# Patient Record
Sex: MALE | Race: WHITE | NOT HISPANIC OR LATINO | Employment: OTHER | URBAN - NONMETROPOLITAN AREA
[De-identification: names, ages, dates, MRNs, and addresses within clinical notes are randomized per-mention and may not be internally consistent; named-entity substitution may affect disease eponyms.]

---

## 2020-07-31 ENCOUNTER — APPOINTMENT (OUTPATIENT)
Dept: CT IMAGING | Facility: HOSPITAL | Age: 65
End: 2020-07-31
Attending: EMERGENCY MEDICINE
Payer: MEDICARE

## 2020-07-31 ENCOUNTER — APPOINTMENT (OUTPATIENT)
Dept: GENERAL RADIOLOGY | Facility: HOSPITAL | Age: 65
End: 2020-07-31
Attending: EMERGENCY MEDICINE
Payer: MEDICARE

## 2020-07-31 ENCOUNTER — HOSPITAL ENCOUNTER (EMERGENCY)
Facility: HOSPITAL | Age: 65
Discharge: HOME | End: 2020-07-31
Attending: EMERGENCY MEDICINE | Admitting: EMERGENCY MEDICINE
Payer: MEDICARE

## 2020-07-31 VITALS
SYSTOLIC BLOOD PRESSURE: 119 MMHG | RESPIRATION RATE: 16 BRPM | OXYGEN SATURATION: 93 % | TEMPERATURE: 97.9 F | WEIGHT: 225.31 LBS | HEART RATE: 97 BPM | DIASTOLIC BLOOD PRESSURE: 87 MMHG

## 2020-07-31 DIAGNOSIS — I50.9 CONGESTIVE HEART FAILURE, UNSPECIFIED HF CHRONICITY, UNSPECIFIED HEART FAILURE TYPE (H): ICD-10-CM

## 2020-07-31 DIAGNOSIS — K57.32 DIVERTICULITIS OF COLON: ICD-10-CM

## 2020-07-31 LAB
ANION GAP SERPL CALCULATED.3IONS-SCNC: 8 MMOL/L (ref 3–14)
BASOPHILS # BLD AUTO: 0.1 10E9/L (ref 0–0.2)
BASOPHILS NFR BLD AUTO: 0.4 %
BUN SERPL-MCNC: 11 MG/DL (ref 7–30)
CALCIUM SERPL-MCNC: 8.6 MG/DL (ref 8.5–10.1)
CHLORIDE SERPL-SCNC: 106 MMOL/L (ref 94–109)
CO2 SERPL-SCNC: 22 MMOL/L (ref 20–32)
CREAT SERPL-MCNC: 0.79 MG/DL (ref 0.66–1.25)
DIFFERENTIAL METHOD BLD: ABNORMAL
EOSINOPHIL # BLD AUTO: 0.2 10E9/L (ref 0–0.7)
EOSINOPHIL NFR BLD AUTO: 1.4 %
ERYTHROCYTE [DISTWIDTH] IN BLOOD BY AUTOMATED COUNT: 12.9 % (ref 10–15)
GFR SERPL CREATININE-BSD FRML MDRD: >90 ML/MIN/{1.73_M2}
GLUCOSE SERPL-MCNC: 118 MG/DL (ref 70–99)
HCT VFR BLD AUTO: 49 % (ref 40–53)
HGB BLD-MCNC: 17.2 G/DL (ref 13.3–17.7)
IMM GRANULOCYTES # BLD: 0.1 10E9/L (ref 0–0.4)
IMM GRANULOCYTES NFR BLD: 0.4 %
LYMPHOCYTES # BLD AUTO: 2.9 10E9/L (ref 0.8–5.3)
LYMPHOCYTES NFR BLD AUTO: 25.9 %
MCH RBC QN AUTO: 31.3 PG (ref 26.5–33)
MCHC RBC AUTO-ENTMCNC: 35.1 G/DL (ref 31.5–36.5)
MCV RBC AUTO: 89 FL (ref 78–100)
MONOCYTES # BLD AUTO: 1 10E9/L (ref 0–1.3)
MONOCYTES NFR BLD AUTO: 8.9 %
NEUTROPHILS # BLD AUTO: 7 10E9/L (ref 1.6–8.3)
NEUTROPHILS NFR BLD AUTO: 63 %
NRBC # BLD AUTO: 0 10*3/UL
NRBC BLD AUTO-RTO: 0 /100
NT-PROBNP SERPL-MCNC: 3183 PG/ML (ref 0–900)
PLATELET # BLD AUTO: 291 10E9/L (ref 150–450)
POTASSIUM SERPL-SCNC: 4.2 MMOL/L (ref 3.4–5.3)
PROCALCITONIN SERPL-MCNC: <0.05 NG/ML
RBC # BLD AUTO: 5.5 10E12/L (ref 4.4–5.9)
SODIUM SERPL-SCNC: 136 MMOL/L (ref 133–144)
WBC # BLD AUTO: 11.1 10E9/L (ref 4–11)

## 2020-07-31 PROCEDURE — 25500064 ZZH RX 255 OP 636: Performed by: EMERGENCY MEDICINE

## 2020-07-31 PROCEDURE — 36415 COLL VENOUS BLD VENIPUNCTURE: CPT | Performed by: EMERGENCY MEDICINE

## 2020-07-31 PROCEDURE — 71045 X-RAY EXAM CHEST 1 VIEW: CPT | Mod: TC

## 2020-07-31 PROCEDURE — 99285 EMERGENCY DEPT VISIT HI MDM: CPT | Mod: 25

## 2020-07-31 PROCEDURE — 83880 ASSAY OF NATRIURETIC PEPTIDE: CPT | Performed by: EMERGENCY MEDICINE

## 2020-07-31 PROCEDURE — C9803 HOPD COVID-19 SPEC COLLECT: HCPCS

## 2020-07-31 PROCEDURE — 84145 PROCALCITONIN (PCT): CPT | Performed by: EMERGENCY MEDICINE

## 2020-07-31 PROCEDURE — 80048 BASIC METABOLIC PNL TOTAL CA: CPT | Performed by: EMERGENCY MEDICINE

## 2020-07-31 PROCEDURE — U0003 INFECTIOUS AGENT DETECTION BY NUCLEIC ACID (DNA OR RNA); SEVERE ACUTE RESPIRATORY SYNDROME CORONAVIRUS 2 (SARS-COV-2) (CORONAVIRUS DISEASE [COVID-19]), AMPLIFIED PROBE TECHNIQUE, MAKING USE OF HIGH THROUGHPUT TECHNOLOGIES AS DESCRIBED BY CMS-2020-01-R: HCPCS | Performed by: EMERGENCY MEDICINE

## 2020-07-31 PROCEDURE — 74177 CT ABD & PELVIS W/CONTRAST: CPT | Mod: TC

## 2020-07-31 PROCEDURE — 85025 COMPLETE CBC W/AUTO DIFF WBC: CPT | Performed by: EMERGENCY MEDICINE

## 2020-07-31 PROCEDURE — 99285 EMERGENCY DEPT VISIT HI MDM: CPT | Mod: Z6 | Performed by: EMERGENCY MEDICINE

## 2020-07-31 RX ORDER — ACETAMINOPHEN 500 MG
1000 TABLET ORAL DAILY PRN
COMMUNITY

## 2020-07-31 RX ORDER — CYCLOBENZAPRINE HCL 10 MG
10 TABLET ORAL
COMMUNITY
Start: 2020-05-21 | End: 2021-02-26

## 2020-07-31 RX ORDER — TRAZODONE HYDROCHLORIDE 50 MG/1
100 TABLET, FILM COATED ORAL
COMMUNITY
Start: 2020-05-21 | End: 2021-08-25

## 2020-07-31 RX ORDER — GABAPENTIN 300 MG/1
300 CAPSULE ORAL
COMMUNITY
Start: 2020-04-13 | End: 2020-08-07

## 2020-07-31 RX ORDER — DULOXETIN HYDROCHLORIDE 60 MG/1
90 CAPSULE, DELAYED RELEASE ORAL DAILY
COMMUNITY
Start: 2020-05-21 | End: 2021-03-02

## 2020-07-31 RX ORDER — GUAIFENESIN 600 MG/1
1200 TABLET, EXTENDED RELEASE ORAL
Status: ON HOLD | COMMUNITY
Start: 2019-10-11 | End: 2022-02-08

## 2020-07-31 RX ORDER — ARIPIPRAZOLE 2 MG/1
2 TABLET ORAL
COMMUNITY
Start: 2019-08-11 | End: 2020-08-14

## 2020-07-31 RX ORDER — MONTELUKAST SODIUM 10 MG/1
TABLET ORAL
COMMUNITY
Start: 2019-09-08 | End: 2020-12-04

## 2020-07-31 RX ORDER — NAPROXEN 500 MG/1
TABLET ORAL
COMMUNITY
Start: 2020-03-31 | End: 2022-11-11

## 2020-07-31 RX ORDER — ASCORBIC ACID 500 MG
500 TABLET ORAL DAILY
COMMUNITY

## 2020-07-31 RX ORDER — CETIRIZINE HYDROCHLORIDE 10 MG/1
10 TABLET ORAL
COMMUNITY
Start: 2020-04-13 | End: 2020-08-14

## 2020-07-31 RX ORDER — HYDROXYZINE HYDROCHLORIDE 25 MG/1
12.5-25 TABLET, FILM COATED ORAL
COMMUNITY
Start: 2020-04-13 | End: 2020-10-13

## 2020-07-31 RX ORDER — FLUTICASONE PROPIONATE 50 MCG
2 SPRAY, SUSPENSION (ML) NASAL
COMMUNITY
Start: 2019-03-06 | End: 2022-11-11

## 2020-07-31 RX ORDER — IOPAMIDOL 612 MG/ML
100 INJECTION, SOLUTION INTRAVASCULAR ONCE
Status: COMPLETED | OUTPATIENT
Start: 2020-07-31 | End: 2020-07-31

## 2020-07-31 RX ORDER — ROSUVASTATIN CALCIUM 10 MG/1
10 TABLET, COATED ORAL
COMMUNITY
Start: 2019-06-04 | End: 2020-10-13

## 2020-07-31 RX ORDER — ZOLPIDEM TARTRATE 5 MG/1
10 TABLET ORAL
COMMUNITY
Start: 2020-06-08 | End: 2020-10-13

## 2020-07-31 RX ORDER — METRONIDAZOLE 500 MG/1
500 TABLET ORAL 2 TIMES DAILY
Qty: 20 TABLET | Refills: 0 | Status: SHIPPED | OUTPATIENT
Start: 2020-07-31 | End: 2020-08-10

## 2020-07-31 RX ORDER — AMPICILLIN TRIHYDRATE 500 MG
1 CAPSULE ORAL DAILY
COMMUNITY

## 2020-07-31 RX ORDER — HYDROCODONE BITARTRATE AND ACETAMINOPHEN 5; 325 MG/1; MG/1
1 TABLET ORAL EVERY 6 HOURS PRN
Qty: 18 TABLET | Refills: 0 | Status: SHIPPED | OUTPATIENT
Start: 2020-07-31 | End: 2020-08-03

## 2020-07-31 RX ORDER — ALBUTEROL SULFATE 90 UG/1
1-2 AEROSOL, METERED RESPIRATORY (INHALATION)
COMMUNITY
Start: 2019-03-13 | End: 2021-04-20

## 2020-07-31 RX ORDER — CEFPROZIL 500 MG/1
500 TABLET, FILM COATED ORAL 2 TIMES DAILY
Qty: 20 TABLET | Refills: 0 | Status: SHIPPED | OUTPATIENT
Start: 2020-07-31 | End: 2020-08-07

## 2020-07-31 RX ADMIN — IOPAMIDOL 100 ML: 612 INJECTION, SOLUTION INTRAVENOUS at 17:44

## 2020-07-31 ASSESSMENT — ENCOUNTER SYMPTOMS
NAUSEA: 0
SHORTNESS OF BREATH: 1
CONSTITUTIONAL NEGATIVE: 1
ABDOMINAL DISTENTION: 0
DIARRHEA: 0
CONSTIPATION: 0
ABDOMINAL PAIN: 1
COUGH: 1
BLOOD IN STOOL: 0
FATIGUE: 0
BACK PAIN: 0
VOMITING: 0
FEVER: 0

## 2020-07-31 NOTE — ED PROVIDER NOTES
History     Chief Complaint   Patient presents with     Abdominal Pain     HPI  Joey Irene is a 64 year old male who presents to the emergency department with a chief complaint of abdominal pain and a nonproductive cough.  Patient states that both of these symptoms started approximately 4 days ago.  Regards to the cough he states that he has been appropriately quarantining himself from COVID and is been wearing a mask at all times.  He has no known exposures to said disease denies experiencing exertional dyspnea denies chest pain no pleuritic complaints.  Patient's left lower quadrant abdominal pain is constant it is not associated with diarrhea or constipation nor is it associated with nausea or vomiting.    Allergies:  Allergies   Allergen Reactions     Tramadol Nausea and Swelling     [place] Nite Time Dizziness and Nausea and Vomiting     Nyquil Cold &  [Night-Time Cold-Flu Relief] Dizziness and Nausea and Vomiting       Problem List:    There are no active problems to display for this patient.       Past Medical History:    No past medical history on file.    Past Surgical History:    No past surgical history on file.    Family History:    No family history on file.    Social History:  Marital Status:   [4]  Social History     Tobacco Use     Smoking status: Not on file   Substance Use Topics     Alcohol use: Not on file     Drug use: Not on file        Medications:    albuterol (PROAIR HFA/PROVENTIL HFA/VENTOLIN HFA) 108 (90 Base) MCG/ACT inhaler  ARIPiprazole (ABILIFY) 2 MG tablet  aspirin (ASA) 81 MG EC tablet  cefPROZIL (CEFZIL) 500 MG tablet  cetirizine (ZYRTEC) 10 MG tablet  cyclobenzaprine (FLEXERIL) 10 MG tablet  DULoxetine (CYMBALTA) 60 MG capsule  fluticasone (FLONASE) 50 MCG/ACT nasal spray  Fluticasone-Umeclidin-Vilanterol (TRELEGY ELLIPTA) 100-62.5-25 MCG/INH oral inhaler  gabapentin (NEURONTIN) 300 MG capsule  guaiFENesin (MUCINEX) 600 MG 12 hr tablet  HYDROcodone-acetaminophen (NORCO)  5-325 MG tablet  hydrOXYzine (ATARAX) 25 MG tablet  metroNIDAZOLE (FLAGYL) 500 MG tablet  montelukast (SINGULAIR) 10 MG tablet  naproxen (NAPROSYN) 500 MG tablet  omeprazole (PRILOSEC) 20 MG DR capsule  rosuvastatin (CRESTOR) 10 MG tablet  traZODone (DESYREL) 50 MG tablet  zolpidem (AMBIEN) 5 MG tablet  acetaminophen (TYLENOL) 500 MG tablet  Cholecalciferol (D 1000) 25 MCG (1000 UT) CAPS  vitamin C (ASCORBIC ACID) 500 MG tablet  VITAMIN E-100 OR          Review of Systems   Constitutional: Negative.  Negative for fatigue and fever.   Respiratory: Positive for cough and shortness of breath.    Cardiovascular: Negative for chest pain.   Gastrointestinal: Positive for abdominal pain. Negative for abdominal distention, blood in stool, constipation, diarrhea, nausea and vomiting.   Musculoskeletal: Negative for back pain.   All other systems reviewed and are negative.      Physical Exam   BP: 119/87  Pulse: 97  Temp: 97.9  F (36.6  C)  Resp: 16  Weight: 102.2 kg (225 lb 5 oz)  SpO2: 93 %      Physical Exam  Vitals signs and nursing note reviewed.   Constitutional:       Appearance: He is well-developed.   HENT:      Head: Normocephalic.   Neck:      Musculoskeletal: Normal range of motion.   Cardiovascular:      Rate and Rhythm: Normal rate.   Pulmonary:      Effort: Pulmonary effort is normal.      Breath sounds: Rales present.   Abdominal:      General: Bowel sounds are normal. There is no distension.      Palpations: Abdomen is soft.      Tenderness: There is abdominal tenderness in the left lower quadrant.      Comments: Tenderness in the left lower quadrant   Musculoskeletal: Normal range of motion.   Skin:     General: Skin is warm and dry.   Neurological:      General: No focal deficit present.      Mental Status: He is alert and oriented to person, place, and time.         ED Course        Procedures                 Results for orders placed or performed during the hospital encounter of 07/31/20 (from the past 24  hour(s))   CBC with platelets differential   Result Value Ref Range    WBC 11.1 (H) 4.0 - 11.0 10e9/L    RBC Count 5.50 4.4 - 5.9 10e12/L    Hemoglobin 17.2 13.3 - 17.7 g/dL    Hematocrit 49.0 40.0 - 53.0 %    MCV 89 78 - 100 fl    MCH 31.3 26.5 - 33.0 pg    MCHC 35.1 31.5 - 36.5 g/dL    RDW 12.9 10.0 - 15.0 %    Platelet Count 291 150 - 450 10e9/L    Diff Method Automated Method     % Neutrophils 63.0 %    % Lymphocytes 25.9 %    % Monocytes 8.9 %    % Eosinophils 1.4 %    % Basophils 0.4 %    % Immature Granulocytes 0.4 %    Nucleated RBCs 0 0 /100    Absolute Neutrophil 7.0 1.6 - 8.3 10e9/L    Absolute Lymphocytes 2.9 0.8 - 5.3 10e9/L    Absolute Monocytes 1.0 0.0 - 1.3 10e9/L    Absolute Eosinophils 0.2 0.0 - 0.7 10e9/L    Absolute Basophils 0.1 0.0 - 0.2 10e9/L    Abs Immature Granulocytes 0.1 0 - 0.4 10e9/L    Absolute Nucleated RBC 0.0    Basic metabolic panel   Result Value Ref Range    Sodium 136 133 - 144 mmol/L    Potassium 4.2 3.4 - 5.3 mmol/L    Chloride 106 94 - 109 mmol/L    Carbon Dioxide 22 20 - 32 mmol/L    Anion Gap 8 3 - 14 mmol/L    Glucose 118 (H) 70 - 99 mg/dL    Urea Nitrogen 11 7 - 30 mg/dL    Creatinine 0.79 0.66 - 1.25 mg/dL    GFR Estimate >90 >60 mL/min/[1.73_m2]    GFR Estimate If Black >90 >60 mL/min/[1.73_m2]    Calcium 8.6 8.5 - 10.1 mg/dL   Procalcitonin   Result Value Ref Range    Procalcitonin <0.05 ng/ml   NT pro BNP   Result Value Ref Range    N-Terminal Pro BNP Inpatient 3,183 (H) 0 - 900 pg/mL   XR Chest Port 1 View    Narrative    PROCEDURE:  XR CHEST PORT 1 VW    HISTORY: eval for sob. .    COMPARISON:  None.    FINDINGS:    The cardiomediastinal contours are notable for probable enlargement of  the pulmonary artery. Interstitial markings are coarsened. A few  Kerley B lines are questioned. Some hazy opacity is present at the  bases.       Impression    IMPRESSION:  Possible interstitial pulmonary edema. Superimposed  infection is not excluded in the appropriate clinical  context.  Consider short interval follow-up.     EMMY EARL MD   Abd/pelvis CT - IV contrast only TRAUMA / AAA    Narrative    PROCEDURE:  CT ABDOMEN PELVIS W CONTRAST    HISTORY: Abd pain, diverticulitis suspected    TECHNIQUE:  Helical CT of the abdomen and pelvis was performed  following injection of intravenous contrast.    COMPARISON:  None.    MEDS/CONTRAST: Isovue 300 100ml Given    FINDINGS:      Limited images through the lung bases demonstrate cardiomegaly. No  edema is seen at the bases. Atelectasis and emphysematous changes are  noted.    The small bowel is normal in caliber. There is focal inflammation  adjacent to multiple diverticula in the proximal sigmoid colon. No  free air or abscess formation is seen.    The liver demonstrates a small cyst.  The gallbladder, spleen,  pancreas and adrenal glands are unremarkable.  Symmetric nephrograms  are present without hydronephrosis. The aorta is normal in size with  scattered atherosclerotic calcifications. The prostate is enlarged.    No suspicious osseous lesions are identified.      Impression    IMPRESSION:      Acute diverticulitis of the proximal sigmoid colon without evidence of  abscess or free air.    EMMY EARL MD       Medications   sodium chloride (PF) 0.9% PF flush 60 mL (60 mLs Intravenous Given 7/31/20 1744)   iopamidol (ISOVUE-300) IV solution 61% 100 mL (100 mLs Intravenous Given 7/31/20 1744)       Assessments & Plan (with Medical Decision Making)  1. Diverticulitis-patient presents to the ER with a chief complaint of left-sided abdominal pain x4 days.  He does not complain of diarrhea constipation or fevers nor change in his appetite.  CT scan reveals evidence of diverticulitis without abscess formation.  This will be treated in the outpatient setting with cefprozil as well as Flagyl for a course of 10 days.  2. Cough-is likely a cardiac cough he has evidence of pulmonary edema and an elevated BNP.  Patient has no known  history of cardiac disease he just moved to [place] he needs to establish care with the PCP for further diagnostic work-up.  I placed an order for a resting cardiac echo in addition I did advise him to follow-up with the PCP to discuss utility of starting diuretics.  Lastly he does have a COVID test that is pending.     I have reviewed the nursing notes.    I have reviewed the findings, diagnosis, plan and need for follow up with the patient.      New Prescriptions    CEFPROZIL (CEFZIL) 500 MG TABLET    Take 1 tablet (500 mg) by mouth 2 times daily for 10 days    HYDROCODONE-ACETAMINOPHEN (NORCO) 5-325 MG TABLET    Take 1 tablet by mouth every 6 hours as needed for pain    METRONIDAZOLE (FLAGYL) 500 MG TABLET    Take 1 tablet (500 mg) by mouth 2 times daily for 10 days       Final diagnoses:   Diverticulitis of colon   Congestive heart failure, unspecified HF chronicity, unspecified heart failure type (H)       7/31/2020   [facility]     Sung Flood MD  07/31/20 9122

## 2020-07-31 NOTE — ED AVS SNAPSHOT
[facility]  [address]  [place] [address]  Phone:  [phone]                                    Joey Irene   MRN: [id number]    Department:  [facility]   Date of Visit:  7/31/2020           After Visit Summary Signature Page    I have received my discharge instructions, and my questions have been answered. I have discussed any challenges I see with this plan with the nurse or doctor.    ..........................................................................................................................................  Patient/Patient Representative Signature      ..........................................................................................................................................  Patient Representative Print Name and Relationship to Patient    ..................................................               ................................................  Date                                   Time    ..........................................................................................................................................  Reviewed by Signature/Title    ...................................................              ..............................................  Date                                               Time          22EPIC Rev 08/18

## 2020-07-31 NOTE — DISCHARGE INSTRUCTIONS
Please follow up with your doctor to arrange a cardiac echo because your blood tests show a component of early heart failure.  Take antibiotics as prescribed for your intestinal infection called diverticulitis.

## 2020-07-31 NOTE — ED NOTES
Pt states he has had a cough for 4 days. Denies and any fevers or body aches. States LLQ pain for 4 days that is worse with coughing and when he presses on area.

## 2020-08-03 ENCOUNTER — HOSPITAL ENCOUNTER (EMERGENCY)
Facility: HOSPITAL | Age: 65
Discharge: HOME | End: 2020-08-04
Attending: EMERGENCY MEDICINE | Admitting: EMERGENCY MEDICINE
Payer: MEDICARE

## 2020-08-03 ENCOUNTER — APPOINTMENT (OUTPATIENT)
Dept: GENERAL RADIOLOGY | Facility: HOSPITAL | Age: 65
End: 2020-08-03
Attending: EMERGENCY MEDICINE
Payer: MEDICARE

## 2020-08-03 DIAGNOSIS — S61.210A LACERATION OF RIGHT INDEX FINGER WITHOUT FOREIGN BODY WITHOUT DAMAGE TO NAIL, INITIAL ENCOUNTER: ICD-10-CM
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
## 2020-08-03 DIAGNOSIS — S62.659A CLOSED NONDISPLACED FRACTURE OF MIDDLE PHALANX OF FINGER, UNSPECIFIED FINGER, INITIAL ENCOUNTER: ICD-10-CM

## 2020-08-03 LAB
SARS-COV-2 RNA SPEC QL NAA+PROBE: NOT DETECTED
SPECIMEN SOURCE: NORMAL

## 2020-08-03 PROCEDURE — 12001 RPR S/N/AX/GEN/TRNK 2.5CM/<: CPT | Performed by: EMERGENCY MEDICINE

## 2020-08-03 PROCEDURE — 90471 IMMUNIZATION ADMIN: CPT

## 2020-08-03 PROCEDURE — 25000128 H RX IP 250 OP 636: Performed by: EMERGENCY MEDICINE

## 2020-08-03 PROCEDURE — 90715 TDAP VACCINE 7 YRS/> IM: CPT | Performed by: EMERGENCY MEDICINE

## 2020-08-03 PROCEDURE — 12001 RPR S/N/AX/GEN/TRNK 2.5CM/<: CPT

## 2020-08-03 PROCEDURE — 99283 EMERGENCY DEPT VISIT LOW MDM: CPT | Mod: 25 | Performed by: EMERGENCY MEDICINE

## 2020-08-03 PROCEDURE — 99283 EMERGENCY DEPT VISIT LOW MDM: CPT | Mod: 25

## 2020-08-03 PROCEDURE — 73140 X-RAY EXAM OF FINGER(S): CPT | Mod: TC,RT

## 2020-08-03 RX ADMIN — CLOSTRIDIUM TETANI TOXOID ANTIGEN (FORMALDEHYDE INACTIVATED), CORYNEBACTERIUM DIPHTHERIAE TOXOID ANTIGEN (FORMALDEHYDE INACTIVATED), BORDETELLA PERTUSSIS TOXOID ANTIGEN (GLUTARALDEHYDE INACTIVATED), BORDETELLA PERTUSSIS FILAMENTOUS HEMAGGLUTININ ANTIGEN (FORMALDEHYDE INACTIVATED), BORDETELLA PERTUSSIS PERTACTIN ANTIGEN, AND BORDETELLA PERTUSSIS FIMBRIAE 2/3 ANTIGEN 0.5 ML: 5; 2; 2.5; 5; 3; 5 INJECTION, SUSPENSION INTRAMUSCULAR at 23:21

## 2020-08-03 SDOH — HEALTH STABILITY: MENTAL HEALTH: HOW OFTEN DO YOU HAVE A DRINK CONTAINING ALCOHOL?: NEVER

## 2020-08-03 NOTE — ED AVS SNAPSHOT
[facility]  [address]  [place] [address]  Phone:  [phone]                                    Joey Irene   MRN: [id number]    Department:  [facility]   Date of Visit:  8/3/2020           After Visit Summary Signature Page    I have received my discharge instructions, and my questions have been answered. I have discussed any challenges I see with this plan with the nurse or doctor.    ..........................................................................................................................................  Patient/Patient Representative Signature      ..........................................................................................................................................  Patient Representative Print Name and Relationship to Patient    ..................................................               ................................................  Date                                   Time    ..........................................................................................................................................  Reviewed by Signature/Title    ...................................................              ..............................................  Date                                               Time          22EPIC Rev 08/18

## 2020-08-04 VITALS
DIASTOLIC BLOOD PRESSURE: 84 MMHG | OXYGEN SATURATION: 94 % | SYSTOLIC BLOOD PRESSURE: 120 MMHG | HEART RATE: 82 BPM | TEMPERATURE: 98.4 F | RESPIRATION RATE: 16 BRPM

## 2020-08-04 PROCEDURE — 25000132 ZZH RX MED GY IP 250 OP 250 PS 637: Mod: GY | Performed by: EMERGENCY MEDICINE

## 2020-08-04 RX ADMIN — AMOXICILLIN AND CLAVULANATE POTASSIUM 1 TABLET: 875; 125 TABLET, FILM COATED ORAL at 00:38

## 2020-08-04 NOTE — DISCHARGE INSTRUCTIONS
1) Follow the aftercare instructions provided  2) Your sutures will be removed in 7 to 10 days  3) You have been given a prescription for a medication that can cause an allergic reactions. Return to the ER if you develop any itching, tongue swelling, wheezing or shortness of breath.  4) Stop the Cefzil antibiotic and start the new Augmentin antibiotic. Continue with the Flagyl medication.   5) You must be seen by orthopedics this week for recheck.  You may call orthopedic associates if you have not been contacted by our orthopedic Department.  6) Return to the ER for a referral if you cannot schedule an orthopedic appointment this week.   6) Do not wear your splint for more than a week unless instructed to do so by a doctor

## 2020-08-04 NOTE — ED PROVIDER NOTES
History     Chief Complaint   Patient presents with     Dog Bite     right index finger laceration from dog bite about 10 mn ago, pts dog, shots up to date     HPI  Joey Irene is a 64 year old male who presents today with complaints of a dog bite.  Patient states that approximately 20 minutes prior to arrival, patient noted his dog had a toy in his hand.  He attempted to move it when the dog bit down and in the process bit into his finger.  Patient had to forcibly remove his finger.  Dog is up-to-date with all immunizations.  Last tetanus was over 10 years ago for patient.  Patient is right-handed.  No additional complaints.    Allergies:  Allergies   Allergen Reactions     Tramadol Nausea and Swelling     [place] Nite Time Dizziness and Nausea and Vomiting     Nyquil Cold &  [Night-Time Cold-Flu Relief] Dizziness and Nausea and Vomiting       Problem List:    There are no active problems to display for this patient.       Past Medical History:    History reviewed. No pertinent past medical history.    Past Surgical History:    History reviewed. No pertinent surgical history.    Family History:    History reviewed. No pertinent family history.    Social History:  Marital Status:   [4]  Social History     Tobacco Use     Smoking status: Current Some Day Smoker     Smokeless tobacco: Never Used     Tobacco comment: uses nicorette, one cigar per day   Substance Use Topics     Alcohol use: Never     Frequency: Never     Drug use: Never        Medications:    acetaminophen (TYLENOL) 500 MG tablet  albuterol (PROAIR HFA/PROVENTIL HFA/VENTOLIN HFA) 108 (90 Base) MCG/ACT inhaler  ARIPiprazole (ABILIFY) 2 MG tablet  aspirin (ASA) 81 MG EC tablet  cefPROZIL (CEFZIL) 500 MG tablet  cetirizine (ZYRTEC) 10 MG tablet  Cholecalciferol (D 1000) 25 MCG (1000 UT) CAPS  cyclobenzaprine (FLEXERIL) 10 MG tablet  DULoxetine (CYMBALTA) 60 MG capsule  fluticasone (FLONASE) 50 MCG/ACT nasal spray  Fluticasone-Umeclidin-Vilanterol  (TRELEGY ELLIPTA) 100-62.5-25 MCG/INH oral inhaler  gabapentin (NEURONTIN) 300 MG capsule  guaiFENesin (MUCINEX) 600 MG 12 hr tablet  HYDROcodone-acetaminophen (NORCO) 5-325 MG tablet  hydrOXYzine (ATARAX) 25 MG tablet  metroNIDAZOLE (FLAGYL) 500 MG tablet  montelukast (SINGULAIR) 10 MG tablet  naproxen (NAPROSYN) 500 MG tablet  omeprazole (PRILOSEC) 20 MG DR capsule  rosuvastatin (CRESTOR) 10 MG tablet  traZODone (DESYREL) 50 MG tablet  vitamin C (ASCORBIC ACID) 500 MG tablet  VITAMIN E-100 OR  zolpidem (AMBIEN) 5 MG tablet          Review of Systems   All other systems reviewed and are negative.      Physical Exam   BP: 125/90  Pulse: 65  Temp: 97.1  F (36.2  C)  Resp: 18  SpO2: 92 %      Physical Exam  Constitutional:       General: He is not in acute distress.     Appearance: Normal appearance. He is normal weight. He is not toxic-appearing.   Neck:      Musculoskeletal: Normal range of motion and neck supple.   Pulmonary:      Effort: Pulmonary effort is normal.   Musculoskeletal:      Comments: Right hand -1.5 cm laceration over volar aspect of DIP of first finger.  Full range of motion of DIP and PIP of involved finger (intact flexion and extension of DIP and PIP).  Good radial pulses.  Sensation intact in all extremities.  Good capillary refill.  Full range of motion of wrist.  Uninjured above the wrist.    Slight bruising noted over volar aspect of middle phalanx and second area over distal phalanx.   Skin:     General: Skin is warm.      Capillary Refill: Capillary refill takes less than 2 seconds.   Neurological:      General: No focal deficit present.      Mental Status: He is alert.   Psychiatric:         Mood and Affect: Mood normal.         Behavior: Behavior normal.         Thought Content: Thought content normal.         Judgment: Judgment normal.         ED Course        Procedures    Wound was anesthestized with 8 ml of 1% lidocaine (no epi) by digital block. Wound was irrigated copiously with 500  ml of NS by jet propulsion. Under direct light and in a bloodless field, bottom of the wound seen and no foreign body or tendon injury noted. Under sterile conditions, wound closed with three loose 4-0 non-absorbable interrupted stitches. Wound edges well approximated. No complications.      X-rays - ? Lucency through volar base of middle finger suspicious for nondisplaced avulsion fx.   No results found for this or any previous visit (from the past 24 hour(s)).    Medications   Tdap (tetanus-diphtheria-acell pertussis) (ADACEL) injection 0.5 mL (0.5 mLs Intramuscular Given 8/3/20 8659)       Assessments & Plan (with Medical Decision Making)     64-year-old male who presents today with complaints of finger bite from his own dog.  Dog's immunization records are up-to-date.  Patient recently seen in our emergency department for diverticulitis and placed on Cefzil and Flagyl.  X-rays performed showing a possible nondisplaced avulsion fracture of the base of the middle phalanx.  Wound closed primarily after discussion with the plastic surgeon on-call at [facility], Dr. Lizarraga, who confirmed that it is safe to do so loosely.  Patient placed in finger splint afterwards.      Patient is going to be discharged home with follow-up with orthopedics this week.  Cefzil was discontinued and patient started on Augmentin for dog bite prophylaxis as well as continued treatment for diverticulitis.  Wound care instructions reviewed with patient.  O2 sats noted but patient has hx of COPD with previous visits with similar O2 levels.  Patient understands to return if he develops any new or worsening symptoms.    Due to the nature of this electronic medical record, laboratory results, imaging results, diagnosis, other information and medications reported above may not represent information available to me at the the time of my care and disposition. Medications reported above may have not been ordered by me.     Portions of the record may  have been created with voice recognition software. Occasional wrong-word or 'sound-a- like' substitution may have occurred due to the inherent limitations of voice recognition software. Though the chart has been reviewed, there may be inadvertent transcription errors. Read the chart carefully and recognize, using context, where substitutions have occurred.       New Prescriptions    No medications on file       Final diagnoses:   Laceration of right index finger without foreign body without damage to nail, initial encounter   Closed nondisplaced fracture of middle phalanx of finger, unspecified finger, initial encounter   Dog bite, initial encounter       8/3/2020   [facility]     Michelle Juan MD  08/04/20 0125       Michelle Juan MD  08/04/20 0128

## 2020-08-04 NOTE — ED NOTES
"Pt was playing with his dog with a toy when the dog accidentally grabbed onto pts distal right index finger, catching dogs tooth on pts finger, lacerating it approx 3 cm in a \"L\" shape. Bleeding controlled. Call light in reach.   "

## 2020-08-05 PROBLEM — I51.7 CARDIOMEGALY: Status: ACTIVE | Noted: 2020-08-05

## 2020-08-05 PROBLEM — E78.5 HYPERLIPIDEMIA, UNSPECIFIED HYPERLIPIDEMIA TYPE: Status: ACTIVE | Noted: 2020-08-05

## 2020-08-05 PROBLEM — N40.0 ENLARGED PROSTATE: Status: ACTIVE | Noted: 2020-08-05

## 2020-08-05 PROBLEM — J43.9 PULMONARY EMPHYSEMA, UNSPECIFIED EMPHYSEMA TYPE (H): Status: ACTIVE | Noted: 2020-08-05
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
## 2020-08-06 PROBLEM — F11.10 OPIOID ABUSE (H): Status: ACTIVE | Noted: 2020-08-06

## 2020-08-06 PROBLEM — E66.811 CLASS 1 OBESITY WITH BODY MASS INDEX (BMI) OF 33.0 TO 33.9 IN ADULT: Status: ACTIVE | Noted: 2020-02-03

## 2020-08-06 PROBLEM — Z96.652 STATUS POST TOTAL LEFT KNEE REPLACEMENT: Status: ACTIVE | Noted: 2017-02-25

## 2020-08-06 PROBLEM — J34.2 NASAL SEPTAL DEVIATION: Status: ACTIVE | Noted: 2017-12-18

## 2020-08-06 PROBLEM — Z96.652: Status: ACTIVE | Noted: 2020-01-27

## 2020-08-06 PROBLEM — F17.200 TOBACCO USE DISORDER: Status: ACTIVE | Noted: 2020-08-06

## 2020-08-06 PROBLEM — F41.1 GENERALIZED ANXIETY DISORDER: Status: ACTIVE | Noted: 2020-08-06

## 2020-08-06 PROBLEM — K57.32 DIVERTICULITIS LARGE INTESTINE: Status: ACTIVE | Noted: 2020-08-06

## 2020-08-06 PROBLEM — M48.02 CERVICAL STENOSIS OF SPINAL CANAL: Status: ACTIVE | Noted: 2020-01-13

## 2020-08-06 PROBLEM — F17.210 SMOKING GREATER THAN 40 PACK YEARS: Status: ACTIVE | Noted: 2020-02-03

## 2020-08-06 PROBLEM — J44.89 CHRONIC BRONCHITIS WITH EMPHYSEMA (H): Status: ACTIVE | Noted: 2020-02-03

## 2020-08-06 PROBLEM — H91.92: Status: ACTIVE | Noted: 2019-03-04

## 2020-08-06 PROBLEM — T84.84XS: Status: ACTIVE | Noted: 2020-01-27

## 2020-08-06 PROBLEM — K57.90 DIVERTICULOSIS: Status: ACTIVE | Noted: 2020-03-25

## 2020-08-06 PROBLEM — Z98.1 HISTORY OF FUSION OF CERVICAL SPINE: Status: ACTIVE | Noted: 2020-08-06

## 2020-08-06 PROBLEM — H71.92 CHOLESTEATOMA OF LEFT EAR: Status: ACTIVE | Noted: 2019-03-04

## 2020-08-06 PROBLEM — M17.11 PRIMARY OSTEOARTHRITIS OF RIGHT KNEE: Status: ACTIVE | Noted: 2017-02-25

## 2020-08-07 ENCOUNTER — TELEPHONE (OUTPATIENT)
Dept: UROLOGY | Facility: OTHER | Age: 65
End: 2020-08-07

## 2020-08-07 ENCOUNTER — OFFICE VISIT (OUTPATIENT)
Dept: FAMILY MEDICINE | Facility: OTHER | Age: 65
End: 2020-08-07
Attending: NURSE PRACTITIONER
Payer: MEDICARE

## 2020-08-07 VITALS
WEIGHT: 223 LBS | TEMPERATURE: 97.1 F | DIASTOLIC BLOOD PRESSURE: 80 MMHG | SYSTOLIC BLOOD PRESSURE: 110 MMHG | HEART RATE: 87 BPM | BODY MASS INDEX: 31.22 KG/M2 | HEIGHT: 71 IN

## 2020-08-07 DIAGNOSIS — Z13.29 SCREENING FOR THYROID DISORDER: ICD-10-CM

## 2020-08-07 DIAGNOSIS — N40.0 ENLARGED PROSTATE: ICD-10-CM

## 2020-08-07 DIAGNOSIS — Z11.59 ENCOUNTER FOR HEPATITIS C SCREENING TEST FOR LOW RISK PATIENT: ICD-10-CM

## 2020-08-07 DIAGNOSIS — Z76.89 ENCOUNTER TO ESTABLISH CARE: Primary | ICD-10-CM

## 2020-08-07 DIAGNOSIS — R73.9 HYPERGLYCEMIA: ICD-10-CM

## 2020-08-07 DIAGNOSIS — R79.89 ELEVATED TSH: ICD-10-CM

## 2020-08-07 DIAGNOSIS — J81.0 ACUTE PULMONARY EDEMA (H): ICD-10-CM

## 2020-08-07 DIAGNOSIS — I51.7 CARDIOMEGALY: ICD-10-CM

## 2020-08-07 DIAGNOSIS — Z91.030 BEE STING ALLERGY: ICD-10-CM

## 2020-08-07 DIAGNOSIS — Z11.4 SCREENING FOR HIV (HUMAN IMMUNODEFICIENCY VIRUS): ICD-10-CM

## 2020-08-07 DIAGNOSIS — Z23 NEED FOR PNEUMOCOCCAL VACCINATION: ICD-10-CM

## 2020-08-07 DIAGNOSIS — Z12.5 SCREENING FOR PROSTATE CANCER: ICD-10-CM

## 2020-08-07 DIAGNOSIS — Z12.11 SCREEN FOR COLON CANCER: ICD-10-CM

## 2020-08-07 DIAGNOSIS — K57.92 ACUTE DIVERTICULITIS: ICD-10-CM

## 2020-08-07 DIAGNOSIS — E78.5 HYPERLIPIDEMIA, UNSPECIFIED HYPERLIPIDEMIA TYPE: ICD-10-CM

## 2020-08-07 DIAGNOSIS — S62.629D CLOSED AVULSION FRACTURE OF MIDDLE PHALANX OF FINGER WITH ROUTINE HEALING, SUBSEQUENT ENCOUNTER: ICD-10-CM

## 2020-08-07 DIAGNOSIS — J43.9 PULMONARY EMPHYSEMA, UNSPECIFIED EMPHYSEMA TYPE (H): ICD-10-CM

## 2020-08-07 DIAGNOSIS — R97.20 ELEVATED PROSTATE SPECIFIC ANTIGEN (PSA): ICD-10-CM

## 2020-08-07 LAB
ALBUMIN SERPL-MCNC: 3.6 G/DL (ref 3.4–5)
ALP SERPL-CCNC: 77 U/L (ref 40–150)
ALT SERPL W P-5'-P-CCNC: 18 U/L (ref 0–70)
ANION GAP SERPL CALCULATED.3IONS-SCNC: 5 MMOL/L (ref 3–14)
AST SERPL W P-5'-P-CCNC: 13 U/L (ref 0–45)
BASOPHILS # BLD AUTO: 0.1 10E9/L (ref 0–0.2)
BASOPHILS NFR BLD AUTO: 0.8 %
BILIRUB SERPL-MCNC: 0.3 MG/DL (ref 0.2–1.3)
BUN SERPL-MCNC: 17 MG/DL (ref 7–30)
CALCIUM SERPL-MCNC: 9.2 MG/DL (ref 8.5–10.1)
CHLORIDE SERPL-SCNC: 106 MMOL/L (ref 94–109)
CHOLEST SERPL-MCNC: 98 MG/DL
CO2 SERPL-SCNC: 25 MMOL/L (ref 20–32)
CREAT SERPL-MCNC: 0.76 MG/DL (ref 0.66–1.25)
DIFFERENTIAL METHOD BLD: ABNORMAL
EOSINOPHIL # BLD AUTO: 0.2 10E9/L (ref 0–0.7)
EOSINOPHIL NFR BLD AUTO: 1.8 %
ERYTHROCYTE [DISTWIDTH] IN BLOOD BY AUTOMATED COUNT: 12.9 % (ref 10–15)
EST. AVERAGE GLUCOSE BLD GHB EST-MCNC: 128 MG/DL
GFR SERPL CREATININE-BSD FRML MDRD: >90 ML/MIN/{1.73_M2}
GLUCOSE SERPL-MCNC: 114 MG/DL (ref 70–99)
HBA1C MFR BLD: 6.1 % (ref 0–5.6)
HCT VFR BLD AUTO: 50.7 % (ref 40–53)
HDLC SERPL-MCNC: 29 MG/DL
HGB BLD-MCNC: 17.5 G/DL (ref 13.3–17.7)
IMM GRANULOCYTES # BLD: 0.1 10E9/L (ref 0–0.4)
IMM GRANULOCYTES NFR BLD: 0.6 %
LDLC SERPL CALC-MCNC: 34 MG/DL
LYMPHOCYTES # BLD AUTO: 2.9 10E9/L (ref 0.8–5.3)
LYMPHOCYTES NFR BLD AUTO: 24.6 %
MCH RBC QN AUTO: 30.9 PG (ref 26.5–33)
MCHC RBC AUTO-ENTMCNC: 34.5 G/DL (ref 31.5–36.5)
MCV RBC AUTO: 89 FL (ref 78–100)
MONOCYTES # BLD AUTO: 1 10E9/L (ref 0–1.3)
MONOCYTES NFR BLD AUTO: 8.1 %
NEUTROPHILS # BLD AUTO: 7.5 10E9/L (ref 1.6–8.3)
NEUTROPHILS NFR BLD AUTO: 64.1 %
NONHDLC SERPL-MCNC: 69 MG/DL
NRBC # BLD AUTO: 0 10*3/UL
NRBC BLD AUTO-RTO: 0 /100
NT-PROBNP SERPL-MCNC: 3201 PG/ML (ref 0–125)
PLATELET # BLD AUTO: 329 10E9/L (ref 150–450)
POTASSIUM SERPL-SCNC: 4.4 MMOL/L (ref 3.4–5.3)
PROT SERPL-MCNC: 7.5 G/DL (ref 6.8–8.8)
PSA SERPL-ACNC: 10.9 UG/L (ref 0–4)
RBC # BLD AUTO: 5.67 10E12/L (ref 4.4–5.9)
SODIUM SERPL-SCNC: 136 MMOL/L (ref 133–144)
T4 FREE SERPL-MCNC: 0.97 NG/DL (ref 0.76–1.46)
TRIGL SERPL-MCNC: 173 MG/DL
TSH SERPL DL<=0.005 MIU/L-ACNC: 5.92 MU/L (ref 0.4–4)
WBC # BLD AUTO: 11.7 10E9/L (ref 4–11)

## 2020-08-07 PROCEDURE — 80053 COMPREHEN METABOLIC PANEL: CPT | Mod: ZL | Performed by: NURSE PRACTITIONER

## 2020-08-07 PROCEDURE — 83036 HEMOGLOBIN GLYCOSYLATED A1C: CPT | Mod: ZL | Performed by: NURSE PRACTITIONER

## 2020-08-07 PROCEDURE — G0009 ADMIN PNEUMOCOCCAL VACCINE: HCPCS

## 2020-08-07 PROCEDURE — 86803 HEPATITIS C AB TEST: CPT | Mod: ZL | Performed by: NURSE PRACTITIONER

## 2020-08-07 PROCEDURE — 40000788 ZZHCL STATISTIC ESTIMATED AVERAGE GLUCOSE: Mod: ZL | Performed by: NURSE PRACTITIONER

## 2020-08-07 PROCEDURE — 84153 ASSAY OF PSA TOTAL: CPT | Mod: ZL | Performed by: NURSE PRACTITIONER

## 2020-08-07 PROCEDURE — G0463 HOSPITAL OUTPT CLINIC VISIT: HCPCS | Mod: 25

## 2020-08-07 PROCEDURE — 90732 PPSV23 VACC 2 YRS+ SUBQ/IM: CPT

## 2020-08-07 PROCEDURE — 87389 HIV-1 AG W/HIV-1&-2 AB AG IA: CPT | Mod: ZL | Performed by: NURSE PRACTITIONER

## 2020-08-07 PROCEDURE — 85025 COMPLETE CBC W/AUTO DIFF WBC: CPT | Mod: ZL | Performed by: NURSE PRACTITIONER

## 2020-08-07 PROCEDURE — 84443 ASSAY THYROID STIM HORMONE: CPT | Mod: ZL | Performed by: NURSE PRACTITIONER

## 2020-08-07 PROCEDURE — 80061 LIPID PANEL: CPT | Mod: ZL | Performed by: NURSE PRACTITIONER

## 2020-08-07 PROCEDURE — 86376 MICROSOMAL ANTIBODY EACH: CPT | Mod: ZL | Performed by: NURSE PRACTITIONER

## 2020-08-07 PROCEDURE — 90471 IMMUNIZATION ADMIN: CPT

## 2020-08-07 PROCEDURE — 99204 OFFICE O/P NEW MOD 45 MIN: CPT | Performed by: NURSE PRACTITIONER

## 2020-08-07 PROCEDURE — 83880 ASSAY OF NATRIURETIC PEPTIDE: CPT | Mod: ZL,59 | Performed by: NURSE PRACTITIONER

## 2020-08-07 PROCEDURE — 36415 COLL VENOUS BLD VENIPUNCTURE: CPT | Mod: ZL | Performed by: NURSE PRACTITIONER

## 2020-08-07 PROCEDURE — G0463 HOSPITAL OUTPT CLINIC VISIT: HCPCS

## 2020-08-07 PROCEDURE — G0103 PSA SCREENING: HCPCS | Mod: ZL | Performed by: NURSE PRACTITIONER

## 2020-08-07 PROCEDURE — 84439 ASSAY OF FREE THYROXINE: CPT | Mod: ZL | Performed by: NURSE PRACTITIONER

## 2020-08-07 RX ORDER — FUROSEMIDE 20 MG
20 TABLET ORAL DAILY
Qty: 30 TABLET | Refills: 0 | Status: CANCELLED | OUTPATIENT
Start: 2020-08-07

## 2020-08-07 RX ORDER — EPINEPHRINE 0.3 MG/.3ML
0.3 INJECTION SUBCUTANEOUS PRN
Qty: 2 EACH | Refills: 0 | Status: SHIPPED | OUTPATIENT
Start: 2020-08-07 | End: 2022-06-13

## 2020-08-07 ASSESSMENT — ANXIETY QUESTIONNAIRES
5. BEING SO RESTLESS THAT IT IS HARD TO SIT STILL: SEVERAL DAYS
3. WORRYING TOO MUCH ABOUT DIFFERENT THINGS: MORE THAN HALF THE DAYS
GAD7 TOTAL SCORE: 10
1. FEELING NERVOUS, ANXIOUS, OR ON EDGE: MORE THAN HALF THE DAYS
7. FEELING AFRAID AS IF SOMETHING AWFUL MIGHT HAPPEN: NOT AT ALL
4. TROUBLE RELAXING: NEARLY EVERY DAY
IF YOU CHECKED OFF ANY PROBLEMS ON THIS QUESTIONNAIRE, HOW DIFFICULT HAVE THESE PROBLEMS MADE IT FOR YOU TO DO YOUR WORK, TAKE CARE OF THINGS AT HOME, OR GET ALONG WITH OTHER PEOPLE: NOT DIFFICULT AT ALL
2. NOT BEING ABLE TO STOP OR CONTROL WORRYING: SEVERAL DAYS
6. BECOMING EASILY ANNOYED OR IRRITABLE: SEVERAL DAYS

## 2020-08-07 ASSESSMENT — PATIENT HEALTH QUESTIONNAIRE - PHQ9: SUM OF ALL RESPONSES TO PHQ QUESTIONS 1-9: 4

## 2020-08-07 ASSESSMENT — PAIN SCALES - GENERAL: PAINLEVEL: MODERATE PAIN (4)

## 2020-08-07 ASSESSMENT — MIFFLIN-ST. JEOR: SCORE: 1823.65

## 2020-08-07 NOTE — TELEPHONE ENCOUNTER
Left message on phone to call back to schedule with Monique can use a cysto spot and does have a referral

## 2020-08-07 NOTE — NURSING NOTE
"Chief Complaint   Patient presents with     Establish Care       Initial /80 (BP Location: Left arm, Patient Position: Chair, Cuff Size: Adult Large)   Pulse 87   Temp 97.1  F (36.2  C) (Tympanic)   Ht 1.803 m (5' 11\")   Wt 101.2 kg (223 lb)   BMI 31.10 kg/m   Estimated body mass index is 31.1 kg/m  as calculated from the following:    Height as of this encounter: 1.803 m (5' 11\").    Weight as of this encounter: 101.2 kg (223 lb).  Medication Reconciliation: complete  Rose Her LPN  "

## 2020-08-08 ASSESSMENT — ANXIETY QUESTIONNAIRES: GAD7 TOTAL SCORE: 10

## 2020-08-09 LAB
HCV AB SERPL QL IA: NONREACTIVE
HIV 1+2 AB+HIV1 P24 AG SERPL QL IA: NONREACTIVE

## 2020-08-11 LAB — THYROPEROXIDASE AB SERPL-ACNC: <10 IU/ML

## 2020-08-14 DIAGNOSIS — I51.7 CARDIOMEGALY: ICD-10-CM

## 2020-08-14 DIAGNOSIS — J30.2 SEASONAL ALLERGIES: ICD-10-CM

## 2020-08-14 DIAGNOSIS — J43.9 PULMONARY EMPHYSEMA, UNSPECIFIED EMPHYSEMA TYPE (H): Primary | ICD-10-CM

## 2020-08-14 DIAGNOSIS — K21.9 GASTROESOPHAGEAL REFLUX DISEASE WITHOUT ESOPHAGITIS: ICD-10-CM

## 2020-08-14 DIAGNOSIS — F33.41 RECURRENT MAJOR DEPRESSIVE DISORDER, IN PARTIAL REMISSION (H): ICD-10-CM

## 2020-08-14 RX ORDER — ARIPIPRAZOLE 2 MG/1
2 TABLET ORAL DAILY
Qty: 90 TABLET | Refills: 0 | Status: SHIPPED | OUTPATIENT
Start: 2020-08-14 | End: 2020-10-13

## 2020-08-14 RX ORDER — CETIRIZINE HYDROCHLORIDE 10 MG/1
10 TABLET ORAL DAILY
Qty: 90 TABLET | Refills: 3 | Status: SHIPPED | OUTPATIENT
Start: 2020-08-14 | End: 2021-06-15

## 2020-08-20 DIAGNOSIS — R97.20 ELEVATED PROSTATE SPECIFIC ANTIGEN (PSA): Primary | ICD-10-CM

## 2020-08-26 ENCOUNTER — OFFICE VISIT (OUTPATIENT)
Dept: UROLOGY | Facility: OTHER | Age: 65
End: 2020-08-26
Attending: UROLOGY
Payer: MEDICARE

## 2020-08-26 VITALS
HEIGHT: 71 IN | BODY MASS INDEX: 31.22 KG/M2 | WEIGHT: 223 LBS | OXYGEN SATURATION: 88 % | TEMPERATURE: 96.4 F | HEART RATE: 90 BPM | SYSTOLIC BLOOD PRESSURE: 130 MMHG | DIASTOLIC BLOOD PRESSURE: 70 MMHG

## 2020-08-26 DIAGNOSIS — R97.20 ELEVATED PROSTATE SPECIFIC ANTIGEN (PSA): ICD-10-CM

## 2020-08-26 DIAGNOSIS — N40.0 ENLARGED PROSTATE: ICD-10-CM

## 2020-08-26 LAB
ALBUMIN UR-MCNC: NEGATIVE MG/DL
APPEARANCE UR: CLEAR
BILIRUB UR QL STRIP: NEGATIVE
COLOR UR AUTO: NORMAL
GLUCOSE UR STRIP-MCNC: NEGATIVE MG/DL
HGB UR QL STRIP: NEGATIVE
KETONES UR STRIP-MCNC: NEGATIVE MG/DL
LEUKOCYTE ESTERASE UR QL STRIP: NEGATIVE
NITRATE UR QL: NEGATIVE
PH UR STRIP: 6 PH (ref 4.7–8)
SOURCE: NORMAL
SP GR UR STRIP: 1.02 (ref 1–1.03)
UROBILINOGEN UR STRIP-MCNC: NORMAL MG/DL (ref 0–2)

## 2020-08-26 PROCEDURE — G0463 HOSPITAL OUTPT CLINIC VISIT: HCPCS

## 2020-08-26 PROCEDURE — 99203 OFFICE O/P NEW LOW 30 MIN: CPT | Performed by: UROLOGY

## 2020-08-26 PROCEDURE — 81003 URINALYSIS AUTO W/O SCOPE: CPT | Mod: ZL | Performed by: UROLOGY

## 2020-08-26 ASSESSMENT — PAIN SCALES - GENERAL: PAINLEVEL: NO PAIN (0)

## 2020-08-26 ASSESSMENT — ENCOUNTER SYMPTOMS
UNEXPECTED WEIGHT CHANGE: 1
SHORTNESS OF BREATH: 1
BACK PAIN: 1
WHEEZING: 1

## 2020-08-26 ASSESSMENT — MIFFLIN-ST. JEOR: SCORE: 1823.65

## 2020-08-26 NOTE — NURSING NOTE
"Chief Complaint   Patient presents with     Elevated PSA       Initial /70 (BP Location: Left arm, Patient Position: Chair, Cuff Size: Adult Regular)   Pulse 90   Temp 96.4  F (35.8  C) (Tympanic)   Ht 1.803 m (5' 11\")   Wt 101.2 kg (223 lb)   SpO2 (!) 88%   BMI 31.10 kg/m   Estimated body mass index is 31.1 kg/m  as calculated from the following:    Height as of this encounter: 1.803 m (5' 11\").    Weight as of this encounter: 101.2 kg (223 lb).  Medication Reconciliation: complete  LARRY MCKEON LPN      Review of Systems:    Weight loss:    yes     Recent fever/chills:  No   Night sweats:   yes  Current skin rash:  No   Recent hair loss:  No  Heat intolerance:  No   Cold intolerance:  No  Chest pain:   No   Palpitations:   No  Shortness of breath:  yes   Wheezing:   yes  Constipation:    No   Diarrhea:   No   Nausea:   No   Vomiting:   No   Kidney/side pain:  No   Back pain:   yes  Frequent headaches:  No   Dizziness:     No  Leg swelling:   yes   Calf pain:    No    Parents, brothers or sisters with history of kidney cancer:   No  Parents, brothers or sisters with history of bladder cancer: No  LARRY MCKEON LPN    "

## 2020-08-26 NOTE — LETTER
8/26/2020       RE: Joey Irene  [address]  [place] [address]     Dear Colleague,    Thank you for referring your patient, Joey Irene, to the [facility] [place] at [facility]. Please see a copy of my visit note below.      History     Chief Complaint:    Elevated PSA      HPI   Joey Irene is a 64 year old male who presents with a recent history of an elevated PSA of 10.9.  Fazal is unaware that he is ever had a PSA before.  He was being seen for diverticulitis that the the PSA was drawn but he was not having any difficulty urinating.  There was not a urine test done that day because he just could not urinate for them for specimen but he was not having any difficulty going.  He has no history of urinary tract infection he denies any problems urinating he has never seen blood in his urine no history of genitourinary surgery no history of stones and no family history of prostate cancer.    Allergies:    Allergies   Allergen Reactions     Seasonal Allergies Difficulty breathing and Cough     Bupropion Other (See Comments)     Tramadol Nausea and Swelling     [place] Nite Time Dizziness and Nausea and Vomiting     Nyquil Cold &  [Night-Time Cold-Flu Relief] Dizziness and Nausea and Vomiting     Trichophyton Other (See Comments)        Medications:      acetaminophen (TYLENOL) 500 MG tablet  albuterol (PROAIR HFA/PROVENTIL HFA/VENTOLIN HFA) 108 (90 Base) MCG/ACT inhaler  ARIPiprazole (ABILIFY) 2 MG tablet  aspirin (ASA) 81 MG EC tablet  cetirizine (ZYRTEC) 10 MG tablet  Cholecalciferol (D 1000) 25 MCG (1000 UT) CAPS  cyclobenzaprine (FLEXERIL) 10 MG tablet  DULoxetine (CYMBALTA) 60 MG capsule  EPINEPHrine (ANY BX GENERIC EQUIV) 0.3 MG/0.3ML injection 2-pack  fluticasone (FLONASE) 50 MCG/ACT nasal spray  Fluticasone-Umeclidin-Vilanterol (TRELEGY ELLIPTA) 100-62.5-25 MCG/INH oral inhaler  guaiFENesin (MUCINEX) 600 MG 12 hr tablet  hydrOXYzine (ATARAX) 25 MG  tablet  montelukast (SINGULAIR) 10 MG tablet  naproxen (NAPROSYN) 500 MG tablet  omeprazole (PRILOSEC) 20 MG DR capsule  rosuvastatin (CRESTOR) 10 MG tablet  traZODone (DESYREL) 50 MG tablet  vitamin C (ASCORBIC ACID) 500 MG tablet  VITAMIN E-100 OR  zolpidem (AMBIEN) 5 MG tablet        Problem List:      Patient Active Problem List    Diagnosis Date Noted     Bee sting allergy 08/07/2020     Priority: Medium     Hyperglycemia 08/07/2020     Priority: Medium     Elevated TSH 08/07/2020     Priority: Medium     Elevated prostate specific antigen (PSA) 08/07/2020     Priority: Medium     Diverticulitis large intestine 08/06/2020     Priority: Medium     History of fusion of cervical spine 08/06/2020     Priority: Medium     Opioid abuse (H) 08/06/2020     Priority: Medium     Generalized anxiety disorder 08/06/2020     Priority: Medium     Tobacco use disorder 08/06/2020     Priority: Medium     Pulmonary emphysema, unspecified emphysema type (H) 08/05/2020     Priority: Medium     Hyperlipidemia, unspecified hyperlipidemia type 08/05/2020     Priority: Medium     Cardiomegaly 08/05/2020     Priority: Medium     Enlarged prostate 08/05/2020     Priority: Medium     Diverticulosis 03/25/2020     Priority: Medium     Chronic bronchitis with emphysema (H) 02/03/2020     Priority: Medium     Class 1 obesity with body mass index (BMI) of 33.0 to 33.9 in adult 02/03/2020     Priority: Medium     Smoking greater than 40 pack years 02/03/2020     Priority: Medium     Pain due to total left knee replacement, sequela 01/27/2020     Priority: Medium     Cervical stenosis of spinal canal 01/13/2020     Priority: Medium     Cholesteatoma of left ear 03/04/2019     Priority: Medium     Hearing problem, left 03/04/2019     Priority: Medium     Nasal septal deviation 12/18/2017     Priority: Medium     Primary osteoarthritis of right knee 02/25/2017     Priority: Medium     Status post total left knee replacement 02/25/2017      "Priority: Medium     Recurrent major depressive disorder, in partial remission (H) 11/21/2015     Priority: Medium     PTSD (post-traumatic stress disorder) 08/19/2015     Priority: Medium     Seasonal allergies 08/19/2015     Priority: Medium     Anxiety state 07/07/2011     Priority: Medium     Back pain, chronic 07/07/2011     Priority: Medium     Hearing loss 07/07/2011     Priority: Medium     Insomnia, unspecified 07/07/2011     Priority: Medium     Sleep apnea 07/07/2011     Priority: Medium        Past Medical History:      History reviewed. No pertinent past medical history.    Past Surgical History:      Past Surgical History:   Procedure Laterality Date     ENT SURGERY      patient has had three left ear surgeries, unsure what they did     FUSION CERVICAL POSTERIOR THREE+ LEVELS       HERNIA REPAIR, INGUINAL RT/LT Right     done times 3     JOINT REPLACEMENT Left      REPAIR HAMMER TOE Right        Family History:      Family History   Problem Relation Age of Onset     Lung Cancer Mother      Ovarian Cancer Sister        Social History:    Marital Status:   [4]  Social History     Tobacco Use     Smoking status: Current Some Day Smoker     Smokeless tobacco: Never Used     Tobacco comment: uses nicorette, one cigar per day   Substance Use Topics     Alcohol use: Never     Frequency: Never     Drug use: Never        Review of Systems   Constitutional: Positive for unexpected weight change.   Respiratory: Positive for shortness of breath and wheezing.    Cardiovascular: Positive for leg swelling.   Musculoskeletal: Positive for back pain.   All other systems reviewed and are negative.        Physical Exam   Vitals:  /70 (BP Location: Left arm, Patient Position: Chair, Cuff Size: Adult Regular)   Pulse 90   Temp 96.4  F (35.8  C) (Tympanic)   Ht 1.803 m (5' 11\")   Wt 101.2 kg (223 lb)   SpO2 (!) 88%   BMI 31.10 kg/m        Physical Exam  Constitutional:       Appearance: Normal " appearance. He is obese.   Pulmonary:      Effort: Pulmonary effort is normal.   Abdominal:      General: There is no distension.      Palpations: Abdomen is soft. There is no mass.      Tenderness: There is abdominal tenderness.      Hernia: No hernia is present.          Comments: Some mild lower abdominal tenderness likely residual from his diverticulitis.   Genitourinary:     Comments: Penis is circumcised meatus glans shaft of the penis are normal.  Both testicles are descended without any masses supra testicular masses or inguinal hernias.  External rectal area is normal normal rectal tone and he has a small prostate that is about 25 g and smooth without any obvious nodularity.  No rectal masses.  Neurological:      Mental Status: He is alert.       PSA   Date Value Ref Range Status   08/07/2020 10.90 (H) 0 - 4 ug/L Final     Comment:     Assay Method:  Chemiluminescence using Siemens Vista analyzer       UA pending    Impression: Elevated PSA with abnormal digital rectal exam  Plan   Plan: I told Joey the first thing is we will just recheck the PSA end of September and then I will see him following that.  I did briefly discuss the prostate biopsy procedure with him .  I told him to avoid certain activities prior to the PSA being checked and we will see him in about 5-6 weeks.      No follow-ups on file.    Kesha Amaya MD  [facility] [place]            Again, thank you for allowing me to participate in the care of your patient.      Sincerely,    Kesha Amaya MD

## 2020-08-26 NOTE — PROGRESS NOTES
History     Chief Complaint:    Elevated PSA      HPI   Joey Irene is a 64 year old male who presents with a recent history of an elevated PSA of 10.9.  Fazal is unaware that he is ever had a PSA before.  He was being seen for diverticulitis that the the PSA was drawn but he was not having any difficulty urinating.  There was not a urine test done that day because he just could not urinate for them for specimen but he was not having any difficulty going.  He has no history of urinary tract infection he denies any problems urinating he has never seen blood in his urine no history of genitourinary surgery no history of stones and no family history of prostate cancer.    Allergies:    Allergies   Allergen Reactions     Seasonal Allergies Difficulty breathing and Cough     Bupropion Other (See Comments)     Tramadol Nausea and Swelling     [place] Nite Time Dizziness and Nausea and Vomiting     Nyquil Cold &  [Night-Time Cold-Flu Relief] Dizziness and Nausea and Vomiting     Trichophyton Other (See Comments)        Medications:      acetaminophen (TYLENOL) 500 MG tablet  albuterol (PROAIR HFA/PROVENTIL HFA/VENTOLIN HFA) 108 (90 Base) MCG/ACT inhaler  ARIPiprazole (ABILIFY) 2 MG tablet  aspirin (ASA) 81 MG EC tablet  cetirizine (ZYRTEC) 10 MG tablet  Cholecalciferol (D 1000) 25 MCG (1000 UT) CAPS  cyclobenzaprine (FLEXERIL) 10 MG tablet  DULoxetine (CYMBALTA) 60 MG capsule  EPINEPHrine (ANY BX GENERIC EQUIV) 0.3 MG/0.3ML injection 2-pack  fluticasone (FLONASE) 50 MCG/ACT nasal spray  Fluticasone-Umeclidin-Vilanterol (TRELEGY ELLIPTA) 100-62.5-25 MCG/INH oral inhaler  guaiFENesin (MUCINEX) 600 MG 12 hr tablet  hydrOXYzine (ATARAX) 25 MG tablet  montelukast (SINGULAIR) 10 MG tablet  naproxen (NAPROSYN) 500 MG tablet  omeprazole (PRILOSEC) 20 MG DR capsule  rosuvastatin (CRESTOR) 10 MG tablet  traZODone (DESYREL) 50 MG tablet  vitamin C (ASCORBIC ACID) 500 MG tablet  VITAMIN E-100 OR  zolpidem (AMBIEN) 5 MG  tablet        Problem List:      Patient Active Problem List    Diagnosis Date Noted     Bee sting allergy 08/07/2020     Priority: Medium     Hyperglycemia 08/07/2020     Priority: Medium     Elevated TSH 08/07/2020     Priority: Medium     Elevated prostate specific antigen (PSA) 08/07/2020     Priority: Medium     Diverticulitis large intestine 08/06/2020     Priority: Medium     History of fusion of cervical spine 08/06/2020     Priority: Medium     Opioid abuse (H) 08/06/2020     Priority: Medium     Generalized anxiety disorder 08/06/2020     Priority: Medium     Tobacco use disorder 08/06/2020     Priority: Medium     Pulmonary emphysema, unspecified emphysema type (H) 08/05/2020     Priority: Medium     Hyperlipidemia, unspecified hyperlipidemia type 08/05/2020     Priority: Medium     Cardiomegaly 08/05/2020     Priority: Medium     Enlarged prostate 08/05/2020     Priority: Medium     Diverticulosis 03/25/2020     Priority: Medium     Chronic bronchitis with emphysema (H) 02/03/2020     Priority: Medium     Class 1 obesity with body mass index (BMI) of 33.0 to 33.9 in adult 02/03/2020     Priority: Medium     Smoking greater than 40 pack years 02/03/2020     Priority: Medium     Pain due to total left knee replacement, sequela 01/27/2020     Priority: Medium     Cervical stenosis of spinal canal 01/13/2020     Priority: Medium     Cholesteatoma of left ear 03/04/2019     Priority: Medium     Hearing problem, left 03/04/2019     Priority: Medium     Nasal septal deviation 12/18/2017     Priority: Medium     Primary osteoarthritis of right knee 02/25/2017     Priority: Medium     Status post total left knee replacement 02/25/2017     Priority: Medium     Recurrent major depressive disorder, in partial remission (H) 11/21/2015     Priority: Medium     PTSD (post-traumatic stress disorder) 08/19/2015     Priority: Medium     Seasonal allergies 08/19/2015     Priority: Medium     Anxiety state 07/07/2011      "Priority: Medium     Back pain, chronic 07/07/2011     Priority: Medium     Hearing loss 07/07/2011     Priority: Medium     Insomnia, unspecified 07/07/2011     Priority: Medium     Sleep apnea 07/07/2011     Priority: Medium        Past Medical History:      History reviewed. No pertinent past medical history.    Past Surgical History:      Past Surgical History:   Procedure Laterality Date     ENT SURGERY      patient has had three left ear surgeries, unsure what they did     FUSION CERVICAL POSTERIOR THREE+ LEVELS       HERNIA REPAIR, INGUINAL RT/LT Right     done times 3     JOINT REPLACEMENT Left      REPAIR HAMMER TOE Right        Family History:      Family History   Problem Relation Age of Onset     Lung Cancer Mother      Ovarian Cancer Sister        Social History:    Marital Status:   [4]  Social History     Tobacco Use     Smoking status: Current Some Day Smoker     Smokeless tobacco: Never Used     Tobacco comment: uses nicorette, one cigar per day   Substance Use Topics     Alcohol use: Never     Frequency: Never     Drug use: Never        Review of Systems   Constitutional: Positive for unexpected weight change.   Respiratory: Positive for shortness of breath and wheezing.    Cardiovascular: Positive for leg swelling.   Musculoskeletal: Positive for back pain.   All other systems reviewed and are negative.        Physical Exam   Vitals:  /70 (BP Location: Left arm, Patient Position: Chair, Cuff Size: Adult Regular)   Pulse 90   Temp 96.4  F (35.8  C) (Tympanic)   Ht 1.803 m (5' 11\")   Wt 101.2 kg (223 lb)   SpO2 (!) 88%   BMI 31.10 kg/m        Physical Exam  Constitutional:       Appearance: Normal appearance. He is obese.   Pulmonary:      Effort: Pulmonary effort is normal.   Abdominal:      General: There is no distension.      Palpations: Abdomen is soft. There is no mass.      Tenderness: There is abdominal tenderness.      Hernia: No hernia is present.          Comments: " Some mild lower abdominal tenderness likely residual from his diverticulitis.   Genitourinary:     Comments: Penis is circumcised meatus glans shaft of the penis are normal.  Both testicles are descended without any masses supra testicular masses or inguinal hernias.  External rectal area is normal normal rectal tone and he has a small prostate that is about 25 g and smooth without any obvious nodularity.  No rectal masses.  Neurological:      Mental Status: He is alert.       PSA   Date Value Ref Range Status   08/07/2020 10.90 (H) 0 - 4 ug/L Final     Comment:     Assay Method:  Chemiluminescence using Siemens Vista analyzer     AUASS 5  UA pending    Impression: Elevated PSA with abnormal digital rectal exam  Plan   Plan: I told Joey the first thing is we will just recheck the PSA end of September and then I will see him following that.  I did briefly discuss the prostate biopsy procedure with him .  I told him to avoid certain activities prior to the PSA being checked and we will see him in about 5-6 weeks.      No follow-ups on file.    Kesha Amaya MD  [facility]

## 2020-08-27 ENCOUNTER — HOSPITAL ENCOUNTER (OUTPATIENT)
Dept: CARDIOLOGY | Facility: HOSPITAL | Age: 65
Discharge: HOME | End: 2020-08-27
Attending: EMERGENCY MEDICINE | Admitting: INTERNAL MEDICINE
Payer: MEDICARE

## 2020-08-27 DIAGNOSIS — I50.9 CONGESTIVE HEART FAILURE, UNSPECIFIED HF CHRONICITY, UNSPECIFIED HEART FAILURE TYPE (H): ICD-10-CM

## 2020-08-27 PROCEDURE — 93306 TTE W/DOPPLER COMPLETE: CPT | Mod: 26 | Performed by: INTERNAL MEDICINE

## 2020-08-27 PROCEDURE — 93306 TTE W/DOPPLER COMPLETE: CPT | Mod: TC

## 2020-09-10 ENCOUNTER — TELEPHONE (OUTPATIENT)
Dept: CARDIOLOGY | Facility: OTHER | Age: 65
End: 2020-09-10

## 2020-09-10 NOTE — TELEPHONE ENCOUNTER
Patient is looking for Echocardiogram results is there any way Amparo you can see these results? We have never seen patient and I have no results to given him. We will see him in October for new patient visit.   Thank you , Joaquina Jonas LPN

## 2020-09-30 ENCOUNTER — NURSE TRIAGE (OUTPATIENT)
Dept: FAMILY MEDICINE | Facility: OTHER | Age: 65
End: 2020-09-30

## 2020-09-30 NOTE — TELEPHONE ENCOUNTER
Protocol advises home care. Care advice given per protocol. Advised patient to call back or go to [facility] if symptoms worsen. Patient verbalized understanding.    Additional Information    Negative: Severe difficulty breathing (e.g., struggling for each breath, speaks in single words)    Negative: Bluish (or gray) lips or face now    Negative: [1] Rapid onset of cough AND [2] has hives    Negative: Coughing started suddenly after medicine, an allergic food or bee sting    Negative: [1] Difficulty breathing AND [2] exposure to flames, smoke, or fumes    Negative: [1] Stridor AND [2] difficulty breathing    Negative: Sounds like a life-threatening emergency to the triager    Negative: Choked on object of food that could be caught in the throat    Negative: Chest pain is main symptom    Negative: [1] Previous asthma attacks AND [2] this feels like asthma attack    Negative: Wet (productive) cough (i.e., white-yellow, yellow, green, or ismael colored sputum)    Negative: Cough lasts > 3 weeks    Negative: Chest pain  (Exception: MILD central chest pain, present only when coughing)    Negative: Difficulty breathing    Negative: Patient sounds very sick or weak to the triager    Negative: [1] Coughed up blood AND [2] > 1 tablespoon (15 ml) (Exception: blood-tinged sputum)    Negative: Fever > 103 F (39.4 C)    Negative: [1] Fever > 101 F (38.3 C) AND [2] age > 60    Negative: [1] Fever > 100.0 F (37.8 C) AND [2] bedridden (e.g., nursing home patient, CVA, chronic illness, recovering from surgery)    Negative: [1] Fever > 100.0 F (37.8 C) AND [2] diabetes mellitus or weak immune system (e.g., HIV positive, cancer chemo, splenectomy, organ transplant, chronic steroids)    Negative: Wheezing is present    Negative: [1] Ankle swelling AND [2] swelling is increasing    Negative: SEVERE coughing spells (e.g., whooping sound after coughing, vomiting after coughing)    Negative: [1] Continuous (nonstop) coughing interferes with work  "or school AND [2] no improvement using cough treatment per protocol    Negative: Fever present > 3 days (72 hours)    Negative: [1] Fever returns after gone for over 24 hours AND [2] symptoms worse or not improved    Negative: [1] Using nasal washes and pain medicine > 24 hours AND [2] sinus pain (around cheekbone or eye) persists    Negative: Earache is present    Negative: Cough has been present for > 3 weeks    Negative: [1] Nasal discharge AND [2] present > 10 days    Negative: [1] Coughed up blood-tinged sputum AND [2] more than once    Negative: [1] Patient also has allergy symptoms (e.g., itchy eyes, clear nasal discharge, postnasal drip) AND [2] they are acting up    Negative: Taking an ACE Inhibitor medication  (e.g., benazepril/LOTENSIN, captopril/CAPOTEN, enalapril/VASOTEC, lisinopril/ZESTRIL)    Negative: Exposure to TB (Tuberculosis)    Negative: Cough    Cough with cold symptoms (e.g., runny nose, postnasal drip, throat clearing)    Answer Assessment - Initial Assessment Questions  1. ONSET: \"When did the cough begin?\"       3 days ago  2. SEVERITY: \"How bad is the cough today?\"       Mild  3. RESPIRATORY DISTRESS: \"Describe your breathing.\"       Ok   4. FEVER: \"Do you have a fever?\" If so, ask: \"What is your temperature, how was it measured, and when did it start?\"      No  5. HEMOPTYSIS: \"Are you coughing up any blood?\" If so ask: \"How much?\" (flecks, streaks, tablespoons, etc.)      No  6. TREATMENT: \"What have you done so far to treat the cough?\" (e.g., meds, fluids, humidifier)      Cough drops  7. CARDIAC HISTORY: \"Do you have any history of heart disease?\" (e.g., heart attack, congestive heart failure)       Heart murmur  8. LUNG HISTORY: \"Do you have any history of lung disease?\"  (e.g., pulmonary embolus, asthma, emphysema)      COPD  9. PE RISK FACTORS: \"Do you have a history of blood clots?\" (or: recent major surgery, recent prolonged travel, bedridden)      No  10. OTHER SYMPTOMS: \"Do you " "have any other symptoms? (e.g., runny nose, wheezing, chest pain)        Runny nose, itchy watery eyes, sore throat, sinuses  11. PREGNANCY: \"Is there any chance you are pregnant?\" \"When was your last menstrual period?\"        NA  12. TRAVEL: \"Have you traveled out of the country in the last month?\" (e.g., travel history, exposures)        No    Protocols used: COUGH - ACUTE NON-PRODUCTIVE-A-AH      "

## 2020-10-12 DIAGNOSIS — J81.0 ACUTE PULMONARY EDEMA (H): ICD-10-CM

## 2020-10-12 DIAGNOSIS — I51.7 CARDIOMEGALY: Primary | ICD-10-CM

## 2020-10-13 ENCOUNTER — OFFICE VISIT (OUTPATIENT)
Dept: CARDIOLOGY | Facility: OTHER | Age: 65
End: 2020-10-13
Attending: NURSE PRACTITIONER
Payer: MEDICARE

## 2020-10-13 VITALS
BODY MASS INDEX: 31.81 KG/M2 | HEART RATE: 94 BPM | TEMPERATURE: 97.7 F | OXYGEN SATURATION: 91 % | WEIGHT: 228.1 LBS | SYSTOLIC BLOOD PRESSURE: 119 MMHG | DIASTOLIC BLOOD PRESSURE: 78 MMHG

## 2020-10-13 DIAGNOSIS — I51.7 CARDIOMEGALY: ICD-10-CM

## 2020-10-13 DIAGNOSIS — R42 EPISODIC LIGHTHEADEDNESS: ICD-10-CM

## 2020-10-13 DIAGNOSIS — R73.03 PREDIABETES: ICD-10-CM

## 2020-10-13 DIAGNOSIS — R79.89 ELEVATED D-DIMER: ICD-10-CM

## 2020-10-13 DIAGNOSIS — R60.0 BILATERAL LOWER EXTREMITY EDEMA: ICD-10-CM

## 2020-10-13 DIAGNOSIS — I51.7 RIGHT VENTRICULAR DILATION: Primary | ICD-10-CM

## 2020-10-13 DIAGNOSIS — R06.09 DOE (DYSPNEA ON EXERTION): ICD-10-CM

## 2020-10-13 DIAGNOSIS — F17.200 TOBACCO USE DISORDER: ICD-10-CM

## 2020-10-13 DIAGNOSIS — R07.9 EXERTIONAL CHEST PAIN: ICD-10-CM

## 2020-10-13 DIAGNOSIS — J43.9 PULMONARY EMPHYSEMA, UNSPECIFIED EMPHYSEMA TYPE (H): ICD-10-CM

## 2020-10-13 DIAGNOSIS — E78.2 MIXED HYPERLIPIDEMIA: ICD-10-CM

## 2020-10-13 DIAGNOSIS — E03.8 SUBCLINICAL HYPOTHYROIDISM: ICD-10-CM

## 2020-10-13 LAB
ALBUMIN SERPL-MCNC: 3.7 G/DL (ref 3.4–5)
ALP SERPL-CCNC: 66 U/L (ref 40–150)
ALT SERPL W P-5'-P-CCNC: 15 U/L (ref 0–70)
ANION GAP SERPL CALCULATED.3IONS-SCNC: 5 MMOL/L (ref 3–14)
AST SERPL W P-5'-P-CCNC: 8 U/L (ref 0–45)
BILIRUB SERPL-MCNC: 0.7 MG/DL (ref 0.2–1.3)
BUN SERPL-MCNC: 15 MG/DL (ref 7–30)
CALCIUM SERPL-MCNC: 8.9 MG/DL (ref 8.5–10.1)
CHLORIDE SERPL-SCNC: 106 MMOL/L (ref 94–109)
CO2 SERPL-SCNC: 26 MMOL/L (ref 20–32)
CREAT SERPL-MCNC: 0.85 MG/DL (ref 0.66–1.25)
D DIMER PPP FEU-MCNC: 0.7 UG/ML FEU (ref 0–0.5)
ERYTHROCYTE [DISTWIDTH] IN BLOOD BY AUTOMATED COUNT: 13 % (ref 10–15)
GFR SERPL CREATININE-BSD FRML MDRD: >90 ML/MIN/{1.73_M2}
GLUCOSE SERPL-MCNC: 94 MG/DL (ref 70–99)
HCT VFR BLD AUTO: 52.9 % (ref 40–53)
HGB BLD-MCNC: 18.2 G/DL (ref 13.3–17.7)
MCH RBC QN AUTO: 31 PG (ref 26.5–33)
MCHC RBC AUTO-ENTMCNC: 34.4 G/DL (ref 31.5–36.5)
MCV RBC AUTO: 90 FL (ref 78–100)
NT-PROBNP SERPL-MCNC: 2102 PG/ML (ref 0–125)
PLATELET # BLD AUTO: 279 10E9/L (ref 150–450)
POTASSIUM SERPL-SCNC: 4.4 MMOL/L (ref 3.4–5.3)
PROT SERPL-MCNC: 7.4 G/DL (ref 6.8–8.8)
RBC # BLD AUTO: 5.87 10E12/L (ref 4.4–5.9)
SODIUM SERPL-SCNC: 137 MMOL/L (ref 133–144)
WBC # BLD AUTO: 9.9 10E9/L (ref 4–11)

## 2020-10-13 PROCEDURE — 93010 ELECTROCARDIOGRAM REPORT: CPT | Performed by: INTERNAL MEDICINE

## 2020-10-13 PROCEDURE — 85379 FIBRIN DEGRADATION QUANT: CPT | Mod: ZL | Performed by: NURSE PRACTITIONER

## 2020-10-13 PROCEDURE — 99215 OFFICE O/P EST HI 40 MIN: CPT | Performed by: NURSE PRACTITIONER

## 2020-10-13 PROCEDURE — 36415 COLL VENOUS BLD VENIPUNCTURE: CPT | Mod: ZL | Performed by: NURSE PRACTITIONER

## 2020-10-13 PROCEDURE — 80053 COMPREHEN METABOLIC PANEL: CPT | Mod: ZL | Performed by: NURSE PRACTITIONER

## 2020-10-13 PROCEDURE — 85027 COMPLETE CBC AUTOMATED: CPT | Mod: ZL | Performed by: NURSE PRACTITIONER

## 2020-10-13 PROCEDURE — 83880 ASSAY OF NATRIURETIC PEPTIDE: CPT | Mod: ZL | Performed by: NURSE PRACTITIONER

## 2020-10-13 PROCEDURE — 93005 ELECTROCARDIOGRAM TRACING: CPT

## 2020-10-13 PROCEDURE — G0463 HOSPITAL OUTPT CLINIC VISIT: HCPCS | Mod: 25

## 2020-10-13 RX ORDER — ARIPIPRAZOLE 5 MG/1
TABLET ORAL
COMMUNITY
Start: 2020-08-25 | End: 2021-03-02

## 2020-10-13 RX ORDER — GABAPENTIN 300 MG/1
CAPSULE ORAL DAILY
COMMUNITY
Start: 2020-05-11 | End: 2022-11-11

## 2020-10-13 RX ORDER — HYDROXYZINE PAMOATE 50 MG/1
CAPSULE ORAL
COMMUNITY
Start: 2020-09-25 | End: 2022-08-02

## 2020-10-13 RX ORDER — TORSEMIDE 20 MG/1
20 TABLET ORAL EVERY MORNING
Qty: 90 TABLET | Refills: 1 | Status: SHIPPED | OUTPATIENT
Start: 2020-10-13 | End: 2020-10-29

## 2020-10-13 RX ORDER — POTASSIUM CHLORIDE 750 MG/1
10 CAPSULE, EXTENDED RELEASE ORAL 2 TIMES DAILY
Qty: 60 CAPSULE | Refills: 1 | Status: SHIPPED | OUTPATIENT
Start: 2020-10-13 | End: 2020-10-29

## 2020-10-13 RX ORDER — DILTIAZEM HYDROCHLORIDE 60 MG/1
TABLET, FILM COATED ORAL
COMMUNITY
Start: 2019-11-20 | End: 2022-05-30

## 2020-10-13 RX ORDER — ZOLPIDEM TARTRATE 10 MG/1
10 TABLET ORAL AT BEDTIME
COMMUNITY
Start: 2020-09-21

## 2020-10-13 ASSESSMENT — PAIN SCALES - GENERAL: PAINLEVEL: NO PAIN (0)
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
## 2020-10-13 NOTE — NURSING NOTE
"Chief Complaint   Patient presents with     New Patient       Initial /78 (BP Location: Right arm, Patient Position: Sitting, Cuff Size: Adult Large)   Pulse 94   Temp 97.7  F (36.5  C) (Tympanic)   Wt 103.5 kg (228 lb 1.6 oz)   SpO2 91%   BMI 31.81 kg/m   Estimated body mass index is 31.81 kg/m  as calculated from the following:    Height as of 8/26/20: 1.803 m (5' 11\").    Weight as of this encounter: 103.5 kg (228 lb 1.6 oz).  Medication Reconciliation: complete  Joaquina Jonas LPN    "

## 2020-10-13 NOTE — PATIENT INSTRUCTIONS
Thank you for allowing Marleny Bauer and our team to participate in your care. Please call our office at [phone] with scheduling questions or if you need to cancel or change your appointment. With any other questions or concerns you may call Joaquina cardiology nurse at [phone].       If you experience chest pain, chest pressure, chest tightness, shortness of breath, fainting, lightheadedness, nausea, vomiting, or other concerning symptoms, please report to the Emergency Department or call 911. These symptoms may be emergent, and best treated in the Emergency Department.    Follow up in 2-3 weeks     You will be scheduled for a stress test to evaluate the vessels which supply blood to the heart to rule out the possibility of blockages. You will be called by the hospital scheduling department to schedule this appointment.    You will have a Ultrasound. You will be called by the hospital scheduling department to schedule this appointment.     The hospital will call you to schedule a pulmonary function test to evaluate your lung function.    Labs today, we will call you for results.     Start Demadex 20 mg this could change based on lab results.     Start Potassium 10 MEQ.    Follow up in 1 wk for Nurse only and Labs.     Possible repeat of Echo. You will have an echocardiogram performed.  This is an ultrasound of the heart, that evaluates heart function.  The hospital scheduling department will call to schedule you for this test.

## 2020-10-14 ENCOUNTER — TELEPHONE (OUTPATIENT)
Dept: CARDIOLOGY | Facility: OTHER | Age: 65
End: 2020-10-14

## 2020-10-14 NOTE — TELEPHONE ENCOUNTER
See note that Tete from [phone] message in regards to VQ scan. BERNARDINO Bautista CNP needs to call Tete as soon as available.  Ania Escobar RN on 10/14/2020 at 11:53 AM

## 2020-10-14 NOTE — TELEPHONE ENCOUNTER
Good Morning,      I am writing in regards to a STAT VQ order that was placed for Mr. Irene.    [employer] gave us an algorithm to follow for VQ scans due to COVID and it being an aerosol generating exam.    A)  If Acute Pulmonary Emobolism  1) First choice: CTA Pulmonary Angiogram  2) Second choice:  Only if CTA is contraindicated, such as due to allergy or kidney disease.   Do full VQ scan (ventilation followed by perfusion) with PPE.    B) Chronic Thromboembolic Disease  1) First Choice:   Perfusion scan only.  2) Second Choice: Perfusion and ventilation scan if needed by the the ordering service.   Ventilation scan requires PPE.        I looked through his chart and do not see any contraindications to the CTA (no contrast allergy, good GFR, good creatinine).   This is the first choice and what our radiologists prefer.    I wanted to touch base with you as you are the ordering provider.    I do not have the isotope on hand for a VQ scan and cannot offer this to be done today.  The isotope is ordered from the [facility].  I could do the VQ scan Friday afternoon, if you are against the CTA.     Please let me know how you would like to proceed.   My extension is [phone].     If you are wanting to go the CTA route, a order will need to be placed.    Thank you for your guidance,  ELIE Fields

## 2020-10-15 ENCOUNTER — HOSPITAL ENCOUNTER (OUTPATIENT)
Dept: ULTRASOUND IMAGING | Facility: HOSPITAL | Age: 65
Discharge: HOME | End: 2020-10-15
Attending: NURSE PRACTITIONER | Admitting: NURSE PRACTITIONER
Payer: MEDICARE

## 2020-10-15 DIAGNOSIS — R42 EPISODIC LIGHTHEADEDNESS: ICD-10-CM

## 2020-10-15 DIAGNOSIS — F17.200 TOBACCO USE DISORDER: ICD-10-CM

## 2020-10-15 DIAGNOSIS — E78.2 MIXED HYPERLIPIDEMIA: ICD-10-CM

## 2020-10-15 PROCEDURE — 93880 EXTRACRANIAL BILAT STUDY: CPT

## 2020-10-20 ENCOUNTER — HOSPITAL ENCOUNTER (OUTPATIENT)
Dept: NUCLEAR MEDICINE | Facility: HOSPITAL | Age: 65
Setting detail: NUCLEAR MEDICINE
End: 2020-10-20
Attending: NURSE PRACTITIONER
Payer: MEDICARE

## 2020-10-20 ENCOUNTER — HOSPITAL ENCOUNTER (OUTPATIENT)
Dept: CARDIOLOGY | Facility: HOSPITAL | Age: 65
Setting detail: NUCLEAR MEDICINE
End: 2020-10-20
Attending: NURSE PRACTITIONER
Payer: MEDICARE

## 2020-10-20 ENCOUNTER — ALLIED HEALTH/NURSE VISIT (OUTPATIENT)
Dept: CARDIOLOGY | Facility: OTHER | Age: 65
End: 2020-10-20
Attending: NURSE PRACTITIONER
Payer: MEDICARE

## 2020-10-20 VITALS — SYSTOLIC BLOOD PRESSURE: 131 MMHG | OXYGEN SATURATION: 93 % | HEART RATE: 88 BPM | DIASTOLIC BLOOD PRESSURE: 86 MMHG

## 2020-10-20 DIAGNOSIS — Z01.30 BP CHECK: Primary | ICD-10-CM

## 2020-10-20 DIAGNOSIS — J81.0 ACUTE PULMONARY EDEMA (H): ICD-10-CM

## 2020-10-20 DIAGNOSIS — E78.2 MIXED HYPERLIPIDEMIA: ICD-10-CM

## 2020-10-20 DIAGNOSIS — R07.9 EXERTIONAL CHEST PAIN: ICD-10-CM

## 2020-10-20 DIAGNOSIS — I51.7 RIGHT VENTRICULAR DILATION: ICD-10-CM

## 2020-10-20 DIAGNOSIS — R06.09 DOE (DYSPNEA ON EXERTION): ICD-10-CM

## 2020-10-20 DIAGNOSIS — R42 EPISODIC LIGHTHEADEDNESS: ICD-10-CM

## 2020-10-20 DIAGNOSIS — I51.7 CARDIOMEGALY: ICD-10-CM

## 2020-10-20 DIAGNOSIS — R97.20 ELEVATED PROSTATE SPECIFIC ANTIGEN (PSA): ICD-10-CM

## 2020-10-20 DIAGNOSIS — F17.200 TOBACCO USE DISORDER: ICD-10-CM

## 2020-10-20 DIAGNOSIS — R73.03 PREDIABETES: ICD-10-CM

## 2020-10-20 LAB
ANION GAP SERPL CALCULATED.3IONS-SCNC: 6 MMOL/L (ref 3–14)
BUN SERPL-MCNC: 24 MG/DL (ref 7–30)
CALCIUM SERPL-MCNC: 9 MG/DL (ref 8.5–10.1)
CHLORIDE SERPL-SCNC: 104 MMOL/L (ref 94–109)
CO2 SERPL-SCNC: 27 MMOL/L (ref 20–32)
CREAT SERPL-MCNC: 1.02 MG/DL (ref 0.66–1.25)
CV BLOOD PRESSURE: 74 %
CV STRESS MAX HR HE: 97
GFR SERPL CREATININE-BSD FRML MDRD: 77 ML/MIN/{1.73_M2}
GLUCOSE SERPL-MCNC: 95 MG/DL (ref 70–99)
NUC STRESS EJECTION FRACTION: 64 %
POTASSIUM SERPL-SCNC: 4.1 MMOL/L (ref 3.4–5.3)
PSA SERPL-MCNC: 9.06 UG/L (ref 0–4)
RATE PRESSURE PRODUCT: 8730
SODIUM SERPL-SCNC: 137 MMOL/L (ref 133–144)
STRESS ECHO BASELINE DIASTOLIC HE: 78
STRESS ECHO BASELINE HR: 86
STRESS ECHO BASELINE SYSTOLIC BP: 100
STRESS ECHO CALCULATED PERCENT HR: 62 %
STRESS ECHO LAST STRESS DIASTOLIC BP: 62
STRESS ECHO LAST STRESS SYSTOLIC BP: 90
STRESS ECHO TARGET HR: 156

## 2020-10-20 PROCEDURE — 78452 HT MUSCLE IMAGE SPECT MULT: CPT

## 2020-10-20 PROCEDURE — A9500 TC99M SESTAMIBI: HCPCS | Performed by: RADIOLOGY

## 2020-10-20 PROCEDURE — 93018 CV STRESS TEST I&R ONLY: CPT | Performed by: INTERNAL MEDICINE

## 2020-10-20 PROCEDURE — 36415 COLL VENOUS BLD VENIPUNCTURE: CPT | Mod: ZL | Performed by: NURSE PRACTITIONER

## 2020-10-20 PROCEDURE — 99207 PR NO CHARGE NURSE ONLY: CPT

## 2020-10-20 PROCEDURE — 80048 BASIC METABOLIC PNL TOTAL CA: CPT | Mod: ZL | Performed by: NURSE PRACTITIONER

## 2020-10-20 PROCEDURE — 93017 CV STRESS TEST TRACING ONLY: CPT | Performed by: INTERNAL MEDICINE

## 2020-10-20 PROCEDURE — 343N000001 HC RX 343: Performed by: RADIOLOGY

## 2020-10-20 PROCEDURE — 250N000011 HC RX IP 250 OP 636: Performed by: INTERNAL MEDICINE

## 2020-10-20 PROCEDURE — 93016 CV STRESS TEST SUPVJ ONLY: CPT | Performed by: INTERNAL MEDICINE

## 2020-10-20 PROCEDURE — 84153 ASSAY OF PSA TOTAL: CPT | Mod: ZL | Performed by: NURSE PRACTITIONER

## 2020-10-20 RX ORDER — REGADENOSON 0.08 MG/ML
0.4 INJECTION, SOLUTION INTRAVENOUS ONCE
Status: COMPLETED | OUTPATIENT
Start: 2020-10-20 | End: 2020-10-20

## 2020-10-20 RX ORDER — AMINOPHYLLINE 25 MG/ML
INJECTION, SOLUTION INTRAVENOUS
Status: DISCONTINUED
Start: 2020-10-20 | End: 2020-10-20 | Stop reason: WASHOUT

## 2020-10-20 RX ADMIN — Medication 30.9 MILLICURIE: at 09:19

## 2020-10-20 RX ADMIN — Medication 10.9 MILLICURIE: at 07:23

## 2020-10-20 RX ADMIN — REGADENOSON 0.4 MG: 0.08 INJECTION, SOLUTION INTRAVENOUS at 09:15

## 2020-10-20 NOTE — PROGRESS NOTES
FYI.....Patient in today for Nurse only and Labs per your request, patient did have stress test as well today and will be having upcoming lung scan on day of return with you, B/P today is 131/86 Pulse is 88 , patient states since saw you last feels good. Patient advised will call when lab results come in and stress result.

## 2020-10-27 ENCOUNTER — HOSPITAL ENCOUNTER (OUTPATIENT)
Dept: NUCLEAR MEDICINE | Facility: HOSPITAL | Age: 65
End: 2020-10-27
Attending: NURSE PRACTITIONER
Payer: MEDICARE

## 2020-10-27 ENCOUNTER — HOSPITAL ENCOUNTER (OUTPATIENT)
Dept: GENERAL RADIOLOGY | Facility: HOSPITAL | Age: 65
End: 2020-10-27
Attending: RADIOLOGY
Payer: MEDICARE

## 2020-10-27 DIAGNOSIS — R06.09 DOE (DYSPNEA ON EXERTION): ICD-10-CM

## 2020-10-27 DIAGNOSIS — R79.89 ELEVATED D-DIMER: ICD-10-CM

## 2020-10-27 DIAGNOSIS — I51.7 CARDIOMEGALY: ICD-10-CM

## 2020-10-27 DIAGNOSIS — I51.7 RIGHT VENTRICULAR DILATION: ICD-10-CM

## 2020-10-27 PROCEDURE — 343N000001 HC RX 343: Performed by: RADIOLOGY

## 2020-10-27 PROCEDURE — 78582 LUNG VENTILAT&PERFUS IMAGING: CPT

## 2020-10-27 PROCEDURE — A9540 TC99M MAA: HCPCS | Performed by: RADIOLOGY

## 2020-10-27 PROCEDURE — 71046 X-RAY EXAM CHEST 2 VIEWS: CPT

## 2020-10-27 PROCEDURE — A9567 TECHNETIUM TC-99M AEROSOL: HCPCS | Performed by: RADIOLOGY

## 2020-10-27 RX ADMIN — KIT FOR THE PREPARATION OF TECHNETIUM TC 99M ALBUMIN AGGREGATED 5.4 MILLICURIE: 2.5 INJECTION, POWDER, FOR SOLUTION INTRAVENOUS at 14:35

## 2020-10-27 RX ADMIN — KIT FOR THE PREPARATION OF TECHNETIUM TC 99M PENTETATE 31.4 MILLICURIE: 20 INJECTION, POWDER, LYOPHILIZED, FOR SOLUTION INTRAVENOUS; RESPIRATORY (INHALATION) at 14:00

## 2020-10-29 ENCOUNTER — OFFICE VISIT (OUTPATIENT)
Dept: CARDIOLOGY | Facility: OTHER | Age: 65
End: 2020-10-29
Attending: NURSE PRACTITIONER
Payer: MEDICARE

## 2020-10-29 ENCOUNTER — TELEPHONE (OUTPATIENT)
Dept: CARDIOLOGY | Facility: OTHER | Age: 65
End: 2020-10-29

## 2020-10-29 VITALS
TEMPERATURE: 97.8 F | OXYGEN SATURATION: 95 % | HEART RATE: 92 BPM | DIASTOLIC BLOOD PRESSURE: 82 MMHG | BODY MASS INDEX: 31.86 KG/M2 | SYSTOLIC BLOOD PRESSURE: 117 MMHG | WEIGHT: 228.4 LBS

## 2020-10-29 DIAGNOSIS — J43.9 PULMONARY EMPHYSEMA, UNSPECIFIED EMPHYSEMA TYPE (H): ICD-10-CM

## 2020-10-29 DIAGNOSIS — E78.2 MIXED HYPERLIPIDEMIA: ICD-10-CM

## 2020-10-29 DIAGNOSIS — F17.200 TOBACCO USE DISORDER: ICD-10-CM

## 2020-10-29 DIAGNOSIS — Z23 NEED FOR PROPHYLACTIC VACCINATION AND INOCULATION AGAINST INFLUENZA: ICD-10-CM

## 2020-10-29 DIAGNOSIS — R73.03 PREDIABETES: ICD-10-CM

## 2020-10-29 DIAGNOSIS — I51.7 RIGHT VENTRICULAR DILATION: Primary | ICD-10-CM

## 2020-10-29 DIAGNOSIS — R06.09 DOE (DYSPNEA ON EXERTION): ICD-10-CM

## 2020-10-29 DIAGNOSIS — I51.7 CARDIOMEGALY: ICD-10-CM

## 2020-10-29 DIAGNOSIS — R60.0 BILATERAL LOWER EXTREMITY EDEMA: ICD-10-CM

## 2020-10-29 LAB — NT-PROBNP SERPL-MCNC: 1908 PG/ML (ref 0–125)

## 2020-10-29 PROCEDURE — G0463 HOSPITAL OUTPT CLINIC VISIT: HCPCS

## 2020-10-29 PROCEDURE — 83880 ASSAY OF NATRIURETIC PEPTIDE: CPT | Mod: ZL | Performed by: NURSE PRACTITIONER

## 2020-10-29 PROCEDURE — 99214 OFFICE O/P EST MOD 30 MIN: CPT | Performed by: NURSE PRACTITIONER

## 2020-10-29 PROCEDURE — 36415 COLL VENOUS BLD VENIPUNCTURE: CPT | Mod: ZL | Performed by: NURSE PRACTITIONER

## 2020-10-29 RX ORDER — TORSEMIDE 20 MG/1
40 TABLET ORAL EVERY MORNING
Qty: 90 TABLET | Refills: 1 | Status: SHIPPED | OUTPATIENT
Start: 2020-10-29 | End: 2021-01-13

## 2020-10-29 RX ORDER — POTASSIUM CHLORIDE 750 MG/1
20 CAPSULE, EXTENDED RELEASE ORAL 2 TIMES DAILY
Qty: 120 CAPSULE | Refills: 3 | Status: SHIPPED | OUTPATIENT
Start: 2020-10-29 | End: 2021-03-01

## 2020-10-29 ASSESSMENT — PAIN SCALES - GENERAL: PAINLEVEL: NO PAIN (0)

## 2020-10-29 NOTE — PATIENT INSTRUCTIONS
Thank you for allowing Marleny Bauer and our team to participate in your care. Please call our office at [phone] with scheduling questions or if you need to cancel or change your appointment. With any other questions or concerns you may call Joaquina cardiology nurse at [phone].       If you experience chest pain, chest pressure, chest tightness, shortness of breath, fainting, lightheadedness, nausea, vomiting, or other concerning symptoms, please report to the Emergency Department or call 911. These symptoms may be emergent, and best treated in the Emergency Department.    Lab today and someone will call you for results.     Reschedule your Pulmonary Function Test.     We will schedule you for a Left Heart Catheterization and someone from our office will call with instructions.
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
## 2020-10-29 NOTE — TELEPHONE ENCOUNTER
Outreach to patient and given instructions below. Also left copy of instructions at  for patient to .     Verbalized understanding. Will call with any questions.

## 2020-10-29 NOTE — NURSING NOTE
"Chief Complaint   Patient presents with     RECHECK       Initial /82 (BP Location: Left arm, Patient Position: Sitting, Cuff Size: Adult Large)   Pulse 92   Temp 97.8  F (36.6  C) (Tympanic)   Wt 103.6 kg (228 lb 6.4 oz)   SpO2 95%   BMI 31.86 kg/m   Estimated body mass index is 31.86 kg/m  as calculated from the following:    Height as of 8/26/20: 1.803 m (5' 11\").    Weight as of this encounter: 103.6 kg (228 lb 6.4 oz).  Medication Reconciliation: complete  Joaquina Jonas LPN    "

## 2020-10-29 NOTE — LETTER
October 29, 2020      Joey Irene  [address]  [place] [address]      Dear Joey,   Here are the instructions we discussed on the phone. Please don't hesitate to call with any questions.   Bharath MIRANDA RN  Cardiology Care Coordinator   [phone]    COVID Swab- Thursday 11- at 1045AM  Angiogram- Monday 11- at 1130AM  Follow up with Marleny Bauer- Tuesday 12-8-2020 at 230PM    You will be scheduled for a COVID swab at our [facility] location. You are to arrive to the facility and park in the east parking lot. Please stay IN your vehicle and call [phone] and get further instructions. If you do not have a cell phone to call the number you will enter through door #1 and be screened at the door.       You are scheduled for a Coronary Angiogram on November 23RD at the [facility], [address] [place] [address], [place] [address]. You are to check in at the [facility] Waiting Area at 1130AM.     When you arrive you will be escorted back to the pre procedure area called 2A. Here they will insert an IV, draw labs, and obtain a short medical history. Please bring an updated list of your current medications.     A physician will come and talk with you about the procedure and obtain consent.     A nurse from the Cardiac Catheterization Lab will then escort you to the procedure area. You will be receiving sedation during the procedure so you will need someone to drive you to and from the hospital.     After the procedure you will recover for a short period (2 - 6 hours) on 6B. You will be discharged with instructions for post procedure care. However, depending on what the angiogram shows you may have to have stents placed and this might require an overnight stay. We ask that you bring a small bag of necessities for your comfort if you would need to stay overnight. DO NOT BRING ANY VALUABLES!       Pre procedure instructions:   1. You will NOT need a preop physical (H&P) done by your  PCP within 30 days prior to the procedure. Please bring a hard copy of this with you to your procedure OR have your PCP fax to: [phone].   2. Make arrangements to have a  as you will not be allowed to drive following the procedure. Someone should stay with you the night after your procedure.   3.  Do not eat any solid food or milk products for 8 hours prior to the exam. You may drink clear liquids until 2 hours prior to the exam. Clear liquids include the following: water, Jell-O, clear broth, apple juice or any non-carbonated drink that you can see through (no pop!).   4. Do not drink alcohol or smoke 24 hours prior to test.   5. Hold these meds:     6. You may take your other morning medications as prescribed with a sip of water. If you currently take an aspirin, continue taking your same dose. If not, take a 325 mg aspirin the day before and the day of your procedure.     If you have further questions, please utilize [employer] to contact us.   If your question concerns the above instructions, contact:   Bharath ZEE RN   Cardiology Care Coordinator   [phone]     If your question concerns the schedule/appointment times, contact:   [facility] Procedure    [phone]

## 2020-10-29 NOTE — TELEPHONE ENCOUNTER
COVID Swab- Thursday 11- at 1045AM  Angiogram- Monday 11- at 1130AM  Follow up with Marleny Bauer- Tuesday 12-8-2020 at 230PM    You will be scheduled for a COVID swab at our [facility] location. You are to arrive to the facility and park in the east parking lot. Please stay IN your vehicle and call [phone] and get further instructions. If you do not have a cell phone to call the number you will enter through door #1 and be screened at the door.       You are scheduled for a Coronary Angiogram on November 23RD at the [facility], [address] [place] [address], [place] [address]. You are to check in at the [facility] Waiting Area at 1130AM.     When you arrive you will be escorted back to the pre procedure area called 2A. Here they will insert an IV, draw labs, and obtain a short medical history. Please bring an updated list of your current medications.     A physician will come and talk with you about the procedure and obtain consent.     A nurse from the Cardiac Catheterization Lab will then escort you to the procedure area. You will be receiving sedation during the procedure so you will need someone to drive you to and from the hospital.     After the procedure you will recover for a short period (2 - 6 hours) on 6B. You will be discharged with instructions for post procedure care. However, depending on what the angiogram shows you may have to have stents placed and this might require an overnight stay. We ask that you bring a small bag of necessities for your comfort if you would need to stay overnight. DO NOT BRING ANY VALUABLES!       Pre procedure instructions:   1. You will NOT need a preop physical (H&P) done by your PCP within 30 days prior to the procedure. Please bring a hard copy of this with you to your procedure OR have your PCP fax to: [phone].   2. Make arrangements to have a  as you will not be allowed to drive following the procedure. Someone  should stay with you the night after your procedure.   3.  Do not eat any solid food or milk products for 8 hours prior to the exam. You may drink clear liquids until 2 hours prior to the exam. Clear liquids include the following: water, Jell-O, clear broth, apple juice or any non-carbonated drink that you can see through (no pop!).   4. Do not drink alcohol or smoke 24 hours prior to test.   5. Hold these meds:     6. You may take your other morning medications as prescribed with a sip of water. If you currently take an aspirin, continue taking your same dose. If not, take a 325 mg aspirin the day before and the day of your procedure.     If you have further questions, please utilize [employer] to contact us.   If your question concerns the above instructions, contact:   Bharath ZEE RN   Cardiology Care Coordinator   [phone]     If your question concerns the schedule/appointment times, contact:   [facility]    [phone]

## 2020-11-02 ENCOUNTER — TELEPHONE (OUTPATIENT)
Dept: UROLOGY | Facility: OTHER | Age: 65
End: 2020-11-02

## 2020-11-02 DIAGNOSIS — Z11.59 ENCOUNTER FOR SCREENING FOR OTHER VIRAL DISEASES: Primary | ICD-10-CM

## 2020-11-02 NOTE — TELEPHONE ENCOUNTER
I called the patient to check them in for their phone visit today and he requested to reschedule to a different day in the middle of December. Pt is rescheduled for December 22nd.     Gricelda Landeros MA on 11/2/2020 at 8:18 AM

## 2020-11-07 ENCOUNTER — HEALTH MAINTENANCE LETTER (OUTPATIENT)
Age: 65
End: 2020-11-07

## 2020-11-19 ENCOUNTER — NURSE TRIAGE (OUTPATIENT)
Dept: FAMILY MEDICINE | Facility: OTHER | Age: 65
End: 2020-11-19

## 2020-11-19 ENCOUNTER — OFFICE VISIT (OUTPATIENT)
Dept: FAMILY MEDICINE | Facility: OTHER | Age: 65
End: 2020-11-19
Attending: FAMILY MEDICINE
Payer: MEDICARE

## 2020-11-19 ENCOUNTER — TELEPHONE (OUTPATIENT)
Dept: CARDIOLOGY | Facility: OTHER | Age: 65
End: 2020-11-19

## 2020-11-19 DIAGNOSIS — Z20.822 ENCOUNTER FOR LABORATORY TESTING FOR COVID-19 VIRUS: Primary | ICD-10-CM

## 2020-11-19 DIAGNOSIS — Z20.822 COVID-19 RULED OUT: ICD-10-CM

## 2020-11-19 DIAGNOSIS — I51.7 RIGHT VENTRICULAR DILATION: Primary | ICD-10-CM

## 2020-11-19 DIAGNOSIS — R06.09 DOE (DYSPNEA ON EXERTION): ICD-10-CM

## 2020-11-19 PROCEDURE — U0003 INFECTIOUS AGENT DETECTION BY NUCLEIC ACID (DNA OR RNA); SEVERE ACUTE RESPIRATORY SYNDROME CORONAVIRUS 2 (SARS-COV-2) (CORONAVIRUS DISEASE [COVID-19]), AMPLIFIED PROBE TECHNIQUE, MAKING USE OF HIGH THROUGHPUT TECHNOLOGIES AS DESCRIBED BY CMS-2020-01-R: HCPCS | Mod: ZL | Performed by: NURSE PRACTITIONER

## 2020-11-19 RX ORDER — LIDOCAINE 40 MG/G
CREAM TOPICAL
Status: CANCELLED | OUTPATIENT
Start: 2020-11-19

## 2020-11-19 NOTE — TELEPHONE ENCOUNTER
----- Message from Leatha Avila PA-C sent at 6/12/2020  4:02 PM CDT -----  Normal wet mount.  As discussed with the patient at office visit, this can sometimes give false negatives.  Still take the Diflucan as prescribed and call Monday if there is no symptomatic improvement.   Sinus congestion with watery eyes, sneezing and cough.     covid testing:    Next 5 appointments (look out 90 days)    Nov 19, 2020 11:00 AM  (Arrive by 10:45 AM)  SHORT with  [facility] [place] ([facility] [place] ) [address]  [place] [address]  [phone]   Dec 08, 2020  2:45 PM  (Arrive by 2:30 PM)  Return Visit with BERNARDINO Cordero CNP  [facility] ([facility] [place] ) [address]  [place] [address]  [phone]          Reason for Disposition    COVID-19 Home Isolation, questions about    Additional Information    Negative: SEVERE difficulty breathing (e.g., struggling for each breath, speaks in single words)    Negative: Difficult to awaken or acting confused (e.g., disoriented, slurred speech)    Negative: Bluish (or gray) lips or face now    Negative: Shock suspected (e.g., cold/pale/clammy skin, too weak to stand, low BP, rapid pulse)    Negative: Sounds like a life-threatening emergency to the triager    Negative: [1] COVID-19 exposure AND [2] no symptoms    Negative: COVID-19 and Breastfeeding, questions about    Negative: [1] Adult with possible COVID-19 symptoms AND [2] triager concerned about severity of symptoms or other causes    Negative: SEVERE or constant chest pain or pressure (Exception: mild central chest pain, present only when coughing)    Negative: MODERATE difficulty breathing (e.g., speaks in phrases, SOB even at rest, pulse 100-120)    Negative: Patient sounds very sick or weak to the triager    Negative: MILD difficulty breathing (e.g., minimal/no SOB at rest, SOB with walking, pulse <100)    Negative: Chest pain or pressure    Negative: Fever > 103 F (39.4 C)    Negative: [1] Fever > 101 F (38.3 C) AND [2] age > 60    Negative: [1] Fever > 100.0 F (37.8 C) AND [2] bedridden (e.g., nursing home patient, CVA, chronic illness, recovering from surgery)    Negative: HIGH RISK  "patient (e.g., age > 64 years, diabetes, heart or lung disease, weak immune system) (Exception: Has already been evaluated by healthcare provider and has no new or worsening symptoms)    Negative: Fever present > 3 days (72 hours)    Negative: [1] Fever returns after gone for over 24 hours AND [2] symptoms worse or not improved    Negative: [1] Continuous (nonstop) coughing interferes with work or school AND [2] no improvement using cough treatment per protocol    Negative: [1] COVID-19 infection suspected by caller or triager AND [2] mild symptoms (cough, fever, or others) AND [3] no complications or SOB    Negative: Cough present > 3 weeks    Negative: [1] COVID-19 diagnosed by positive lab test AND [2] mild symptoms (e.g., cough, fever, others) AND [3] no complications or SOB    Negative: [1] COVID-19 diagnosed by HCP (doctor, NP or PA) AND [2] mild symptoms (e.g., cough, fever, others) AND [3] no complications or SOB    Answer Assessment - Initial Assessment Questions  1. COVID-19 DIAGNOSIS: \"Who made your Coronavirus (COVID-19) diagnosis?\" \"Was it confirmed by a positive lab test?\" If not diagnosed by a HCP, ask \"Are there lots of cases (community spread) where you live?\" (See public health department website, if unsure)      Pt has not tested positive for covid 19    2. ONSET: \"When did the COVID-19 symptoms start?\"       X 3 days ago    3. WORST SYMPTOM: \"What is your worst symptom?\" (e.g., cough, fever, shortness of breath, muscle aches)      Cough     4. COUGH: \"Do you have a cough?\" If so, ask: \"How bad is the cough?\"        Yes, productive)      5. FEVER: \"Do you have a fever?\" If so, ask: \"What is your temperature, how was it measured, and when did it start?\"      No     6. RESPIRATORY STATUS: \"Describe your breathing?\" (e.g., shortness of breath, wheezing, unable to speak)       No     7. BETTER-SAME-WORSE: \"Are you getting better, staying the same or getting worse compared to yesterday?\"  If getting " "worse, ask, \"In what way?\"      Wheezing slight     8. HIGH RISK DISEASE: \"Do you have any chronic medical problems?\" (e.g., asthma, heart or lung disease, weak immune system, etc.)      COPD and Heart Disease     9. PREGNANCY: \"Is there any chance you are pregnant?\" \"When was your last menstrual period?\"      NA    10. OTHER SYMPTOMS: \"Do you have any other symptoms?\"  (e.g., chills, fatigue, headache, loss of smell or taste, muscle pain, sore throat)        Cough with sinus congestion    Protocols used: CORONAVIRUS (COVID-19) DIAGNOSED OR [id number] 8.4.20      "

## 2020-11-19 NOTE — TELEPHONE ENCOUNTER
I will put him down to reschedule and reach out to him end of December or early January.   Thank you.

## 2020-11-19 NOTE — TELEPHONE ENCOUNTER
Patient called and is cancelling Angiogram on Monday 11/23/2020, he states with the Lance of Covid - 19 he would like to hold off until January of 2021. Let me know if you need help on my end.   Joaquina Jonas LPN

## 2020-11-20 ENCOUNTER — APPOINTMENT (OUTPATIENT)
Dept: GENERAL RADIOLOGY | Facility: HOSPITAL | Age: 65
End: 2020-11-20
Attending: EMERGENCY MEDICINE
Payer: MEDICARE

## 2020-11-20 ENCOUNTER — TELEPHONE (OUTPATIENT)
Dept: CARDIOLOGY | Facility: CLINIC | Age: 65
End: 2020-11-20

## 2020-11-20 ENCOUNTER — HOSPITAL ENCOUNTER (EMERGENCY)
Facility: HOSPITAL | Age: 65
Discharge: HOME | End: 2020-11-20
Attending: EMERGENCY MEDICINE | Admitting: EMERGENCY MEDICINE
Payer: MEDICARE

## 2020-11-20 ENCOUNTER — TELEPHONE (OUTPATIENT)
Dept: CARDIOLOGY | Facility: OTHER | Age: 65
End: 2020-11-20

## 2020-11-20 ENCOUNTER — APPOINTMENT (OUTPATIENT)
Dept: CT IMAGING | Facility: HOSPITAL | Age: 65
End: 2020-11-20
Attending: EMERGENCY MEDICINE
Payer: MEDICARE

## 2020-11-20 VITALS
SYSTOLIC BLOOD PRESSURE: 117 MMHG | RESPIRATION RATE: 25 BRPM | DIASTOLIC BLOOD PRESSURE: 86 MMHG | OXYGEN SATURATION: 92 % | TEMPERATURE: 98.6 F | HEART RATE: 87 BPM

## 2020-11-20 DIAGNOSIS — J43.9 PULMONARY EMPHYSEMA, UNSPECIFIED EMPHYSEMA TYPE (H): ICD-10-CM

## 2020-11-20 DIAGNOSIS — F17.200 TOBACCO USE DISORDER: ICD-10-CM

## 2020-11-20 DIAGNOSIS — I27.21 SEVERE PULMONARY ARTERIAL SYSTOLIC HYPERTENSION (H): Primary | ICD-10-CM

## 2020-11-20 DIAGNOSIS — I50.33 ACUTE ON CHRONIC DIASTOLIC HEART FAILURE (H): ICD-10-CM

## 2020-11-20 DIAGNOSIS — R06.09 DOE (DYSPNEA ON EXERTION): ICD-10-CM

## 2020-11-20 DIAGNOSIS — I51.7 CARDIOMEGALY: ICD-10-CM

## 2020-11-20 DIAGNOSIS — I51.7 RIGHT VENTRICULAR DILATION: Primary | ICD-10-CM

## 2020-11-20 LAB
ALBUMIN SERPL-MCNC: 3.7 G/DL (ref 3.4–5)
ALP SERPL-CCNC: 80 U/L (ref 40–150)
ALT SERPL W P-5'-P-CCNC: 18 U/L (ref 0–70)
ANION GAP SERPL CALCULATED.3IONS-SCNC: 5 MMOL/L (ref 3–14)
AST SERPL W P-5'-P-CCNC: 13 U/L (ref 0–45)
BASE EXCESS BLDV CALC-SCNC: 4.4 MMOL/L
BASOPHILS # BLD AUTO: 0.1 10E9/L (ref 0–0.2)
BASOPHILS NFR BLD AUTO: 0.7 %
BILIRUB SERPL-MCNC: 0.4 MG/DL (ref 0.2–1.3)
BUN SERPL-MCNC: 15 MG/DL (ref 7–30)
CALCIUM SERPL-MCNC: 8.9 MG/DL (ref 8.5–10.1)
CHLORIDE SERPL-SCNC: 99 MMOL/L (ref 94–109)
CO2 SERPL-SCNC: 31 MMOL/L (ref 20–32)
CREAT SERPL-MCNC: 1.05 MG/DL (ref 0.66–1.25)
CRP SERPL-MCNC: 13.5 MG/L (ref 0–8)
D DIMER PPP FEU-MCNC: 0.9 UG/ML FEU (ref 0–0.5)
DIFFERENTIAL METHOD BLD: ABNORMAL
EOSINOPHIL # BLD AUTO: 0.1 10E9/L (ref 0–0.7)
EOSINOPHIL NFR BLD AUTO: 0.9 %
ERYTHROCYTE [DISTWIDTH] IN BLOOD BY AUTOMATED COUNT: 12.8 % (ref 10–15)
FERRITIN SERPL-MCNC: 77 NG/ML (ref 26–388)
GFR SERPL CREATININE-BSD FRML MDRD: 74 ML/MIN/{1.73_M2}
GLUCOSE SERPL-MCNC: 107 MG/DL (ref 70–99)
HCO3 BLDV-SCNC: 30 MMOL/L (ref 21–28)
HCT VFR BLD AUTO: 54.4 % (ref 40–53)
HGB BLD-MCNC: 18.8 G/DL (ref 13.3–17.7)
IMM GRANULOCYTES # BLD: 0.1 10E9/L (ref 0–0.4)
IMM GRANULOCYTES NFR BLD: 0.5 %
LDH SERPL L TO P-CCNC: 194 U/L (ref 85–227)
LYMPHOCYTES # BLD AUTO: 4.1 10E9/L (ref 0.8–5.3)
LYMPHOCYTES NFR BLD AUTO: 34 %
MAGNESIUM SERPL-MCNC: 2.1 MG/DL (ref 1.6–2.3)
MCH RBC QN AUTO: 31.1 PG (ref 26.5–33)
MCHC RBC AUTO-ENTMCNC: 34.6 G/DL (ref 31.5–36.5)
MCV RBC AUTO: 90 FL (ref 78–100)
MONOCYTES # BLD AUTO: 1 10E9/L (ref 0–1.3)
MONOCYTES NFR BLD AUTO: 8.7 %
NEUTROPHILS # BLD AUTO: 6.6 10E9/L (ref 1.6–8.3)
NEUTROPHILS NFR BLD AUTO: 55.2 %
NRBC # BLD AUTO: 0 10*3/UL
NRBC BLD AUTO-RTO: 0 /100
NT-PROBNP SERPL-MCNC: 2674 PG/ML (ref 0–900)
O2/TOTAL GAS SETTING VFR VENT: ABNORMAL %
OXYHGB MFR BLDV: 86 %
PCO2 BLDV: 45 MM HG (ref 40–50)
PH BLDV: 7.43 PH (ref 7.32–7.43)
PHOSPHATE SERPL-MCNC: 4 MG/DL (ref 2.5–4.5)
PLATELET # BLD AUTO: 301 10E9/L (ref 150–450)
PO2 BLDV: 57 MM HG (ref 25–47)
POTASSIUM SERPL-SCNC: 4.3 MMOL/L (ref 3.4–5.3)
PROCALCITONIN SERPL-MCNC: <0.05 NG/ML
PROT SERPL-MCNC: 7.6 G/DL (ref 6.8–8.8)
RBC # BLD AUTO: 6.04 10E12/L (ref 4.4–5.9)
SARS-COV-2 RNA SPEC QL NAA+PROBE: NOT DETECTED
SODIUM SERPL-SCNC: 135 MMOL/L (ref 133–144)
SPECIMEN SOURCE: NORMAL
TROPONIN I SERPL-MCNC: <0.015 UG/L (ref 0–0.04)
WBC # BLD AUTO: 12 10E9/L (ref 4–11)

## 2020-11-20 PROCEDURE — 80053 COMPREHEN METABOLIC PANEL: CPT | Performed by: EMERGENCY MEDICINE

## 2020-11-20 PROCEDURE — 82728 ASSAY OF FERRITIN: CPT | Performed by: EMERGENCY MEDICINE

## 2020-11-20 PROCEDURE — 86140 C-REACTIVE PROTEIN: CPT | Performed by: EMERGENCY MEDICINE

## 2020-11-20 PROCEDURE — 99285 EMERGENCY DEPT VISIT HI MDM: CPT | Performed by: EMERGENCY MEDICINE

## 2020-11-20 PROCEDURE — 93010 ELECTROCARDIOGRAM REPORT: CPT | Performed by: INTERNAL MEDICINE

## 2020-11-20 PROCEDURE — 83615 LACTATE (LD) (LDH) ENZYME: CPT | Performed by: EMERGENCY MEDICINE

## 2020-11-20 PROCEDURE — 85025 COMPLETE CBC W/AUTO DIFF WBC: CPT | Performed by: EMERGENCY MEDICINE

## 2020-11-20 PROCEDURE — 250N000013 HC RX MED GY IP 250 OP 250 PS 637: Mod: GY | Performed by: EMERGENCY MEDICINE

## 2020-11-20 PROCEDURE — 94640 AIRWAY INHALATION TREATMENT: CPT

## 2020-11-20 PROCEDURE — 83880 ASSAY OF NATRIURETIC PEPTIDE: CPT | Performed by: EMERGENCY MEDICINE

## 2020-11-20 PROCEDURE — 94664 DEMO&/EVAL PT USE INHALER: CPT | Mod: XU

## 2020-11-20 PROCEDURE — 85379 FIBRIN DEGRADATION QUANT: CPT | Performed by: EMERGENCY MEDICINE

## 2020-11-20 PROCEDURE — 84100 ASSAY OF PHOSPHORUS: CPT | Performed by: EMERGENCY MEDICINE

## 2020-11-20 PROCEDURE — 71275 CT ANGIOGRAPHY CHEST: CPT

## 2020-11-20 PROCEDURE — 71045 X-RAY EXAM CHEST 1 VIEW: CPT

## 2020-11-20 PROCEDURE — 84145 PROCALCITONIN (PCT): CPT | Performed by: EMERGENCY MEDICINE

## 2020-11-20 PROCEDURE — 96375 TX/PRO/DX INJ NEW DRUG ADDON: CPT | Mod: XU

## 2020-11-20 PROCEDURE — 93005 ELECTROCARDIOGRAM TRACING: CPT

## 2020-11-20 PROCEDURE — 999N000157 HC STATISTIC RCP TIME EA 10 MIN

## 2020-11-20 PROCEDURE — 82805 BLOOD GASES W/O2 SATURATION: CPT | Performed by: EMERGENCY MEDICINE

## 2020-11-20 PROCEDURE — 99285 EMERGENCY DEPT VISIT HI MDM: CPT | Mod: 25

## 2020-11-20 PROCEDURE — 250N000011 HC RX IP 250 OP 636: Performed by: EMERGENCY MEDICINE

## 2020-11-20 PROCEDURE — 87040 BLOOD CULTURE FOR BACTERIA: CPT | Performed by: EMERGENCY MEDICINE

## 2020-11-20 PROCEDURE — 83735 ASSAY OF MAGNESIUM: CPT | Performed by: EMERGENCY MEDICINE

## 2020-11-20 PROCEDURE — 96374 THER/PROPH/DIAG INJ IV PUSH: CPT | Mod: XU

## 2020-11-20 PROCEDURE — 36415 COLL VENOUS BLD VENIPUNCTURE: CPT | Performed by: EMERGENCY MEDICINE

## 2020-11-20 PROCEDURE — 84484 ASSAY OF TROPONIN QUANT: CPT | Performed by: EMERGENCY MEDICINE

## 2020-11-20 RX ORDER — METOLAZONE 2.5 MG/1
2.5 TABLET ORAL DAILY
Qty: 7 TABLET | Refills: 0 | Status: SHIPPED | OUTPATIENT
Start: 2020-11-20 | End: 2020-12-22

## 2020-11-20 RX ORDER — IOPAMIDOL 755 MG/ML
100 INJECTION, SOLUTION INTRAVASCULAR ONCE
Status: COMPLETED | OUTPATIENT
Start: 2020-11-20 | End: 2020-11-20

## 2020-11-20 RX ORDER — ALBUTEROL SULFATE 90 UG/1
6 AEROSOL, METERED RESPIRATORY (INHALATION)
Status: DISCONTINUED | OUTPATIENT
Start: 2020-11-20 | End: 2020-11-20 | Stop reason: HOSPADM

## 2020-11-20 RX ORDER — ONDANSETRON 2 MG/ML
4 INJECTION INTRAMUSCULAR; INTRAVENOUS EVERY 30 MIN PRN
Status: DISCONTINUED | OUTPATIENT
Start: 2020-11-20 | End: 2020-11-20 | Stop reason: HOSPADM

## 2020-11-20 RX ORDER — FUROSEMIDE 10 MG/ML
60 INJECTION INTRAMUSCULAR; INTRAVENOUS ONCE
Status: COMPLETED | OUTPATIENT
Start: 2020-11-20 | End: 2020-11-20

## 2020-11-20 RX ORDER — MORPHINE SULFATE 4 MG/ML
4 INJECTION, SOLUTION INTRAMUSCULAR; INTRAVENOUS
Status: COMPLETED | OUTPATIENT
Start: 2020-11-20 | End: 2020-11-20

## 2020-11-20 RX ADMIN — MORPHINE SULFATE 4 MG: 4 INJECTION, SOLUTION INTRAMUSCULAR; INTRAVENOUS at 17:23

## 2020-11-20 RX ADMIN — IOPAMIDOL 100 ML: 755 INJECTION, SOLUTION INTRAVENOUS at 19:09

## 2020-11-20 RX ADMIN — ALBUTEROL SULFATE 6 PUFF: 90 AEROSOL, METERED RESPIRATORY (INHALATION) at 17:26

## 2020-11-20 RX ADMIN — FUROSEMIDE 60 MG: 10 INJECTION, SOLUTION INTRAMUSCULAR; INTRAVENOUS at 17:22

## 2020-11-20 RX ADMIN — ONDANSETRON 4 MG: 2 INJECTION INTRAMUSCULAR; INTRAVENOUS at 17:23

## 2020-11-20 ASSESSMENT — ENCOUNTER SYMPTOMS
SHORTNESS OF BREATH: 1
ABDOMINAL PAIN: 0
FEVER: 0

## 2020-11-20 NOTE — ED PROVIDER NOTES
History     Chief Complaint   Patient presents with     Weight Gain     Chest Pain     HPI  Joey Irene is a 64 year old male who presents to the emergency department with complaints of 5 pound weight gain since yesterday per his scale at home.  He is also feeling more short of breath than usual.  He is known to have CHF and COPD.  He tried using his inhalers without improvement.  He is currently on Torsemide 40 mg daily.    Allergies:  Allergies   Allergen Reactions     Seasonal Allergies Difficulty breathing and Cough     Bupropion Other (See Comments)     Tramadol Nausea and Swelling     Belfry Nite Time Dizziness and Nausea and Vomiting     Nyquil Cold &  [Night-Time Cold-Flu Relief] Dizziness and Nausea and Vomiting     Trichophyton Other (See Comments)       Problem List:    Patient Active Problem List    Diagnosis Date Noted     Severe pulmonary arterial systolic hypertension (H) 11/20/2020     Priority: Medium     Acute on chronic diastolic heart failure (H) 11/20/2020     Priority: Medium     Right ventricular dilation 10/29/2020     Priority: Medium     Added automatically from request for surgery [id number]       MURRAY (dyspnea on exertion) 10/29/2020     Priority: Medium     Added automatically from request for surgery [id number]       Bee sting allergy 08/07/2020     Priority: Medium     Hyperglycemia 08/07/2020     Priority: Medium     Elevated TSH 08/07/2020     Priority: Medium     Elevated prostate specific antigen (PSA) 08/07/2020     Priority: Medium     Diverticulitis large intestine 08/06/2020     Priority: Medium     History of fusion of cervical spine 08/06/2020     Priority: Medium     Opioid abuse (H) 08/06/2020     Priority: Medium     Generalized anxiety disorder 08/06/2020     Priority: Medium     Tobacco use disorder 08/06/2020     Priority: Medium     Pulmonary emphysema, unspecified emphysema type (H) 08/05/2020     Priority: Medium     Hyperlipidemia, unspecified hyperlipidemia type  08/05/2020     Priority: Medium     Cardiomegaly 08/05/2020     Priority: Medium     Enlarged prostate 08/05/2020     Priority: Medium     Diverticulosis 03/25/2020     Priority: Medium     Chronic bronchitis with emphysema (H) 02/03/2020     Priority: Medium     Class 1 obesity with body mass index (BMI) of 33.0 to 33.9 in adult 02/03/2020     Priority: Medium     Smoking greater than 40 pack years 02/03/2020     Priority: Medium     Pain due to total left knee replacement, sequela 01/27/2020     Priority: Medium     Cervical stenosis of spinal canal 01/13/2020     Priority: Medium     Cholesteatoma of left ear 03/04/2019     Priority: Medium     Hearing problem, left 03/04/2019     Priority: Medium     Nasal septal deviation 12/18/2017     Priority: Medium     Primary osteoarthritis of right knee 02/25/2017     Priority: Medium     Status post total left knee replacement 02/25/2017     Priority: Medium     Recurrent major depressive disorder, in partial remission (H) 11/21/2015     Priority: Medium     PTSD (post-traumatic stress disorder) 08/19/2015     Priority: Medium     Seasonal allergies 08/19/2015     Priority: Medium     Anxiety state 07/07/2011     Priority: Medium     Back pain, chronic 07/07/2011     Priority: Medium     Hearing loss 07/07/2011     Priority: Medium     Insomnia, unspecified 07/07/2011     Priority: Medium     Sleep apnea 07/07/2011     Priority: Medium        Past Medical History:    History reviewed. No pertinent past medical history.    Past Surgical History:    Past Surgical History:   Procedure Laterality Date     ENT SURGERY      patient has had three left ear surgeries, unsure what they did     FUSION CERVICAL POSTERIOR THREE+ LEVELS       HERNIA REPAIR, INGUINAL RT/LT Right     done times 3     JOINT REPLACEMENT Left      REPAIR HAMMER TOE Right        Family History:    Family History   Problem Relation Age of Onset     Lung Cancer Mother      Ovarian Cancer Sister        Social  History:  Marital Status:   [4]  Social History     Tobacco Use     Smoking status: Current Some Day Smoker     Years: 50.00     Types: Cigars     Smokeless tobacco: Never Used     Tobacco comment: smokes 2 cigars daily, previously smoked 2 packs per day   Substance Use Topics     Alcohol use: Never     Frequency: Never     Drug use: Never        Medications:         acetaminophen (TYLENOL) 500 MG tablet       albuterol (PROAIR HFA/PROVENTIL HFA/VENTOLIN HFA) 108 (90 Base) MCG/ACT inhaler       ARIPiprazole (ABILIFY) 5 MG tablet       aspirin (ASA) 81 MG EC tablet       cetirizine (ZYRTEC) 10 MG tablet       Cholecalciferol (D 1000) 25 MCG (1000 UT) CAPS       cyclobenzaprine (FLEXERIL) 10 MG tablet       DULoxetine (CYMBALTA) 60 MG capsule       fluticasone (FLONASE) 50 MCG/ACT nasal spray       Fluticasone-Umeclidin-Vilanterol (TRELEGY ELLIPTA) 100-62.5-25 MCG/INH oral inhaler       gabapentin (NEURONTIN) 300 MG capsule       guaiFENesin (MUCINEX) 600 MG 12 hr tablet       hydrOXYzine (VISTARIL) 50 MG capsule       metolazone (ZAROXOLYN) 2.5 MG tablet       montelukast (SINGULAIR) 10 MG tablet       naproxen (NAPROSYN) 500 MG tablet       omeprazole (PRILOSEC) 20 MG DR capsule       potassium chloride ER (MICRO-K) 10 MEQ CR capsule       SYMBICORT 80-4.5 MCG/ACT Inhaler       torsemide (DEMADEX) 20 MG tablet       traZODone (DESYREL) 50 MG tablet       vitamin C (ASCORBIC ACID) 500 MG tablet       VITAMIN E-100 OR       zolpidem (AMBIEN) 10 MG tablet       EPINEPHrine (ANY BX GENERIC EQUIV) 0.3 MG/0.3ML injection 2-pack       influenza vac high-dose quad (FLUZONE HD) 0.7 ML ÁLVARO injection          Review of Systems   Constitutional: Negative for fever.   Respiratory: Positive for shortness of breath.    Cardiovascular: Positive for chest pain.   Gastrointestinal: Negative for abdominal pain.   All other systems reviewed and are negative.      Physical Exam   BP: 120/95  Pulse: 97  Temp: 97.6  F (36.4   C)  Resp: 18  SpO2: 93 %      Physical Exam  Vitals signs and nursing note reviewed.   Constitutional:       General: He is not in acute distress.     Appearance: He is well-developed. He is not diaphoretic.   HENT:      Head: Normocephalic and atraumatic.   Eyes:      Pupils: Pupils are equal, round, and reactive to light.   Cardiovascular:      Rate and Rhythm: Normal rate and regular rhythm.      Heart sounds: Normal heart sounds.   Pulmonary:      Effort: Pulmonary effort is normal. No respiratory distress.      Breath sounds: No stridor. Wheezing and rales present.   Abdominal:      General: Bowel sounds are normal. There is no distension.      Tenderness: There is no abdominal tenderness.   Musculoskeletal:         General: No tenderness or deformity.   Neurological:      General: No focal deficit present.      Mental Status: He is alert and oriented to person, place, and time.         ED Course   Patient evaluated and labs ordered.  IV Lasix for 60 mg given.  Morphine 4 mg IV given.  Albuterol nebs given.    Procedures       EKG Interpretation:      Interpreted by Balaji Casper MD  Time reviewed: 5:14 PM  Symptoms at time of EKG: Chest pressure, shortness of breath  Rhythm: normal sinus   Rate: normal  Axis: normal  Ectopy: none  Conduction: normal  ST Segments/ T Waves: No ST-T wave changes  Q Waves: none  Comparison to prior: No old EKG available    Clinical Impression: Sinus rhythm with fusion complexes, left anterior fascicular block.      Results for orders placed or performed during the hospital encounter of 11/20/20 (from the past 24 hour(s))   CBC with platelets differential   Result Value Ref Range    WBC 12.0 (H) 4.0 - 11.0 10e9/L    RBC Count 6.04 (H) 4.4 - 5.9 10e12/L    Hemoglobin 18.8 (H) 13.3 - 17.7 g/dL    Hematocrit 54.4 (H) 40.0 - 53.0 %    MCV 90 78 - 100 fl    MCH 31.1 26.5 - 33.0 pg    MCHC 34.6 31.5 - 36.5 g/dL    RDW 12.8 10.0 - 15.0 %    Platelet Count 301 150 - 450 10e9/L    Diff  Method Automated Method     % Neutrophils 55.2 %    % Lymphocytes 34.0 %    % Monocytes 8.7 %    % Eosinophils 0.9 %    % Basophils 0.7 %    % Immature Granulocytes 0.5 %    Nucleated RBCs 0 0 /100    Absolute Neutrophil 6.6 1.6 - 8.3 10e9/L    Absolute Lymphocytes 4.1 0.8 - 5.3 10e9/L    Absolute Monocytes 1.0 0.0 - 1.3 10e9/L    Absolute Eosinophils 0.1 0.0 - 0.7 10e9/L    Absolute Basophils 0.1 0.0 - 0.2 10e9/L    Abs Immature Granulocytes 0.1 0 - 0.4 10e9/L    Absolute Nucleated RBC 0.0    Comprehensive metabolic panel   Result Value Ref Range    Sodium 135 133 - 144 mmol/L    Potassium 4.3 3.4 - 5.3 mmol/L    Chloride 99 94 - 109 mmol/L    Carbon Dioxide 31 20 - 32 mmol/L    Anion Gap 5 3 - 14 mmol/L    Glucose 107 (H) 70 - 99 mg/dL    Urea Nitrogen 15 7 - 30 mg/dL    Creatinine 1.05 0.66 - 1.25 mg/dL    GFR Estimate 74 >60 mL/min/[1.73_m2]    GFR Estimate If Black 86 >60 mL/min/[1.73_m2]    Calcium 8.9 8.5 - 10.1 mg/dL    Bilirubin Total 0.4 0.2 - 1.3 mg/dL    Albumin 3.7 3.4 - 5.0 g/dL    Protein Total 7.6 6.8 - 8.8 g/dL    Alkaline Phosphatase 80 40 - 150 U/L    ALT 18 0 - 70 U/L    AST 13 0 - 45 U/L   Troponin I   Result Value Ref Range    Troponin I ES <0.015 0.000 - 0.045 ug/L   CRP inflammation   Result Value Ref Range    CRP Inflammation 13.5 (H) 0.0 - 8.0 mg/L   D dimer quantitative   Result Value Ref Range    D Dimer 0.9 (H) 0.0 - 0.50 ug/ml FEU   NT pro BNP   Result Value Ref Range    N-Terminal Pro BNP Inpatient 2,674 (H) 0 - 900 pg/mL   XR Chest Port 1 View    Narrative    PROCEDURE:  XR CHEST PORT 1 VW    HISTORY: SOB and chest pain. .    COMPARISON:  7/31/2020, 10/27/2020    FINDINGS:    The cardiomediastinal contours are stable, including chronic  prominence of the pulmonary arteries.  Hazy opacity over the outer lower lungs may be accentuated by  technique. No effusion or pneumothorax is seen.      Impression    IMPRESSION:  Technique versus hazy alveolar opacity over the outer  lower lungs.  Consider PA and lateral views.      EMMY EARL MD   Magnesium   Result Value Ref Range    Magnesium 2.1 1.6 - 2.3 mg/dL   Phosphorus   Result Value Ref Range    Phosphorus 4.0 2.5 - 4.5 mg/dL   Lactate Dehydrogenase   Result Value Ref Range    Lactate Dehydrogenase 194 85 - 227 U/L   Procalcitonin   Result Value Ref Range    Procalcitonin <0.05 ng/ml   Ferritin   Result Value Ref Range    Ferritin 77 26 - 388 ng/mL   Blood gas venous and oxyhgb   Result Value Ref Range    Ph Venous 7.43 7.32 - 7.43 pH    PCO2 Venous 45 40 - 50 mm Hg    PO2 Venous 57 (H) 25 - 47 mm Hg    Bicarbonate Venous 30 (H) 21 - 28 mmol/L    FIO2 2 liters     Oxyhemoglobin Venous 86 %    Base Excess Venous 4.4 mmol/L   CTA Chest with Contrast    Narrative    PROCEDURE:  CTA CHEST WITH CONTRAST.    HISTORY:  Evaluate for pulmonary embolism.  PE suspected, high pretest  prob    TECHNIQUE:  Initial pre-contrast  and localizer images were  obtained.  Contrast enhanced helical CT pulmonary angiography was then  performed.  Routine transaxial and post-processed (multiplanar and/or  MIP) reformations were obtained.    COMPARISON:  10/27/2020    MEDS/CONTRAST: Isovue 300 100mL    PULMONARY CTA FINDINGS:  This is a diagnostic quality helical CT  pulmonary angiogram.  There is no acute pulmonary embolism to the  segmental pulmonary artery level.    OTHER FINDINGS:      The heart is markedly enlarged. Trace pericardial fluid is seen. The  main pulmonary artery is dilated.     There is severe narrowing of the central airways suggesting  tracheobronchomalacia. Apical predominant emphysematous changes are  present. Atelectasis may be accentuated by the phase of respiration.    Limited views of the upper abdomen reveal no adrenal mass or acute  process. A small sliding hiatal hernia is noted.    No suspicious osseous lesion is identified.      Impression    IMPRESSION:    No acute pulmonary emboli.    Severe cardiomegaly. Severe pulmonary artery  enlargement suggesting  chronic advanced pulmonary hypertension.    Tracheobronchomalacia. Emphysema.    EMMY EARL MD       Medications   albuterol (PROAIR HFA/PROVENTIL HFA/VENTOLIN HFA) 108 (90 Base) MCG/ACT inhaler 6 puff (6 puffs Inhalation Given 11/20/20 1726)   ondansetron (ZOFRAN) injection 4 mg (4 mg Intravenous Given 11/20/20 1723)   furosemide (LASIX) injection 60 mg (60 mg Intravenous Given 11/20/20 1722)   morphine (PF) injection 4 mg (4 mg Intravenous Given 11/20/20 1723)   iopamidol (ISOVUE-370) solution 100 mL (100 mLs Intravenous Given 11/20/20 1909)   sodium chloride (PF) 0.9% PF flush 100 mL (100 mLs Intravenous Given 11/20/20 1910)       Assessments & Plan (with Medical Decision Making)   Severe pulmonary pretension: Discussed tentative diagnosis of severe pulmonary hypertension per CT scan done with right ventricular dilation.  Also discussed the need for right heart catheterization that was initially scheduled for Monday but patient canceled it.  Referral to pulmonologist made.  Discussed with patient possible modalities of treatment including endothelin receptor agonists like ambrisentan and macitentan in combination with phosphodiesterase inhibitors like tadalafil and sildenafil.  Patient will discuss this with a pulmonologist.  Meanwhile, patient was started on Zaroxolyn 2.5 mg daily for the next 1 week and advised follow-up with PCP next week for recheck of potassium levels.  Continue albuterol nebs for COPD at home.    I have reviewed the nursing notes.    I have reviewed the findings, diagnosis, plan and need for follow up with the patient.    New Prescriptions    METOLAZONE (ZAROXOLYN) 2.5 MG TABLET    Take 1 tablet (2.5 mg) by mouth daily for 7 days       Final diagnoses:   Severe pulmonary arterial systolic hypertension (H)   Pulmonary emphysema, unspecified emphysema type (H)   Acute on chronic diastolic heart failure (H)       11/20/2020   [facility]      Balaji Casper MD  11/20/20 1959

## 2020-11-20 NOTE — TELEPHONE ENCOUNTER
called patient to complete Travel Screen for Cardiac Cath Lab appointment on 11/23 and inform patient of updated Visitor Policy.       Pt would like to reschedule for after the new year.

## 2020-11-20 NOTE — ED NOTES
"Presents with reports of L sided chest pressure since 1530 and a 5 lb weight gain in 24 hours. Patient has hx of CHF and COPD. Reports \"slight\" increase in SOB. Rates chest pain 5/10. Weighs himself on the same scale at the same time in the same clothes. Denies high salt meals. States he was scheduled to have an angiogram on Monday, but he cancelled it due to COVID  "

## 2020-11-20 NOTE — ED TRIAGE NOTES
States his NP advised him to be seen due to 5 lb weight gain since yesterday as well as L sided chest pressure that began this morning. Chest pressure increases with ambulation.

## 2020-11-20 NOTE — ED AVS SNAPSHOT
[facility]  [address] [place] [id number]  Phone: [phone]                                    Joey Irene   MRN: [id number]    Department: [facility]   Date of Visit: 11/20/2020           After Visit Summary Signature Page    I have received my discharge instructions, and my questions have been answered. I have discussed any challenges I see with this plan with the nurse or doctor.    ..........................................................................................................................................  Patient/Patient Representative Signature      ..........................................................................................................................................  Patient Representative Print Name and Relationship to Patient    ..................................................               ................................................  Date                                   Time    ..........................................................................................................................................  Reviewed by Signature/Title    ...................................................              ..............................................  Date                                               Time          22EPIC Rev 08/18

## 2020-11-20 NOTE — TELEPHONE ENCOUNTER
Patient called stated he has gained 6 lbs since yesterday, and feels sluggish and has  been peeing all day. Patient advised to be seen in UC. Please let me know what you advise if any and will check on patient on Monday.

## 2020-11-20 NOTE — ED NOTES
SpO2 decreased to 82% after morphine administration. Patient placed on 2L O2 via NC. SpO2 increased to 91%

## 2020-11-21 NOTE — ED NOTES
Patient discharged home at this time. Patient given written and verbal discharge instructions regarding home care, f/u and medications. Patient verbalized understanding of all discharge instructions.   Aware RX at [facility]

## 2020-11-23 NOTE — TELEPHONE ENCOUNTER
Patient was in ER and needs to now get back in as soon as possible to get Angiogram, patient notified RN coordinator  will call him when all is set up.

## 2020-11-24 ENCOUNTER — TELEPHONE (OUTPATIENT)
Dept: CARDIOLOGY | Facility: OTHER | Age: 65
End: 2020-11-24

## 2020-11-24 NOTE — TELEPHONE ENCOUNTER
Please call patient and schedule pre-op for angiogram scheduled on 12-. Must be within 30 days of procedure.   Thank you.   Bharath MIRANDA RN  Cardiology Care Coordinator   [phone]

## 2020-11-24 NOTE — TELEPHONE ENCOUNTER
Outgoing call to patient. Instructions below given. Verbalized understanding. Instructions also printed and left at  for patient to .

## 2020-11-24 NOTE — LETTER
11/24/2020      RE: Joey Irene  [address] [place] [address]     PREOP- You will be called with this appt time  COVID SWAB- 12-7-2020 at 11:15AM  ANGIOGRAM- 12- AT 11:30AM  FOLLOW UP WITH ALEJANDRO GARLAND- 12- AT 2:00PM  (Cancelled appt on 12-8-2020)    You will be scheduled for a COVID swab at our [place] location. You are to arrive to the facility and park in the east parking lot. Please stay IN your vehicle and call [phone] and get further instructions. If you do not have a cell phone to call the number you will enter through door #1 and be screened at the door.       You are scheduled for a Coronary Angiogram on December 11, 2020 at the [facility], [address] [place] [address], [place] [address]. You are to check in at the [facility] Waiting Area at 11:30AM .     When you arrive you will be escorted back to the pre procedure area called 2A. Here they will insert an IV, draw labs, and obtain a short medical history. Please bring an updated list of your current medications.     A physician will come and talk with you about the procedure and obtain consent.     A nurse from the Cardiac Catheterization Lab will then escort you to the procedure area. You will be receiving sedation during the procedure so you will need someone to drive you to and from the hospital.     After the procedure you will recover for a short period (2 - 6 hours) on 6B. You will be discharged with instructions for post procedure care. However, depending on what the angiogram shows you may have to have stents placed and this might require an overnight stay. We ask that you bring a small bag of necessities for your comfort if you would need to stay overnight. DO NOT BRING ANY VALUABLES!       Pre procedure instructions:   1. You will need a preop physical (H&P) done by your PCP within 30 days prior to the procedure. Please bring a hard copy of this with you to your procedure OR have your PCP  fax to: [phone].   2. Make arrangements to have a  as you will not be allowed to drive following the procedure. Someone should stay with you the night after your procedure.   3.  Do not eat any solid food or milk products for 8 hours prior to the exam. You may drink clear liquids until 2 hours prior to the exam. Clear liquids include the following: water, Jell-O, clear broth, apple juice or any non-carbonated drink that you can see through (no pop!).   4. Do not drink alcohol or smoke 24 hours prior to test.   5. Hold these meds:     6. You may take your other morning medications as prescribed with a sip of water. If you currently take an aspirin, continue taking your same dose. If not, take a 325 mg aspirin the day before and the day of your procedure.     If you have further questions, please utilize [employer] to contact us.   If your question concerns the above instructions, contact:   Bharath ZEE RN   Cardiology Care Coordinator   [phone]     If your question concerns the schedule/appointment times, contact:   [facility]    [phone]

## 2020-11-24 NOTE — TELEPHONE ENCOUNTER
Marleny Bauer APRN CNP  You 5 minutes ago (3:39 PM)     Order signed.   Thanks,   Marleny MOHAMUD. BERNARDINO Bauer CNP    Message text

## 2020-11-24 NOTE — TELEPHONE ENCOUNTER
PREOP- You will be called with this appt time  COVID SWAB- 12-7-2020 at 11:15AM  ANGIOGRAM- 12- AT 11:30AM  FOLLOW UP WITH ALEJANDRO GARLAND- 12- AT 2:00PM  (Cancelled appt on 12-8-2020)    You will be scheduled for a COVID swab at our [place] location. You are to arrive to the facility and park in the [facility] parking lot. Please stay IN your vehicle and call [phone] and get further instructions. If you do not have a cell phone to call the number you will enter through door #1 and be screened at the door.       You are scheduled for a Coronary Angiogram on December 11, 2020 at the [facility], [address] [place] [address], [place] [address]. You are to check in at the [facility] Waiting Area at 11:30AM .     When you arrive you will be escorted back to the pre procedure area called 2A. Here they will insert an IV, draw labs, and obtain a short medical history. Please bring an updated list of your current medications.     A physician will come and talk with you about the procedure and obtain consent.     A nurse from the Cardiac Catheterization Lab will then escort you to the procedure area. You will be receiving sedation during the procedure so you will need someone to drive you to and from the hospital.     After the procedure you will recover for a short period (2 - 6 hours) on 6B. You will be discharged with instructions for post procedure care. However, depending on what the angiogram shows you may have to have stents placed and this might require an overnight stay. We ask that you bring a small bag of necessities for your comfort if you would need to stay overnight. DO NOT BRING ANY VALUABLES!       Pre procedure instructions:   1. You will need a preop physical (H&P) done by your PCP within 30 days prior to the procedure. Please bring a hard copy of this with you to your procedure OR have your PCP fax to: [phone].   2. Make arrangements to have a  as you will  not be allowed to drive following the procedure. Someone should stay with you the night after your procedure.   3.  Do not eat any solid food or milk products for 8 hours prior to the exam. You may drink clear liquids until 2 hours prior to the exam. Clear liquids include the following: water, Jell-O, clear broth, apple juice or any non-carbonated drink that you can see through (no pop!).   4. Do not drink alcohol or smoke 24 hours prior to test.   5. Hold these meds:     6. You may take your other morning medications as prescribed with a sip of water. If you currently take an aspirin, continue taking your same dose. If not, take a 325 mg aspirin the day before and the day of your procedure.     If you have further questions, please utilize [employer] to contact us.   If your question concerns the above instructions, contact:   Bharath ZEE RN   Cardiology Care Coordinator   [phone]     If your question concerns the schedule/appointment times, contact:   [facility]    [phone]

## 2020-11-26 DIAGNOSIS — Z11.59 ENCOUNTER FOR SCREENING FOR OTHER VIRAL DISEASES: Primary | ICD-10-CM

## 2020-11-26 LAB
BACTERIA SPEC CULT: NORMAL
Lab: NORMAL
SPECIMEN SOURCE: NORMAL

## 2020-12-03 NOTE — PROGRESS NOTES
[facility] [place]  [address]  [place] [address]  Phone: [phone]  Primary Provider: Amparo Alvares  Pre-op Performing Provider: PRINCESS SANDOVAL    PREOPERATIVE EVALUATION:  Today's date: 12/8/2020    Joey Irene is a 65 year old male who presents for a preoperative evaluation.    Surgical Information:  Surgery/Procedure: angiogram  Surgery Location: [facility]  Surgeon: Khanh  Surgery Date: 12/11/20  Time of Surgery: TBD  Where patient plans to recover: At home with family  Fax number for surgical facility: Note does not need to be faxed, will be available electronically in Epic.    Type of Anesthesia Anticipated: to be determined    Subjective     HPI related to upcoming procedure: DX with CHF and SOB due to COPD with occasional chest pain and cough he did have a stress test completed       Preop Questions 12/8/2020   1. Have you ever had a heart attack or stroke? No   2. Have you ever had surgery on your heart or blood vessels, such as a stent placement, a coronary artery bypass, or surgery on an artery in your head, neck, heart, or legs? No   3. Do you have chest pain with activity? Occasional chest pain and SOB with activity    4. Do you have a history of  heart failure? Yes, CHF   5. Do you currently have a cold, bronchitis or symptoms of other infection? No   6. Do you have a cough, shortness of breath, or wheezing? Smokers cough    7. Do you or anyone in your family have previous history of blood clots? No   8. Do you or does anyone in your family have a serious bleeding problem such as prolonged bleeding following surgeries or cuts? No   9. Have you ever had problems with anemia or been told to take iron pills? No   10. Have you had any abnormal blood loss such as black, tarry or bloody stools? No   11. Have you ever had a blood transfusion? No   12. Are you willing to have a blood transfusion if it is medically needed before, during, or after your surgery? Yes    13. Have you or any of your relatives ever had problems with anesthesia? No   14. Do you have sleep apnea, excessive snoring or daytime drowsiness? YES - snores    14a. Do you have a CPAP machine? No   15. Do you have any artifical heart valves or other implanted medical devices like a pacemaker, defibrillator, or continuous glucose monitor? No   16. Do you have artificial joints? YES - left knee replacement    17. Are you allergic to latex? No     Health Care Directive:  Patient does not have a Health Care Directive or Living Will: Discussed advance care planning with patient; information given to patient to review.    Preoperative Review of :   reviewed - controlled substances reflected in medication list.      Status of Chronic Conditions:  See problem list for active medical problems.  Problems all longstanding and stable, except as noted/documented.  See ROS for pertinent symptoms related to these conditions.    COPD - Patient has a longstanding history of moderate-severe COPD . Patient has been doing well overall noting MURRAY and CHEST PAIN and continues on medication regimen consisting of symbicort without adverse reactions or side effects.    DEPRESSION - Patient has a long history of Depression of moderate severity requiring medication for control with recent symptoms being waxing and waning..Current symptoms of depression include anxiety, irritablility.     SLEEP PROBLEM - Patient has a longstanding history of snoring, irritability and depression and mood changes.. Patient has tried OTC medications with limited success.     PRE-DIABETES  Encouraged to work on diet and exercise   Lab Results   Component Value Date    A1C 6.1 08/07/2020     Elevated  TSH  TSH   Date Value Ref Range Status   08/07/2020 5.92 (H) 0.40 - 4.00 mU/L Final         Review of Systems  CONSTITUTIONAL: NEGATIVE for fever, chills, change in weight  INTEGUMENTARY/SKIN: NEGATIVE for worrisome rashes, moles or lesions  EYES: NEGATIVE  for vision changes or irritation  ENT/MOUTH: NEGATIVE for ear, mouth and throat problems  RESP: Hx COPD emphysema, smokers cough - 1 cigar per day   CV: intermittent chest pain   GI: NEGATIVE for nausea, abdominal pain, heartburn, or change in bowel habits  : NEGATIVE for frequency, dysuria, or hematuria  MUSCULOSKELETAL:chronic pain knees and lower back   NEURO: NEGATIVE for weakness, dizziness or paresthesias  ENDOCRINE: pre-diabetic, history of elevated TSH  HEME/ALLERGY/IMMUNE: NEGATIVE for bleeding problems  PSYCHIATRIC: HX anxiety and HX depression    Patient Active Problem List    Diagnosis Date Noted     Severe pulmonary arterial systolic hypertension (H) 11/20/2020     Priority: Medium     Acute on chronic diastolic heart failure (H) 11/20/2020     Priority: Medium     Right ventricular dilation 10/29/2020     Priority: Medium     Added automatically from request for surgery [id number]       MURRAY (dyspnea on exertion) 10/29/2020     Priority: Medium     Added automatically from request for surgery [id number]       Bee sting allergy 08/07/2020     Priority: Medium     Hyperglycemia 08/07/2020     Priority: Medium     Elevated TSH 08/07/2020     Priority: Medium     Elevated prostate specific antigen (PSA) 08/07/2020     Priority: Medium     Diverticulitis large intestine 08/06/2020     Priority: Medium     History of fusion of cervical spine 08/06/2020     Priority: Medium     Opioid abuse (H) 08/06/2020     Priority: Medium     Generalized anxiety disorder 08/06/2020     Priority: Medium     Tobacco use disorder 08/06/2020     Priority: Medium     Pulmonary emphysema, unspecified emphysema type (H) 08/05/2020     Priority: Medium     Hyperlipidemia, unspecified hyperlipidemia type 08/05/2020     Priority: Medium     Cardiomegaly 08/05/2020     Priority: Medium     Enlarged prostate 08/05/2020     Priority: Medium     Diverticulosis 03/25/2020     Priority: Medium     Chronic bronchitis with emphysema (H)  02/03/2020     Priority: Medium     Class 1 obesity with body mass index (BMI) of 33.0 to 33.9 in adult 02/03/2020     Priority: Medium     Smoking greater than 40 pack years 02/03/2020     Priority: Medium     Pain due to total left knee replacement, sequela 01/27/2020     Priority: Medium     Cervical stenosis of spinal canal 01/13/2020     Priority: Medium     Cholesteatoma of left ear 03/04/2019     Priority: Medium     Hearing problem, left 03/04/2019     Priority: Medium     Nasal septal deviation 12/18/2017     Priority: Medium     Primary osteoarthritis of right knee 02/25/2017     Priority: Medium     Status post total left knee replacement 02/25/2017     Priority: Medium     Recurrent major depressive disorder, in partial remission (H) 11/21/2015     Priority: Medium     PTSD (post-traumatic stress disorder) 08/19/2015     Priority: Medium     Seasonal allergies 08/19/2015     Priority: Medium     Anxiety state 07/07/2011     Priority: Medium     Back pain, chronic 07/07/2011     Priority: Medium     Hearing loss 07/07/2011     Priority: Medium     Insomnia, unspecified 07/07/2011     Priority: Medium     Sleep apnea 07/07/2011     Priority: Medium      History reviewed. No pertinent past medical history.  Past Surgical History:   Procedure Laterality Date     ENT SURGERY      patient has had three left ear surgeries, unsure what they did     FUSION CERVICAL POSTERIOR THREE+ LEVELS       HERNIA REPAIR, INGUINAL RT/LT Right     done times 3     JOINT REPLACEMENT Left      REPAIR HAMMER TOE Right      Current Outpatient Medications   Medication Sig Dispense Refill     acetaminophen (TYLENOL) 500 MG tablet Take 1 tablet by mouth       albuterol (PROAIR HFA/PROVENTIL HFA/VENTOLIN HFA) 108 (90 Base) MCG/ACT inhaler Inhale 1-2 puffs into the lungs       ARIPiprazole (ABILIFY) 5 MG tablet        aspirin (ASA) 81 MG EC tablet Take 1 tablet (81 mg) by mouth daily 90 tablet 3     cetirizine (ZYRTEC) 10 MG tablet  Take 1 tablet (10 mg) by mouth daily 90 tablet 3     Cholecalciferol (D 1000) 25 MCG (1000 UT) CAPS        cyclobenzaprine (FLEXERIL) 10 MG tablet Take 10 mg by mouth       DULoxetine (CYMBALTA) 60 MG capsule 90 mg daily        EPINEPHrine (ANY BX GENERIC EQUIV) 0.3 MG/0.3ML injection 2-pack Inject 0.3 mLs (0.3 mg) into the muscle as needed for anaphylaxis 2 each 0     fluticasone (FLONASE) 50 MCG/ACT nasal spray Spray 2 sprays in nostril       Fluticasone-Umeclidin-Vilanterol (TRELEGY ELLIPTA) 100-62.5-25 MCG/INH oral inhaler Inhale 1 puff into the lungs       gabapentin (NEURONTIN) 300 MG capsule        guaiFENesin (MUCINEX) 600 MG 12 hr tablet Take 1,200 mg by mouth       hydrOXYzine (VISTARIL) 50 MG capsule        influenza vac high-dose quad (FLUZONE HD) 0.7 ML ÁLVARO injection Inject 0.7 mLs into the muscle once for 1 dose 0.7 mL 0     montelukast (SINGULAIR) 10 MG tablet TAKE ONE TABLET BY MOUTH AT BEDTIME 90 tablet 3     naproxen (NAPROSYN) 500 MG tablet Take 1 tablet by mouth twice daily with food       omeprazole (PRILOSEC) 20 MG DR capsule TAKE ONE CAPSULE BY MOUTH ONCE DAILY BEFORE MEALS. DO NOT CRUSH. 90 capsule 3     potassium chloride ER (MICRO-K) 10 MEQ CR capsule Take 2 capsules (20 mEq) by mouth 2 times daily 120 capsule 3     SYMBICORT 80-4.5 MCG/ACT Inhaler INHALE 2 PUFFS INTO THE LUNGS TWO TIMES A DAY       torsemide (DEMADEX) 20 MG tablet Take 2 tablets (40 mg) by mouth every morning 90 tablet 1     traZODone (DESYREL) 50 MG tablet Take 100 mg by mouth       vitamin C (ASCORBIC ACID) 500 MG tablet        VITAMIN E-100 OR Take 1 tablet by mouth       zolpidem (AMBIEN) 10 MG tablet        metolazone (ZAROXOLYN) 2.5 MG tablet Take 1 tablet (2.5 mg) by mouth daily for 7 days 7 tablet 0       Allergies   Allergen Reactions     Seasonal Allergies Difficulty breathing and Cough     Bupropion Other (See Comments)     Tramadol Nausea and Swelling     [place] Nite Time Dizziness and Nausea and Vomiting      Nyquil Cold &  [Night-Time Cold-Flu Relief] Dizziness and Nausea and Vomiting     Trichophyton Other (See Comments)        Social History     Tobacco Use     Smoking status: Current Some Day Smoker     Years: 50.00     Types: Cigars     Smokeless tobacco: Never Used     Tobacco comment: smokes 2 cigars daily, previously smoked 2 packs per day   Substance Use Topics     Alcohol use: Never     Frequency: Never     Family History   Problem Relation Age of Onset     Lung Cancer Mother      Ovarian Cancer Sister      History   Drug Use Unknown         Objective     /64   Pulse 95   Temp 97.4  F (36.3  C) (Tympanic)   Wt 106.6 kg (235 lb)   SpO2 90%   BMI 32.78 kg/m      Physical Exam    GENERAL APPEARANCE: alert, active and no distress     EYES: EOMI,  PERRL     HENT: ear canals and TM's normal and nose and mouth without ulcers or lesions     NECK: no adenopathy, no asymmetry, masses, or scars and thyroid normal to palpation     RESP: congested throughout - cleared with cough,      CV: regular rates and rhythm, normal S1 S2, no S3 or S4 and peripheral pulses strong     ABDOMEN:  soft, nontender, no HSM or masses and bowel sounds normal     MS: extremities normal- no gross deformities noted, no evidence of inflammation in joints, FROM in all extremities.     SKIN: no suspicious lesions or rashes     NEURO: Normal strength and tone, sensory exam grossly normal, mentation intact and speech normal     PSYCH: mentation appears normal. and affect normal/bright     LYMPHATICS: No cervical adenopathy    Recent Labs   Lab Test 11/20/20  1641 10/20/20  1250 10/13/20  1054 08/07/20  0927   HGB 18.8*  --  18.2* 17.5     --  279 329    137 137 136   POTASSIUM 4.3 4.1 4.4 4.4   CR 1.05 1.02 0.85 0.76   A1C  --   --   --  6.1*        Diagnostics:  Recent Results (from the past 24 hour(s))   Basic metabolic panel    Collection Time: 12/08/20  8:25 AM   Result Value Ref Range    Sodium 139 133 - 144 mmol/L     Potassium 3.6 3.4 - 5.3 mmol/L    Chloride 104 94 - 109 mmol/L    Carbon Dioxide 31 20 - 32 mmol/L    Anion Gap 4 3 - 14 mmol/L    Glucose 141 (H) 70 - 99 mg/dL    Urea Nitrogen 16 7 - 30 mg/dL    Creatinine 1.05 0.66 - 1.25 mg/dL    GFR Estimate 74 >60 mL/min/[1.73_m2]    GFR Estimate If Black 86 >60 mL/min/[1.73_m2]    Calcium 8.5 8.5 - 10.1 mg/dL   CBC with platelets and differential    Collection Time: 20  8:25 AM   Result Value Ref Range    WBC 10.0 4.0 - 11.0 10e9/L    RBC Count 5.65 4.4 - 5.9 10e12/L    Hemoglobin 17.6 13.3 - 17.7 g/dL    Hematocrit 51.4 40.0 - 53.0 %    MCV 91 78 - 100 fl    MCH 31.2 26.5 - 33.0 pg    MCHC 34.2 31.5 - 36.5 g/dL    RDW 12.7 10.0 - 15.0 %    Platelet Count 312 150 - 450 10e9/L    Diff Method Automated Method     % Neutrophils 57.1 %    % Lymphocytes 32.4 %    % Monocytes 7.0 %    % Eosinophils 2.3 %    % Basophils 0.7 %    % Immature Granulocytes 0.5 %    Nucleated RBCs 0 0 /100    Absolute Neutrophil 5.7 1.6 - 8.3 10e9/L    Absolute Lymphocytes 3.2 0.8 - 5.3 10e9/L    Absolute Monocytes 0.7 0.0 - 1.3 10e9/L    Absolute Eosinophils 0.2 0.0 - 0.7 10e9/L    Absolute Basophils 0.1 0.0 - 0.2 10e9/L    Abs Immature Granulocytes 0.1 0 - 0.4 10e9/L    Absolute Nucleated RBC 0.0    Hemoglobin A1c    Collection Time: 20  8:25 AM   Result Value Ref Range    Hemoglobin A1C 6.0 (H) 0 - 5.6 %      Last EK2020  Sinus rhythm with sinus arrhythmia  Left posterior fascicular block  Inferior infarct age undetermined  ST & T wave abnormalities possible anterior/lateral ischemia       Revised Cardiac Risk Index (RCRI):  The patient has the following serious cardiovascular risks for perioperative complications:   - Congestive Heart Failure (pulmonary edema, PND, s3 jordy, CXR with pulmonary congestion, basilar rales) = 1 point   Pulmonary hypertension   History of COPD/emphasemia     RCRI Interpretation: 2 points: Class III (moderate risk - 6.6% complication rate)      Estimated Functional Capacity: CANNOT perform 4 METS without symptoms  NM Lexiscan stress test 10/13/2020  Conclusion          The nuclear stress test is negative for inducible myocardial ischemia or infarction.     The left ventricular ejection fraction at rest is 74%.  The left ventricular ejection fraction at stress is 64%.     [facility] [place].  About 9 years ago     No evidence of reversible ischemia.  Normal left ventricular function        CTA chest with contrast 11/20/2020  PULMONARY CTA FINDINGS:  This is a diagnostic quality helical CT  pulmonary angiogram.  There is no acute pulmonary embolism to the  segmental pulmonary artery level.     OTHER FINDINGS:    The heart is markedly enlarged. Trace pericardial fluid is seen. The  main pulmonary artery is dilated.      There is severe narrowing of the central airways suggesting  tracheobronchomalacia. Apical predominant emphysematous changes are  present. Atelectasis may be accentuated by the phase of respiration.     Limited views of the upper abdomen reveal no adrenal mass or acute  process. A small sliding hiatal hernia is noted.     No suspicious osseous lesion is identified.                                                                   IMPRESSION:  No acute pulmonary emboli.     Severe cardiomegaly. Severe pulmonary artery enlargement suggesting  chronic advanced pulmonary hypertension.     Tracheobronchomalacia. Emphysema.     EMMY EARL MD         Assessment & Plan   The proposed surgical procedure is considered INTERMEDIATE risk.    Preop general physical exam  Labs  Reviewed  Should have anesthesiologist review lung status day of procedure   Kidney function is adequate   Remains prediabetes with A1c 6.0  - Basic metabolic panel  - CBC with platelets and differential  - Hemoglobin A1c  - XR CHEST 2 VW (Clinic Performed); Future  - Estimated Average Glucose    Right ventricular dilation      MURRAY (dyspnea on  exertion)      Cardiomegaly      Prediabetes  Remains pre-diabetes   - Hemoglobin A1c    Encounter for smoking cessation counseling  Would like assistance for smoking cessation - states he cannot completley stop on his own  - nicotine (NICODERM CQ) 14 MG/24HR 24 hr patch; Place 1 patch onto the skin every 24 hours    Possible Sleep Apnea:    No flowsheet data found.   Should consider sleep study - will discuss with Amparo Alvares NP after cardiac cath         Risks and Recommendations:  The patient has the following additional risks and recommendations for perioperative complications:   - Morbid obesity (BMI >40)  Cardiovascular:   - Cardiology consulted - suggested cardiac cath   Pulmonary:    - Incentive spirometry post-op    Medication Instructions:  Patient is to take all scheduled medications on the day of surgery EXCEPT for modifications listed below:   - aspirin: continue aspirin due to type of surgery    - Diuretics: May continue due to heart failure.   - pregabalin, gabapentin: Continue without modification.   - nicotine patches: Continue on day of surgery. Patient to Inform anesthesia of patch location.    - naproxen (Aleve, Naprosyn): HOLD 4 days before surgery.    - SSRIs, SNRIs, TCAs, Antipsychotics: Continue without modification.    - LABA, inhaled corticosteroid, long-acting anticholinergics: Continue without modification.    RECOMMENDATION:  APPROVAL GIVEN to proceed with proposed procedure, without further diagnostic evaluation.    Signed Electronically by: BERNARDINO Tolliver CNP    Copy of this evaluation report is provided to requesting physician.    [facility]    Revised Cardiac Risk Index

## 2020-12-03 NOTE — PATIENT INSTRUCTIONS
"  Preparing for Your Surgery  Getting started  A surgery nurse will call you to review your health history and instructions. They will give you an arrival time based on your scheduled surgery time.  Please be ready to share the following:    Your doctor's clinic name and phone number    Your medical, surgical and anesthesia history    A list of allergies and sensitivities    A list of medicines, including herbal treatments and over-the-counter drugs    Whether the patient has a legal guardian (ask how to send us the papers in advance)  If your child is having surgery, please ask for a copy of Preparing for Your Child's Surgery.    Preparing for surgery    Within 30 days of surgery: Have an exam at your family clinic (primary care clinic), or go to a pre-operative clinic. This exam is called a \"History and Physical,\" or H&P.    At your H&P exam, talk to your care team about all medicines you take. If you need to stop any medicines before surgery, ask when to start taking them again.  ? We do this for your safety. Many medicines can make you bleed too much during surgery. Some change how well surgery (anesthesia) drugs work.    Call your insurance company to see what it will and won't pay for. Ask if they need to pre-approve the surgery. (If no insurance, call [phone].)    Call your surgeon's clinic if there's any change in your health. This includes signs of a cold or flu (sore throat, runny nose, cough, rash, fever). It also includes a scrape or scratch near the surgery site.    If you have questions on the day of surgery, call your surgery center.  Eating and drinking guidelines  For your safety: Unless your surgeon tells you otherwise, follow the guidelines below.    Eat and drink as usual until 8 hours before surgery. After that, no food or milk.    Drink clear liquids until 2 hours before surgery. These are liquids you can see through, like water, Gatorade and Propel Water. You may also have black coffee " and tea (no cream or milk).    Nothing by mouth within 2 hours of surgery. This includes gum, candy and breath mints.    Stop alcohol the midnight before surgery.    If your family clinic tells you to take medicine on the morning of surgery, it's okay to take it with a sip of water.  Preventing infection    Shower or bathe the night before and morning of your surgery. Follow the instructions your clinic gave you. (If no instructions, use regular soap.)    Don't shave or clip hair near your surgery site. This can lead to skin infection.    Don't smoke the morning of surgery. Smoking increases the risk of infection. You may chew nicotine gum up to 2 hours before surgery. A nicotine patch is okay.  ? Note: Some surgeries require you to completely quit smoking and nicotine. Check with your surgeon.    Your care team will make every effort to keep you safe from infection. We will:  ? Clean our hands often with soap and water (or an alcohol-based hand rub).  ? Clean the skin at your surgery site with a special soap that kills germs. We'll also remove hair from the site as needed.  ? Wear special hair covers, masks, gowns and gloves during surgery.  ? Give antibiotic medicine, if prescribed. Not all surgeries need antibiotics.  What to bring on the day of surgery    Photo ID and insurance card    Copy of your health care directive, if you have one    Glasses and hearing aides (bring cases)  ? You can't wear contacts during surgery    Inhaler and eye drops, if you use them (tell us about these when you arrive)    CPAP machine or breathing device, if you use them    A few personal items, if spending the night    If you have . . .  ? A pacemaker or ICD (cardiac defibrillator): Bring the ID card.  ? An implanted stimulator: Bring the remote control.  ? A legal guardian: Bring a copy of the certified (court-stamped) guardianship papers.  Please remove any jewelry, including body piercings. Leave jewelry and other valuables at  "home.  If you're going home the day of surgery  Important: If you don't follow the rules below, we must cancel your surgery.     Arrange for someone to drive you home after surgery. You may not drive, take a taxi or take public transportation by yourself (unless you'll have local anesthesia only).    Arrange for a responsible adult to stay with you overnight. If you don't, we may keep you in the hospital overnight, and you may need to pay the costs yourself.  Questions?   If you have any questions for your care team, list them here: _________________________________________________________________________________________________________________________________________________________________________________________________________________________________________________________________________________________________________________________  For informational purposes only. Not to replace the advice of your health care provider. Copyright   4786-2349 [employer]. All rights reserved. Clinically reviewed by Lexie Jay MD. [employer] [id number] - REV 07/19.    Preparing for Your Surgery  Getting started  A surgery nurse will call you to review your health history and instructions. They will give you an arrival time based on your scheduled surgery time.  Please be ready to share the following:    Your doctor's clinic name and phone number    Your medical, surgical and anesthesia history    A list of allergies and sensitivities    A list of medicines, including herbal treatments and over-the-counter drugs    Whether the patient has a legal guardian (ask how to send us the papers in advance)  If your child is having surgery, please ask for a copy of Preparing for Your Child's Surgery.    Preparing for surgery    Within 30 days of surgery: Have an exam at your family clinic (primary care clinic), or go to a pre-operative clinic. This exam is called a \"History and Physical,\" or H&P.    At your H&P exam, talk to " your care team about all medicines you take. If you need to stop any medicines before surgery, ask when to start taking them again.  ? We do this for your safety. Many medicines can make you bleed too much during surgery. Some change how well surgery (anesthesia) drugs work.    Call your insurance company to see what it will and won't pay for. Ask if they need to pre-approve the surgery. (If no insurance, call [phone].)    Call your surgeon's clinic if there's any change in your health. This includes signs of a cold or flu (sore throat, runny nose, cough, rash, fever). It also includes a scrape or scratch near the surgery site.    If you have questions on the day of surgery, call your surgery center.  Eating and drinking guidelines  For your safety: Unless your surgeon tells you otherwise, follow the guidelines below.    Eat and drink as usual until 8 hours before surgery. After that, no food or milk.    Drink clear liquids until 2 hours before surgery. These are liquids you can see through, like water, Gatorade and Propel Water. You may also have black coffee and tea (no cream or milk).    Nothing by mouth within 2 hours of surgery. This includes gum, candy and breath mints.    Stop alcohol the midnight before surgery.    If your family clinic tells you to take medicine on the morning of surgery, it's okay to take it with a sip of water.  Preventing infection    Shower or bathe the night before and morning of your surgery. Follow the instructions your clinic gave you. (If no instructions, use regular soap.)    Don't shave or clip hair near your surgery site. This can lead to skin infection.    Don't smoke the morning of surgery. Smoking increases the risk of infection. You may chew nicotine gum up to 2 hours before surgery. A nicotine patch is okay.  ? Note: Some surgeries require you to completely quit smoking and nicotine. Check with your surgeon.    Your care team will make every effort to keep you safe  from infection. We will:  ? Clean our hands often with soap and water (or an alcohol-based hand rub).  ? Clean the skin at your surgery site with a special soap that kills germs. We'll also remove hair from the site as needed.  ? Wear special hair covers, masks, gowns and gloves during surgery.  ? Give antibiotic medicine, if prescribed. Not all surgeries need antibiotics.  What to bring on the day of surgery    Photo ID and insurance card    Copy of your health care directive, if you have one    Glasses and hearing aides (bring cases)  ? You can't wear contacts during surgery    Inhaler and eye drops, if you use them (tell us about these when you arrive)    CPAP machine or breathing device, if you use them    A few personal items, if spending the night    If you have . . .  ? A pacemaker or ICD (cardiac defibrillator): Bring the ID card.  ? An implanted stimulator: Bring the remote control.  ? A legal guardian: Bring a copy of the certified (court-stamped) guardianship papers.  Please remove any jewelry, including body piercings. Leave jewelry and other valuables at home.  If you're going home the day of surgery  Important: If you don't follow the rules below, we must cancel your surgery.     Arrange for someone to drive you home after surgery. You may not drive, take a taxi or take public transportation by yourself (unless you'll have local anesthesia only).    Arrange for a responsible adult to stay with you overnight. If you don't, we may keep you in the hospital overnight, and you may need to pay the costs yourself.  Questions?   If you have any questions for your care team, list them here: _________________________________________________________________________________________________________________________________________________________________________________________________________________________________________________________________________________________________________________________  For  informational purposes only. Not to replace the advice of your health care provider. Copyright   1996-4077 [employer] [facility]. All rights reserved. Clinically reviewed by Lexie Jay MD. [employer] [id number] - REV 07/19.      Patient Education     Nicotine skin patches  Brand Names: Habitrol, Nicoderm FATIMAH  What is this medicine?  NICOTINE (EMEKA oh teen) helps people stop smoking. The patches replace the nicotine found in cigarettes and help to decrease withdrawal effects. They are most effective when used in combination with a stop-smoking program.  How should I use this medicine?  This medicine is for use on the skin. Follow the directions that come with the patches. Find an area of skin on your upper arm, chest, or back that is clean, dry, greaseless, undamaged and hairless. Wash hands with plain soap and water. Do not use anything that contains aloe, lanolin or glycerin as these may prevent the patch from sticking. Dry thoroughly. Remove the patch from the sealed pouch. Do not try to cut or trim the patch. Using your palm, press the patch firmly in place for 10 seconds to make sure that there is good contact with your skin. After applying the patch, wash your hands. Change the patch every day, keeping to a regular schedule. When you apply a new patch, use a new area of skin. Wait at least 1 week before using the same area again.  Talk to your pediatrician regarding the use of this medicine in children. Special care may be needed.  What side effects may I notice from receiving this medicine?  Side effects that you should report to your doctor or health care professional as soon as possible:    allergic reactions like skin rash, itching or hives, swelling of the face, lips, or tongue    breathing problems    changes in hearing    changes in vision    chest pain    cold sweats    confusion    fast, irregular heartbeat    feeling faint or lightheaded, falls    headache    increased saliva    skin redness that lasts  more than 4 days    stomach pain    signs and symptoms of nicotine overdose like nausea; vomiting; dizziness; weakness; and rapid heartbeat  Side effects that usually do not require medical attention (report to your doctor or health care professional if they continue or are bothersome):    diarrhea    dry mouth    hiccups    irritability    nervousness or restlessness    trouble sleeping or vivid dreams  What may interact with this medicine?    medicines for asthma    medicines for blood pressure    medicines for mental depression    What if I miss a dose?  If you forget to replace a patch, use it as soon as you can. Only use one patch at a time and do not leave on the skin for longer than directed. If a patch falls off, you can replace it, but keep to your schedule and remove the patch at the right time.  Where should I keep my medicine?  Keep out of the reach of children.  Store at room temperature between 20 and 25 degrees C (68 and 77 degrees F). Protect from heat and light. Store in manufacturers packaging until ready to use. Throw away unused medicine after the expiration date. When you remove a patch, fold with sticky sides together; put in an empty opened pouch and throw away.  What should I tell my health care provider before I take this medicine?  They need to know if you have any of these conditions:    diabetes    heart disease, angina, irregular heartbeat or previous heart attack    high blood pressure    lung disease, including asthma    overactive thyroid    pheochromocytoma    seizures or a history of seizures    skin problems, like eczema    stomach problems or ulcers    an unusual or allergic reaction to nicotine, adhesives, other medicines, foods, dyes, or preservatives    pregnant or trying to get pregnant    breast-feeding  What should I watch for while using this medicine?  You should begin using the nicotine patch the day you stop smoking. It is okay if you do not succeed at your attempt to  quit and have a cigarette. You can still continue your quit attempt and keep using the product as directed. Just throw away your cigarettes and get back to your quit plan.  You can keep the patch in place during swimming, bathing, and showering. If your patch falls off during these activities, replace it.  When you first apply the patch, your skin may itch or burn. This should go away soon. When you remove a patch, the skin may look red, but this should only last for a few days. Call your doctor or health care professional if skin redness does not go away after 4 days, if your skin swells, or if you get a rash.  If you are a diabetic and you quit smoking, the effects of insulin may be increased and you may need to reduce your insulin dose. Check with your doctor or health care professional about how you should adjust your insulin dose.  If you are going to have a magnetic resonance imaging (MRI) procedure, tell your MRI technician if you have this patch on your body. It must be removed before a MRI.  NOTE:This sheet is a summary. It may not cover all possible information. If you have questions about this medicine, talk to your doctor, pharmacist, or health care provider. Copyright  2020 [employer]

## 2020-12-04 DIAGNOSIS — J30.2 SEASONAL ALLERGIES: Primary | ICD-10-CM

## 2020-12-04 RX ORDER — MONTELUKAST SODIUM 10 MG/1
TABLET ORAL
Qty: 90 TABLET | Refills: 3 | Status: SHIPPED | OUTPATIENT
Start: 2020-12-04 | End: 2021-12-02

## 2020-12-07 ENCOUNTER — OFFICE VISIT (OUTPATIENT)
Dept: FAMILY MEDICINE | Facility: OTHER | Age: 65
End: 2020-12-07
Attending: NURSE PRACTITIONER
Payer: MEDICARE

## 2020-12-07 DIAGNOSIS — Z01.818 PREOP GENERAL PHYSICAL EXAM: Primary | ICD-10-CM

## 2020-12-07 PROCEDURE — U0003 INFECTIOUS AGENT DETECTION BY NUCLEIC ACID (DNA OR RNA); SEVERE ACUTE RESPIRATORY SYNDROME CORONAVIRUS 2 (SARS-COV-2) (CORONAVIRUS DISEASE [COVID-19]), AMPLIFIED PROBE TECHNIQUE, MAKING USE OF HIGH THROUGHPUT TECHNOLOGIES AS DESCRIBED BY CMS-2020-01-R: HCPCS | Mod: ZL | Performed by: NURSE PRACTITIONER

## 2020-12-08 ENCOUNTER — ANCILLARY PROCEDURE (OUTPATIENT)
Dept: GENERAL RADIOLOGY | Facility: OTHER | Age: 65
End: 2020-12-08
Attending: NURSE PRACTITIONER
Payer: MEDICARE

## 2020-12-08 ENCOUNTER — OFFICE VISIT (OUTPATIENT)
Dept: FAMILY MEDICINE | Facility: OTHER | Age: 65
End: 2020-12-08
Attending: NURSE PRACTITIONER
Payer: MEDICARE

## 2020-12-08 VITALS
TEMPERATURE: 97.4 F | SYSTOLIC BLOOD PRESSURE: 110 MMHG | OXYGEN SATURATION: 90 % | HEART RATE: 95 BPM | DIASTOLIC BLOOD PRESSURE: 64 MMHG | BODY MASS INDEX: 32.78 KG/M2 | WEIGHT: 235 LBS

## 2020-12-08 DIAGNOSIS — Z71.6 ENCOUNTER FOR SMOKING CESSATION COUNSELING: ICD-10-CM

## 2020-12-08 DIAGNOSIS — Z01.818 PREOP GENERAL PHYSICAL EXAM: Primary | ICD-10-CM

## 2020-12-08 DIAGNOSIS — R06.09 DOE (DYSPNEA ON EXERTION): ICD-10-CM

## 2020-12-08 DIAGNOSIS — I51.7 CARDIOMEGALY: ICD-10-CM

## 2020-12-08 DIAGNOSIS — R73.03 PREDIABETES: ICD-10-CM

## 2020-12-08 DIAGNOSIS — Z01.818 PREOP GENERAL PHYSICAL EXAM: ICD-10-CM

## 2020-12-08 DIAGNOSIS — I51.7 RIGHT VENTRICULAR DILATION: ICD-10-CM

## 2020-12-08 LAB
ANION GAP SERPL CALCULATED.3IONS-SCNC: 4 MMOL/L (ref 3–14)
BASOPHILS # BLD AUTO: 0.1 10E9/L (ref 0–0.2)
BASOPHILS NFR BLD AUTO: 0.7 %
BUN SERPL-MCNC: 16 MG/DL (ref 7–30)
CALCIUM SERPL-MCNC: 8.5 MG/DL (ref 8.5–10.1)
CHLORIDE SERPL-SCNC: 104 MMOL/L (ref 94–109)
CO2 SERPL-SCNC: 31 MMOL/L (ref 20–32)
CREAT SERPL-MCNC: 1.05 MG/DL (ref 0.66–1.25)
DIFFERENTIAL METHOD BLD: NORMAL
EOSINOPHIL # BLD AUTO: 0.2 10E9/L (ref 0–0.7)
EOSINOPHIL NFR BLD AUTO: 2.3 %
ERYTHROCYTE [DISTWIDTH] IN BLOOD BY AUTOMATED COUNT: 12.7 % (ref 10–15)
EST. AVERAGE GLUCOSE BLD GHB EST-MCNC: 126 MG/DL
GFR SERPL CREATININE-BSD FRML MDRD: 74 ML/MIN/{1.73_M2}
GLUCOSE SERPL-MCNC: 141 MG/DL (ref 70–99)
HBA1C MFR BLD: 6 % (ref 0–5.6)
HCT VFR BLD AUTO: 51.4 % (ref 40–53)
HGB BLD-MCNC: 17.6 G/DL (ref 13.3–17.7)
IMM GRANULOCYTES # BLD: 0.1 10E9/L (ref 0–0.4)
IMM GRANULOCYTES NFR BLD: 0.5 %
LYMPHOCYTES # BLD AUTO: 3.2 10E9/L (ref 0.8–5.3)
LYMPHOCYTES NFR BLD AUTO: 32.4 %
MCH RBC QN AUTO: 31.2 PG (ref 26.5–33)
MCHC RBC AUTO-ENTMCNC: 34.2 G/DL (ref 31.5–36.5)
MCV RBC AUTO: 91 FL (ref 78–100)
MONOCYTES # BLD AUTO: 0.7 10E9/L (ref 0–1.3)
MONOCYTES NFR BLD AUTO: 7 %
NEUTROPHILS # BLD AUTO: 5.7 10E9/L (ref 1.6–8.3)
NEUTROPHILS NFR BLD AUTO: 57.1 %
NRBC # BLD AUTO: 0 10*3/UL
NRBC BLD AUTO-RTO: 0 /100
PLATELET # BLD AUTO: 312 10E9/L (ref 150–450)
POTASSIUM SERPL-SCNC: 3.6 MMOL/L (ref 3.4–5.3)
RBC # BLD AUTO: 5.65 10E12/L (ref 4.4–5.9)
SARS-COV-2 RNA SPEC QL NAA+PROBE: NOT DETECTED
SODIUM SERPL-SCNC: 139 MMOL/L (ref 133–144)
SPECIMEN SOURCE: NORMAL
WBC # BLD AUTO: 10 10E9/L (ref 4–11)

## 2020-12-08 PROCEDURE — 85025 COMPLETE CBC W/AUTO DIFF WBC: CPT | Mod: ZL | Performed by: NURSE PRACTITIONER

## 2020-12-08 PROCEDURE — 36415 COLL VENOUS BLD VENIPUNCTURE: CPT | Mod: ZL | Performed by: NURSE PRACTITIONER

## 2020-12-08 PROCEDURE — 80048 BASIC METABOLIC PNL TOTAL CA: CPT | Mod: ZL | Performed by: NURSE PRACTITIONER

## 2020-12-08 PROCEDURE — 83036 HEMOGLOBIN GLYCOSYLATED A1C: CPT | Mod: ZL | Performed by: NURSE PRACTITIONER

## 2020-12-08 PROCEDURE — 999N001182 HC STATISTIC ESTIMATED AVERAGE GLUCOSE: Mod: ZL | Performed by: NURSE PRACTITIONER

## 2020-12-08 PROCEDURE — G0463 HOSPITAL OUTPT CLINIC VISIT: HCPCS | Mod: 25

## 2020-12-08 PROCEDURE — 99214 OFFICE O/P EST MOD 30 MIN: CPT | Performed by: NURSE PRACTITIONER

## 2020-12-08 PROCEDURE — 71046 X-RAY EXAM CHEST 2 VIEWS: CPT | Mod: TC

## 2020-12-08 RX ORDER — NICOTINE 21 MG/24HR
1 PATCH, TRANSDERMAL 24 HOURS TRANSDERMAL EVERY 24 HOURS
Qty: 14 PATCH | Refills: 1 | Status: CANCELLED | OUTPATIENT
Start: 2020-12-08

## 2020-12-08 RX ORDER — NICOTINE 21 MG/24HR
1 PATCH, TRANSDERMAL 24 HOURS TRANSDERMAL EVERY 24 HOURS
Qty: 28 PATCH | Refills: 1 | Status: SHIPPED | OUTPATIENT
Start: 2020-12-08 | End: 2021-04-20

## 2020-12-08 ASSESSMENT — ANXIETY QUESTIONNAIRES
6. BECOMING EASILY ANNOYED OR IRRITABLE: NOT AT ALL
2. NOT BEING ABLE TO STOP OR CONTROL WORRYING: NOT AT ALL
1. FEELING NERVOUS, ANXIOUS, OR ON EDGE: NOT AT ALL
GAD7 TOTAL SCORE: 0
3. WORRYING TOO MUCH ABOUT DIFFERENT THINGS: NOT AT ALL
5. BEING SO RESTLESS THAT IT IS HARD TO SIT STILL: NOT AT ALL
7. FEELING AFRAID AS IF SOMETHING AWFUL MIGHT HAPPEN: NOT AT ALL
4. TROUBLE RELAXING: NOT AT ALL

## 2020-12-08 ASSESSMENT — PAIN SCALES - GENERAL: PAINLEVEL: NO PAIN (0)

## 2020-12-08 ASSESSMENT — PATIENT HEALTH QUESTIONNAIRE - PHQ9: SUM OF ALL RESPONSES TO PHQ QUESTIONS 1-9: 0

## 2020-12-08 NOTE — NURSING NOTE
"Chief Complaint   Patient presents with     Pre-Op Exam       Initial /64   Pulse 95   Temp 97.4  F (36.3  C) (Tympanic)   Wt 106.6 kg (235 lb)   SpO2 90%   BMI 32.78 kg/m   Estimated body mass index is 32.78 kg/m  as calculated from the following:    Height as of 8/26/20: 1.803 m (5' 11\").    Weight as of this encounter: 106.6 kg (235 lb).  Medication Reconciliation: complete  Loree Wilson LPN  "

## 2020-12-08 NOTE — LETTER
My Depression Action Plan  Name: Joey Irene   Date of Birth 1955  Date: 12/8/2020    My doctor: Amparo Alvares   My clinic: [facility] [place]  [place] [address]  [phone]          GREEN    ZONE   Good Control    What it looks like:     Things are going generally well. You have normal ups and downs. You may even feel depressed from time to time, but bad moods usually last less than a day.   What you need to do:  1. Continue to care for yourself (see self care plan)  2. Check your depression survival kit and update it as needed  3. Follow your physician s recommendations including any medication.  4. Do not stop taking medication unless you consult with your physician first.           YELLOW         ZONE Getting Worse    What it looks like:     Depression is starting to interfere with your life.     It may be hard to get out of bed; you may be starting to isolate yourself from others.    Symptoms of depression are starting to last most all day and this has happened for several days.     You may have suicidal thoughts but they are not constant.   What you need to do:     1. Call your care team. Your response to treatment will improve if you keep your care team informed of your progress. Yellow periods are signs an adjustment may need to be made.     2. Continue your self-care.  Just get dressed and ready for the day.  Don't give yourself time to talk yourself out of it.    3. Talk to someone in your support network.    4. Open up your Depression Self-Care Plan/Wellness Kit.           RED    ZONE Medical Alert - Get Help    What it looks like:     Depression is seriously interfering with your life.     You may experience these or other symptoms: You can t get out of bed most days, can t work or engage in other necessary activities, you have trouble taking care of basic hygiene, or basic responsibilities, thoughts of suicide or death that will not go away,  self-injurious behavior.     What you need to do:  1. Call your care team and request a same-day appointment. If they are not available (weekends or after hours) call your local crisis line, emergency room or 911.            Depression Self-Care Plan / Wellness Kit    Self-Care for Depression  Here s the deal. Your body and mind are really not as separate as most people think.  What you do and think affects how you feel and how you feel influences what you do and think. This means if you do things that people who feel good do, it will help you feel better.  Sometimes this is all it takes.  There is also a place for medication and therapy depending on how severe your depression is, so be sure to consult with your medical provider and/ or Behavioral Health Consultant if your symptoms are worsening or not improving.     In order to better manage my stress, I will:    Exercise  Get some form of exercise, every day. This will help reduce pain and release endorphins, the  feel good  chemicals in your brain. This is almost as good as taking antidepressants!  This is not the same as joining a gym and then never going! (they count on that by the way ) It can be as simple as just going for a walk or doing some gardening, anything that will get you moving.      Hygiene   Maintain good hygiene (get out of bed in the morning, make your bed, brush your teeth, take a shower, and get dressed like you were going to work, even if you are unemployed).  If your clothes don't fit try to get ones that do.    Diet  Strive to eat foods that are good for me, drink plenty of water, and avoid excessive sugar, caffeine, alcohol, and other mood-altering substances.  Some foods that are helpful in depression are: complex carbohydrates, B vitamins, flaxseed, fish or fish oil, fresh fruits and vegetables.    Psychotherapy  Agree to participate in Individual Therapy (if recommended).    Medication  If prescribed medications, I agree to take them.   Missing doses can result in serious side effects.  I understand that drinking alcohol, or other illicit drug use, may cause potential side effects.  I will not stop my medication abruptly without first discussing it with my provider.    Staying Connected With Others  Stay in touch with my friends, family members, and my primary care provider/team.    Use your imagination  Be creative.  We all have a creative side; it doesn t matter if it s oil painting, sand castles, or mud pies! This will also kick up the endorphins.    Witness Beauty  (AKA stop and smell the roses) Take a look outside, even in mid-winter. Notice colors, textures. Watch the squirrels and birds.     Service to others  Be of service to others.  There is always someone else in need.  By helping others we can  get out of ourselves  and remember the really important things.  This also provides opportunities for practicing all the other parts of the program.    Humor  Laugh and be silly!  Adjust your TV habits for less news and crime-drama and more comedy.    Control your stress  Try breathing deep, massage therapy, biofeedback, and meditation. Find time to relax each day.     Crisis Text Line  http://www.crisistextline.org    The Crisis Text Line serves anyone, in any type of crisis, providing access to free, 24/7 support and information via the medium people already use and trust:    Here's how it works:  1.  Text 199-592 from anywhere in the [place], anytime, about any type of crisis.  2.  A live, trained Crisis Counselor receives the text and responds quickly.  3.  The volunteer Crisis Counselor will help you move from a 'hot moment to a cool moment'.    My support system    Clinic Contact:  Phone number:    Contact 1:  Phone number:    Contact 2:  Phone number:    Voodoo/:  Phone number:    Therapist:  Phone number:    Local crisis center:    Phone number:    Other community support:  Phone number:

## 2020-12-08 NOTE — Clinical Note
Please tell him to continue with aspirin as directed     Marnie Jonas APRN FNP-BC  Family Nurse Practitioner

## 2020-12-09 ASSESSMENT — ANXIETY QUESTIONNAIRES: GAD7 TOTAL SCORE: 0

## 2020-12-10 ENCOUNTER — TELEPHONE (OUTPATIENT)
Dept: CARDIOLOGY | Facility: CLINIC | Age: 65
End: 2020-12-10

## 2020-12-10 NOTE — TELEPHONE ENCOUNTER
Call complete for pre procedure reminder, travel screen and updated visitor policy.  COVID Negative  Keturah Winter RN

## 2020-12-11 ENCOUNTER — APPOINTMENT (OUTPATIENT)
Dept: LAB | Facility: CLINIC | Age: 65
End: 2020-12-11
Attending: INTERNAL MEDICINE
Payer: MEDICARE

## 2020-12-11 ENCOUNTER — HOSPITAL ENCOUNTER (OUTPATIENT)
Facility: CLINIC | Age: 65
Discharge: HOME | End: 2020-12-11
Attending: INTERNAL MEDICINE | Admitting: INTERNAL MEDICINE
Payer: MEDICARE

## 2020-12-11 ENCOUNTER — APPOINTMENT (OUTPATIENT)
Dept: MEDSURG UNIT | Facility: CLINIC | Age: 65
End: 2020-12-11
Attending: INTERNAL MEDICINE
Payer: MEDICARE

## 2020-12-11 VITALS
HEART RATE: 80 BPM | TEMPERATURE: 98.7 F | RESPIRATION RATE: 20 BRPM | HEIGHT: 71 IN | BODY MASS INDEX: 32.62 KG/M2 | OXYGEN SATURATION: 93 % | DIASTOLIC BLOOD PRESSURE: 87 MMHG | WEIGHT: 233 LBS | SYSTOLIC BLOOD PRESSURE: 114 MMHG

## 2020-12-11 DIAGNOSIS — I51.7 RIGHT VENTRICULAR DILATION: ICD-10-CM

## 2020-12-11 DIAGNOSIS — R06.09 DOE (DYSPNEA ON EXERTION): ICD-10-CM

## 2020-12-11 DIAGNOSIS — F17.200 TOBACCO USE DISORDER: ICD-10-CM

## 2020-12-11 DIAGNOSIS — J43.9 PULMONARY EMPHYSEMA, UNSPECIFIED EMPHYSEMA TYPE (H): ICD-10-CM

## 2020-12-11 DIAGNOSIS — I51.7 CARDIOMEGALY: ICD-10-CM

## 2020-12-11 PROBLEM — Z98.890 STATUS POST CORONARY ANGIOGRAM: Status: ACTIVE | Noted: 2020-12-11

## 2020-12-11 LAB — POTASSIUM SERPL-SCNC: 4.1 MMOL/L (ref 3.4–5.3)

## 2020-12-11 PROCEDURE — 250N000011 HC RX IP 250 OP 636: Performed by: INTERNAL MEDICINE

## 2020-12-11 PROCEDURE — C1769 GUIDE WIRE: HCPCS | Performed by: INTERNAL MEDICINE

## 2020-12-11 PROCEDURE — 99152 MOD SED SAME PHYS/QHP 5/>YRS: CPT | Performed by: INTERNAL MEDICINE

## 2020-12-11 PROCEDURE — 84132 ASSAY OF SERUM POTASSIUM: CPT | Performed by: NURSE PRACTITIONER

## 2020-12-11 PROCEDURE — 999N000142 HC STATISTIC PROCEDURE PREP ONLY

## 2020-12-11 PROCEDURE — 93010 ELECTROCARDIOGRAM REPORT: CPT | Performed by: INTERNAL MEDICINE

## 2020-12-11 PROCEDURE — 93456 R HRT CORONARY ARTERY ANGIO: CPT | Mod: 26 | Performed by: INTERNAL MEDICINE

## 2020-12-11 PROCEDURE — C1887 CATHETER, GUIDING: HCPCS | Performed by: INTERNAL MEDICINE

## 2020-12-11 PROCEDURE — 99153 MOD SED SAME PHYS/QHP EA: CPT | Performed by: INTERNAL MEDICINE

## 2020-12-11 PROCEDURE — 93005 ELECTROCARDIOGRAM TRACING: CPT

## 2020-12-11 PROCEDURE — C1894 INTRO/SHEATH, NON-LASER: HCPCS | Performed by: INTERNAL MEDICINE

## 2020-12-11 PROCEDURE — 999N000054 HC STATISTIC EKG NON-CHARGEABLE

## 2020-12-11 PROCEDURE — 99152 MOD SED SAME PHYS/QHP 5/>YRS: CPT | Mod: 59 | Performed by: INTERNAL MEDICINE

## 2020-12-11 PROCEDURE — 36415 COLL VENOUS BLD VENIPUNCTURE: CPT | Performed by: NURSE PRACTITIONER

## 2020-12-11 PROCEDURE — 250N000009 HC RX 250: Performed by: INTERNAL MEDICINE

## 2020-12-11 PROCEDURE — 272N000001 HC OR GENERAL SUPPLY STERILE: Performed by: INTERNAL MEDICINE

## 2020-12-11 PROCEDURE — 93456 R HRT CORONARY ARTERY ANGIO: CPT | Performed by: INTERNAL MEDICINE

## 2020-12-11 RX ORDER — NALOXONE HYDROCHLORIDE 0.4 MG/ML
0.4 INJECTION, SOLUTION INTRAMUSCULAR; INTRAVENOUS; SUBCUTANEOUS
Status: DISCONTINUED | OUTPATIENT
Start: 2020-12-11 | End: 2020-12-11 | Stop reason: HOSPADM

## 2020-12-11 RX ORDER — POTASSIUM CHLORIDE 750 MG/1
40 TABLET, EXTENDED RELEASE ORAL
Status: DISCONTINUED | OUTPATIENT
Start: 2020-12-11 | End: 2020-12-11 | Stop reason: HOSPADM

## 2020-12-11 RX ORDER — FENTANYL CITRATE 50 UG/ML
INJECTION, SOLUTION INTRAMUSCULAR; INTRAVENOUS
Status: DISCONTINUED | OUTPATIENT
Start: 2020-12-11 | End: 2020-12-11 | Stop reason: HOSPADM

## 2020-12-11 RX ORDER — NICARDIPINE HYDROCHLORIDE 2.5 MG/ML
INJECTION INTRAVENOUS
Status: DISCONTINUED | OUTPATIENT
Start: 2020-12-11 | End: 2020-12-11 | Stop reason: HOSPADM

## 2020-12-11 RX ORDER — SODIUM CHLORIDE 9 MG/ML
INJECTION, SOLUTION INTRAVENOUS CONTINUOUS
Status: DISCONTINUED | OUTPATIENT
Start: 2020-12-11 | End: 2020-12-11 | Stop reason: HOSPADM

## 2020-12-11 RX ORDER — FENTANYL CITRATE 50 UG/ML
25-50 INJECTION, SOLUTION INTRAMUSCULAR; INTRAVENOUS
Status: ACTIVE | OUTPATIENT
Start: 2020-12-11 | End: 2020-12-11

## 2020-12-11 RX ORDER — LIDOCAINE 40 MG/G
CREAM TOPICAL
Status: DISCONTINUED | OUTPATIENT
Start: 2020-12-11 | End: 2020-12-11 | Stop reason: HOSPADM

## 2020-12-11 RX ORDER — IOPAMIDOL 755 MG/ML
INJECTION, SOLUTION INTRAVASCULAR
Status: DISCONTINUED | OUTPATIENT
Start: 2020-12-11 | End: 2020-12-11 | Stop reason: HOSPADM

## 2020-12-11 RX ORDER — HEPARIN SODIUM 1000 [USP'U]/ML
INJECTION, SOLUTION INTRAVENOUS; SUBCUTANEOUS
Status: DISCONTINUED | OUTPATIENT
Start: 2020-12-11 | End: 2020-12-11 | Stop reason: HOSPADM

## 2020-12-11 RX ORDER — NALOXONE HYDROCHLORIDE 0.4 MG/ML
0.2 INJECTION, SOLUTION INTRAMUSCULAR; INTRAVENOUS; SUBCUTANEOUS
Status: DISCONTINUED | OUTPATIENT
Start: 2020-12-11 | End: 2020-12-11 | Stop reason: HOSPADM

## 2020-12-11 RX ORDER — ALBUTEROL SULFATE 90 UG/1
1-2 AEROSOL, METERED RESPIRATORY (INHALATION) EVERY 4 HOURS PRN
Status: DISCONTINUED | OUTPATIENT
Start: 2020-12-11 | End: 2020-12-11 | Stop reason: HOSPADM

## 2020-12-11 RX ORDER — POTASSIUM CHLORIDE 750 MG/1
20 TABLET, EXTENDED RELEASE ORAL
Status: DISCONTINUED | OUTPATIENT
Start: 2020-12-11 | End: 2020-12-11 | Stop reason: HOSPADM

## 2020-12-11 RX ORDER — FLUMAZENIL 0.1 MG/ML
0.2 INJECTION, SOLUTION INTRAVENOUS
Status: DISCONTINUED | OUTPATIENT
Start: 2020-12-11 | End: 2020-12-11 | Stop reason: HOSPADM

## 2020-12-11 RX ORDER — NITROGLYCERIN 5 MG/ML
VIAL (ML) INTRAVENOUS
Status: DISCONTINUED | OUTPATIENT
Start: 2020-12-11 | End: 2020-12-11 | Stop reason: HOSPADM

## 2020-12-11 RX ORDER — ATROPINE SULFATE 0.1 MG/ML
0.5 INJECTION INTRAVENOUS
Status: DISCONTINUED | OUTPATIENT
Start: 2020-12-11 | End: 2020-12-11 | Stop reason: HOSPADM

## 2020-12-11 ASSESSMENT — MIFFLIN-ST. JEOR: SCORE: 1864.01

## 2020-12-11 NOTE — PROGRESS NOTES
D/I/A: Pt roomed on 3C in bay *so26**.  Arrived via litter and accompanied by CCRN On/Off: Off monitor.  VSSA.  Rhythm upon arrival SR on monitor.  Denies pain or sob.  Reviewed activity restrictions and when to notify RN, ie-changes to breathing or increased chest pressure or chest pain.  CCL access: right radial band in place with 12 ml of air  .Pt arrived with 3 bags. Pt did have some difficulty with maintaining sats . Pt sating upper 80 on RA . Pt applied 2 l 02 NC and sats increased to lower 90's..  P: Continue to monitor status.  Discharge to home once meeting criteria.

## 2020-12-11 NOTE — PRE-PROCEDURE
GENERAL PRE-PROCEDURE:   Procedure:  Coronary angiogram    Written consent obtained?: Yes    Risks and benefits: Risks, benefits and alternatives were discussed    Consent given by:  Patient  Patient states understanding of procedure being performed: Yes    Patient's understanding of procedure matches consent: Yes    Procedure consent matches procedure scheduled: Yes    Expected level of sedation:  Moderate  Appropriately NPO:  Yes  ASA Class:  Class 1- healthy patient  History & Physical reviewed:  History and physical reviewed and no updates needed  Statement of review:  I have reviewed the lab findings, diagnostic data, medications, and the plan for sedation

## 2020-12-11 NOTE — PROGRESS NOTES
Pre RHC and Angio- VSS. Alert. ASA 81mg took this am. Skin prepped and clipped. Left PIV in place. Assessment and prep complete. +3 bilateral pedal pulses marked. Consent and Contrast consent signed.

## 2020-12-11 NOTE — DISCHARGE INSTRUCTIONS
)  ______________________________________________      After you go home:    Have an adult stay with you for 24 hours.    Drink plenty of fluids.    You may eat your normal diet, unless your doctor tells you otherwise.    For 24 hours:    Relax and take it easy.    Do NOT smoke.    Do NOT make any important or legal decisions.    Do NOT drive or operate machines at home or at work.    Do NOT drink alcohol.    Remove the Band-Aid after 24 hours. If there is minor oozing, apply another Band-aid and remove it after 12 hours.    For 2 days, do NOT have sex or do any heavy exercise.    Do NOT take a bath, or use a hot tub or pool for at least 3 days. You may shower.            Care of wrist or arm site  It is normal to have soreness at the puncture site and mild tingling in your hand for up to 3 days.    For 2 days, do not use your hand or arm to support your weight (such as rising from a chair) or bend your wrist (such as lifting a garage door).    For 2 days, do not lift more than 5 pounds or exercise your arm (tennis, golf or bowling).    If you start bleeding from the site in your arm:    Sit down and press firmly on the site with your fingers for 10 minutes. Call your doctor as soon as you can.    If the bleeding stops, sit still and keep your wrist straight for 2 hours.    Medicines    If you have started taking Plavix or Effient, do not stop taking it until you talk to your heart doctor (cardiologist).    If you are on metformin (Glucophage), do not restart it until you have blood tests (within 2 to 3 days after discharge). When your doctor tells you it is safe, you may restart the metformin.    If you have stopped any other medicines, check with your nurse or provider about when to restart them.    Call 911 right away if you have bleeding that is heavy or does not stop.    Call your doctor if:    You have a large or growing hard lump around the site.    The site is red, swollen, hot or tender.    Blood or fluid  is draining from the site.    You have chills or a fever greater than 101 F (38 C).    Your leg or arm feels numb or cool.    You have hives, a rash or unusual itching.      [facility] [place]:  [phone] (7 days a week)      Going Home After Your Right and Left Heart Cath Procedure- Dr Reed    Activity: You may resume all normal activity.  Monitor neck site for bleeding, swelling, or voice changes. If you notice bleeding or swelling immediately apply pressure to the site and call number below to speak with Cardiology Fellow.  If you experience any changes in your breathing you should call your doctor immediately or come to the closest Emergency Department.  Do not drive yourself.    Medications: You are to resume all home medications including anticoagulation therapy unless otherwise advised by your primary cardiologist or nurse coordinator.    Follow Up: Per your primary cardiology team    If you have any questions or concerns regarding your procedure site please call [phone] at anytime and ask for Cardiology Fellow on call.  They are available 24 hours a day.  You may also contact the Cardiology Clinic after hours number at [phone].

## 2020-12-12 NOTE — PROGRESS NOTES
D:Pt discharged after successful angiogram, showing clear COR but slightly elevated right pressures.   A/I: Pt had right radial access, site CDI, CMS intact. Pt has eaten,voided and ambulated. Pt denies any c/o pain. Pt instructed on all discharge medications,activity restrictions and post procedural site care. Pt instructed on follow up appointments. Pt's PIV removed. Pt escorted to front door via WC with all belongings safely

## 2020-12-13 LAB — INTERPRETATION ECG - MUSE: NORMAL

## 2020-12-22 ENCOUNTER — VIRTUAL VISIT (OUTPATIENT)
Dept: UROLOGY | Facility: OTHER | Age: 65
End: 2020-12-22
Attending: UROLOGY
Payer: MEDICARE

## 2020-12-22 ENCOUNTER — OFFICE VISIT (OUTPATIENT)
Dept: CARDIOLOGY | Facility: OTHER | Age: 65
End: 2020-12-22
Attending: NURSE PRACTITIONER
Payer: MEDICARE

## 2020-12-22 ENCOUNTER — TELEPHONE (OUTPATIENT)
Dept: UROLOGY | Facility: OTHER | Age: 65
End: 2020-12-22

## 2020-12-22 VITALS
SYSTOLIC BLOOD PRESSURE: 124 MMHG | OXYGEN SATURATION: 93 % | TEMPERATURE: 97.2 F | DIASTOLIC BLOOD PRESSURE: 87 MMHG | HEART RATE: 100 BPM | WEIGHT: 225 LBS | BODY MASS INDEX: 31.38 KG/M2

## 2020-12-22 VITALS — HEIGHT: 71 IN | WEIGHT: 225 LBS | BODY MASS INDEX: 31.5 KG/M2

## 2020-12-22 DIAGNOSIS — R06.02 SHORTNESS OF BREATH: ICD-10-CM

## 2020-12-22 DIAGNOSIS — Z87.891 PERSONAL HISTORY OF TOBACCO USE, PRESENTING HAZARDS TO HEALTH: ICD-10-CM

## 2020-12-22 DIAGNOSIS — Z98.890 STATUS POST CORONARY ANGIOGRAM: ICD-10-CM

## 2020-12-22 DIAGNOSIS — R60.9 EDEMA, UNSPECIFIED TYPE: ICD-10-CM

## 2020-12-22 DIAGNOSIS — R97.20 ELEVATED PROSTATE SPECIFIC ANTIGEN (PSA): Primary | ICD-10-CM

## 2020-12-22 DIAGNOSIS — I27.20 PULMONARY HYPERTENSION (H): Primary | ICD-10-CM

## 2020-12-22 DIAGNOSIS — R73.03 PREDIABETES: ICD-10-CM

## 2020-12-22 DIAGNOSIS — I51.7 RIGHT VENTRICULAR DILATION: ICD-10-CM

## 2020-12-22 DIAGNOSIS — Z98.890: ICD-10-CM

## 2020-12-22 LAB
ANION GAP SERPL CALCULATED.3IONS-SCNC: 6 MMOL/L (ref 3–14)
BUN SERPL-MCNC: 12 MG/DL (ref 7–30)
CALCIUM SERPL-MCNC: 9 MG/DL (ref 8.5–10.1)
CHLORIDE SERPL-SCNC: 101 MMOL/L (ref 94–109)
CO2 SERPL-SCNC: 30 MMOL/L (ref 20–32)
CREAT SERPL-MCNC: 1.14 MG/DL (ref 0.66–1.25)
GFR SERPL CREATININE-BSD FRML MDRD: 67 ML/MIN/{1.73_M2}
GLUCOSE SERPL-MCNC: 96 MG/DL (ref 70–99)
NT-PROBNP SERPL-MCNC: 2705 PG/ML (ref 0–125)
POTASSIUM SERPL-SCNC: 4.5 MMOL/L (ref 3.4–5.3)
SODIUM SERPL-SCNC: 137 MMOL/L (ref 133–144)

## 2020-12-22 PROCEDURE — 99442 PR PHYSICIAN TELEPHONE EVALUATION 11-20 MIN: CPT | Mod: 95 | Performed by: UROLOGY

## 2020-12-22 PROCEDURE — G0463 HOSPITAL OUTPT CLINIC VISIT: HCPCS

## 2020-12-22 PROCEDURE — 99214 OFFICE O/P EST MOD 30 MIN: CPT | Performed by: NURSE PRACTITIONER

## 2020-12-22 PROCEDURE — 83880 ASSAY OF NATRIURETIC PEPTIDE: CPT | Mod: ZL | Performed by: NURSE PRACTITIONER

## 2020-12-22 PROCEDURE — 80048 BASIC METABOLIC PNL TOTAL CA: CPT | Mod: ZL | Performed by: NURSE PRACTITIONER

## 2020-12-22 PROCEDURE — 36415 COLL VENOUS BLD VENIPUNCTURE: CPT | Mod: ZL | Performed by: NURSE PRACTITIONER

## 2020-12-22 RX ORDER — CIPROFLOXACIN 500 MG/1
500 TABLET, FILM COATED ORAL 2 TIMES DAILY
Qty: 5 TABLET | Refills: 0 | Status: SHIPPED | OUTPATIENT
Start: 2020-12-22 | End: 2021-03-05

## 2020-12-22 RX ORDER — GENTAMICIN 40 MG/ML
120 INJECTION, SOLUTION INTRAMUSCULAR; INTRAVENOUS ONCE
Status: DISCONTINUED | OUTPATIENT
Start: 2021-01-18 | End: 2021-06-15

## 2020-12-22 ASSESSMENT — MIFFLIN-ST. JEOR: SCORE: 1827.72

## 2020-12-22 ASSESSMENT — PAIN SCALES - GENERAL
PAINLEVEL: NO PAIN (0)
PAINLEVEL: NO PAIN (0)

## 2020-12-22 NOTE — PROGRESS NOTES
History     Chief Complaint:      Telephone      HPI   Joey Irene is a 65 year old male whom I visited with this morning on the phone regarding an elevated PSA.  I will had a PSA 4 months ago which was 10.9.  We elected to recheck it and we did that in October and it came back 9.06.  I had a telephone visit scheduled with him but he rescheduled it to this time so we contacted him this morning on the phone.  Overall he feels well.  He previously had a urinalysis which was negative on his first visit.  Fazal has no family history of prostate cancer but I did advise him at this time because of 2 elevated PSAs to pursue a prostate biopsy.  He has agreed to do this and were going to get it scheduled sometime in January.  Allergies:    Allergies   Allergen Reactions     Seasonal Allergies Difficulty breathing and Cough     Bupropion Other (See Comments)     Tramadol Nausea and Swelling     Lower Brule Nite Time Dizziness and Nausea and Vomiting     Nyquil Cold &  [Night-Time Cold-Flu Relief] Dizziness and Nausea and Vomiting     Trichophyton Other (See Comments)        Medications:           acetaminophen (TYLENOL) 500 MG tablet       albuterol (PROAIR HFA/PROVENTIL HFA/VENTOLIN HFA) 108 (90 Base) MCG/ACT inhaler       ARIPiprazole (ABILIFY) 5 MG tablet       aspirin (ASA) 81 MG EC tablet       cetirizine (ZYRTEC) 10 MG tablet       Cholecalciferol (D 1000) 25 MCG (1000 UT) CAPS       ciprofloxacin (CIPRO) 500 MG tablet       cyclobenzaprine (FLEXERIL) 10 MG tablet       DULoxetine (CYMBALTA) 60 MG capsule       EPINEPHrine (ANY BX GENERIC EQUIV) 0.3 MG/0.3ML injection 2-pack       fluticasone (FLONASE) 50 MCG/ACT nasal spray       Fluticasone-Umeclidin-Vilanterol (TRELEGY ELLIPTA) 100-62.5-25 MCG/INH oral inhaler       gabapentin (NEURONTIN) 300 MG capsule       guaiFENesin (MUCINEX) 600 MG 12 hr tablet       hydrOXYzine (VISTARIL) 50 MG capsule       influenza vac high-dose quad (FLUZONE HD) 0.7 ML ÁLVARO injection        montelukast (SINGULAIR) 10 MG tablet       naproxen (NAPROSYN) 500 MG tablet       nicotine (NICODERM CQ) 14 MG/24HR 24 hr patch       omeprazole (PRILOSEC) 20 MG DR capsule       potassium chloride ER (MICRO-K) 10 MEQ CR capsule       SYMBICORT 80-4.5 MCG/ACT Inhaler       torsemide (DEMADEX) 20 MG tablet       traZODone (DESYREL) 50 MG tablet       vitamin C (ASCORBIC ACID) 500 MG tablet       VITAMIN E-100 OR       zolpidem (AMBIEN) 10 MG tablet       metolazone (ZAROXOLYN) 2.5 MG tablet        Problem List:      Patient Active Problem List    Diagnosis Date Noted     Status post coronary angiogram 12/11/2020     Priority: Medium     Severe pulmonary arterial systolic hypertension (H) 11/20/2020     Priority: Medium     Acute on chronic diastolic heart failure (H) 11/20/2020     Priority: Medium     Right ventricular dilation 10/29/2020     Priority: Medium     Added automatically from request for surgery [id number]       MURRAY (dyspnea on exertion) 10/29/2020     Priority: Medium     Added automatically from request for surgery [id number]       Bee sting allergy 08/07/2020     Priority: Medium     Hyperglycemia 08/07/2020     Priority: Medium     Elevated TSH 08/07/2020     Priority: Medium     Elevated prostate specific antigen (PSA) 08/07/2020     Priority: Medium     Diverticulitis large intestine 08/06/2020     Priority: Medium     History of fusion of cervical spine 08/06/2020     Priority: Medium     Opioid abuse (H) 08/06/2020     Priority: Medium     Generalized anxiety disorder 08/06/2020     Priority: Medium     Tobacco use disorder 08/06/2020     Priority: Medium     Pulmonary emphysema, unspecified emphysema type (H) 08/05/2020     Priority: Medium     Hyperlipidemia, unspecified hyperlipidemia type 08/05/2020     Priority: Medium     Cardiomegaly 08/05/2020     Priority: Medium     Enlarged prostate 08/05/2020     Priority: Medium     Diverticulosis 03/25/2020     Priority: Medium     Chronic bronchitis  with emphysema (H) 02/03/2020     Priority: Medium     Class 1 obesity with body mass index (BMI) of 33.0 to 33.9 in adult 02/03/2020     Priority: Medium     Smoking greater than 40 pack years 02/03/2020     Priority: Medium     Pain due to total left knee replacement, sequela 01/27/2020     Priority: Medium     Cervical stenosis of spinal canal 01/13/2020     Priority: Medium     Cholesteatoma of left ear 03/04/2019     Priority: Medium     Hearing problem, left 03/04/2019     Priority: Medium     Nasal septal deviation 12/18/2017     Priority: Medium     Primary osteoarthritis of right knee 02/25/2017     Priority: Medium     Status post total left knee replacement 02/25/2017     Priority: Medium     Recurrent major depressive disorder, in partial remission (H) 11/21/2015     Priority: Medium     PTSD (post-traumatic stress disorder) 08/19/2015     Priority: Medium     Seasonal allergies 08/19/2015     Priority: Medium     Anxiety state 07/07/2011     Priority: Medium     Back pain, chronic 07/07/2011     Priority: Medium     Hearing loss 07/07/2011     Priority: Medium     Insomnia, unspecified 07/07/2011     Priority: Medium     Sleep apnea 07/07/2011     Priority: Medium        Past Medical History:      Past Medical History:   Diagnosis Date     COPD (chronic obstructive pulmonary disease) (H)      Hypertension      Uncomplicated asthma        Past Surgical History:      Past Surgical History:   Procedure Laterality Date     CV CORONARY ANGIOGRAM N/A 12/11/2020    Procedure: Coronary Angiogram;  Surgeon: Aman Delgado MD;  Location:  [facility]     CV RIGHT HEART CATH N/A 12/11/2020    Procedure: CV RIGHT HEART CATH;  Surgeon: Aman Delgado MD;  [facility]: [facility]     ENT SURGERY      patient has had three left ear surgeries, unsure what they did     FUSION CERVICAL POSTERIOR THREE+ LEVELS       HERNIA REPAIR, INGUINAL RT/LT Right     done times 3     JOINT  "REPLACEMENT Left      REPAIR HAMMER TOE Right        Family History:      Family History   Problem Relation Age of Onset     Lung Cancer Mother      Ovarian Cancer Sister        Social History:    Marital Status:   [4]  Social History     Tobacco Use     Smoking status: Current Some Day Smoker     Years: 50.00     Types: Cigars     Smokeless tobacco: Never Used     Tobacco comment: smokes 2 cigars daily, previously smoked 2 packs per day   Substance Use Topics     Alcohol use: Never     Frequency: Never     Drug use: Never        Review of Systems      Physical Exam   Vitals:  Ht 1.803 m (5' 11\")   Wt 102.1 kg (225 lb)   BMI 31.38 kg/m        Physical Exam    PSA   Date Value Ref Range Status   10/20/2020 9.06 (H) 0 - 4 ug/L Final     Comment:     Assay Method:  Chemiluminescence using Siemens Vista analyzer   08/07/2020 10.90 (H) 0 - 4 ug/L Final     Comment:     Assay Method:  Chemiluminescence using Siemens Vista analyzer     Impression: Elevated PSA  Plan   Plan: I talked with Fazal and at this time I am recommending a prostate biopsy.  Risks of bleeding, infection, difficulty urinating were discussed.  All of these complications rarely occur.  We typically do this in the outpatient setting in radiology.  Fazal does struggle with anxiety so we just need to be aware of that.  I have gone over the procedure with Fazal, he will need a urinalysis the day of the procedure.  He needs to stop his aspirin 10 days prior to the procedure.  I have described that this is done transrectally with a ultrasound probe.  He needs to have a enema at home prior to coming and then 1 enema when he arrives in the department.  He will also take an antibiotic 1 to 2 hours prior to the biopsy and will receive an injection of gentamicin at the time of the biopsy.  He understands all of this and we are tentatively scheduling him for January 18.  We will confirm this date with the radiology schedule and they will contact the patient " with arrival time.  We will also mail out instructions.  Fazal was told to call me if he has any questions.  13 minutes were spent in this phone consultation.      No follow-ups on file.    Kesha Amaya MD  [facility]

## 2020-12-22 NOTE — TELEPHONE ENCOUNTER
----- Message from Kesha Amaya MD sent at 12/22/2020 10:19 AM CST -----  Nicole I ordered a prostate biopsy for Joey Jan 18.  Please confirm time/date with radiology.  Orders are in.  Please contact patient to go over everything with him. Thanks kary
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
## 2020-12-22 NOTE — LETTER
"12/22/2020       RE: Joey Irene  [address]  [place] [address]     Dear Colleague,    Thank you for referring your patient, Joey Irene, [facility]. Please see a copy of my visit note below.    Review of Systems:    Weight loss:    No     Recent fever/chills:  No   Night sweats:   No  Current skin rash:  No   Recent hair loss:  No  Heat intolerance:  No   Cold intolerance:  No  Chest pain:   No   Palpitations:   No  Shortness of breath:  yes   Wheezing:   No  Constipation:    No   Diarrhea:   No   Nausea:   No   Vomiting:   No   Kidney/side pain:  No   Back pain:   No  Frequent headaches:  No   Dizziness:     No  Leg swelling:   No   Calf pain:    No    Parents, brothers or sisters with history of kidney cancer:   No  Parents, brothers or sisters with history of bladder cancer: No  LARRY MCKEON LPN      Joey Irene is a 65 year old male who is being evaluated via a billable telephone visit.      The patient has been notified of following:     \"This telephone visit will be conducted via a call between you and your physician/provider. We have found that certain health care needs can be provided without the need for a physical exam.  This service lets us provide the care you need with a short phone conversation.  If a prescription is necessary we can send it directly to your pharmacy.  If lab work is needed we can place an order for that and you can then stop by our lab to have the test done at a later time.    Telephone visits are billed at different rates depending on your insurance coverage. During this emergency period, for some insurers they may be billed the same as an in-person visit.  Please reach out to your insurance provider with any questions.    If during the course of the call the physician/provider feels a telephone visit is not appropriate, you will not be charged for this service.\"    Patient has " given verbal consent for Telephone visit?  Yes    What phone number would you like to be contacted at? [phone]    How would you like to obtain your AVS? Yogesh    Phone call duration:  minutes            History     Chief Complaint:      Telephone      HPI   Joey Irene is a 65 year old male whom I visited with this morning on the phone regarding an elevated PSA.  I will had a PSA 4 months ago which was 10.9.  We elected to recheck it and we did that in October and it came back 9.06.  I had a telephone visit scheduled with him but he rescheduled it to this time so we contacted him this morning on the phone.  Overall he feels well.  He previously had a urinalysis which was negative on his first visit.  Fazal has no family history of prostate cancer but I did advise him at this time because of 2 elevated PSAs to pursue a prostate biopsy.  He has agreed to do this and were going to get it scheduled sometime in January.  Allergies:    Allergies   Allergen Reactions     Seasonal Allergies Difficulty breathing and Cough     Bupropion Other (See Comments)     Tramadol Nausea and Swelling     [place] Nite Time Dizziness and Nausea and Vomiting     Nyquil Cold &  [Night-Time Cold-Flu Relief] Dizziness and Nausea and Vomiting     Trichophyton Other (See Comments)        Medications:           acetaminophen (TYLENOL) 500 MG tablet       albuterol (PROAIR HFA/PROVENTIL HFA/VENTOLIN HFA) 108 (90 Base) MCG/ACT inhaler       ARIPiprazole (ABILIFY) 5 MG tablet       aspirin (ASA) 81 MG EC tablet       cetirizine (ZYRTEC) 10 MG tablet       Cholecalciferol (D 1000) 25 MCG (1000 UT) CAPS       ciprofloxacin (CIPRO) 500 MG tablet       cyclobenzaprine (FLEXERIL) 10 MG tablet       DULoxetine (CYMBALTA) 60 MG capsule       EPINEPHrine (ANY BX GENERIC EQUIV) 0.3 MG/0.3ML injection 2-pack       fluticasone (FLONASE) 50 MCG/ACT nasal spray       Fluticasone-Umeclidin-Vilanterol (TRELEGY ELLIPTA) 100-62.5-25 MCG/INH oral  inhaler       gabapentin (NEURONTIN) 300 MG capsule       guaiFENesin (MUCINEX) 600 MG 12 hr tablet       hydrOXYzine (VISTARIL) 50 MG capsule       influenza vac high-dose quad (FLUZONE HD) 0.7 ML ÁLVARO injection       montelukast (SINGULAIR) 10 MG tablet       naproxen (NAPROSYN) 500 MG tablet       nicotine (NICODERM CQ) 14 MG/24HR 24 hr patch       omeprazole (PRILOSEC) 20 MG DR capsule       potassium chloride ER (MICRO-K) 10 MEQ CR capsule       SYMBICORT 80-4.5 MCG/ACT Inhaler       torsemide (DEMADEX) 20 MG tablet       traZODone (DESYREL) 50 MG tablet       vitamin C (ASCORBIC ACID) 500 MG tablet       VITAMIN E-100 OR       zolpidem (AMBIEN) 10 MG tablet       metolazone (ZAROXOLYN) 2.5 MG tablet        Problem List:      Patient Active Problem List    Diagnosis Date Noted     Status post coronary angiogram 12/11/2020     Priority: Medium     Severe pulmonary arterial systolic hypertension (H) 11/20/2020     Priority: Medium     Acute on chronic diastolic heart failure (H) 11/20/2020     Priority: Medium     Right ventricular dilation 10/29/2020     Priority: Medium     Added automatically from request for surgery [id number]       MURRAY (dyspnea on exertion) 10/29/2020     Priority: Medium     Added automatically from request for surgery [id number]       Bee sting allergy 08/07/2020     Priority: Medium     Hyperglycemia 08/07/2020     Priority: Medium     Elevated TSH 08/07/2020     Priority: Medium     Elevated prostate specific antigen (PSA) 08/07/2020     Priority: Medium     Diverticulitis large intestine 08/06/2020     Priority: Medium     History of fusion of cervical spine 08/06/2020     Priority: Medium     Opioid abuse (H) 08/06/2020     Priority: Medium     Generalized anxiety disorder 08/06/2020     Priority: Medium     Tobacco use disorder 08/06/2020     Priority: Medium     Pulmonary emphysema, unspecified emphysema type (H) 08/05/2020     Priority: Medium     Hyperlipidemia, unspecified  hyperlipidemia type 08/05/2020     Priority: Medium     Cardiomegaly 08/05/2020     Priority: Medium     Enlarged prostate 08/05/2020     Priority: Medium     Diverticulosis 03/25/2020     Priority: Medium     Chronic bronchitis with emphysema (H) 02/03/2020     Priority: Medium     Class 1 obesity with body mass index (BMI) of 33.0 to 33.9 in adult 02/03/2020     Priority: Medium     Smoking greater than 40 pack years 02/03/2020     Priority: Medium     Pain due to total left knee replacement, sequela 01/27/2020     Priority: Medium     Cervical stenosis of spinal canal 01/13/2020     Priority: Medium     Cholesteatoma of left ear 03/04/2019     Priority: Medium     Hearing problem, left 03/04/2019     Priority: Medium     Nasal septal deviation 12/18/2017     Priority: Medium     Primary osteoarthritis of right knee 02/25/2017     Priority: Medium     Status post total left knee replacement 02/25/2017     Priority: Medium     Recurrent major depressive disorder, in partial remission (H) 11/21/2015     Priority: Medium     PTSD (post-traumatic stress disorder) 08/19/2015     Priority: Medium     Seasonal allergies 08/19/2015     Priority: Medium     Anxiety state 07/07/2011     Priority: Medium     Back pain, chronic 07/07/2011     Priority: Medium     Hearing loss 07/07/2011     Priority: Medium     Insomnia, unspecified 07/07/2011     Priority: Medium     Sleep apnea 07/07/2011     Priority: Medium        Past Medical History:      Past Medical History:   Diagnosis Date     COPD (chronic obstructive pulmonary disease) (H)      Hypertension      Uncomplicated asthma        Past Surgical History:      Past Surgical History:   Procedure Laterality Date     CV CORONARY ANGIOGRAM N/A 12/11/2020    Procedure: Coronary Angiogram;  Surgeon: Aman Delgado MD;  Location:  HEART CARDIAC CATH LAB     CV RIGHT HEART CATH N/A 12/11/2020    Procedure: CV RIGHT HEART CATH;  Surgeon: Aman Delgado MD;   "Location:  HEART CARDIAC CATH LAB     ENT SURGERY      patient has had three left ear surgeries, unsure what they did     FUSION CERVICAL POSTERIOR THREE+ LEVELS       HERNIA REPAIR, INGUINAL RT/LT Right     done times 3     JOINT REPLACEMENT Left      REPAIR HAMMER TOE Right        Family History:      Family History   Problem Relation Age of Onset     Lung Cancer Mother      Ovarian Cancer Sister        Social History:    Marital Status:   [4]  Social History     Tobacco Use     Smoking status: Current Some Day Smoker     Years: 50.00     Types: Cigars     Smokeless tobacco: Never Used     Tobacco comment: smokes 2 cigars daily, previously smoked 2 packs per day   Substance Use Topics     Alcohol use: Never     Frequency: Never     Drug use: Never        Review of Systems      Physical Exam   Vitals:  Ht 1.803 m (5' 11\")   Wt 102.1 kg (225 lb)   BMI 31.38 kg/m        Physical Exam    PSA   Date Value Ref Range Status   10/20/2020 9.06 (H) 0 - 4 ug/L Final     Comment:     Assay Method:  Chemiluminescence using Siemens Vista analyzer   08/07/2020 10.90 (H) 0 - 4 ug/L Final     Comment:     Assay Method:  Chemiluminescence using Siemens Vista analyzer     Impression: Elevated PSA  Plan   Plan: I talked with Fazal and at this time I am recommending a prostate biopsy.  Risks of bleeding, infection, difficulty urinating were discussed.  All of these complications rarely occur.  We typically do this in the outpatient setting in radiology.  Fazal does struggle with anxiety so we just need to be aware of that.  I have gone over the procedure with Fazal, he will need a urinalysis the day of the procedure.  He needs to stop his aspirin 10 days prior to the procedure.  I have described that this is done transrectally with a ultrasound probe.  He needs to have a enema at home prior to coming and then 1 enema when he arrives in the department.  He will also take an antibiotic 1 to 2 hours prior to the biopsy and will " receive an injection of gentamicin at the time of the biopsy.  He understands all of this and we are tentatively scheduling him for January 18.  We will confirm this date with the radiology schedule and they will contact the patient with arrival time.  We will also mail out instructions.  Fazal was told to call me if he has any questions.  13 minutes were spent in this phone consultation.      No follow-ups on file.    Kesha Amaya MD  [facility] [place]              Again, thank you for allowing me to participate in the care of your patient.      Sincerely,    Kesha Amaya MD

## 2020-12-22 NOTE — NURSING NOTE
"Chief Complaint   Patient presents with     RECHECK       Initial /87 (BP Location: Right arm)   Pulse 100   Temp 97.2  F (36.2  C) (Tympanic)   Wt 102.1 kg (225 lb)   SpO2 93%   BMI 31.38 kg/m   Estimated body mass index is 31.38 kg/m  as calculated from the following:    Height as of an earlier encounter on 12/22/20: 1.803 m (5' 11\").    Weight as of this encounter: 102.1 kg (225 lb).  Medication Reconciliation: complete  Charline Claire LPN  "

## 2020-12-22 NOTE — TELEPHONE ENCOUNTER
Spoke with pt over the phone and notified him that the biopsy will be 1/18/21 at 8 am, arrive at hospital admitting at 7:30 am.  I went over pt instructions including coming to appt with a comfortably full bladder for a UA.  I have mailed pt a copy of instructions.  LARRY MCKEON LPN

## 2020-12-22 NOTE — PATIENT INSTRUCTIONS
Thank you for allowing Marleny Bauer and our team to participate in your care. Please call our office at [phone] with scheduling questions or if you need to cancel or change your appointment. With any other questions or concerns you may call Joaquina cardiology nurse at [phone].       If you experience chest pain, chest pressure, chest tightness, shortness of breath, fainting, lightheadedness, nausea, vomiting, or other concerning symptoms, please report to the Emergency Department or call 911. These symptoms may be emergent, and best treated in the Emergency Department.    Follow up in 3 months    The hospital will call you to schedule a pulmonary function test to evaluate your lung function at [facility].     After pulmonary function test  we will set you up with a pulmonary specialist. Someone from our office will call you to set that up and or let you know details.

## 2020-12-22 NOTE — PROGRESS NOTES
"Review of Systems:    Weight loss:    No     Recent fever/chills:  No   Night sweats:   No  Current skin rash:  No   Recent hair loss:  No  Heat intolerance:  No   Cold intolerance:  No  Chest pain:   No   Palpitations:   No  Shortness of breath:  yes   Wheezing:   No  Constipation:    No   Diarrhea:   No   Nausea:   No   Vomiting:   No   Kidney/side pain:  No   Back pain:   No  Frequent headaches:  No   Dizziness:     No  Leg swelling:   No   Calf pain:    No    Parents, brothers or sisters with history of kidney cancer:   No  Parents, brothers or sisters with history of bladder cancer: No  LARRY MCKEON LPN      Joey Irene is a 65 year old male who is being evaluated via a billable telephone visit.      The patient has been notified of following:     \"This telephone visit will be conducted via a call between you and your physician/provider. We have found that certain health care needs can be provided without the need for a physical exam.  This service lets us provide the care you need with a short phone conversation.  If a prescription is necessary we can send it directly to your pharmacy.  If lab work is needed we can place an order for that and you can then stop by our lab to have the test done at a later time.    Telephone visits are billed at different rates depending on your insurance coverage. During this emergency period, for some insurers they may be billed the same as an in-person visit.  Please reach out to your insurance provider with any questions.    If during the course of the call the physician/provider feels a telephone visit is not appropriate, you will not be charged for this service.\"    Patient has given verbal consent for Telephone visit?  Yes    What phone number would you like to be contacted at? [phone]    How would you like to obtain your AVS? Yogesh    Phone call duration:  minutes        "

## 2021-01-05 ENCOUNTER — TELEPHONE (OUTPATIENT)
Dept: INTERVENTIONAL RADIOLOGY/VASCULAR | Facility: HOSPITAL | Age: 66
End: 2021-01-05

## 2021-01-05 NOTE — TELEPHONE ENCOUNTER
Pt called to see if pt would be willing to change times for his prostates biopsy.  Pt agrees to arrive at 0930 for 1000 on Monday January 18th.

## 2021-01-11 RX ORDER — LIDOCAINE HYDROCHLORIDE 10 MG/ML
20 INJECTION, SOLUTION EPIDURAL; INFILTRATION; INTRACAUDAL; PERINEURAL ONCE
Status: CANCELLED | OUTPATIENT
Start: 2021-01-18

## 2021-01-12 ENCOUNTER — TELEPHONE (OUTPATIENT)
Dept: CARDIOLOGY | Facility: OTHER | Age: 66
End: 2021-01-12

## 2021-01-12 DIAGNOSIS — Z98.890: ICD-10-CM

## 2021-01-12 DIAGNOSIS — I50.9 CONGESTIVE HEART FAILURE, UNSPECIFIED HF CHRONICITY, UNSPECIFIED HEART FAILURE TYPE (H): ICD-10-CM

## 2021-01-12 DIAGNOSIS — R60.9 EDEMA, UNSPECIFIED TYPE: ICD-10-CM

## 2021-01-12 DIAGNOSIS — E78.2 MIXED HYPERLIPIDEMIA: Primary | ICD-10-CM

## 2021-01-12 DIAGNOSIS — E78.2 MIXED HYPERLIPIDEMIA: ICD-10-CM

## 2021-01-12 LAB
ANION GAP SERPL CALCULATED.3IONS-SCNC: 5 MMOL/L (ref 3–14)
BUN SERPL-MCNC: 14 MG/DL (ref 7–30)
CALCIUM SERPL-MCNC: 9 MG/DL (ref 8.5–10.1)
CHLORIDE SERPL-SCNC: 104 MMOL/L (ref 94–109)
CO2 SERPL-SCNC: 30 MMOL/L (ref 20–32)
CREAT SERPL-MCNC: 1.13 MG/DL (ref 0.66–1.25)
GFR SERPL CREATININE-BSD FRML MDRD: 68 ML/MIN/{1.73_M2}
GLUCOSE SERPL-MCNC: 84 MG/DL (ref 70–99)
NT-PROBNP SERPL-MCNC: 2723 PG/ML (ref 0–125)
POTASSIUM SERPL-SCNC: 3.7 MMOL/L (ref 3.4–5.3)
SODIUM SERPL-SCNC: 139 MMOL/L (ref 133–144)

## 2021-01-12 PROCEDURE — 80048 BASIC METABOLIC PNL TOTAL CA: CPT | Mod: ZL | Performed by: NURSE PRACTITIONER

## 2021-01-12 PROCEDURE — 83880 ASSAY OF NATRIURETIC PEPTIDE: CPT | Mod: ZL | Performed by: NURSE PRACTITIONER

## 2021-01-12 PROCEDURE — 36415 COLL VENOUS BLD VENIPUNCTURE: CPT | Mod: ZL | Performed by: NURSE PRACTITIONER

## 2021-01-13 ENCOUNTER — TELEPHONE (OUTPATIENT)
Dept: CARDIOLOGY | Facility: OTHER | Age: 66
End: 2021-01-13

## 2021-01-13 DIAGNOSIS — R60.0 BILATERAL LOWER EXTREMITY EDEMA: ICD-10-CM

## 2021-01-13 DIAGNOSIS — I51.7 RIGHT VENTRICULAR DILATION: ICD-10-CM

## 2021-01-13 DIAGNOSIS — R06.09 DOE (DYSPNEA ON EXERTION): ICD-10-CM

## 2021-01-13 RX ORDER — TORSEMIDE 20 MG/1
40 TABLET ORAL EVERY MORNING
Qty: 90 TABLET | Refills: 1 | Status: SHIPPED | OUTPATIENT
Start: 2021-01-13 | End: 2021-02-17

## 2021-01-14 ENCOUNTER — DOCUMENTATION ONLY (OUTPATIENT)
Dept: INTERVENTIONAL RADIOLOGY/VASCULAR | Facility: HOSPITAL | Age: 66
End: 2021-01-14

## 2021-01-14 NOTE — PROGRESS NOTES
"Patient called to verify if he was able to make it to his appointment on Monday.  Patient states, \"I don't think January is going to work for me, can we push it out to February?\"  Contacted Dr Bedoya nurse clare, patient able to come on February 15th, will adjust the schedule.    "

## 2021-01-15 ENCOUNTER — HEALTH MAINTENANCE LETTER (OUTPATIENT)
Age: 66
End: 2021-01-15

## 2021-02-12 ENCOUNTER — TELEPHONE (OUTPATIENT)
Dept: INTERVENTIONAL RADIOLOGY/VASCULAR | Facility: HOSPITAL | Age: 66
End: 2021-02-12

## 2021-02-12 NOTE — TELEPHONE ENCOUNTER
Instructions given to pt.  Will take antibiotics and enema in am and night prior,  States understanding.

## 2021-02-15 ENCOUNTER — TELEPHONE (OUTPATIENT)
Dept: UROLOGY | Facility: OTHER | Age: 66
End: 2021-02-15

## 2021-02-15 NOTE — TELEPHONE ENCOUNTER
Left message for pt to return my call and let me know if he wants to reschedule his prostate biopsy or to not proceed.  LARRY MCKEON, ERLINDAN

## 2021-02-15 NOTE — TELEPHONE ENCOUNTER
----- Message from Kesha Amaya MD sent at 2/15/2021  9:26 AM CST -----  Larry.  Patient failed this appointment. If he has elected not to proceed then he needs to let us know and we will not reschedule. Please verify. Thanks skg  ----- Message -----  From: Larry Mckeon LPN  Sent: 1/14/2021   1:08 PM CST  To: Kesha Amaya MD    Pt rescheduled prostate biopsy from 1/18/21 to 2/15/21.  No new order needs to be placed, ultrasound just changed the date.  LARRY MCKEON LPN

## 2021-02-17 ENCOUNTER — TELEPHONE (OUTPATIENT)
Dept: UROLOGY | Facility: OTHER | Age: 66
End: 2021-02-17

## 2021-02-17 ENCOUNTER — PATIENT OUTREACH (OUTPATIENT)
Dept: CARE COORDINATION | Facility: OTHER | Age: 66
End: 2021-02-17

## 2021-02-17 DIAGNOSIS — R06.09 DOE (DYSPNEA ON EXERTION): ICD-10-CM

## 2021-02-17 DIAGNOSIS — R60.0 BILATERAL LOWER EXTREMITY EDEMA: ICD-10-CM

## 2021-02-17 DIAGNOSIS — I51.7 RIGHT VENTRICULAR DILATION: ICD-10-CM

## 2021-02-17 RX ORDER — TORSEMIDE 20 MG/1
40 TABLET ORAL EVERY MORNING
Qty: 90 TABLET | Refills: 1 | Status: SHIPPED | OUTPATIENT
Start: 2021-02-17 | End: 2021-05-25

## 2021-02-17 ASSESSMENT — ACTIVITIES OF DAILY LIVING (ADL): DEPENDENT_IADLS:: INDEPENDENT

## 2021-02-17 NOTE — PROGRESS NOTES
"Clinic Care Coordination Contact    Clinic Care Coordination Contact  OUTREACH    Referral Information:  Referral Source: Care Team(Marleny Bauer)         Chief Complaint   Patient presents with     Clinic Care Coordination - Initial     CHF        Universal Utilization:   Clinic Utilization  Difficulty keeping appointments:: No  Compliance Concerns: No  No-Show Concerns: No  Utilization    Last refreshed: 2021  7:59 AM: Hospital Admissions 1           Last refreshed: 2021  7:59 AM: ED Visits 3           Last refreshed: 2021  7:59 AM: No Show Count (past year) 8              Current as of: 2021  7:59 AM              Clinical Concerns:  Current Medical Concerns:  edema    Current Behavioral Concerns: denies    Education Provided to patient: yes      Health Maintenance Reviewed:    Clinical Pathway: Clinic Care Coordination CHF Assessment      CHF:    Home scale available:  Yes  Home scale weight this mornin lbs  Hospital discharge weight: NA   Current weight: 238 lbs   Heart Failure Zones sheet on refrigerator or available: No- will mail out today  Any increased SOB since hospital discharge:  No  What number to call for YELLOW zones: [phone] option #8    Symptom Review:   Heart Failure Symptoms  Shortness of breath:: No  Wheezing or noisy breathing?: No  Cough: No  Increased sputum: No  Fever: No  Chest pain: : No  Heartbeat: Regular  Dizzy or Lightheaded: No  Checking weight daily? : Yes  Weight?: (fluctuates)  Does the patient have understanding of Diuretic self-management?: Yes  Diet:: Other(salt and fluid restriction)  Appetite:: Normal  Swelling: : Feet  Urination:: Normal  Fatigue: No  Weakness (Heaviness in limbs):: No  What Heart Failure zone are you currently in?: Yellow  Overall your CHF symptoms are (GOAL):: Stable  How confident are you with the plan we have identified?: 8    Medications:  \"Do you have questions about your medications?\"  Yes was not taking correct dose of " Demadex  Is patient on Warfarin?  No  Is Ejection Fraction <40%: Echocardiogram completed on 2020. Normal LV size and function with LVEF 60 to 65%        Medication Management:  Manages own medications     Functional Status:  Dependent ADLs:: Independent  Dependent IADLs:: Independent  Bed or wheelchair confined:: No  Mobility Status: Independent  Fallen 2 or more times in the past year?: No  Any fall with injury in the past year?: No    Living Situation:  Current living arrangement:: I live in a private home with spouse  Type of residence:: Private home - stairs    Lifestyle & Psychosocial Needs:        Diet:: (salt and fluid restriction)  Inadequate nutrition (GOAL):: No  Tube Feeding: No  Inadequate activity/exercise (GOAL):: No  Significant changes in sleep pattern (GOAL): No  Transportation means:: Regular car     Religion or spiritual beliefs that impact treatment:: No  Mental health DX:: Yes(seasonal depression)  Mental health management concern (GOAL):: No  Informal Support system:: Family   Socioeconomic History     Marital status:      Spouse name: Not on file     Number of children: Not on file     Years of education: Not on file     Highest education level: Not on file     Tobacco Use     Smoking status: Former Smoker     Years: 50.00     Types: Cigars     Quit date: 2020     Years since quittin.1     Smokeless tobacco: Never Used   Substance and Sexual Activity     Alcohol use: Never     Frequency: Never     Drug use: Never     Sexual activity: Not Currently        Resources and Interventions:  Current Resources:         Supplies used at home:: None  Equipment Currently Used at Home: none       Goals:   Goals        General    fluid restriction     sodium restriction           Patient/Caregiver understanding: yes       Future Appointments              In 5 days Amparo Alvares, NP [facility] [place] [place]    In 1 week Marleny Bauer, APRN [facility]  [facility] [place] [place]    In 1 month Marleny Bauer APRN CNP [facility] [place]          Plan: Enrolled patient in CHF CC program. Reports feeling well since being put on diuretic however is still having swelling on lower extremities. States his finger print stays indented after he pushes on it. Discussed Demadex dose- patient taking incorrectly. Only taking 40 mg in AM. Educated he was added an additional 20 mg at noon per lab note on 1- (see below)     Marleny Bauer APRN CNP   1/12/2021  7:47 PM CST      Please have patient add on additional dose of Demadex 20 mg at noon, continue with 40 mg in the AM. Continue with current dose of potassium.   Thanks,   BERNARDINO Max CNP     Patient agrees to take additional dose as prescribed. Does have an appt on 2- with PCP Amparo Alvares and CC also scheduled appt with Marleny Bauer on 3-2-2021 to assess labs and edema.   Also discussed salt and fluid restriction until seen by PCP and Marleny Bauer. Patient agreeable to this. Sent instructions along with daily weight instructions via [employer]. CC will mail out heart failure action plan, Heart Failure Self Help Guide, and CC welcome letter. Patient also given CC phone number to call with questions or concerns.     Bharath MIRANDA RN  Cardiology Care Coordinator   [phone] Option #8          HISTORY:  Per LOV with BERNARDINO Bautista CNP 12-:  Echocardiogram completed on 8/27/2020. Normal LV size and function with LVEF 60 to 65%.  Grade 1/early diastolic dysfunction. Mild to moderate RV dilation.  Mild left atrial enlargement.  Mild mitral insufficiency.  The aortic valve is normal in structure and function.  No pericardial effusion.    Diastolic heart failure/diastolic dysfunction is the stiffening of the heart that can cause shortness of breath and swelling of the extremities. Ejection fraction (measurement of how much your heart pumps  out with each beat) is often in the normal range (normal is between 50%-70%). To help decrease the diastolic dysfunction you can increase your activity, salt restriction, and fluid restriction along with daily weights.   Also called heart failure with preserved ejection fraction (HFpEF).

## 2021-02-17 NOTE — LETTER
My Heart Failure Action Plan   Name: Joey Irene    YOB: 1955   Date: 2/17/2021    [facility] [place]     [address] [facility] [address] [phone]  My Diagnosis: diastolic dysfunction   My Exercise Goal: 30 minutes daily  .     My Weight Plan:   Wt Readings from Last 2 Encounters:   12/22/20 102.1 kg (225 lb)   12/22/20 102.1 kg (225 lb)     Weigh yourself daily using the same scale. If you gain more than 2 pounds in 24 hours or 5 pounds in a week call [facility] at [phone] option #8    My Diet Goal: salt and fluid restriction    Emergency Room Visits:    Our goal is to improve your quality of life and help you avoid a visit to the emergency room or hospital.  If we work together, we can achieve this goal. But, if you feel you need to call 911 or go to the emergency room, please do so.  If you go to the emergency room, please bring your list of medicines and your daily weight chart with you.       GREEN ZONE     Doing well today    Weight gained is no more than 2 pounds a day or 5 pounds a week.    No swelling in feet, ankles, legs or stomach.    No more swelling than usual.    No more trouble breathing than usual.    No change in my sleep.    No other problems. Actions:    I am doing fine.  I will take my medicine, follow my diet, see my doctor, exercise, and watch for symptoms.           YELLOW ZONE         Having a bad day or flare up    Weight gain of more than 2 pounds in one day or 5 pounds in one week.    New swelling in ankle, leg, knee or thigh.    Bloating in belly, pants feel tighter.    Swelling in hands or face.    Coughing or trouble breathing while walking or talking.    Harder to breathe last night.    Have trouble sleeping, wake up short of breath.    Much more tired than usual.    Not eating.    Pain in my chest or bad leg cramps.    Feel weak or dizzy. Actions:    I need to take action and call my doctor or nurse  today.                 RED ZONE         Need medical care now    Weight gain of 5 pounds overnight.    Chest pain or pressure that does not go away.    Feel less alert.    Wheezing or have trouble breathing when at rest.    Cannot sleep lying down.    Cannot take my water pill.    Pass out or faint. Actions:    I need to call my doctor or nurse now!    Call 911 if I have chest pain or cannot breathe.

## 2021-02-17 NOTE — LETTER
February 17, 2021      Joey Irene  [address]  [place] [address]        Dear Joey,     I am a clinic care coordinator who works with Cardiology at [facility]. I wanted to thank you for spending the time to talk with me.  Below is a description of clinic care coordination and how I can further assist you.      The clinic care coordinator is a registered nurse and/or  who understand the health care system. The goal of clinic care coordination is to help you manage your health and improve access to the [facility] in the most efficient manner. The registered nurse can assist you in meeting your health care goals by providing education, coordinating services, and strengthening the communication among your providers.   Please feel free to contact me at [phone] option #8, with any questions or concerns. We at [facility] are focused on providing you with the highest-quality healthcare experience possible and that all starts with you.      Sincerely,      Bharath ZEE RN  Clinic Care Coordination      Enclosed: I have enclosed a copy of a Heart Failure Action Plan. Please keep this in an easy to access place to use as needed. Please review and call me with any questions.

## 2021-02-17 NOTE — TELEPHONE ENCOUNTER
I spoke with the pt via phone and he does not wish to have the prostate biopsy.  He will call us if he changes his mind.  LARRY MCKEON LPN

## 2021-02-18 ENCOUNTER — TELEPHONE (OUTPATIENT)
Dept: FAMILY MEDICINE | Facility: OTHER | Age: 66
End: 2021-02-18

## 2021-02-18 NOTE — TELEPHONE ENCOUNTER
Reason for call:  OVERBOOK      The patient is requesting an appointment for Preop for procedure with [facility] on 03/03/21 with Amparo Alvares NP    Was an appointment offered for this call?  March 1, told by eye clinic that would be too close to surgery date     Preferred method for responding to this message: telephone    If we cannot reach you directly, may we leave a detailed response at the number you provided? yes    Can this message wait until your PCP/provider returns, if not available today? Yes, provider out of office today 02/18

## 2021-02-19 NOTE — PROGRESS NOTES
Marleny Bauer APRN CNP  You 15 hours ago (4:23 PM)     Looks good, We will see how he is doing when he comes in for his appt in the next couple of weeks.   Thanks,   PAZ    Message text

## 2021-02-22 PROBLEM — E78.5 HYPERLIPIDEMIA, UNSPECIFIED HYPERLIPIDEMIA TYPE: Status: RESOLVED | Noted: 2020-08-05 | Resolved: 2021-02-22

## 2021-02-22 PROBLEM — H91.92: Status: RESOLVED | Noted: 2019-03-04 | Resolved: 2021-02-22

## 2021-02-22 PROBLEM — K57.32 DIVERTICULITIS LARGE INTESTINE: Status: RESOLVED | Noted: 2020-08-06 | Resolved: 2021-02-22

## 2021-02-23 ENCOUNTER — HOSPITAL ENCOUNTER (EMERGENCY)
Facility: HOSPITAL | Age: 66
Discharge: HOME | End: 2021-02-23
Attending: EMERGENCY MEDICINE | Admitting: EMERGENCY MEDICINE
Payer: COMMERCIAL

## 2021-02-23 ENCOUNTER — APPOINTMENT (OUTPATIENT)
Dept: CT IMAGING | Facility: HOSPITAL | Age: 66
End: 2021-02-23
Attending: EMERGENCY MEDICINE
Payer: COMMERCIAL

## 2021-02-23 ENCOUNTER — APPOINTMENT (OUTPATIENT)
Dept: GENERAL RADIOLOGY | Facility: HOSPITAL | Age: 66
End: 2021-02-23
Attending: EMERGENCY MEDICINE
Payer: COMMERCIAL

## 2021-02-23 VITALS
RESPIRATION RATE: 16 BRPM | OXYGEN SATURATION: 92 % | TEMPERATURE: 97.2 F | HEART RATE: 97 BPM | DIASTOLIC BLOOD PRESSURE: 91 MMHG | SYSTOLIC BLOOD PRESSURE: 112 MMHG

## 2021-02-23 DIAGNOSIS — J44.9 CHRONIC OBSTRUCTIVE PULMONARY DISEASE, UNSPECIFIED COPD TYPE (H): ICD-10-CM

## 2021-02-23 DIAGNOSIS — Z98.890 H/O NECK SURGERY: ICD-10-CM

## 2021-02-23 DIAGNOSIS — S01.01XA SCALP LACERATION, INITIAL ENCOUNTER: ICD-10-CM

## 2021-02-23 DIAGNOSIS — Z23 TETANUS TOXOID INOCULATION: ICD-10-CM
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
## 2021-02-23 DIAGNOSIS — S06.0X9A CONCUSSION WITH LOSS OF CONSCIOUSNESS, INITIAL ENCOUNTER: ICD-10-CM

## 2021-02-23 DIAGNOSIS — J44.89 CHRONIC BRONCHITIS WITH EMPHYSEMA (H): ICD-10-CM

## 2021-02-23 LAB
ALBUMIN SERPL-MCNC: 4.1 G/DL (ref 3.4–5)
ALP SERPL-CCNC: 81 U/L (ref 40–150)
ALT SERPL W P-5'-P-CCNC: 22 U/L (ref 0–70)
ANION GAP SERPL CALCULATED.3IONS-SCNC: 9 MMOL/L (ref 3–14)
AST SERPL W P-5'-P-CCNC: 16 U/L (ref 0–45)
BASOPHILS # BLD AUTO: 0.1 10E9/L (ref 0–0.2)
BASOPHILS NFR BLD AUTO: 0.7 %
BILIRUB DIRECT SERPL-MCNC: 0.1 MG/DL (ref 0–0.2)
BILIRUB SERPL-MCNC: 0.6 MG/DL (ref 0.2–1.3)
BUN SERPL-MCNC: 19 MG/DL (ref 7–30)
CALCIUM SERPL-MCNC: 9.2 MG/DL (ref 8.5–10.1)
CHLORIDE SERPL-SCNC: 99 MMOL/L (ref 94–109)
CO2 SERPL-SCNC: 29 MMOL/L (ref 20–32)
CREAT SERPL-MCNC: 1.23 MG/DL (ref 0.66–1.25)
DIFFERENTIAL METHOD BLD: ABNORMAL
EOSINOPHIL # BLD AUTO: 0.2 10E9/L (ref 0–0.7)
EOSINOPHIL NFR BLD AUTO: 1.9 %
ERYTHROCYTE [DISTWIDTH] IN BLOOD BY AUTOMATED COUNT: 12.8 % (ref 10–15)
ETHANOL SERPL-MCNC: <0.01 G/DL
GFR SERPL CREATININE-BSD FRML MDRD: 61 ML/MIN/{1.73_M2}
GLUCOSE BLDC GLUCOMTR-MCNC: 153 MG/DL (ref 70–99)
GLUCOSE SERPL-MCNC: 141 MG/DL (ref 70–99)
HCT VFR BLD AUTO: 55.6 % (ref 40–53)
HGB BLD-MCNC: 19.1 G/DL (ref 13.3–17.7)
IMM GRANULOCYTES # BLD: 0.1 10E9/L (ref 0–0.4)
IMM GRANULOCYTES NFR BLD: 0.5 %
INR PPP: 0.94 (ref 0.86–1.14)
LYMPHOCYTES # BLD AUTO: 3.5 10E9/L (ref 0.8–5.3)
LYMPHOCYTES NFR BLD AUTO: 38.7 %
MCH RBC QN AUTO: 31.3 PG (ref 26.5–33)
MCHC RBC AUTO-ENTMCNC: 34.4 G/DL (ref 31.5–36.5)
MCV RBC AUTO: 91 FL (ref 78–100)
MONOCYTES # BLD AUTO: 0.7 10E9/L (ref 0–1.3)
MONOCYTES NFR BLD AUTO: 7.3 %
NEUTROPHILS # BLD AUTO: 4.6 10E9/L (ref 1.6–8.3)
NEUTROPHILS NFR BLD AUTO: 50.9 %
NRBC # BLD AUTO: 0 10*3/UL
NRBC BLD AUTO-RTO: 0 /100
PLATELET # BLD AUTO: 293 10E9/L (ref 150–450)
POTASSIUM SERPL-SCNC: 3.5 MMOL/L (ref 3.4–5.3)
PROT SERPL-MCNC: 8.1 G/DL (ref 6.8–8.8)
RBC # BLD AUTO: 6.1 10E12/L (ref 4.4–5.9)
SODIUM SERPL-SCNC: 137 MMOL/L (ref 133–144)
TROPONIN I SERPL-MCNC: <0.015 UG/L (ref 0–0.04)
WBC # BLD AUTO: 9.1 10E9/L (ref 4–11)

## 2021-02-23 PROCEDURE — 250N000011 HC RX IP 250 OP 636: Performed by: EMERGENCY MEDICINE

## 2021-02-23 PROCEDURE — 71045 X-RAY EXAM CHEST 1 VIEW: CPT

## 2021-02-23 PROCEDURE — 84484 ASSAY OF TROPONIN QUANT: CPT | Performed by: EMERGENCY MEDICINE

## 2021-02-23 PROCEDURE — 99283 EMERGENCY DEPT VISIT LOW MDM: CPT | Mod: 25 | Performed by: EMERGENCY MEDICINE

## 2021-02-23 PROCEDURE — 90471 IMMUNIZATION ADMIN: CPT | Performed by: EMERGENCY MEDICINE

## 2021-02-23 PROCEDURE — 85025 COMPLETE CBC W/AUTO DIFF WBC: CPT | Performed by: EMERGENCY MEDICINE

## 2021-02-23 PROCEDURE — 12002 RPR S/N/AX/GEN/TRNK2.6-7.5CM: CPT | Performed by: EMERGENCY MEDICINE

## 2021-02-23 PROCEDURE — 85610 PROTHROMBIN TIME: CPT | Performed by: EMERGENCY MEDICINE

## 2021-02-23 PROCEDURE — 12002 RPR S/N/AX/GEN/TRNK2.6-7.5CM: CPT

## 2021-02-23 PROCEDURE — 82077 ASSAY SPEC XCP UR&BREATH IA: CPT | Performed by: EMERGENCY MEDICINE

## 2021-02-23 PROCEDURE — 70450 CT HEAD/BRAIN W/O DYE: CPT

## 2021-02-23 PROCEDURE — 36415 COLL VENOUS BLD VENIPUNCTURE: CPT

## 2021-02-23 PROCEDURE — 999N000186 HC STATISTIC TRAUMA - NO PRIOR

## 2021-02-23 PROCEDURE — 96374 THER/PROPH/DIAG INJ IV PUSH: CPT | Mod: XU

## 2021-02-23 PROCEDURE — 999N001017 HC STATISTIC GLUCOSE BY METER IP

## 2021-02-23 PROCEDURE — 90715 TDAP VACCINE 7 YRS/> IM: CPT | Performed by: EMERGENCY MEDICINE

## 2021-02-23 PROCEDURE — 80048 BASIC METABOLIC PNL TOTAL CA: CPT | Performed by: EMERGENCY MEDICINE

## 2021-02-23 PROCEDURE — 72125 CT NECK SPINE W/O DYE: CPT

## 2021-02-23 PROCEDURE — 80076 HEPATIC FUNCTION PANEL: CPT | Performed by: EMERGENCY MEDICINE

## 2021-02-23 PROCEDURE — 96375 TX/PRO/DX INJ NEW DRUG ADDON: CPT | Mod: XU

## 2021-02-23 PROCEDURE — 99285 EMERGENCY DEPT VISIT HI MDM: CPT | Mod: 25

## 2021-02-23 RX ORDER — LIDOCAINE HYDROCHLORIDE AND EPINEPHRINE BITARTRATE 20; .01 MG/ML; MG/ML
10 INJECTION, SOLUTION SUBCUTANEOUS ONCE
Status: DISCONTINUED | OUTPATIENT
Start: 2021-02-23 | End: 2021-02-23 | Stop reason: HOSPADM

## 2021-02-23 RX ORDER — FENTANYL CITRATE 50 UG/ML
50 INJECTION, SOLUTION INTRAMUSCULAR; INTRAVENOUS ONCE
Status: COMPLETED | OUTPATIENT
Start: 2021-02-23 | End: 2021-02-23

## 2021-02-23 RX ORDER — ONDANSETRON 2 MG/ML
4 INJECTION INTRAMUSCULAR; INTRAVENOUS ONCE
Status: COMPLETED | OUTPATIENT
Start: 2021-02-23 | End: 2021-02-23

## 2021-02-23 RX ADMIN — FENTANYL CITRATE 50 MCG: 50 INJECTION, SOLUTION INTRAMUSCULAR; INTRAVENOUS at 08:54

## 2021-02-23 RX ADMIN — ONDANSETRON 4 MG: 2 INJECTION INTRAMUSCULAR; INTRAVENOUS at 08:54

## 2021-02-23 RX ADMIN — CLOSTRIDIUM TETANI TOXOID ANTIGEN (FORMALDEHYDE INACTIVATED), CORYNEBACTERIUM DIPHTHERIAE TOXOID ANTIGEN (FORMALDEHYDE INACTIVATED), BORDETELLA PERTUSSIS TOXOID ANTIGEN (GLUTARALDEHYDE INACTIVATED), BORDETELLA PERTUSSIS FILAMENTOUS HEMAGGLUTININ ANTIGEN (FORMALDEHYDE INACTIVATED), BORDETELLA PERTUSSIS PERTACTIN ANTIGEN, AND BORDETELLA PERTUSSIS FIMBRIAE 2/3 ANTIGEN 0.5 ML: 5; 2; 2.5; 5; 3; 5 INJECTION, SUSPENSION INTRAMUSCULAR at 08:49

## 2021-02-23 NOTE — ED PROVIDER NOTES
History     Chief Complaint   Patient presents with     Fall     Altered Mental Status     Nausea     Dizziness     HPI  Joey Irene is a 65 year old male who presents ambulatory via triage during covid pandemic. Tier 2 trauma initiated from Triage.  Obtained from the patient and his fiancée at the bedside.  Patient notes to me that when out in his garage and slipped and falling.  He next he knew he was on the floor.  He noted blood to his scalp.  He was able to ambulate back into his residence and his female  noted blood in his arm.  She was concerned he may have had arm injury.  No alcohol use.        Allergies:  Allergies   Allergen Reactions     Seasonal Allergies Difficulty breathing and Cough     Bupropion Other (See Comments)     Tramadol Nausea and Swelling     Lindsay Nite Time Dizziness and Nausea and Vomiting     Nyquil Cold &  [Night-Time Cold-Flu Relief] Dizziness and Nausea and Vomiting     Trichophyton Other (See Comments)       Problem List:    Patient Active Problem List    Diagnosis Date Noted     Status post coronary angiogram 12/11/2020     Priority: Medium     Severe pulmonary arterial systolic hypertension (H) 11/20/2020     Priority: Medium     Acute on chronic diastolic heart failure (H) 11/20/2020     Priority: Medium     Right ventricular dilation 10/29/2020     Priority: Medium     Added automatically from request for surgery [id number]       MURRAY (dyspnea on exertion) 10/29/2020     Priority: Medium     Added automatically from request for surgery [id number]       Bee sting allergy 08/07/2020     Priority: Medium     Hyperglycemia 08/07/2020     Priority: Medium     Elevated TSH 08/07/2020     Priority: Medium     Elevated prostate specific antigen (PSA) 08/07/2020     Priority: Medium     History of fusion of cervical spine 08/06/2020     Priority: Medium     Opioid abuse (H) 08/06/2020     Priority: Medium     Generalized anxiety disorder 08/06/2020     Priority: Medium      Tobacco use disorder 08/06/2020     Priority: Medium     Pulmonary emphysema, unspecified emphysema type (H) 08/05/2020     Priority: Medium     Cardiomegaly 08/05/2020     Priority: Medium     Enlarged prostate 08/05/2020     Priority: Medium     Diverticulosis 03/25/2020     Priority: Medium     Chronic bronchitis with emphysema (H) 02/03/2020     Priority: Medium     Class 1 obesity with body mass index (BMI) of 33.0 to 33.9 in adult 02/03/2020     Priority: Medium     Smoking greater than 40 pack years 02/03/2020     Priority: Medium     Pain due to total left knee replacement, sequela 01/27/2020     Priority: Medium     Cervical stenosis of spinal canal 01/13/2020     Priority: Medium     Cholesteatoma of left ear 03/04/2019     Priority: Medium     Nasal septal deviation 12/18/2017     Priority: Medium     Primary osteoarthritis of right knee 02/25/2017     Priority: Medium     Status post total left knee replacement 02/25/2017     Priority: Medium     Recurrent major depressive disorder, in partial remission (H) 11/21/2015     Priority: Medium     PTSD (post-traumatic stress disorder) 08/19/2015     Priority: Medium     Seasonal allergies 08/19/2015     Priority: Medium     Anxiety state 07/07/2011     Priority: Medium     Back pain, chronic 07/07/2011     Priority: Medium     Hearing loss 07/07/2011     Priority: Medium     Insomnia, unspecified 07/07/2011     Priority: Medium     Sleep apnea 07/07/2011     Priority: Medium        Past Medical History:    Past Medical History:   Diagnosis Date     COPD (chronic obstructive pulmonary disease) (H)      Hypertension      Uncomplicated asthma        Past Surgical History:    Past Surgical History:   Procedure Laterality Date     CV CORONARY ANGIOGRAM N/A 12/11/2020    Procedure: Coronary Angiogram;  Surgeon: Aman Delgado MD;  Location:  [facility] CARD[facility] CATH LAB     CV RIGHT HEART CATH MEASUREMENTS RECORDED N/A 12/11/2020    Procedure: CV RIGHT HEART  CATH;  Surgeon: Aman Delgado MD;  Location:  HE[facility]ARDI[facility] CATH LAB     ENT SURGERY      patient has had three left ear surgeries, unsure what they did     FUSION CERVICAL POSTERIOR THREE+ LEVELS       HERNIA REPAIR, INGUINAL RT/LT Right     done times 3     JOINT REPLACEMENT Left      REPAIR HAMMER TOE Right        Family History:    Family History   Problem Relation Age of Onset     Lung Cancer Mother      Ovarian Cancer Sister        Social History:  Marital Status:   [4]  Social History     Tobacco Use     Smoking status: Former Smoker     Years: 50.00     Types: Cigars     Quit date: 2020     Years since quittin.1     Smokeless tobacco: Never Used   Substance Use Topics     Alcohol use: Never     Frequency: Never     Drug use: Never        Medications:    aspirin (ASA) 81 MG EC tablet  acetaminophen (TYLENOL) 500 MG tablet  albuterol (PROAIR HFA/PROVENTIL HFA/VENTOLIN HFA) 108 (90 Base) MCG/ACT inhaler  ARIPiprazole (ABILIFY) 5 MG tablet  cetirizine (ZYRTEC) 10 MG tablet  Cholecalciferol (D 1000) 25 MCG (1000 UT) CAPS  ciprofloxacin (CIPRO) 500 MG tablet  cyclobenzaprine (FLEXERIL) 10 MG tablet  DULoxetine (CYMBALTA) 60 MG capsule  EPINEPHrine (ANY BX GENERIC EQUIV) 0.3 MG/0.3ML injection 2-pack  fluticasone (FLONASE) 50 MCG/ACT nasal spray  Fluticasone-Umeclidin-Vilanterol (TRELEGY ELLIPTA) 100-62.5-25 MCG/INH oral inhaler  gabapentin (NEURONTIN) 300 MG capsule  guaiFENesin (MUCINEX) 600 MG 12 hr tablet  hydrOXYzine (VISTARIL) 50 MG capsule  influenza vac high-dose quad (FLUZONE HD) 0.7 ML ÁLVARO injection  montelukast (SINGULAIR) 10 MG tablet  naproxen (NAPROSYN) 500 MG tablet  nicotine (NICODERM CQ) 14 MG/24HR 24 hr patch  omeprazole (PRILOSEC) 20 MG DR capsule  potassium chloride ER (MICRO-K) 10 MEQ CR capsule  SYMBICORT 80-4.5 MCG/ACT Inhaler  torsemide (DEMADEX) 20 MG tablet  traZODone (DESYREL) 50 MG tablet  vitamin C (ASCORBIC ACID) 500 MG tablet  VITAMIN E-100 OR  zolpidem  (AMBIEN) 10 MG tablet          Review of Systems     Per HPI.  Other 10 systems negative.  Of seasonal allergies and his history of spine fusion.  He denies any new acute neck pain.  He does have mild chronic nocturia.  Has had no runny nose, sore throat, cough or Covid symptoms.    Physical Exam   BP: (!) 145/105  Pulse: 109  Temp: 97.2  F (36.2  C)  Resp: 22  SpO2: (!) 89 %(notes he has a history of COPD should wear oxygen but does not)      Physical Exam   Primary Survey:  Airway patent  Breath sounds equal bilateral  Circulation intact distally  D: Gcs 15  Exposure for trauma exam    2nd survey:    Vitals: reviewed  Head: no trauma  Eyes.  Conjunctiva noninjected.  Pupils equal round reactive to light.  Anicteric.  No ocular or lid trauma is seen.  ENT: No drainage or bleeding noted.  TMs without hemotympanum.  EACs clear.  No nasal blood or septal hematoma.  Airway is patent without drooling or voice changes.  Normal mastoids.  Neck: No midline tenderness no step-offs: C-collar in place  Pulmonary: Chest movement is symmetrical.  No chest wall ecchymosis, subcu emphysema, tenderness or crepitus.  CV: rate normal. No jvd. Intact equal peripheral pulses  : normal flanks  Abd: Soft, nontender, nondistended, normal bowel sounds.  No rebound.  No abrasions or ecchymosis.  Skin: no significant trauma other than occipital scalp laceration 6 cm primarily linear but there at the mid aspect is a slight half centimeter tangential laceration through the midportion of the laceration.  The upper extremities are normal except for small blood on the left arm.  There is no signs of traumatic laceration or other soft tissue injuries to the face, neck, chest abdomen pelvis or long bones.  Neuro: Motor and sensory exam nonfocal.  Moves all extremities without difficulty.  GCS 15.   Psych: Patient alert and oriented to person, place and time.  Mood and affect are unremarkable.  Memory is intact.  MS: Back nontender.  No extremity  "pain or deformity.  No midline CT or L-spine tenderness or step-offs.  Stable pelvis.        ED Course     ED Course as of Feb 23 1849   Tue Feb 23, 2021   0833 Trauma Tier 2.  Initial assessed in the hallway airway ,breathing ,circulation intact.  GCS 15.  2nd survey in room 10 is notable for occipital scalp laceration clean linear without arterial bleeding.  Patient is in c-collar  No alcohol   ? Brief LOC        0834 Plan: update tdap  CT head/C spine  Labs,  PCXR      0847 Patient notes ongoing headache, questions on her pain and nausea.  patient has returned from CT scan.  GCS 15.      0905 CT head, C-spine and chest x-ray portable reviewed.      0921 Occipital scalp lac repaired, 7 staples, see procedure note.  Gcs 15.       0924 Of note, patient without any ongoing neuro complaints other than headache on repeat exam. C collar removed. \"feels much better\". Denies neck pain.          Procedures  Occipital scalp laceration.  Anesthesia with lidocaine with epinephrine 3 mils.  The scalp was cleansed irrigated and there was a small mount of dark clot was removed from the wound margins sterile gloved finger..  The scalp was explored no underlying scalp step-off.  No foreign bodies.  Wound was copious irrigated with normal saline and then closed with 7 staples and well approximated.  Bleeding was controlled.  The length of repaired laceration of 6 cm.  Compressive head dressing was placed.          Results for orders placed or performed during the hospital encounter of 02/23/21 (from the past 24 hour(s))   CBC with platelets differential   Result Value Ref Range    WBC 9.1 4.0 - 11.0 10e9/L    RBC Count 6.10 (H) 4.4 - 5.9 10e12/L    Hemoglobin 19.1 (H) 13.3 - 17.7 g/dL    Hematocrit 55.6 (H) 40.0 - 53.0 %    MCV 91 78 - 100 fl    MCH 31.3 26.5 - 33.0 pg    MCHC 34.4 31.5 - 36.5 g/dL    RDW 12.8 10.0 - 15.0 %    Platelet Count 293 150 - 450 10e9/L    Diff Method Automated Method     % Neutrophils 50.9 %    % " Lymphocytes 38.7 %    % Monocytes 7.3 %    % Eosinophils 1.9 %    % Basophils 0.7 %    % Immature Granulocytes 0.5 %    Nucleated RBCs 0 0 /100    Absolute Neutrophil 4.6 1.6 - 8.3 10e9/L    Absolute Lymphocytes 3.5 0.8 - 5.3 10e9/L    Absolute Monocytes 0.7 0.0 - 1.3 10e9/L    Absolute Eosinophils 0.2 0.0 - 0.7 10e9/L    Absolute Basophils 0.1 0.0 - 0.2 10e9/L    Abs Immature Granulocytes 0.1 0 - 0.4 10e9/L    Absolute Nucleated RBC 0.0    INR   Result Value Ref Range    INR 0.94 0.86 - 1.14   Basic metabolic panel   Result Value Ref Range    Sodium 137 133 - 144 mmol/L    Potassium 3.5 3.4 - 5.3 mmol/L    Chloride 99 94 - 109 mmol/L    Carbon Dioxide 29 20 - 32 mmol/L    Anion Gap 9 3 - 14 mmol/L    Glucose 141 (H) 70 - 99 mg/dL    Urea Nitrogen 19 7 - 30 mg/dL    Creatinine 1.23 0.66 - 1.25 mg/dL    GFR Estimate 61 >60 mL/min/[1.73_m2]    GFR Estimate If Black 71 >60 mL/min/[1.73_m2]    Calcium 9.2 8.5 - 10.1 mg/dL   Troponin I   Result Value Ref Range    Troponin I ES <0.015 0.000 - 0.045 ug/L   Alcohol ethyl   Result Value Ref Range    Ethanol g/dL <0.01 0.01 g/dL   Hepatic panel   Result Value Ref Range    Bilirubin Direct 0.1 0.0 - 0.2 mg/dL    Bilirubin Total 0.6 0.2 - 1.3 mg/dL    Albumin 4.1 3.4 - 5.0 g/dL    Protein Total 8.1 6.8 - 8.8 g/dL    Alkaline Phosphatase 81 40 - 150 U/L    ALT 22 0 - 70 U/L    AST 16 0 - 45 U/L   Glucose by meter   Result Value Ref Range    Glucose 153 (H) 70 - 99 mg/dL   XR Chest Port 1 View    Narrative    PROCEDURE:  XR CHEST PORT 1 VW    HISTORY:  trauma, fall, h/o copd, hypoxemia.     COMPARISON:  December 8, 2020    FINDINGS:   The cardiac silhouette is normal in size. The pulmonary vasculature is  normal.  The bilateral pulmonary parenchymal opacities seen previously  on December 8, 2020 have improved. There are some residual opacities  noted worse on the left than the right. No pleural effusion or  pneumothorax.      Impression    IMPRESSION:  Improving bilateral  pulmonary parenchymal opacities from  December 8, 2020      MANA SOUSA MD   Head CT w/o contrast    Narrative    PROCEDURE: CT HEAD W/O CONTRAST     HISTORY: Head trauma.    COMPARISON: None.    TECHNIQUE:  Helical images of the head from the foramen magnum to the  vertex were obtained without contrast.    FINDINGS: The ventricles and sulci are normal in volume. No acute  intracranial hemorrhage, mass effect, midline shift, hydrocephalus or  basilar cystern effacement are present.    The grey-white matter interface is preserved.    The calvarium is intact.  Mastoidectomy changes are noted on the left  The visualized paranasal sinuses are clear.      Impression    IMPRESSION: No acute brain injury.      MANA SOUSA MD   Cervical spine CT w/o contrast    Narrative    PROCEDURE: CT CERVICAL SPINE W/O CONTRAST 2/23/2021 8:47 AM    HISTORY: trauma, cant' clear clinicallly. hit head.    COMPARISONS: None.    Meds/Dose Given:    TECHNIQUE: CT scan of the cervical spine with sagittal coronal  reconstructions    FINDINGS: There is an unfused dorsal ring of C1 1 developmental  variation. There has been fusion of the C6-C7 and T1 vertebra.  Cervical facet joint degenerative changes are noted most severe in the  upper cervical spine on the left. There are no acute fractures of the  vertebral bodies or arches. The prevertebral soft tissues appear  normal. Emphysematous blebs are seen in both lung apices.         Impression    IMPRESSION: Lower cervical spine fusion both ventrally and dorsally.  No acute cervical fracture.    MANA SOUSA MD       Medications   Tdap (tetanus-diphtheria-acell pertussis) (ADACEL) injection 0.5 mL (0.5 mLs Intramuscular Given 2/23/21 0849)   ondansetron (ZOFRAN) injection 4 mg (4 mg Intravenous Given 2/23/21 0854)   fentaNYL (PF) (SUBLIMAZE) injection 50 mcg (50 mcg Intravenous Given 2/23/21 0854)       Assessments & Plan (with Medical Decision Making)     I have reviewed the nursing  verbal report.  Patient underwent a tier 2 trauma activation.  Patient scalp laceration was repaired please see procedure note above.  Tetanus was updated.    I have reviewed the findings, diagnosis, plan and need for follow up with the patient.  MDM:  Joey Irene 65 year old 1955 who presents to the emergency department.  Patient has been assessed and reassessed and at this time based on the information available to me, I feel the patient is medically stable for discharge.  At times conditions evolve or change,thus repeat medical evaluation is recommended if any additional concerns arise.      The patient is stable for discharge form the ED. she presents after a serious fall.  Due to the obvious scalp laceration and mechanism with age cannot clear his C-spine clinically without imaging.  Fortunately no signs of abnormalities by advanced imaging.  C-collar was removed his neck is much more comfortable he is neurologically normal with a GCS of 15.  He has no a reassuring repeat chest 7 pelvis and long bone examination on tertiary trauma review.  Patient's tetanus is updated Zofran given for nausea and fentanyl for pain control of the scalp laceration.  Patient stable for discharge.  He is given instructions for falls, scalp laceration, tetanus update concussion precautions.  Of note patient does have chronic COPD with no acute pulmonary complaints and he reports at baseline his oxygen is borderline.  He does not want further evaluation for his chronic COPD.  He denies any new shortness of breath chest pain or exertional symptomatology or leg swelling.      Final diagnoses:   Fall from slip, trip, or stumble, initial encounter   Scalp laceration, initial encounter   Tetanus toxoid inoculation   Concussion with loss of consciousness, initial encounter   Chronic obstructive pulmonary disease, unspecified COPD type (H)   H/O neck surgery     Amparo Alvares, NP  [place] [id number] [facility]  [id number]  [phone]    In 1 week  for recheck.    [facility]  [address] [address] [phone]    per instructions/ head injury precautions and laceration care instructions (staples)    [facility]  [address] [address] [phone]    For staple removal, 10-14 days, here or with primary MD.    Thank you for trusting us with your care today.  You have staples in your scalp after you fell at home and suffered a traumatic laceration to the back of your head.  You had a CT scan of your head today and neck which showed no acute fracture or internal bleeding.  Your previous neck surgery appears stable.  It is noted in the ER today that your oxygen is borderline low and you noted that this is a ongoing problem.  Your tetanus was updated today.    Staples removed in 10 to 14 days.  Primary care follow-up in ~1 week to 10 days for repeat assessment post fall and head injury.  Recommend holding aspirin for 7-10 days.       Discharge instructions for scalp laceration-staples, concussion and head injury with sleep monitoring  2/23/2021   [facility]     Farzad Moulton MD  02/23/21 3522

## 2021-02-23 NOTE — ED TRIAGE NOTES
"Patient presents to the emergency room with reports of a fall on the ice at home with a unknown period of time that he was \"out\" patient notes he went outside around 0800 and came back into the house around 0805.  Patient has a laceration to the back of his head, c/o feeling dizzy, lightheaded and nauseated.  Patient reports feeling \"anxious\" which is his normal.  Patient to room; c-collar applied and at 0825 level 2 trauma was activated.      "

## 2021-02-23 NOTE — ED NOTES
Discharge instructions reviewed with patient, and patient verbalized understanding. Pt ambulated with a steady gait to exit with s/o.

## 2021-02-23 NOTE — DISCHARGE INSTRUCTIONS
Thank you for trusting us with your care today.  You have staples in your scalp after you fell at home and suffered a traumatic laceration to the back of your head.  You had a CT scan of your head today and neck which showed no acute fracture or internal bleeding.  Your previous neck surgery appears stable.  It is noted in the ER today that your oxygen is borderline low and you noted that this is a ongoing problem.  Your tetanus was updated today.    Staples removed in 10 to 14 days.  Primary care follow-up in ~1 week to 10 days for repeat assessment post fall and head injury.  Recommend holding aspirin for 7-10 days.

## 2021-02-26 ENCOUNTER — HOSPITAL ENCOUNTER (EMERGENCY)
Facility: HOSPITAL | Age: 66
Discharge: HOME | End: 2021-02-26
Attending: NURSE PRACTITIONER | Admitting: NURSE PRACTITIONER
Payer: COMMERCIAL

## 2021-02-26 ENCOUNTER — APPOINTMENT (OUTPATIENT)
Dept: GENERAL RADIOLOGY | Facility: HOSPITAL | Age: 66
End: 2021-02-26
Attending: NURSE PRACTITIONER
Payer: COMMERCIAL

## 2021-02-26 VITALS
TEMPERATURE: 98.1 F | OXYGEN SATURATION: 95 % | SYSTOLIC BLOOD PRESSURE: 134 MMHG | HEART RATE: 85 BPM | DIASTOLIC BLOOD PRESSURE: 95 MMHG | RESPIRATION RATE: 16 BRPM

## 2021-02-26 DIAGNOSIS — M53.3 COCCYDYNIA: Primary | ICD-10-CM

## 2021-02-26 PROCEDURE — 250N000011 HC RX IP 250 OP 636: Performed by: NURSE PRACTITIONER

## 2021-02-26 PROCEDURE — G0463 HOSPITAL OUTPT CLINIC VISIT: HCPCS | Mod: 25

## 2021-02-26 PROCEDURE — 96372 THER/PROPH/DIAG INJ SC/IM: CPT | Performed by: NURSE PRACTITIONER

## 2021-02-26 PROCEDURE — 72202 X-RAY EXAM SI JOINTS 3/> VWS: CPT

## 2021-02-26 PROCEDURE — 99214 OFFICE O/P EST MOD 30 MIN: CPT | Performed by: NURSE PRACTITIONER

## 2021-02-26 PROCEDURE — 72100 X-RAY EXAM L-S SPINE 2/3 VWS: CPT

## 2021-02-26 RX ORDER — KETOROLAC TROMETHAMINE 30 MG/ML
30 INJECTION, SOLUTION INTRAMUSCULAR; INTRAVENOUS ONCE
Status: COMPLETED | OUTPATIENT
Start: 2021-02-26 | End: 2021-02-26

## 2021-02-26 RX ORDER — CYCLOBENZAPRINE HCL 10 MG
10 TABLET ORAL 2 TIMES DAILY PRN
Qty: 10 TABLET | Refills: 0 | Status: SHIPPED | OUTPATIENT
Start: 2021-02-26 | End: 2021-03-05

## 2021-02-26 RX ADMIN — KETOROLAC TROMETHAMINE 30 MG: 30 INJECTION, SOLUTION INTRAMUSCULAR at 15:24

## 2021-02-26 ASSESSMENT — ENCOUNTER SYMPTOMS
PSYCHIATRIC NEGATIVE: 1
WOUND: 0
FEVER: 0
VOMITING: 0
CHILLS: 0
SHORTNESS OF BREATH: 0
NAUSEA: 0
ABDOMINAL PAIN: 0
DIARRHEA: 0
DIZZINESS: 0
BACK PAIN: 1
WEAKNESS: 0
HEADACHES: 0

## 2021-02-26 NOTE — DISCHARGE INSTRUCTIONS
Flexaril as needed (start with one daily) if needed can increase to twice daily.     To help with discomfort:   Continue taking over-the-counter or prescription medicine to help relieve pain and swelling. NSAIDs (nonsteroidal anti-inflammatory drugs) are the most common medicines used. Medicines may be prescribed or bought over the counter. They may be given as pills. Or they may be put on the skin as a gel, cream, or patch.    Warm or cold to help relieve symptoms for a time, such as a heating pad, hot water bottle, or ice pack    Keeping pressure off the tailbone to help the area heal by shifting weight forward onto your hipbones and thighs when sitting or sitting on a special cushion    Sleeping-Sleep on firm mattress, on side with pillow between knees or on back with a pillow under your knees.     Sit with cushion or pillow underneath you to help with discomfort.    Call your healthcare provider right away if you have any of these:   Pain that continues for more than 2 months or gets worse  Pain that limits your usual activities  Pain that doesn t get better by trying the self-care treatments described above  Fever of 100.4 F (38 C) or higher, or as directed by your provider  Chills  Redness or swelling  A new sore in the cleft of the buttocks, especially one that contains or drains pus  Other new symptoms  Schedule follow up appointment with Amparo Alvares NP  Return to urgent care/ED as needed.   Results for orders placed or performed during the hospital encounter of 02/26/21   Lumbar spine XR, 2-3 views     Status: None    Narrative    XR LUMBAR SPINE 2-3 VIEWS    HISTORY: 65 years Male low back pain from fall    COMPARISON: None    TECHNIQUE: 3 views lumbar spine    FINDINGS: 3 views lumbar spine. Vertebral body height is  well-maintained throughout. There is moderate loss of disc height at  L1-L2. There is moderate disc space narrowing of L3-L4. There is  moderate severe narrowing at L4-L5. There is advanced  facet  osteoarthritis at L4-L5 and L5-S1.      Impression    IMPRESSION: Multilevel degenerative change. No evidence of subluxation  or fracture.    ALTON IYER MD   XR Sacrum and Coccyx 2 Views     Status: None    Narrative    XR SACRUM AND COCCYX 2 VW    HISTORY: 65 years Male coccyx pain from fall    COMPARISON: None    TECHNIQUE: Sacrum 3 views    FINDINGS: Sacroiliac joints and pubic symphysis are congruent. There  is no evidence of sacral or coccygeal fracture.      Impression    IMPRESSION: Negative study.    ALTON IYER MD
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
## 2021-02-26 NOTE — ED TRIAGE NOTES
Pt  Is here with c/o low back pain/coccyx pain from a fall a few days ago   Has  Been taking 400 mg ibu and reports not touching th pain

## 2021-02-26 NOTE — ED TRIAGE NOTES
"Pt states he fell 3 days ago. Since the fall he has had pain in coccyx area \"righyt above my butt crack\".   "

## 2021-03-01 DIAGNOSIS — R60.0 BILATERAL LOWER EXTREMITY EDEMA: ICD-10-CM

## 2021-03-01 DIAGNOSIS — I51.7 RIGHT VENTRICULAR DILATION: ICD-10-CM

## 2021-03-01 RX ORDER — POTASSIUM CHLORIDE 750 MG/1
CAPSULE, EXTENDED RELEASE ORAL
Qty: 120 CAPSULE | Refills: 0 | Status: SHIPPED | OUTPATIENT
Start: 2021-03-01 | End: 2021-03-23

## 2021-03-01 NOTE — TELEPHONE ENCOUNTER
potassium      Last Written Prescription Date:  10/29/20  Last Fill Quantity: 120,   # refills: 3  Last Office Visit: 2/24/21  Future Office visit:    Next 5 appointments (look out 90 days)    Mar 02, 2021  2:15 PM  (Arrive by 2:00 PM)  Return Visit with BERNARDINO Cordero CNP  [facility] ([facility] [place] ) [address] [place] [address]  [phone]   Mar 05, 2021  7:40 AM  (Arrive by 7:25 AM)  SHORT with Amparo Alvares NP  [facility] ([facility] [place] ) [address] [place] [address]  [phone]   Mar 23, 2021  1:45 PM  (Arrive by 1:30 PM)  Return Visit with BERNARDINO Cordero CNP  [facility] [place] ([facility] [place] ) [address]  [place] [address]  [phone]

## 2021-03-02 ENCOUNTER — PATIENT OUTREACH (OUTPATIENT)
Dept: CARE COORDINATION | Facility: OTHER | Age: 66
End: 2021-03-02

## 2021-03-02 ENCOUNTER — OFFICE VISIT (OUTPATIENT)
Dept: CARDIOLOGY | Facility: OTHER | Age: 66
End: 2021-03-02
Attending: NURSE PRACTITIONER
Payer: COMMERCIAL

## 2021-03-02 VITALS
WEIGHT: 235.8 LBS | BODY MASS INDEX: 32.89 KG/M2 | OXYGEN SATURATION: 90 % | HEART RATE: 100 BPM | TEMPERATURE: 96.7 F | DIASTOLIC BLOOD PRESSURE: 80 MMHG | SYSTOLIC BLOOD PRESSURE: 119 MMHG

## 2021-03-02 DIAGNOSIS — R73.03 PREDIABETES: ICD-10-CM

## 2021-03-02 DIAGNOSIS — Z98.890 STATUS POST CORONARY ANGIOGRAM: ICD-10-CM

## 2021-03-02 DIAGNOSIS — R06.02 SHORTNESS OF BREATH: ICD-10-CM

## 2021-03-02 DIAGNOSIS — R06.09 DOE (DYSPNEA ON EXERTION): ICD-10-CM

## 2021-03-02 DIAGNOSIS — Z98.890: ICD-10-CM

## 2021-03-02 DIAGNOSIS — I51.7 RIGHT VENTRICULAR DILATION: ICD-10-CM

## 2021-03-02 DIAGNOSIS — I27.21 PULMONARY ARTERIAL HYPERTENSION (H): Primary | ICD-10-CM

## 2021-03-02 LAB — NT-PROBNP SERPL-MCNC: 2809 PG/ML (ref 0–125)

## 2021-03-02 PROCEDURE — G0463 HOSPITAL OUTPT CLINIC VISIT: HCPCS

## 2021-03-02 PROCEDURE — 36415 COLL VENOUS BLD VENIPUNCTURE: CPT | Mod: ZL | Performed by: NURSE PRACTITIONER

## 2021-03-02 PROCEDURE — 99214 OFFICE O/P EST MOD 30 MIN: CPT | Performed by: NURSE PRACTITIONER

## 2021-03-02 PROCEDURE — 86038 ANTINUCLEAR ANTIBODIES: CPT | Mod: ZL | Performed by: NURSE PRACTITIONER

## 2021-03-02 PROCEDURE — 83880 ASSAY OF NATRIURETIC PEPTIDE: CPT | Mod: ZL | Performed by: NURSE PRACTITIONER

## 2021-03-02 RX ORDER — ARIPIPRAZOLE 10 MG/1
TABLET ORAL
COMMUNITY
Start: 2021-03-01 | End: 2023-01-01 | Stop reason: ALTCHOICE

## 2021-03-02 RX ORDER — PREDNISOLONE ACETATE 10 MG/ML
SUSPENSION/ DROPS OPHTHALMIC
COMMUNITY
Start: 2021-02-19 | End: 2021-05-20

## 2021-03-02 RX ORDER — MOXIFLOXACIN 5 MG/ML
SOLUTION/ DROPS OPHTHALMIC
COMMUNITY
Start: 2021-02-19 | End: 2021-05-20

## 2021-03-02 RX ORDER — KETOROLAC TROMETHAMINE 5 MG/ML
SOLUTION OPHTHALMIC
COMMUNITY
Start: 2021-02-19 | End: 2021-05-20

## 2021-03-02 RX ORDER — DULOXETIN HYDROCHLORIDE 30 MG/1
CAPSULE, DELAYED RELEASE ORAL
COMMUNITY
Start: 2021-01-12 | End: 2021-06-19

## 2021-03-02 ASSESSMENT — PAIN SCALES - GENERAL: PAINLEVEL: NO PAIN (0)

## 2021-03-02 NOTE — PROGRESS NOTES
"Clinic Care Coordination Contact    Follow Up Progress Note   Patient here for follow up with BERNARDINO Bautista CNP. CHF CC seen patient before appt.          Assessment:  7 staples in back of head from falling-  Slipped on ice- did not pass out then fall, fell then \"knocked himself out\"    Patient reports that other than falling on ice that he has been feeling much better. Taking diuretic at correct dose and has been limiting salt and fluids wince CHF enrollment. Reports not feeling as congested in the chest and having more energy. Weight has increased slightly but patient reports it is due to quarantine and cold weather. Will try to continue to work on increasing activity as weather will get better and he is very active outside.       Are you experiencing any shortness of breath? little bit when over exercising- better than previous days      Are you experiencing any swelling in your legs or feet?  no swelling seen    Are you using more pillows than usual? none    Do you cough at night?  denies    Do you check your weight daily?  yes- made copies of weights and sent to scanning    Current weight 235 lbs on 03/02/21  Previous weight   Wt Readings from Last 2 Encounters:   03/02/21 107 kg (235 lb 12.8 oz)   12/22/20 102.1 kg (225 lb)         Are you having any side effects from your medications?  denies    Since your last visit, how many times have you gone to the cardiologist, urgent care, emergency room, or hospital because of your heart failure? None related to heart failure    Diuretic? Demadex 20mg- Route: Take 2 tablets (40 mg) by mouth every morning And 1 tab 20 mg at noon- taking correctly    Medication compliance issue? denies    New symptoms or concerns relating to CHF? denies    Follow up scheduled for  6 months with BERNARDINO Bautista CNP    PLAN: labs today- we will call him with results. CC called scheduling for PFT as patient said he did not ever get a call from them to set it up- according to " "scheduling pt \"NO SHOWED\" 3 times for PFT. Left VM for patient that they will reach out 1 more time to schedule but if he does not show up they will not reschedule him. Marleny Bauer aware. Sent message to [facility] to schedule patient follow up in [place] with Dr. Robin.   Patient agrees to call CC with any questions or                concerns.     Bharath MIRANDA RN  Cardiology Care Coordinator   [phone] Option #8          "

## 2021-03-02 NOTE — NURSING NOTE
"Chief Complaint   Patient presents with     RECHECK       Initial /80 (BP Location: Right arm, Patient Position: Sitting, Cuff Size: Adult Regular)   Pulse 100   Temp 96.7  F (35.9  C) (Tympanic)   Wt 107 kg (235 lb 12.8 oz)   SpO2 90%   BMI 32.89 kg/m   Estimated body mass index is 32.89 kg/m  as calculated from the following:    Height as of 12/22/20: 1.803 m (5' 11\").    Weight as of this encounter: 107 kg (235 lb 12.8 oz).  Medication Reconciliation: complete  Joaquina Jonas LPN    "
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
## 2021-03-02 NOTE — PATIENT INSTRUCTIONS
Thank you for allowing Marleny Juancarlos and our team to participate in your care. Please call our office at [phone] with scheduling questions or if you need to cancel or change your appointment. With any other questions or concerns you may call Joaquina cardiology nurse at [phone].       If you experience chest pain, chest pressure, chest tightness, shortness of breath, fainting, lightheadedness, nausea, vomiting, or other concerning symptoms, please report to the Emergency Department or call 911. These symptoms may be emergent, and best treated in the Emergency Department.    Follow up in 6 months.     You will have an echocardiogram performed.  This is an ultrasound of the heart, that evaluates heart function.  The hospital scheduling department will call to schedule you for this test.     We will have the people from pulmonary call you.     Dr. Szymanski in the Spring in [place]. (end of April)

## 2021-03-03 NOTE — PROGRESS NOTES
"    Assessment & Plan     (W19.XXXA) Fall, initial encounter  (primary encounter diagnosis)  (S01.01XA) Laceration of occipital scalp, initial encounter  (S06.0X9A) Concussion with loss of consciousness, initial encounter  Plan: Patient feeling well. Denies headaches. Laceration without signs of infection. Appears to be healing nicely. Area of laceration was cleaned and 7 staples were removed. Agrees to follow up with new or worsening symptoms.     (M53.3) Coccydynia  Plan: cyclobenzaprine (FLEXERIL) 10 MG tablet        Pain is slowly improving. Ok to continue Flexeril as needed. Also encouraged to use a donut pill. Follow up with new or worsening symptoms.     (J44.9) Chronic bronchitis with emphysema (H)  Comment: stable, oxygen sats 95 percent today, denies any SOB  Plan: PFTs scheduled. Patient encouraged to complete. Will notify patient of the results when available and intervene accordingly.     (Z12.11) Screening for colon cancer  Plan: GENERAL SURG ADULT REFERRAL        Due for colonoscopy. Referral to general surgery placed.     (R94.6) Abnormal results of thyroid function studies   Plan: TSH with free T4 reflex        TSH pending. Will notify patient of the results when available and intervene accordingly.     (F33.41) Recurrent major depressive disorder, in partial remission (H)  Comment: stable  Plan: Will continue to follow-up with On license of [facility].   [id number]}     BMI:   Estimated body mass index is 33.47 kg/m  as calculated from the following:    Height as of this encounter: 1.803 m (5' 11\").    Weight as of this encounter: 108.9 kg (240 lb).   Weight management plan: Discussed healthy diet and exercise guidelines    Amparo Alvares NP  [facility] - BERNIE Cook is a 65 year old who presents for the following health issues with his significant other    [facility]       ED/UC Followup:    Facility:  [facility]  Date of visit: 2/23/21 and 2/26/21  Reason for visit: Patient was initially in " "the ER on 2/23/21 after slipping and hitting his head. Note was reviewed. He did suffer an occipital scalp laceration and it was closed with 7 staples. A CT of his head and cervical spine was done. No acute findings were seen. CXR was also done as he was hypoxic. He does suffer from COPD. Lab work was unremarkable. He then again presented to the ER on 2/26/21 with coccydynia. Note was reviewed. XR was done and was negative.  OTC medications encouraged.   Current Status: Today patient notes that he is feeling better. Denies any head pain. No erythema or drainage surrounding his laceration site. No fevers. The pain around his tailbone is slowly improving.      Due for a colonoscopy. Referral made for general surgery     He does have some depression. Follows with [facility], taking Abilify, gabapentin, and cymbalta without side effects. Feels his mental health is well controlled. Denies thoughts of suicide.     TSH was slightly elevated in 8/2020, free T4 normal.     He does have emphysema. Has never had PFTs, but they are scheduled. Denies any shortness of breath or chest pain today. Feeling well.     Review of Systems   CONSTITUTIONAL: NEGATIVE for fever, chills, change in weight  INTEGUMENTARY/SKIN: NEGATIVE for worrisome rashes, moles or lesions  EYES: NEGATIVE for vision changes or irritation  ENT/MOUTH: NEGATIVE for ear, mouth and throat problems  RESP: NEGATIVE for significant cough or SOB  CV: NEGATIVE for chest pain, palpitations or peripheral edema  GI: NEGATIVE for nausea, abdominal pain, heartburn, or change in bowel habits  PSYCHIATRIC: NEGATIVE for changes in mood or affect      Objective    /72 (BP Location: Left arm, Patient Position: Chair, Cuff Size: Adult Large)   Pulse 91   Temp 97.3  F (36.3  C) (Tympanic)   Ht 1.803 m (5' 11\")   Wt 108.9 kg (240 lb)   SpO2 95%   BMI 33.47 kg/m    Body mass index is 33.47 kg/m .  Physical Exam   GENERAL: healthy, alert and no distress  EYES: Eyes grossly " normal to inspection, PERRL and conjunctivae and sclerae normal  HENT: ear canals and TM's normal, nose and mouth without ulcers or lesions  NECK: no adenopathy, no asymmetry, masses, or scars and thyroid normal to palpation  RESP: lungs clear to auscultation - no rales, rhonchi or wheezes  CV: regular rate and rhythm, soft systolic murmur heard, no peripheral edema  ABDOMEN: soft, nontender, no masses and bowel sounds normal  MS: no gross musculoskeletal defects noted, no edema  SKIN: patient has well healed laceration on posterior superior scalp, 7 staples intact without erythema or drainage  NEURO: Normal strength and tone, mentation intact and speech normal  PSYCH: mentation appears normal, affect normal/bright    TSH pending

## 2021-03-04 LAB — ANA SER QL IF: NEGATIVE

## 2021-03-05 ENCOUNTER — OFFICE VISIT (OUTPATIENT)
Dept: FAMILY MEDICINE | Facility: OTHER | Age: 66
End: 2021-03-05
Attending: NURSE PRACTITIONER
Payer: COMMERCIAL

## 2021-03-05 VITALS
HEART RATE: 91 BPM | SYSTOLIC BLOOD PRESSURE: 100 MMHG | DIASTOLIC BLOOD PRESSURE: 72 MMHG | HEIGHT: 71 IN | WEIGHT: 240 LBS | BODY MASS INDEX: 33.6 KG/M2 | TEMPERATURE: 97.3 F | OXYGEN SATURATION: 95 %

## 2021-03-05 DIAGNOSIS — Z12.11 SCREENING FOR COLON CANCER: ICD-10-CM

## 2021-03-05 DIAGNOSIS — S01.01XA LACERATION OF OCCIPITAL SCALP, INITIAL ENCOUNTER: ICD-10-CM

## 2021-03-05 DIAGNOSIS — M53.3 COCCYDYNIA: ICD-10-CM

## 2021-03-05 DIAGNOSIS — J44.89 CHRONIC BRONCHITIS WITH EMPHYSEMA (H): ICD-10-CM

## 2021-03-05 DIAGNOSIS — F33.41 RECURRENT MAJOR DEPRESSIVE DISORDER, IN PARTIAL REMISSION (H): ICD-10-CM

## 2021-03-05 DIAGNOSIS — S06.0X9A CONCUSSION WITH LOSS OF CONSCIOUSNESS, INITIAL ENCOUNTER: ICD-10-CM

## 2021-03-05 DIAGNOSIS — Z48.02 ENCOUNTER FOR STAPLE REMOVAL: ICD-10-CM
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
## 2021-03-05 DIAGNOSIS — R94.6 ABNORMAL RESULTS OF THYROID FUNCTION STUDIES: ICD-10-CM

## 2021-03-05 PROBLEM — F11.10 OPIOID ABUSE (H): Status: RESOLVED | Noted: 2020-08-06 | Resolved: 2021-03-05

## 2021-03-05 LAB — TSH SERPL DL<=0.005 MIU/L-ACNC: 3.75 MU/L (ref 0.4–4)

## 2021-03-05 PROCEDURE — 84443 ASSAY THYROID STIM HORMONE: CPT | Mod: ZL | Performed by: NURSE PRACTITIONER

## 2021-03-05 PROCEDURE — G0463 HOSPITAL OUTPT CLINIC VISIT: HCPCS | Mod: 25

## 2021-03-05 PROCEDURE — 99214 OFFICE O/P EST MOD 30 MIN: CPT | Performed by: NURSE PRACTITIONER

## 2021-03-05 PROCEDURE — G0463 HOSPITAL OUTPT CLINIC VISIT: HCPCS

## 2021-03-05 PROCEDURE — 36415 COLL VENOUS BLD VENIPUNCTURE: CPT | Mod: ZL | Performed by: NURSE PRACTITIONER

## 2021-03-05 RX ORDER — CYCLOBENZAPRINE HCL 10 MG
5-10 TABLET ORAL 3 TIMES DAILY PRN
Qty: 15 TABLET | Refills: 0 | Status: SHIPPED | OUTPATIENT
Start: 2021-03-05 | End: 2021-03-22

## 2021-03-05 ASSESSMENT — PAIN SCALES - GENERAL: PAINLEVEL: EXTREME PAIN (8)

## 2021-03-05 ASSESSMENT — MIFFLIN-ST. JEOR: SCORE: 1895.76

## 2021-03-05 NOTE — NURSING NOTE
"Chief Complaint   Patient presents with     staple removal     7 staples to head        Initial /72 (BP Location: Left arm, Patient Position: Chair, Cuff Size: Adult Large)   Pulse 91   Temp 97.3  F (36.3  C) (Tympanic)   Ht 1.803 m (5' 11\")   Wt 108.9 kg (240 lb)   SpO2 95%   BMI 33.47 kg/m   Estimated body mass index is 33.47 kg/m  as calculated from the following:    Height as of this encounter: 1.803 m (5' 11\").    Weight as of this encounter: 108.9 kg (240 lb).  Medication Reconciliation: complete  Rose Her LPN  "

## 2021-03-18 ENCOUNTER — TELEPHONE (OUTPATIENT)
Dept: SURGERY | Facility: OTHER | Age: 66
End: 2021-03-18

## 2021-03-18 NOTE — TELEPHONE ENCOUNTER
Referral received for colonoscopy.   This patient was approved by surgery education nurses for meet and greet colonoscopy and will need a preop appointment.   First attempt to call patient to schedule. No answer. Oksana Castro LPN

## 2021-03-22 DIAGNOSIS — M53.3 COCCYDYNIA: ICD-10-CM

## 2021-03-22 RX ORDER — CYCLOBENZAPRINE HCL 10 MG
TABLET ORAL
Qty: 15 TABLET | Refills: 0 | Status: SHIPPED | OUTPATIENT
Start: 2021-03-22 | End: 2022-11-11

## 2021-03-22 NOTE — TELEPHONE ENCOUNTER
Flexeril 10 mg      Last Written Prescription Date:  3-5-2021  Last Fill Quantity: 15,   # refills: 0  Last Office Visit: 3-5-2021  Future Office visit:    Next 5 appointments (look out 90 days)    Jun 07, 2021  9:00 AM  (Arrive by 8:45 AM)  Office Visit with Amparo Alvares NP  [facility] [place] ([facility] [place] ) [address]  [place] [address]  [phone]

## 2021-03-23 DIAGNOSIS — R60.0 BILATERAL LOWER EXTREMITY EDEMA: ICD-10-CM

## 2021-03-23 DIAGNOSIS — I51.7 RIGHT VENTRICULAR DILATION: ICD-10-CM

## 2021-03-23 RX ORDER — POTASSIUM CHLORIDE 750 MG/1
CAPSULE, EXTENDED RELEASE ORAL
Qty: 120 CAPSULE | Refills: 1 | Status: SHIPPED | OUTPATIENT
Start: 2021-03-23 | End: 2021-05-20

## 2021-03-23 NOTE — TELEPHONE ENCOUNTER
Potassium chloride ER 10 meq      Last Written Prescription Date:  3-1-2021  Last Fill Quantity: 120,   # refills: 0  Last Office Visit: 3-5-2021  Future Office visit:    Next 5 appointments (look out 90 days)    Jun 07, 2021  9:00 AM  (Arrive by 8:45 AM)  Office Visit with Amparo Alvares NP  [facility] [place] ([facility] [place] ) [address]  [place] [address]  [phone]

## 2021-03-25 ENCOUNTER — HOSPITAL ENCOUNTER (OUTPATIENT)
Dept: RESPIRATORY THERAPY | Facility: HOSPITAL | Age: 66
Discharge: HOME | End: 2021-03-25
Attending: NURSE PRACTITIONER | Admitting: INTERNAL MEDICINE
Payer: COMMERCIAL

## 2021-03-25 DIAGNOSIS — R06.02 SHORTNESS OF BREATH: ICD-10-CM

## 2021-03-25 DIAGNOSIS — Z98.890: ICD-10-CM

## 2021-03-25 DIAGNOSIS — I51.7 RIGHT VENTRICULAR DILATION: ICD-10-CM

## 2021-03-25 DIAGNOSIS — I27.20 PULMONARY HYPERTENSION (H): ICD-10-CM

## 2021-03-25 DIAGNOSIS — Z87.891 PERSONAL HISTORY OF TOBACCO USE, PRESENTING HAZARDS TO HEALTH: ICD-10-CM

## 2021-03-25 LAB
COHGB MFR BLD: 3.3 % (ref 0–2)
HGB BLD-MCNC: 19.8 G/DL (ref 13.3–17.7)

## 2021-03-25 PROCEDURE — 94010 BREATHING CAPACITY TEST: CPT

## 2021-03-25 PROCEDURE — 36415 COLL VENOUS BLD VENIPUNCTURE: CPT | Performed by: NURSE PRACTITIONER

## 2021-03-25 PROCEDURE — 94729 DIFFUSING CAPACITY: CPT

## 2021-03-25 PROCEDURE — 94010 BREATHING CAPACITY TEST: CPT | Mod: 26 | Performed by: INTERNAL MEDICINE

## 2021-03-25 PROCEDURE — 85018 HEMOGLOBIN: CPT | Performed by: NURSE PRACTITIONER

## 2021-03-25 PROCEDURE — 94726 PLETHYSMOGRAPHY LUNG VOLUMES: CPT | Mod: 26 | Performed by: INTERNAL MEDICINE

## 2021-03-25 PROCEDURE — 94729 DIFFUSING CAPACITY: CPT | Mod: 26 | Performed by: INTERNAL MEDICINE

## 2021-03-25 PROCEDURE — 94726 PLETHYSMOGRAPHY LUNG VOLUMES: CPT

## 2021-03-25 PROCEDURE — 82375 ASSAY CARBOXYHB QUANT: CPT | Performed by: NURSE PRACTITIONER

## 2021-03-29 ENCOUNTER — TELEPHONE (OUTPATIENT)
Dept: SURGERY | Facility: OTHER | Age: 66
End: 2021-03-29

## 2021-03-29 NOTE — TELEPHONE ENCOUNTER
Referral received for colonoscopy. This is the 2nd attempt by phone to schedule meet and greet colonoscopy. No answer.   Letter sent requesting patient to call office to schedule colonoscopy/consult.    Referring provider notified that patient has not yet been scheduled for colonoscopy or consult after attempts have been made schedule by phone. Oksana Castro LPN

## 2021-03-31 ENCOUNTER — HOSPITAL ENCOUNTER (OUTPATIENT)
Dept: CARDIOLOGY | Facility: HOSPITAL | Age: 66
Discharge: HOME | End: 2021-03-31
Attending: NURSE PRACTITIONER | Admitting: INTERNAL MEDICINE
Payer: COMMERCIAL

## 2021-03-31 DIAGNOSIS — R06.09 DOE (DYSPNEA ON EXERTION): ICD-10-CM

## 2021-03-31 DIAGNOSIS — I51.7 RIGHT VENTRICULAR DILATION: ICD-10-CM

## 2021-03-31 DIAGNOSIS — I27.21 PULMONARY ARTERIAL HYPERTENSION (H): ICD-10-CM

## 2021-03-31 PROCEDURE — 93306 TTE W/DOPPLER COMPLETE: CPT | Mod: 26 | Performed by: INTERNAL MEDICINE

## 2021-03-31 PROCEDURE — 93306 TTE W/DOPPLER COMPLETE: CPT

## 2021-04-14 ENCOUNTER — HOSPITAL ENCOUNTER (OUTPATIENT)
Facility: HOSPITAL | Age: 66
End: 2021-04-14
Attending: OPHTHALMOLOGY | Admitting: OPHTHALMOLOGY
Payer: COMMERCIAL

## 2021-04-15 NOTE — PROGRESS NOTES
[facility] [place]  [address]  [place] [address]  Phone: [phone]  Primary Provider: Mason Calvin  Pre-op Performing Provider: MASON CALVIN      PREOPERATIVE EVALUATION:  Today's date: 4/20/2021    Joey Irene is a 65 year old male who presents for a preoperative evaluation.    Surgical Information:  Surgery/Procedure: Bilateral Cataract Removal  Surgery Location: [facility]  Surgeon: Dr. Gibson  Surgery Date: Right eye 4/27/21 and left eye 5/11/21  Time of Surgery: tbd  Where patient plans to recover: At home with family  Fax number for surgical facility: [facility]     Type of Anesthesia Anticipated: to be determined    Assessment & Plan     The proposed surgical procedure is considered LOW risk.    Preop general physical exam  Cataract of both eyes, unspecified cataract type  Was not cleared for surgery with increased shortness of breath and wheezes. Oxygen sats 92 percent. Will treat as COPD exacerbation with exacerbation of diastolic heart failure. See plan below.     PTSD (post-traumatic stress disorder)  Recurrent major depressive disorder, in partial remission (H)  Generalized anxiety disorder  Mental health stable. Continue current medications.     Severe pulmonary arterial systolic hypertension (H)  Due for follow up with Dr. Singh. No showed his recent appointment. Wanting to see him again. Promises to show up. Will make cardiology aware.     Sleep apnea, unspecified type  Does not have CPAP. Declines.     Tobacco use disorder  Encounter for smoking cessation counseling  Smoking 1-2 cigars per day. Interested in quitting. Nicotine patches ordered.     Impaired fasting glucose  A1C 5.9. Reviewed pathogenesis of pre-diabetes. Stressed importance of cutting out sugars/carbs and engaging in routine physical exercise for 30 minutes/5 days of work with the goal of gradual weight loss.    - Hemoglobin A1c    Screening for colon cancer  - [facility]([employer])  -Will notify patient of the results when available and intervene accordingly.     Seasonal allergic rhinitis, unspecified trigger  Shortness of breath  COPD exacerbation (H)  Acute diastolic heart failure (H)  Patient with a dry cough and increased wheezes with shortness of breath times 2 days. CXR without acute disease. Oxygen sats slightly low at 92 percent. Baseline typically around 95 percent. He tells me that his allergies are acting up. BNP is also slightly elevated at 2455. While his BNP was 2809 on 3/2/21, his weight is also up 9 pounds today. Will therefore begin Zyrtec to help with his seasonal allergies. I also suspect he has an acute flare of his diastolic dysfunction. Currently taking torsemide 40 mg in the morning and 20 mg in the afternoon. Kidney function and potassium normal today. Will increase his torsemide to 40 mg BID until Monday 4/26/21. He will then follow up in clinic. Will reassess his breathing and weight. Lastly, will also treat as COPD exacerbation with prednisone burst (40 g daily times 5 day) and azithromycin as his WBC is slightly elevated. Declines d-dimer. He notes that even it if was elevated, he would refuse a CT. He was told that he may have a PE, but he noted that he \"knows that he does not have a PE and feels his shortness of breath is r/t his allergies.\" Will then reassess on 4/26/21. If not better, will cancel surgery. Agrees to go to the ER sooner with new or worsening symptoms.         Risks and Recommendations:  The patient has the following additional risks and recommendations for perioperative complications:  Pulmonary:    - Incentive spirometry post-op   - Active nicotine user, advised smoking cessation    Obstructive Sleep Apnea:   Does not have CPAP.     Medication Instructions:   - Diuretics: May continue due to heart failure.   - SSRIs, SNRIs, TCAs, Antipsychotics: Continue without modification.    - LABA, inhaled corticosteroid, long-acting " anticholinergics: Continue without modification.    RECOMMENDATION:  Surgery not recommended. Will rechecked his breathing on 4/26/21. If improved, will clear for surgery.       40 minutes spent on the date of the encounter doing chart review, review of outside records, interpretation of tests, patient visit, documentation and discussion with other provider(s) Discussed case with Marleny Coppola CNP        Subjective     HPI related to upcoming procedure: Patient with bilateral cataracts. Plans to have both removed.       Preop Questions 4/20/2021   1. Have you ever had a heart attack or stroke? No   2. Have you ever had surgery on your heart or blood vessels, such as a stent placement, a coronary artery bypass, or surgery on an artery in your head, neck, heart, or legs? No   3. Do you have chest pain with activity? No    4. Do you have a history of  heart failure? YES - CHF   5. Do you currently have a cold, bronchitis or symptoms of other infection? YES - having chest congestion, dry cough, and  shortness of breath    6. Do you have a cough, shortness of breath, or wheezing? YES - productive cough, congestion , shortness of breath x 2 days    7. Do you or anyone in your family have previous history of blood clots? No   8. Do you or does anyone in your family have a serious bleeding problem such as prolonged bleeding following surgeries or cuts? No   9. Have you ever had problems with anemia or been told to take iron pills? No   10. Have you had any abnormal blood loss such as black, tarry or bloody stools? No   11. Have you ever had a blood transfusion? No   12. Are you willing to have a blood transfusion if it is medically needed before, during, or after your surgery? Yes   13. Have you or any of your relatives ever had problems with anesthesia? No   14. Do you have sleep apnea, excessive snoring or daytime drowsiness? No   14a. Do you have a CPAP machine? -   15. Do you have any artifical heart valves or other  "implanted medical devices like a pacemaker, defibrillator, or continuous glucose monitor? YES, has spine stimulator in right low back   15a. What type of device do you have? stimulator in lower right side of back   15b. Name of the clinic that manages your device:  [facility] ( does not use )   16. Do you have artificial joints? YES - Left knee    17. Are you allergic to latex? No        Health Care Directive:  Patient does not have a Health Care Directive or Living Will: Discussed advance care planning with patient; information given to patient to review.    Preoperative Review of :   reviewed - no record of controlled substances prescribed.      Status of Chronic Conditions:  CHF/Pulmonary hypertension - Patient has a longstanding history of diastolic heart failure. Exacerbating conditions include COPD and dystolic dysfunction. Currently the patient's condition has worsened. Having increased shortness of breath. Current treatment regimen includes torsemide. The patient denies chest pain, edema, or orthopnea. Some weight gain, but he admits to eating poorly. Last Echocardiogram 3/12/21, EF 55-60 percent. Does have some mild to moderate dilation of right ventricle. Concern for pulmonary hypertension, suppose to schedule follow-up with Dr. Singh, patient recently \"no-showed him,\" he is waiting for them to call to give him another appointment, Cardiac Cath: 12/11/21; minimal non-obstructive coronary disease, right filling pressures elevated, moderate pulmonary hypertension.     COPD - Patient has a longstanding history of moderate-severe COPD . Patient currently having increased shortness of breath and continues on medication regimen consisting of albuterol PRN with Trelegy Ellipta without adverse reactions or side effects.    DEPRESSION - Patient has a long history of Depression of moderate severity requiring medication for control with recent symptoms being stable. Current symptoms of depression include " none. Taking gabapentin, duloxetine, and Abilify without side effects.     SLEEP PROBLEM - Patient has a longstanding history of sleep apnea. Declines to wear a CPAP.     He does smoke, smoking 1-2 cigars daily. Requests the nictotine patches today.    H/O hyperglycemia, A1C was 6.0 on 12/8/21.    As noted above, patient complains of a cry cough and increased shortness of breath times 2 days. No chest pain. No fevers. Some increased wheezes. He does have COPD. Using his inhalers as prescribed. Using his albuterol every 4 hours with some relief. He thinks his symptoms are r/t allergies. He notes that he also has itchy eyes and increased nasal congestion. Taking an OTC antihistamine, but is unsure if this is helping.     Review of Systems  CONSTITUTIONAL: NEGATIVE for fever, chills, weight up  INTEGUMENTARY/SKIN: NEGATIVE for worrisome rashes, moles or lesions  EYES: NEGATIVE for vision changes or irritation  ENT/MOUTH: NEGATIVE for ear, mouth and throat problems  RESP: increased dry cough, shortness of breath, and wheezes  CV: NEGATIVE for chest pain, palpitations or peripheral edema  GI: NEGATIVE for nausea, abdominal pain, heartburn, or change in bowel habits  : NEGATIVE for frequency, dysuria, or hematuria  MUSCULOSKELETAL: NEGATIVE for significant arthralgias or myalgia  NEURO: NEGATIVE for weakness, dizziness or paresthesias  ENDOCRINE: NEGATIVE for temperature intolerance, skin/hair changes  HEME: NEGATIVE for bleeding problems  PSYCHIATRIC: NEGATIVE for changes in mood or affect    Patient Active Problem List    Diagnosis Date Noted     Encounter for smoking cessation counseling 04/19/2021     Priority: Medium     Diastolic heart failure, unspecified HF chronicity (H) 04/19/2021     Priority: Medium     Status post coronary angiogram 12/11/2020     Priority: Medium     Severe pulmonary arterial systolic hypertension (H) 11/20/2020     Priority: Medium     Right ventricular dilation 10/29/2020     Priority:  Medium     Added automatically from request for surgery [id number]       Bee sting allergy 08/07/2020     Priority: Medium     Hyperglycemia 08/07/2020     Priority: Medium     Elevated prostate specific antigen (PSA) 08/07/2020     Priority: Medium     History of fusion of cervical spine 08/06/2020     Priority: Medium     Generalized anxiety disorder 08/06/2020     Priority: Medium     Tobacco use disorder 08/06/2020     Priority: Medium     Pulmonary emphysema, unspecified emphysema type (H) 08/05/2020     Priority: Medium     Cardiomegaly 08/05/2020     Priority: Medium     Enlarged prostate 08/05/2020     Priority: Medium     Diverticulosis 03/25/2020     Priority: Medium     Chronic bronchitis with emphysema (H) 02/03/2020     Priority: Medium     Class 1 obesity with body mass index (BMI) of 33.0 to 33.9 in adult 02/03/2020     Priority: Medium     Smoking greater than 40 pack years 02/03/2020     Priority: Medium     Pain due to total left knee replacement, sequela 01/27/2020     Priority: Medium     Cervical stenosis of spinal canal 01/13/2020     Priority: Medium     Cholesteatoma of left ear 03/04/2019     Priority: Medium     Nasal septal deviation 12/18/2017     Priority: Medium     Primary osteoarthritis of right knee 02/25/2017     Priority: Medium     Status post total left knee replacement 02/25/2017     Priority: Medium     Recurrent major depressive disorder, in partial remission (H) 11/21/2015     Priority: Medium     PTSD (post-traumatic stress disorder) 08/19/2015     Priority: Medium     Seasonal allergies 08/19/2015     Priority: Medium     Anxiety state 07/07/2011     Priority: Medium     Back pain, chronic 07/07/2011     Priority: Medium     Hearing loss 07/07/2011     Priority: Medium     Insomnia, unspecified 07/07/2011     Priority: Medium     Sleep apnea 07/07/2011     Priority: Medium      Past Medical History:   Diagnosis Date     COPD (chronic obstructive pulmonary disease) (H)       Hypertension      Uncomplicated asthma      Past Surgical History:   Procedure Laterality Date     CV CORONARY ANGIOGRAM N/A 12/11/2020    Procedure: Coronary Angiogram;  Surgeon: Aman Delgado MD;  Location:  [facility]     CV RIGHT HEART CATH MEASUREMENTS RECORDED N/A 12/11/2020    Procedure: CV RIGHT HEART CATH;  Surgeon: Aman Delgado MD;  Location:  [facility]     ENT SURGERY      patient has had three left ear surgeries, unsure what they did     FUSION CERVICAL POSTERIOR THREE+ LEVELS       HERNIA REPAIR, INGUINAL RT/LT Right     done times 3     JOINT REPLACEMENT Left      REPAIR HAMMER TOE Right      Current Outpatient Medications   Medication Sig Dispense Refill     acetaminophen (TYLENOL) 500 MG tablet Take 1 tablet by mouth       albuterol (PROAIR HFA/PROVENTIL HFA/VENTOLIN HFA) 108 (90 Base) MCG/ACT inhaler Inhale 1-2 puffs into the lungs every 4 hours as needed for shortness of breath / dyspnea or wheezing 18 g 1     ARIPiprazole (ABILIFY) 10 MG tablet        aspirin (ASA) 81 MG EC tablet Take 1 tablet (81 mg) by mouth daily 90 tablet 3     azithromycin (ZITHROMAX) 250 MG tablet Take 2 tablets (500 mg) by mouth daily for 1 day, THEN 1 tablet (250 mg) daily for 4 days. 6 tablet 0     cetirizine (ZYRTEC) 10 MG tablet Take 1 tablet (10 mg) by mouth daily 30 tablet 0     cetirizine (ZYRTEC) 10 MG tablet Take 1 tablet (10 mg) by mouth daily 90 tablet 3     Cholecalciferol (D 1000) 25 MCG (1000 UT) CAPS        cyclobenzaprine (FLEXERIL) 10 MG tablet TAKE 1/2-1 TABLET BY MOUTH 3 TIMES A DAY AS NEEDED FOR MUSCLE SPASMS 15 tablet 0     DULoxetine (CYMBALTA) 30 MG capsule        fluticasone (FLONASE) 50 MCG/ACT nasal spray Spray 2 sprays in nostril       Fluticasone-Umeclidin-Vilanterol (TRELEGY ELLIPTA) 100-62.5-25 MCG/INH oral inhaler Inhale 1 puff into the lungs daily 28 each 1     gabapentin (NEURONTIN) 300 MG capsule        guaiFENesin (MUCINEX) 600 MG 12 hr  tablet Take 1,200 mg by mouth       hydrOXYzine (VISTARIL) 50 MG capsule        ketorolac (ACULAR) 0.5 % ophthalmic solution        montelukast (SINGULAIR) 10 MG tablet TAKE ONE TABLET BY MOUTH AT BEDTIME 90 tablet 3     moxifloxacin (VIGAMOX) 0.5 % ophthalmic solution        naproxen (NAPROSYN) 500 MG tablet Take 1 tablet by mouth twice daily with food       nicotine (NICODERM CQ) 21 MG/24HR 24 hr patch Place 1 patch onto the skin every 24 hours 28 patch 1     omeprazole (PRILOSEC) 20 MG DR capsule TAKE ONE CAPSULE BY MOUTH ONCE DAILY BEFORE MEALS. DO NOT CRUSH. 90 capsule 3     potassium chloride ER (MICRO-K) 10 MEQ CR capsule TAKE 2 CAPSULES (20MEQ) BY MOUTH TWICE DAILY 120 capsule 1     prednisoLONE acetate (PRED FORTE) 1 % ophthalmic suspension        predniSONE (DELTASONE) 20 MG tablet Take 2 tablets (40 mg) by mouth daily for 5 days 10 tablet 0     SYMBICORT 80-4.5 MCG/ACT Inhaler INHALE 2 PUFFS INTO THE LUNGS TWO TIMES A DAY       torsemide (DEMADEX) 20 MG tablet Take 2 tablets (40 mg) by mouth every morning And 1 tab 20 mg at noon. 90 tablet 1     traZODone (DESYREL) 50 MG tablet Take 100 mg by mouth       vitamin C (ASCORBIC ACID) 500 MG tablet        zolpidem (AMBIEN) 10 MG tablet        EPINEPHrine (ANY BX GENERIC EQUIV) 0.3 MG/0.3ML injection 2-pack Inject 0.3 mLs (0.3 mg) into the muscle as needed for anaphylaxis (Patient not taking: Reported on 3/5/2021) 2 each 0       Allergies   Allergen Reactions     Seasonal Allergies Difficulty breathing and Cough     Bupropion Other (See Comments)     Tramadol Nausea and Swelling     [place]on Nite Time Dizziness and Nausea and Vomiting     Nyquil Cold &  [Night-Time Cold-Flu Relief] Dizziness and Nausea and Vomiting     Trichophyton Other (See Comments)        Social History     Tobacco Use     Smoking status: Former Smoker     Years: 50.00     Types: Cigars     Quit date: 2020     Years since quittin.3     Smokeless tobacco: Never Used   Substance Use  "Topics     Alcohol use: Never     Frequency: Never     Family History   Problem Relation Age of Onset     Lung Cancer Mother      Ovarian Cancer Sister      History   Drug Use Unknown         Objective     /74 (BP Location: Left arm, Patient Position: Chair, Cuff Size: Adult Large)   Pulse 104   Temp 98.2  F (36.8  C) (Tympanic)   Ht 1.803 m (5' 11\")   Wt 112.9 kg (249 lb)   SpO2 92%   BMI 34.73 kg/m      Physical Exam    GENERAL APPEARANCE: alert, overweight and some shortness of breath when talking to and from lab     EYES: EOMI,  PERRL     HENT: ear canals and TM's normal and nose and mouth without ulcers or lesions     NECK: no adenopathy, no asymmetry, masses, no JVD     RESP: lungs diminished throughout, no crackles or wheezes heard     CV: regular rates and rhythm, normal S1 S2, no S3 or S4 and no murmur, click or rub     ABDOMEN:  soft, nontender, obese, no HSM or masses and bowel sounds normal     MS: extremities normal- no gross deformities noted, no evidence of inflammation in joints, FROM in all extremities.     SKIN: no suspicious lesions or rashes     NEURO: Normal strength and tone, sensory exam grossly normal, mentation intact and speech normal     PSYCH: mentation appears normal. and affect normal/bright     LYMPHATICS: No cervical adenopathy    Recent Labs   Lab Test 03/25/21  1207 02/23/21  0830 01/12/21  1350 12/08/20  0825 12/08/20  0825 08/07/20  0927 08/07/20  0927   HGB 19.8* 19.1*  --   --  17.6   < > 17.5   PLT  --  293  --   --  312   < > 329   INR  --  0.94  --   --   --   --   --    NA  --  137 139   < > 139   < > 136   POTASSIUM  --  3.5 3.7   < > 3.6   < > 4.4   CR  --  1.23 1.13   < > 1.05   < > 0.76   A1C  --   --   --   --  6.0*  --  6.1*    < > = values in this interval not displayed.        Diagnostics:  Recent Results (from the past 24 hour(s))   CBC with platelets and differential    Collection Time: 04/20/21 10:17 AM   Result Value Ref Range    WBC 11.9 (H) 4.0 - " 11.0 10e9/L    RBC Count 6.08 (H) 4.4 - 5.9 10e12/L    Hemoglobin 18.7 (H) 13.3 - 17.7 g/dL    Hematocrit 54.9 (H) 40.0 - 53.0 %    MCV 90 78 - 100 fl    MCH 30.8 26.5 - 33.0 pg    MCHC 34.1 31.5 - 36.5 g/dL    RDW 12.6 10.0 - 15.0 %    Platelet Count 278 150 - 450 10e9/L    Diff Method Automated Method     % Neutrophils 60.4 %    % Lymphocytes 31.0 %    % Monocytes 6.8 %    % Eosinophils 0.9 %    % Basophils 0.6 %    % Immature Granulocytes 0.3 %    Nucleated RBCs 0 0 /100    Absolute Neutrophil 7.2 1.6 - 8.3 10e9/L    Absolute Lymphocytes 3.7 0.8 - 5.3 10e9/L    Absolute Monocytes 0.8 0.0 - 1.3 10e9/L    Absolute Eosinophils 0.1 0.0 - 0.7 10e9/L    Absolute Basophils 0.1 0.0 - 0.2 10e9/L    Abs Immature Granulocytes 0.0 0 - 0.4 10e9/L    Absolute Nucleated RBC 0.0    Basic metabolic panel    Collection Time: 04/20/21 10:17 AM   Result Value Ref Range    Sodium 135 133 - 144 mmol/L    Potassium 4.2 3.4 - 5.3 mmol/L    Chloride 104 94 - 109 mmol/L    Carbon Dioxide 25 20 - 32 mmol/L    Anion Gap 6 3 - 14 mmol/L    Glucose 142 (H) 70 - 99 mg/dL    Urea Nitrogen 17 7 - 30 mg/dL    Creatinine 1.10 0.66 - 1.25 mg/dL    GFR Estimate 70 >60 mL/min/[1.73_m2]    GFR Estimate If Black 81 >60 mL/min/[1.73_m2]    Calcium 9.0 8.5 - 10.1 mg/dL   Hemoglobin A1c    Collection Time: 04/20/21 10:17 AM   Result Value Ref Range    Hemoglobin A1C 5.9 (H) 0 - 5.6 %   Estimated Average Glucose    Collection Time: 04/20/21 10:17 AM   Result Value Ref Range    Estimated Average Glucose 123 mg/dL   BNP-N terminal pro    Collection Time: 04/20/21 10:17 AM   Result Value Ref Range    N-Terminal Pro Bnp 2,455 (H) 0 - 125 pg/mL        No EKG required for low risk surgery (cataract, skin procedure, breast biopsy, etc).    Revised Cardiac Risk Index (RCRI):  The patient has the following serious cardiovascular risks for perioperative complications:   - Congestive Heart Failure (pulmonary edema, PND, s3 jordy, CXR with pulmonary congestion,  basilar rales) = 1 point     RCRI Interpretation: 1 point: Class II (low risk - 0.9% complication rate)           Signed Electronically by: Amparo Alvares NP  Copy of this evaluation report is provided to requesting physician.

## 2021-04-15 NOTE — PATIENT INSTRUCTIONS

## 2021-04-19 PROBLEM — Z71.6 ENCOUNTER FOR SMOKING CESSATION COUNSELING: Status: ACTIVE | Noted: 2021-04-19

## 2021-04-19 PROBLEM — I50.33 ACUTE ON CHRONIC DIASTOLIC HEART FAILURE (H): Status: RESOLVED | Noted: 2020-11-20 | Resolved: 2021-04-19

## 2021-04-19 PROBLEM — I50.30 DIASTOLIC HEART FAILURE, UNSPECIFIED HF CHRONICITY (H): Status: ACTIVE | Noted: 2021-04-19

## 2021-04-19 PROBLEM — R06.09 DOE (DYSPNEA ON EXERTION): Status: RESOLVED | Noted: 2020-10-29 | Resolved: 2021-04-19

## 2021-04-19 PROBLEM — R79.89 ELEVATED TSH: Status: RESOLVED | Noted: 2020-08-07 | Resolved: 2021-04-19

## 2021-04-20 ENCOUNTER — ANCILLARY PROCEDURE (OUTPATIENT)
Dept: GENERAL RADIOLOGY | Facility: OTHER | Age: 66
End: 2021-04-20
Attending: NURSE PRACTITIONER
Payer: COMMERCIAL

## 2021-04-20 ENCOUNTER — APPOINTMENT (OUTPATIENT)
Dept: LAB | Facility: OTHER | Age: 66
End: 2021-04-20
Attending: NURSE PRACTITIONER
Payer: COMMERCIAL

## 2021-04-20 ENCOUNTER — ANESTHESIA EVENT (OUTPATIENT)
Dept: SURGERY | Facility: HOSPITAL | Age: 66
End: 2021-04-20

## 2021-04-20 ENCOUNTER — OFFICE VISIT (OUTPATIENT)
Dept: FAMILY MEDICINE | Facility: OTHER | Age: 66
End: 2021-04-20
Attending: NURSE PRACTITIONER
Payer: COMMERCIAL

## 2021-04-20 VITALS
BODY MASS INDEX: 34.86 KG/M2 | SYSTOLIC BLOOD PRESSURE: 102 MMHG | TEMPERATURE: 98.2 F | DIASTOLIC BLOOD PRESSURE: 74 MMHG | HEIGHT: 71 IN | HEART RATE: 104 BPM | WEIGHT: 249 LBS | OXYGEN SATURATION: 92 %

## 2021-04-20 DIAGNOSIS — H26.9 CATARACT OF BOTH EYES, UNSPECIFIED CATARACT TYPE: ICD-10-CM

## 2021-04-20 DIAGNOSIS — I50.31 ACUTE DIASTOLIC HEART FAILURE (H): ICD-10-CM

## 2021-04-20 DIAGNOSIS — J44.1 COPD EXACERBATION (H): ICD-10-CM

## 2021-04-20 DIAGNOSIS — R06.02 SHORTNESS OF BREATH: ICD-10-CM

## 2021-04-20 DIAGNOSIS — F33.41 RECURRENT MAJOR DEPRESSIVE DISORDER, IN PARTIAL REMISSION (H): ICD-10-CM

## 2021-04-20 DIAGNOSIS — Z01.818 PREOP GENERAL PHYSICAL EXAM: Primary | ICD-10-CM

## 2021-04-20 DIAGNOSIS — I27.21 SEVERE PULMONARY ARTERIAL SYSTOLIC HYPERTENSION (H): ICD-10-CM

## 2021-04-20 DIAGNOSIS — F17.200 TOBACCO USE DISORDER: ICD-10-CM

## 2021-04-20 DIAGNOSIS — F43.10 PTSD (POST-TRAUMATIC STRESS DISORDER): ICD-10-CM

## 2021-04-20 DIAGNOSIS — J30.2 SEASONAL ALLERGIC RHINITIS, UNSPECIFIED TRIGGER: ICD-10-CM

## 2021-04-20 DIAGNOSIS — F41.1 GENERALIZED ANXIETY DISORDER: ICD-10-CM

## 2021-04-20 DIAGNOSIS — R73.01 IMPAIRED FASTING GLUCOSE: ICD-10-CM

## 2021-04-20 DIAGNOSIS — Z12.11 SCREENING FOR COLON CANCER: ICD-10-CM

## 2021-04-20 DIAGNOSIS — Z71.6 ENCOUNTER FOR SMOKING CESSATION COUNSELING: ICD-10-CM

## 2021-04-20 DIAGNOSIS — G47.30 SLEEP APNEA, UNSPECIFIED TYPE: ICD-10-CM

## 2021-04-20 LAB
ANION GAP SERPL CALCULATED.3IONS-SCNC: 6 MMOL/L (ref 3–14)
BASOPHILS # BLD AUTO: 0.1 10E9/L (ref 0–0.2)
BASOPHILS NFR BLD AUTO: 0.6 %
BUN SERPL-MCNC: 17 MG/DL (ref 7–30)
CALCIUM SERPL-MCNC: 9 MG/DL (ref 8.5–10.1)
CHLORIDE SERPL-SCNC: 104 MMOL/L (ref 94–109)
CO2 SERPL-SCNC: 25 MMOL/L (ref 20–32)
CREAT SERPL-MCNC: 1.1 MG/DL (ref 0.66–1.25)
DIFFERENTIAL METHOD BLD: ABNORMAL
EOSINOPHIL # BLD AUTO: 0.1 10E9/L (ref 0–0.7)
EOSINOPHIL NFR BLD AUTO: 0.9 %
ERYTHROCYTE [DISTWIDTH] IN BLOOD BY AUTOMATED COUNT: 12.6 % (ref 10–15)
EST. AVERAGE GLUCOSE BLD GHB EST-MCNC: 123 MG/DL
GFR SERPL CREATININE-BSD FRML MDRD: 70 ML/MIN/{1.73_M2}
GLUCOSE SERPL-MCNC: 142 MG/DL (ref 70–99)
HBA1C MFR BLD: 5.9 % (ref 0–5.6)
HCT VFR BLD AUTO: 54.9 % (ref 40–53)
HGB BLD-MCNC: 18.7 G/DL (ref 13.3–17.7)
IMM GRANULOCYTES # BLD: 0 10E9/L (ref 0–0.4)
IMM GRANULOCYTES NFR BLD: 0.3 %
LYMPHOCYTES # BLD AUTO: 3.7 10E9/L (ref 0.8–5.3)
LYMPHOCYTES NFR BLD AUTO: 31 %
MCH RBC QN AUTO: 30.8 PG (ref 26.5–33)
MCHC RBC AUTO-ENTMCNC: 34.1 G/DL (ref 31.5–36.5)
MCV RBC AUTO: 90 FL (ref 78–100)
MONOCYTES # BLD AUTO: 0.8 10E9/L (ref 0–1.3)
MONOCYTES NFR BLD AUTO: 6.8 %
NEUTROPHILS # BLD AUTO: 7.2 10E9/L (ref 1.6–8.3)
NEUTROPHILS NFR BLD AUTO: 60.4 %
NRBC # BLD AUTO: 0 10*3/UL
NRBC BLD AUTO-RTO: 0 /100
NT-PROBNP SERPL-MCNC: 2455 PG/ML (ref 0–125)
PLATELET # BLD AUTO: 278 10E9/L (ref 150–450)
POTASSIUM SERPL-SCNC: 4.2 MMOL/L (ref 3.4–5.3)
RBC # BLD AUTO: 6.08 10E12/L (ref 4.4–5.9)
SODIUM SERPL-SCNC: 135 MMOL/L (ref 133–144)
WBC # BLD AUTO: 11.9 10E9/L (ref 4–11)

## 2021-04-20 PROCEDURE — 71046 X-RAY EXAM CHEST 2 VIEWS: CPT | Mod: TC

## 2021-04-20 PROCEDURE — 80048 BASIC METABOLIC PNL TOTAL CA: CPT | Mod: ZL | Performed by: NURSE PRACTITIONER

## 2021-04-20 PROCEDURE — 83880 ASSAY OF NATRIURETIC PEPTIDE: CPT | Mod: ZL | Performed by: NURSE PRACTITIONER

## 2021-04-20 PROCEDURE — 36415 COLL VENOUS BLD VENIPUNCTURE: CPT | Mod: ZL | Performed by: NURSE PRACTITIONER

## 2021-04-20 PROCEDURE — 83036 HEMOGLOBIN GLYCOSYLATED A1C: CPT | Mod: ZL | Performed by: NURSE PRACTITIONER

## 2021-04-20 PROCEDURE — 85025 COMPLETE CBC W/AUTO DIFF WBC: CPT | Mod: ZL | Performed by: NURSE PRACTITIONER

## 2021-04-20 PROCEDURE — 99215 OFFICE O/P EST HI 40 MIN: CPT | Performed by: NURSE PRACTITIONER

## 2021-04-20 PROCEDURE — G0463 HOSPITAL OUTPT CLINIC VISIT: HCPCS | Mod: 25

## 2021-04-20 PROCEDURE — 999N001182 HC STATISTIC ESTIMATED AVERAGE GLUCOSE: Mod: ZL | Performed by: NURSE PRACTITIONER

## 2021-04-20 RX ORDER — ONDANSETRON 2 MG/ML
4 INJECTION INTRAMUSCULAR; INTRAVENOUS EVERY 30 MIN PRN
Status: CANCELLED | OUTPATIENT
Start: 2021-04-20

## 2021-04-20 RX ORDER — AZITHROMYCIN 250 MG/1
TABLET, FILM COATED ORAL
Qty: 6 TABLET | Refills: 0 | Status: SHIPPED | OUTPATIENT
Start: 2021-04-20 | End: 2021-04-25

## 2021-04-20 RX ORDER — MEPERIDINE HYDROCHLORIDE 25 MG/ML
12.5 INJECTION INTRAMUSCULAR; INTRAVENOUS; SUBCUTANEOUS
Status: CANCELLED | OUTPATIENT
Start: 2021-04-20

## 2021-04-20 RX ORDER — ALBUTEROL SULFATE 90 UG/1
1-2 AEROSOL, METERED RESPIRATORY (INHALATION) EVERY 4 HOURS PRN
Qty: 18 G | Refills: 1 | Status: SHIPPED | OUTPATIENT
Start: 2021-04-20 | End: 2023-01-01

## 2021-04-20 RX ORDER — ONDANSETRON 4 MG/1
4 TABLET, ORALLY DISINTEGRATING ORAL EVERY 30 MIN PRN
Status: CANCELLED | OUTPATIENT
Start: 2021-04-20

## 2021-04-20 RX ORDER — NALOXONE HYDROCHLORIDE 0.4 MG/ML
0.4 INJECTION, SOLUTION INTRAMUSCULAR; INTRAVENOUS; SUBCUTANEOUS
Status: CANCELLED | OUTPATIENT
Start: 2021-04-20

## 2021-04-20 RX ORDER — NICOTINE 21 MG/24HR
1 PATCH, TRANSDERMAL 24 HOURS TRANSDERMAL EVERY 24 HOURS
Qty: 28 PATCH | Refills: 1 | Status: SHIPPED | OUTPATIENT
Start: 2021-04-20 | End: 2021-07-20

## 2021-04-20 RX ORDER — NALOXONE HYDROCHLORIDE 0.4 MG/ML
0.2 INJECTION, SOLUTION INTRAMUSCULAR; INTRAVENOUS; SUBCUTANEOUS
Status: CANCELLED | OUTPATIENT
Start: 2021-04-20

## 2021-04-20 RX ORDER — PREDNISONE 20 MG/1
40 TABLET ORAL DAILY
Qty: 10 TABLET | Refills: 0 | Status: SHIPPED | OUTPATIENT
Start: 2021-04-20 | End: 2021-04-25

## 2021-04-20 RX ORDER — LIDOCAINE 40 MG/G
CREAM TOPICAL
Status: CANCELLED | OUTPATIENT
Start: 2021-04-20

## 2021-04-20 RX ORDER — CETIRIZINE HYDROCHLORIDE 10 MG/1
10 TABLET ORAL DAILY
Qty: 30 TABLET | Refills: 0 | Status: SHIPPED | OUTPATIENT
Start: 2021-04-20 | End: 2021-05-25

## 2021-04-20 RX ORDER — SODIUM CHLORIDE, SODIUM LACTATE, POTASSIUM CHLORIDE, CALCIUM CHLORIDE 600; 310; 30; 20 MG/100ML; MG/100ML; MG/100ML; MG/100ML
INJECTION, SOLUTION INTRAVENOUS CONTINUOUS
Status: CANCELLED | OUTPATIENT
Start: 2021-04-20

## 2021-04-20 ASSESSMENT — PAIN SCALES - GENERAL: PAINLEVEL: NO PAIN (0)

## 2021-04-20 ASSESSMENT — LIFESTYLE VARIABLES: TOBACCO_USE: 1

## 2021-04-20 ASSESSMENT — MIFFLIN-ST. JEOR: SCORE: 1936.59

## 2021-04-20 ASSESSMENT — COPD QUESTIONNAIRES: COPD: 1

## 2021-04-20 NOTE — ANESTHESIA PREPROCEDURE EVALUATION
Anesthesia Pre-Procedure Evaluation    Patient: Joey Irene   MRN: [id number] : 1955        Preoperative Diagnosis: Combined forms of age-related cataract of right eye [H25.811]   Procedure : Procedure(s):  PHACOEMULSIFICATION CATARACT EXTRACTION POSTERIOR CHAMBER LENS RIGHT EYE     Past Medical History:   Diagnosis Date     COPD (chronic obstructive pulmonary disease) (H)      Hypertension      Uncomplicated asthma       Past Surgical History:   Procedure Laterality Date     CV CORONARY ANGIOGRAM N/A 2020    Procedure: Coronary Angiogram;  Surgeon: Aman Delgado MD;  Location:  [facility]     CV RIGHT HEART CATH MEASUREMENTS RECORDED N/A 2020    Procedure: CV RIGHT HEART CATH;  Surgeon: Aman Delgado MD;  Location:  [facility]     ENT SURGERY      patient has had three left ear surgeries, unsure what they did     FUSION CERVICAL POSTERIOR THREE+ LEVELS       HERNIA REPAIR, INGUINAL RT/LT Right     done times 3     JOINT REPLACEMENT Left      REPAIR HAMMER TOE Right       Allergies   Allergen Reactions     Seasonal Allergies Difficulty breathing and Cough     Bupropion Other (See Comments)     Tramadol Nausea and Swelling     [place] Nite Time Dizziness and Nausea and Vomiting     Nyquil Cold &  [Night-Time Cold-Flu Relief] Dizziness and Nausea and Vomiting     Trichophyton Other (See Comments)      Social History     Tobacco Use     Smoking status: Former Smoker     Years: 50.00     Types: Cigars     Quit date: 2020     Years since quittin.3     Smokeless tobacco: Never Used   Substance Use Topics     Alcohol use: Never     Frequency: Never      Wt Readings from Last 1 Encounters:   21 108.9 kg (240 lb)        Anesthesia Evaluation   Pt has had prior anesthetic.         ROS/MED HX  ENT/Pulmonary: Comment: emphysema    (+) sleep apnea, allergic rhinitis, tobacco use, Past use, COPD,     Neurologic: Comment: Hearing loss       Cardiovascular: Comment: cardiomegaly    (+) hypertension-----CHF pulmonary hypertension,     METS/Exercise Tolerance:     Hematologic:       Musculoskeletal: Comment: Cervical stenosis, s/p cervical fusion  (+) arthritis,     GI/Hepatic:       Renal/Genitourinary:     (+) BPH,     Endo:     (+) Obesity,     Psychiatric/Substance Use:     (+) psychiatric history anxiety, depression and other (comment) (PTSD)     Infectious Disease:       Malignancy:       Other:      (+) , H/O Chronic Pain,           OUTSIDE LABS:  CBC:   Lab Results   Component Value Date    WBC 9.1 02/23/2021    WBC 10.0 12/08/2020    HGB 19.8 (H) 03/25/2021    HGB 19.1 (H) 02/23/2021    HCT 55.6 (H) 02/23/2021    HCT 51.4 12/08/2020     02/23/2021     12/08/2020     BMP:   Lab Results   Component Value Date     02/23/2021     01/12/2021    POTASSIUM 3.5 02/23/2021    POTASSIUM 3.7 01/12/2021    CHLORIDE 99 02/23/2021    CHLORIDE 104 01/12/2021    CO2 29 02/23/2021    CO2 30 01/12/2021    BUN 19 02/23/2021    BUN 14 01/12/2021    CR 1.23 02/23/2021    CR 1.13 01/12/2021     (H) 02/23/2021    GLC 84 01/12/2021     COAGS:   Lab Results   Component Value Date    INR 0.94 02/23/2021     POC:   Lab Results   Component Value Date     (H) 02/23/2021     HEPATIC:   Lab Results   Component Value Date    ALBUMIN 4.1 02/23/2021    PROTTOTAL 8.1 02/23/2021    ALT 22 02/23/2021    AST 16 02/23/2021    ALKPHOS 81 02/23/2021    BILITOTAL 0.6 02/23/2021     OTHER:   Lab Results   Component Value Date    A1C 6.0 (H) 12/08/2020    MARC 9.2 02/23/2021    PHOS 4.0 11/20/2020    MAG 2.1 11/20/2020    TSH 3.75 03/05/2021    T4 0.97 08/07/2020    CRP 13.5 (H) 11/20/2020       Anesthesia Plan          Anesthesia Type: MAC.              Consents            Postoperative Care            Comments:    Need H and P            Ara Motta, APRN CRNA

## 2021-04-20 NOTE — NURSING NOTE
"Chief Complaint   Patient presents with     Pre-Op Exam     Right cataract 4-  left eye 5-       Initial /74 (BP Location: Left arm, Patient Position: Chair, Cuff Size: Adult Large)   Pulse 104   Temp 98.2  F (36.8  C) (Tympanic)   Ht 1.803 m (5' 11\")   Wt 112.9 kg (249 lb)   SpO2 92%   BMI 34.73 kg/m   Estimated body mass index is 34.73 kg/m  as calculated from the following:    Height as of this encounter: 1.803 m (5' 11\").    Weight as of this encounter: 112.9 kg (249 lb).  Medication Reconciliation: complete  Rose Her LPN  "

## 2021-04-27 ENCOUNTER — ANESTHESIA (OUTPATIENT)
Dept: SURGERY | Facility: HOSPITAL | Age: 66
End: 2021-04-27

## 2021-05-05 ENCOUNTER — HOSPITAL ENCOUNTER (EMERGENCY)
Dept: ULTRASOUND IMAGING | Facility: HOSPITAL | Age: 66
End: 2021-05-05
Attending: EMERGENCY MEDICINE
Payer: COMMERCIAL

## 2021-05-05 ENCOUNTER — APPOINTMENT (OUTPATIENT)
Dept: CT IMAGING | Facility: HOSPITAL | Age: 66
End: 2021-05-05
Attending: EMERGENCY MEDICINE
Payer: COMMERCIAL

## 2021-05-05 ENCOUNTER — HOSPITAL ENCOUNTER (EMERGENCY)
Facility: HOSPITAL | Age: 66
Discharge: HOME | End: 2021-05-05
Attending: EMERGENCY MEDICINE | Admitting: EMERGENCY MEDICINE
Payer: COMMERCIAL

## 2021-05-05 VITALS
RESPIRATION RATE: 18 BRPM | OXYGEN SATURATION: 92 % | DIASTOLIC BLOOD PRESSURE: 66 MMHG | TEMPERATURE: 97.8 F | WEIGHT: 245.37 LBS | HEART RATE: 82 BPM | SYSTOLIC BLOOD PRESSURE: 114 MMHG | BODY MASS INDEX: 34.22 KG/M2

## 2021-05-05 DIAGNOSIS — R10.12 ABDOMINAL PAIN, LEFT UPPER QUADRANT: ICD-10-CM

## 2021-05-05 LAB
ABO + RH BLD: NORMAL
ABO + RH BLD: NORMAL
ALBUMIN SERPL-MCNC: 3.7 G/DL (ref 3.4–5)
ALBUMIN UR-MCNC: NEGATIVE MG/DL
ALP SERPL-CCNC: 85 U/L (ref 40–150)
ALT SERPL W P-5'-P-CCNC: 20 U/L (ref 0–70)
ANION GAP SERPL CALCULATED.3IONS-SCNC: 8 MMOL/L (ref 3–14)
APPEARANCE UR: CLEAR
APTT PPP: 36 SEC (ref 22–37)
AST SERPL W P-5'-P-CCNC: 14 U/L (ref 0–45)
BASOPHILS # BLD AUTO: 0.1 10E9/L (ref 0–0.2)
BASOPHILS NFR BLD AUTO: 0.5 %
BILIRUB SERPL-MCNC: 0.8 MG/DL (ref 0.2–1.3)
BILIRUB UR QL STRIP: NEGATIVE
BLD GP AB SCN SERPL QL: NORMAL
BLOOD BANK CMNT PATIENT-IMP: NORMAL
BUN SERPL-MCNC: 14 MG/DL (ref 7–30)
CALCIUM SERPL-MCNC: 8.9 MG/DL (ref 8.5–10.1)
CHLORIDE SERPL-SCNC: 99 MMOL/L (ref 94–109)
CO2 SERPL-SCNC: 28 MMOL/L (ref 20–32)
COLOR UR AUTO: NORMAL
CREAT SERPL-MCNC: 1.13 MG/DL (ref 0.66–1.25)
D DIMER PPP FEU-MCNC: 0.9 UG/ML FEU (ref 0–0.5)
DIFFERENTIAL METHOD BLD: ABNORMAL
EOSINOPHIL # BLD AUTO: 0.1 10E9/L (ref 0–0.7)
EOSINOPHIL NFR BLD AUTO: 0.7 %
ERYTHROCYTE [DISTWIDTH] IN BLOOD BY AUTOMATED COUNT: 12.8 % (ref 10–15)
ETHANOL SERPL-MCNC: <0.01 G/DL
GFR SERPL CREATININE-BSD FRML MDRD: 68 ML/MIN/{1.73_M2}
GLUCOSE SERPL-MCNC: 157 MG/DL (ref 70–99)
GLUCOSE UR STRIP-MCNC: NEGATIVE MG/DL
HCT VFR BLD AUTO: 57.4 % (ref 40–53)
HGB BLD-MCNC: 19.6 G/DL (ref 13.3–17.7)
HGB UR QL STRIP: NEGATIVE
IMM GRANULOCYTES # BLD: 0 10E9/L (ref 0–0.4)
IMM GRANULOCYTES NFR BLD: 0.2 %
INR PPP: 1.01 (ref 0.86–1.14)
KETONES UR STRIP-MCNC: NEGATIVE MG/DL
LACTATE BLD-SCNC: 2.6 MMOL/L (ref 0.7–2)
LEUKOCYTE ESTERASE UR QL STRIP: NEGATIVE
LIPASE SERPL-CCNC: 85 U/L (ref 73–393)
LYMPHOCYTES # BLD AUTO: 3.4 10E9/L (ref 0.8–5.3)
LYMPHOCYTES NFR BLD AUTO: 32.6 %
MCH RBC QN AUTO: 31.2 PG (ref 26.5–33)
MCHC RBC AUTO-ENTMCNC: 34.1 G/DL (ref 31.5–36.5)
MCV RBC AUTO: 91 FL (ref 78–100)
MONOCYTES # BLD AUTO: 0.5 10E9/L (ref 0–1.3)
MONOCYTES NFR BLD AUTO: 5.2 %
NEUTROPHILS # BLD AUTO: 6.3 10E9/L (ref 1.6–8.3)
NEUTROPHILS NFR BLD AUTO: 60.8 %
NITRATE UR QL: NEGATIVE
NRBC # BLD AUTO: 0 10*3/UL
NRBC BLD AUTO-RTO: 0 /100
NT-PROBNP SERPL-MCNC: 1717 PG/ML (ref 0–900)
PH UR STRIP: 7 PH (ref 4.7–8)
PLATELET # BLD AUTO: 284 10E9/L (ref 150–450)
POTASSIUM SERPL-SCNC: 4.1 MMOL/L (ref 3.4–5.3)
PROT SERPL-MCNC: 7.5 G/DL (ref 6.8–8.8)
RBC # BLD AUTO: 6.29 10E12/L (ref 4.4–5.9)
SODIUM SERPL-SCNC: 135 MMOL/L (ref 133–144)
SOURCE: NORMAL
SP GR UR STRIP: 1.01 (ref 1–1.03)
SPECIMEN EXP DATE BLD: NORMAL
TROPONIN I SERPL-MCNC: <0.015 UG/L (ref 0–0.04)
TROPONIN I SERPL-MCNC: <0.015 UG/L (ref 0–0.04)
UROBILINOGEN UR STRIP-MCNC: NORMAL MG/DL (ref 0–2)
WBC # BLD AUTO: 10.4 10E9/L (ref 4–11)

## 2021-05-05 PROCEDURE — 85025 COMPLETE CBC W/AUTO DIFF WBC: CPT | Performed by: EMERGENCY MEDICINE

## 2021-05-05 PROCEDURE — 76705 ECHO EXAM OF ABDOMEN: CPT | Mod: 26 | Performed by: EMERGENCY MEDICINE

## 2021-05-05 PROCEDURE — 96361 HYDRATE IV INFUSION ADD-ON: CPT

## 2021-05-05 PROCEDURE — 85610 PROTHROMBIN TIME: CPT | Performed by: EMERGENCY MEDICINE

## 2021-05-05 PROCEDURE — 93308 TTE F-UP OR LMTD: CPT | Mod: TC

## 2021-05-05 PROCEDURE — 258N000003 HC RX IP 258 OP 636: Performed by: EMERGENCY MEDICINE

## 2021-05-05 PROCEDURE — 96374 THER/PROPH/DIAG INJ IV PUSH: CPT | Mod: XU

## 2021-05-05 PROCEDURE — 81003 URINALYSIS AUTO W/O SCOPE: CPT | Performed by: EMERGENCY MEDICINE

## 2021-05-05 PROCEDURE — 86850 RBC ANTIBODY SCREEN: CPT | Performed by: EMERGENCY MEDICINE

## 2021-05-05 PROCEDURE — 85379 FIBRIN DEGRADATION QUANT: CPT | Performed by: EMERGENCY MEDICINE

## 2021-05-05 PROCEDURE — 76604 US EXAM CHEST: CPT | Mod: TC

## 2021-05-05 PROCEDURE — 82077 ASSAY SPEC XCP UR&BREATH IA: CPT | Performed by: EMERGENCY MEDICINE

## 2021-05-05 PROCEDURE — 86901 BLOOD TYPING SEROLOGIC RH(D): CPT | Performed by: EMERGENCY MEDICINE

## 2021-05-05 PROCEDURE — 96375 TX/PRO/DX INJ NEW DRUG ADDON: CPT | Mod: XU

## 2021-05-05 PROCEDURE — 85730 THROMBOPLASTIN TIME PARTIAL: CPT | Performed by: EMERGENCY MEDICINE

## 2021-05-05 PROCEDURE — 80053 COMPREHEN METABOLIC PANEL: CPT | Performed by: EMERGENCY MEDICINE

## 2021-05-05 PROCEDURE — 250N000011 HC RX IP 250 OP 636: Performed by: RADIOLOGY

## 2021-05-05 PROCEDURE — 93010 ELECTROCARDIOGRAM REPORT: CPT | Performed by: INTERNAL MEDICINE

## 2021-05-05 PROCEDURE — 76705 ECHO EXAM OF ABDOMEN: CPT | Mod: TC | Performed by: EMERGENCY MEDICINE

## 2021-05-05 PROCEDURE — 71275 CT ANGIOGRAPHY CHEST: CPT

## 2021-05-05 PROCEDURE — 99285 EMERGENCY DEPT VISIT HI MDM: CPT | Mod: 25

## 2021-05-05 PROCEDURE — 84484 ASSAY OF TROPONIN QUANT: CPT | Performed by: EMERGENCY MEDICINE

## 2021-05-05 PROCEDURE — 36415 COLL VENOUS BLD VENIPUNCTURE: CPT | Performed by: EMERGENCY MEDICINE

## 2021-05-05 PROCEDURE — 250N000011 HC RX IP 250 OP 636: Performed by: EMERGENCY MEDICINE

## 2021-05-05 PROCEDURE — C9113 INJ PANTOPRAZOLE SODIUM, VIA: HCPCS | Performed by: EMERGENCY MEDICINE

## 2021-05-05 PROCEDURE — 93308 TTE F-UP OR LMTD: CPT | Mod: 26 | Performed by: EMERGENCY MEDICINE

## 2021-05-05 PROCEDURE — 83605 ASSAY OF LACTIC ACID: CPT | Performed by: EMERGENCY MEDICINE

## 2021-05-05 PROCEDURE — 83690 ASSAY OF LIPASE: CPT | Performed by: EMERGENCY MEDICINE

## 2021-05-05 PROCEDURE — 86900 BLOOD TYPING SEROLOGIC ABO: CPT | Performed by: EMERGENCY MEDICINE

## 2021-05-05 PROCEDURE — 99285 EMERGENCY DEPT VISIT HI MDM: CPT | Mod: 25 | Performed by: EMERGENCY MEDICINE

## 2021-05-05 PROCEDURE — 93005 ELECTROCARDIOGRAM TRACING: CPT

## 2021-05-05 PROCEDURE — 250N000009 HC RX 250: Performed by: EMERGENCY MEDICINE

## 2021-05-05 PROCEDURE — 76604 US EXAM CHEST: CPT | Mod: 26 | Performed by: EMERGENCY MEDICINE

## 2021-05-05 PROCEDURE — 83880 ASSAY OF NATRIURETIC PEPTIDE: CPT | Performed by: EMERGENCY MEDICINE

## 2021-05-05 RX ORDER — IOPAMIDOL 755 MG/ML
100 INJECTION, SOLUTION INTRAVASCULAR ONCE
Status: COMPLETED | OUTPATIENT
Start: 2021-05-05 | End: 2021-05-05

## 2021-05-05 RX ORDER — KETAMINE HYDROCHLORIDE 10 MG/ML
0.3 INJECTION, SOLUTION INTRAMUSCULAR; INTRAVENOUS ONCE
Status: COMPLETED | OUTPATIENT
Start: 2021-05-05 | End: 2021-05-05

## 2021-05-05 RX ADMIN — KETAMINE HYDROCHLORIDE 27.5 MG: 10 INJECTION, SOLUTION INTRAMUSCULAR; INTRAVENOUS at 10:17

## 2021-05-05 RX ADMIN — SODIUM CHLORIDE 1000 ML: 9 INJECTION, SOLUTION INTRAVENOUS at 09:44

## 2021-05-05 RX ADMIN — IOPAMIDOL 100 ML: 755 INJECTION, SOLUTION INTRAVENOUS at 10:43

## 2021-05-05 RX ADMIN — HYDROMORPHONE HYDROCHLORIDE 1 MG: 1 INJECTION, SOLUTION INTRAMUSCULAR; INTRAVENOUS; SUBCUTANEOUS at 09:45

## 2021-05-05 RX ADMIN — PANTOPRAZOLE SODIUM 40 MG: 40 INJECTION, POWDER, FOR SOLUTION INTRAVENOUS at 11:47

## 2021-05-05 NOTE — ED PROVIDER NOTES
History     Chief Complaint   Patient presents with     Abdominal Pain     HPI  Joey Irene is a 65 year old male who has a pmshx of smoking, pulm htn, copd, enlarged prostate, CAD, here w/ LUQ/ L flank pain. Had sx yesterday, worse today, constant. Sharp. Does not radiate to groin or back. Nausea, no vomiting or dyspnea. Has a cough, his COPD cough, but states it may be a little worse. H/o similar but lower, was dx w/ diverticulitis at the time.    Allergies:  Allergies   Allergen Reactions     Seasonal Allergies Difficulty breathing and Cough     Bupropion Other (See Comments)     Tramadol Nausea and Swelling     [place] Nite Time Dizziness and Nausea and Vomiting     Nyquil Cold &  [Night-Time Cold-Flu Relief] Dizziness and Nausea and Vomiting     Trichophyton Other (See Comments)       Problem List:    Patient Active Problem List    Diagnosis Date Noted     Encounter for smoking cessation counseling 04/19/2021     Priority: Medium     Diastolic heart failure, unspecified HF chronicity (H) 04/19/2021     Priority: Medium     Status post coronary angiogram 12/11/2020     Priority: Medium     Severe pulmonary arterial systolic hypertension (H) 11/20/2020     Priority: Medium     Right ventricular dilation 10/29/2020     Priority: Medium     Added automatically from request for surgery [id number]       Bee sting allergy 08/07/2020     Priority: Medium     Hyperglycemia 08/07/2020     Priority: Medium     Elevated prostate specific antigen (PSA) 08/07/2020     Priority: Medium     History of fusion of cervical spine 08/06/2020     Priority: Medium     Generalized anxiety disorder 08/06/2020     Priority: Medium     Tobacco use disorder 08/06/2020     Priority: Medium     Pulmonary emphysema, unspecified emphysema type (H) 08/05/2020     Priority: Medium     Cardiomegaly 08/05/2020     Priority: Medium     Enlarged prostate 08/05/2020     Priority: Medium     Diverticulosis 03/25/2020     Priority: Medium     Chronic  bronchitis with emphysema (H) 02/03/2020     Priority: Medium     Class 1 obesity with body mass index (BMI) of 33.0 to 33.9 in adult 02/03/2020     Priority: Medium     Smoking greater than 40 pack years 02/03/2020     Priority: Medium     Pain due to total left knee replacement, sequela 01/27/2020     Priority: Medium     Cervical stenosis of spinal canal 01/13/2020     Priority: Medium     Cholesteatoma of left ear 03/04/2019     Priority: Medium     Nasal septal deviation 12/18/2017     Priority: Medium     Primary osteoarthritis of right knee 02/25/2017     Priority: Medium     Status post total left knee replacement 02/25/2017     Priority: Medium     Recurrent major depressive disorder, in partial remission (H) 11/21/2015     Priority: Medium     PTSD (post-traumatic stress disorder) 08/19/2015     Priority: Medium     Seasonal allergies 08/19/2015     Priority: Medium     Anxiety state 07/07/2011     Priority: Medium     Back pain, chronic 07/07/2011     Priority: Medium     Hearing loss 07/07/2011     Priority: Medium     Insomnia, unspecified 07/07/2011     Priority: Medium     Sleep apnea 07/07/2011     Priority: Medium        Past Medical History:    Past Medical History:   Diagnosis Date     COPD (chronic obstructive pulmonary disease) (H)      Hypertension      Uncomplicated asthma        Past Surgical History:    Past Surgical History:   Procedure Laterality Date     CV CORONARY ANGIOGRAM N/A 12/11/2020    Procedure: Coronary Angiogram;  Surgeon: Aman Delgado MD;  Location:  [facility]     CV RIGHT HEART CATH MEASUREMENTS RECORDED N/A 12/11/2020    Procedure: CV RIGHT HEART CATH;  Surgeon: Aman Delgado MD;  Location:  [facility]     ENT SURGERY      patient has had three left ear surgeries, unsure what they did     FUSION CERVICAL POSTERIOR THREE+ LEVELS       HERNIA REPAIR, INGUINAL RT/LT Right     done times 3     JOINT REPLACEMENT Left      REPAIR  HAMMER TOE Right        Family History:    Family History   Problem Relation Age of Onset     Lung Cancer Mother      Ovarian Cancer Sister        Social History:  Marital Status:   [4]  Social History     Tobacco Use     Smoking status: Former Smoker     Years: 50.00     Types: Cigars     Quit date: 2020     Years since quittin.3     Smokeless tobacco: Never Used   Substance Use Topics     Alcohol use: Never     Frequency: Never     Drug use: Never        Medications:    ARIPiprazole (ABILIFY) 10 MG tablet  aspirin (ASA) 81 MG EC tablet  cetirizine (ZYRTEC) 10 MG tablet  cyclobenzaprine (FLEXERIL) 10 MG tablet  DULoxetine (CYMBALTA) 30 MG capsule  fluticasone (FLONASE) 50 MCG/ACT nasal spray  gabapentin (NEURONTIN) 300 MG capsule  hydrOXYzine (VISTARIL) 50 MG capsule  montelukast (SINGULAIR) 10 MG tablet  naproxen (NAPROSYN) 500 MG tablet  nicotine (NICODERM CQ) 21 MG/24HR 24 hr patch  omeprazole (PRILOSEC) 20 MG DR capsule  potassium chloride ER (MICRO-K) 10 MEQ CR capsule  torsemide (DEMADEX) 20 MG tablet  traZODone (DESYREL) 50 MG tablet  zolpidem (AMBIEN) 10 MG tablet  acetaminophen (TYLENOL) 500 MG tablet  albuterol (PROAIR HFA/PROVENTIL HFA/VENTOLIN HFA) 108 (90 Base) MCG/ACT inhaler  cetirizine (ZYRTEC) 10 MG tablet  Cholecalciferol (D 1000) 25 MCG (1000 UT) CAPS  EPINEPHrine (ANY BX GENERIC EQUIV) 0.3 MG/0.3ML injection 2-pack  Fluticasone-Umeclidin-Vilanterol (TRELEGY ELLIPTA) 100-62.5-25 MCG/INH oral inhaler  guaiFENesin (MUCINEX) 600 MG 12 hr tablet  ketorolac (ACULAR) 0.5 % ophthalmic solution  moxifloxacin (VIGAMOX) 0.5 % ophthalmic solution  prednisoLONE acetate (PRED FORTE) 1 % ophthalmic suspension  SYMBICORT 80-4.5 MCG/ACT Inhaler  vitamin C (ASCORBIC ACID) 500 MG tablet          Review of Systems   Constitutional: Negative for chills and fatigue.   Respiratory: Negative for cough and shortness of breath.    All other systems reviewed and are negative.      Physical Exam   BP:  144/88  Pulse: 108  Temp: 98.4  F (36.9  C)  Resp: 16  Weight: 111.3 kg (245 lb 6 oz)  SpO2: (!) 89 %(hx of COPD)      Physical Exam  Constitutional:       General: He is not in acute distress.     Appearance: Normal appearance.   HENT:      Head: Normocephalic and atraumatic.      Right Ear: External ear normal.      Left Ear: External ear normal.      Nose: Nose normal. No rhinorrhea.   Eyes:      Conjunctiva/sclera: Conjunctivae normal.   Cardiovascular:      Rate and Rhythm: Normal rate and regular rhythm.      Pulses: Normal pulses.   Pulmonary:      Effort: Pulmonary effort is normal. No respiratory distress.      Breath sounds: Normal breath sounds.   Abdominal:      General: There is no distension.      Palpations: Abdomen is soft.      Tenderness: There is abdominal tenderness.   Musculoskeletal:         General: No deformity or signs of injury.   Skin:     General: Skin is warm and dry.      Capillary Refill: Capillary refill takes less than 2 seconds.   Neurological:      General: No focal deficit present.      Mental Status: He is alert. Mental status is at baseline.   Psychiatric:         Mood and Affect: Mood normal.         Behavior: Behavior normal.         ED Course        Procedures    Results for orders placed during the hospital encounter of 05/05/21   POC US CHEST B-SCAN    [facility] Procedure Note      Limited Bedside ED Ultrasound of Thorax:    PROCEDURE: PERFORMED BY: Dr. Malik Gimenez MD  INDICATIONS/SYMPTOM:  Chest pain  PROBE: High frequency linear probe  BODY LOCATION: Chest  FINDINGS:  Images of both lung hemithoracies taken in 2D in multiple rib spaces        Right side:  Lung sliding artifact  Present     Comet tail artifacts  Present   Left side:  Lung sliding artifact  Present     Comet tail artifacts  Present   Hemothorax: Right side Absent     Left side Absent   Pleural effusion: Right side Absent      Left side Absent    INTERPRETATION: b/l b-lines c/w chf,  less likely PNA.  IMAGE DOCUMENTATION: Images were archived to PACs system.     POC US ECHO LIMITED    [facility] Procedure Note      Limited Bedside ED Cardiac Ultrasound:    PROCEDURE: PERFORMED BY: Dr. Malik Gimenez MD  INDICATIONS/SYMPTOM:  Chest Pain  PROBE: Cardiac phased array probe  BODY LOCATION: Chest  FINDINGS:   The ultrasound was performed utilizing the subcostal, parasternal long axis, parasternal short axis and apical 4 chamber views.  Cardiac contractility:  Present  Gross estimation of cardiac kinesis: normal  Pericardial Effusion:  None  RV:LV ratio: RV > LV  INTERPRETATION:    RV > LV but h/o pulm htn, No pericardial effusion was found.  IVC visualized and findings indicate normovolemia.  IMAGE DOCUMENTATION: Images were archived to PACs system.         POC US ABDOMEN LIMITED    [facility] Procedure Note    Limited Bedside ED Renal Ultrasound:    PERFORMED BY: Dr. Malik Gimenez MD  INDICATIONS:  Abdominal Pain  PROBE: Low frequency convex probe  BODY LOCATION:  Abdomen  FINDINGS:  The ultrasound was performed with longitudinal and transverse views.   Right Kidney:   Hydronephrosis:  None   Renal cyst:  None  Left Kidney:   Hydronephrosis:  None   Renal cyst:  None  INTERPRETATION:  The evaluation of the kidneys was normal without evidence of hydronephrosis or cysts.  IMAGE DOCUMENTATION: Images were archived to PACs system.             EKG Interpretation:      Interpreted by Malik Gimenez MD  Time reviewed: today  Symptoms at time of EKG: abd pain   Rhythm: normal sinus   Rate: Normal  Axis: Right Axis Deviation  Ectopy: none  Conduction: right bundle branch block (incomplete)  ST Segments/ T Waves: ST Segment Depression V3  Q Waves: none  Comparison to prior:     Clinical Impression: partial rbbb                Critical Care time:  none     The Lactic acid level is elevated due to abdominal pain, working up for mesenteric ischemia or AAA, at this time there  is no sign of severe sepsis or septic shock.         No results found for this or any previous visit (from the past 24 hour(s)).    Medications   HYDROmorphone (DILAUDID) injection 1 mg (1 mg Intravenous Given 5/5/21 0945)   0.9% sodium chloride BOLUS (0 mLs Intravenous Stopped 5/5/21 1105)   ketamine (KETALAR) injection 27.5 mg (27.5 mg Intravenous Given 5/5/21 1017)   sodium chloride (PF) 0.9% PF flush 100 mL (100 mLs Intravenous Given 5/5/21 1043)   iopamidol (ISOVUE-370) solution 100 mL (100 mLs Intravenous Given 5/5/21 1043)   pantoprazole (PROTONIX) IV push injection 40 mg (40 mg Intravenous Given 5/5/21 1147)       Assessments & Plan (with Medical Decision Making)     I have reviewed the nursing notes.    I have reviewed the findings, diagnosis, plan and need for follow up with the patient.   66 yo m here w/ abd pain, working up for aaa, mesenteric ischemia, less likely renal colic, PE less likely given no chest pain or difficulty breathing, pain improved w/ opiates, will reassess    Discharge Medication List as of 5/5/2021 12:50 PM          Final diagnoses:   Abdominal pain, left upper quadrant       5/5/2021   [facility]     Malik Gimenez MD  05/05/21 1116       Malik Gimenez MD  05/08/21 [id number]

## 2021-05-05 NOTE — ED TRIAGE NOTES
Pt was awaken with left upper abdominal pains.  Has had diverticulitis in the past with similar symptoms.  Denies NVD.  Last BM was a few days ago and was normal no fever.

## 2021-05-05 NOTE — ED NOTES
Patient here with 8/10 LUQ pain that woke him up this AM. Patient does have hx of diverticulitis but in the past that was lower quadrant pain. Pain is constant but does have pain surges. Last BM 2 or 3 days ago. States he was a bit constipated prior to that. Denies increasing SOB, states he has hx of COPD. Denies chest pain. EKG done d/t location of abd pain. Dr. Gimenez in room to evaluate patient.

## 2021-05-08 ASSESSMENT — ENCOUNTER SYMPTOMS
SHORTNESS OF BREATH: 0
CHILLS: 0
FATIGUE: 0
COUGH: 0

## 2021-05-13 ENCOUNTER — HOSPITAL ENCOUNTER (EMERGENCY)
Facility: HOSPITAL | Age: 66
Discharge: HOME | End: 2021-05-13
Attending: NURSE PRACTITIONER | Admitting: NURSE PRACTITIONER
Payer: COMMERCIAL

## 2021-05-13 ENCOUNTER — NURSE TRIAGE (OUTPATIENT)
Dept: FAMILY MEDICINE | Facility: OTHER | Age: 66
End: 2021-05-13

## 2021-05-13 ENCOUNTER — APPOINTMENT (OUTPATIENT)
Dept: GENERAL RADIOLOGY | Facility: HOSPITAL | Age: 66
End: 2021-05-13
Attending: NURSE PRACTITIONER
Payer: COMMERCIAL

## 2021-05-13 VITALS
RESPIRATION RATE: 16 BRPM | HEART RATE: 102 BPM | DIASTOLIC BLOOD PRESSURE: 86 MMHG | SYSTOLIC BLOOD PRESSURE: 130 MMHG | TEMPERATURE: 98.4 F | OXYGEN SATURATION: 93 %

## 2021-05-13 DIAGNOSIS — R79.89 ELEVATED BRAIN NATRIURETIC PEPTIDE (BNP) LEVEL: ICD-10-CM

## 2021-05-13 DIAGNOSIS — R07.89 LEFT-SIDED CHEST WALL PAIN: ICD-10-CM

## 2021-05-13 DIAGNOSIS — R06.02 SHORTNESS OF BREATH: ICD-10-CM

## 2021-05-13 DIAGNOSIS — J20.9 ACUTE BRONCHITIS WITH SYMPTOMS > 10 DAYS: ICD-10-CM

## 2021-05-13 LAB
NT-PROBNP SERPL-MCNC: 3218 PG/ML (ref 0–900)
TROPONIN I SERPL-MCNC: 0.02 UG/L (ref 0–0.04)

## 2021-05-13 PROCEDURE — 84484 ASSAY OF TROPONIN QUANT: CPT | Performed by: NURSE PRACTITIONER

## 2021-05-13 PROCEDURE — 83880 ASSAY OF NATRIURETIC PEPTIDE: CPT | Performed by: NURSE PRACTITIONER

## 2021-05-13 PROCEDURE — 99214 OFFICE O/P EST MOD 30 MIN: CPT | Performed by: NURSE PRACTITIONER

## 2021-05-13 PROCEDURE — 71101 X-RAY EXAM UNILAT RIBS/CHEST: CPT | Mod: LT

## 2021-05-13 PROCEDURE — G0463 HOSPITAL OUTPT CLINIC VISIT: HCPCS

## 2021-05-13 PROCEDURE — 36415 COLL VENOUS BLD VENIPUNCTURE: CPT | Performed by: NURSE PRACTITIONER

## 2021-05-13 PROCEDURE — 250N000013 HC RX MED GY IP 250 OP 250 PS 637: Performed by: NURSE PRACTITIONER

## 2021-05-13 RX ORDER — AZITHROMYCIN 250 MG/1
TABLET, FILM COATED ORAL
Qty: 6 TABLET | Refills: 0 | Status: SHIPPED | OUTPATIENT
Start: 2021-05-13 | End: 2021-05-18

## 2021-05-13 RX ORDER — BENZONATATE 100 MG/1
100 CAPSULE ORAL 3 TIMES DAILY PRN
Qty: 12 CAPSULE | Refills: 0 | Status: SHIPPED | OUTPATIENT
Start: 2021-05-13 | End: 2021-05-16

## 2021-05-13 RX ORDER — HYDROCODONE BITARTRATE AND ACETAMINOPHEN 5; 325 MG/1; MG/1
1 TABLET ORAL ONCE
Status: COMPLETED | OUTPATIENT
Start: 2021-05-13 | End: 2021-05-13

## 2021-05-13 RX ORDER — ACETAMINOPHEN 325 MG/1
650 TABLET ORAL ONCE
Status: COMPLETED | OUTPATIENT
Start: 2021-05-13 | End: 2021-05-13

## 2021-05-13 RX ORDER — TORSEMIDE 20 MG/1
60 TABLET ORAL 2 TIMES DAILY
Qty: 18 TABLET | Refills: 0 | Status: SHIPPED | OUTPATIENT
Start: 2021-05-13 | End: 2021-05-20

## 2021-05-13 RX ORDER — PREDNISONE 20 MG/1
TABLET ORAL
Qty: 5 TABLET | Refills: 0 | Status: SHIPPED | OUTPATIENT
Start: 2021-05-13 | End: 2021-05-20

## 2021-05-13 RX ADMIN — HYDROCODONE BITARTRATE AND ACETAMINOPHEN 1 TABLET: 5; 325 TABLET ORAL at 12:57

## 2021-05-13 RX ADMIN — ACETAMINOPHEN 650 MG: 325 TABLET, FILM COATED ORAL at 12:57

## 2021-05-13 ASSESSMENT — ENCOUNTER SYMPTOMS
DIARRHEA: 0
SHORTNESS OF BREATH: 1
LIGHT-HEADEDNESS: 0
VOMITING: 0
CHILLS: 0
FATIGUE: 0
ACTIVITY CHANGE: 1
NAUSEA: 0
DIZZINESS: 0
FEVER: 0
HEADACHES: 0
COUGH: 1

## 2021-05-13 NOTE — ED PROVIDER NOTES
"  History     Chief Complaint   Patient presents with     Rib Pain     HPI  Joey Irene is a 65 year old male who is accompanied per his, \"new girlfriend.\" he presents with an eleven day history of  Left sided, anterior chest discomfort and a cough. He only has shortness of breath when he has a bad coughing spell. He was evaluated in ER  5/5/2021 for this discomfort and had negative labs except for elevated BNP and negative CTA.History of chronic bronchitis with emphysema and heart failure.  He has tried Aleve and ibuprofen. Last dose yesterday.  No known sick contacts. Smokes one cigar daily. No obtained covid vaccination. Denies fevers, chills, nausea, vomiting, and diarrhea.    Allergies:  Allergies   Allergen Reactions     Seasonal Allergies Difficulty breathing and Cough     Bupropion Other (See Comments)     Tramadol Nausea and Swelling     [place] Nite Time Dizziness and Nausea and Vomiting     Nyquil Cold &  [Night-Time Cold-Flu Relief] Dizziness and Nausea and Vomiting     Trichophyton Other (See Comments)       Problem List:    Patient Active Problem List    Diagnosis Date Noted     Encounter for smoking cessation counseling 04/19/2021     Priority: Medium     Diastolic heart failure, unspecified HF chronicity (H) 04/19/2021     Priority: Medium     Status post coronary angiogram 12/11/2020     Priority: Medium     Severe pulmonary arterial systolic hypertension (H) 11/20/2020     Priority: Medium     Right ventricular dilation 10/29/2020     Priority: Medium     Added automatically from request for surgery [id number]       Bee sting allergy 08/07/2020     Priority: Medium     Hyperglycemia 08/07/2020     Priority: Medium     Elevated prostate specific antigen (PSA) 08/07/2020     Priority: Medium     History of fusion of cervical spine 08/06/2020     Priority: Medium     Generalized anxiety disorder 08/06/2020     Priority: Medium     Tobacco use disorder 08/06/2020     Priority: Medium     Pulmonary " emphysema, unspecified emphysema type (H) 08/05/2020     Priority: Medium     Cardiomegaly 08/05/2020     Priority: Medium     Enlarged prostate 08/05/2020     Priority: Medium     Diverticulosis 03/25/2020     Priority: Medium     Chronic bronchitis with emphysema (H) 02/03/2020     Priority: Medium     Class 1 obesity with body mass index (BMI) of 33.0 to 33.9 in adult 02/03/2020     Priority: Medium     Smoking greater than 40 pack years 02/03/2020     Priority: Medium     Pain due to total left knee replacement, sequela 01/27/2020     Priority: Medium     Cervical stenosis of spinal canal 01/13/2020     Priority: Medium     Cholesteatoma of left ear 03/04/2019     Priority: Medium     Nasal septal deviation 12/18/2017     Priority: Medium     Primary osteoarthritis of right knee 02/25/2017     Priority: Medium     Status post total left knee replacement 02/25/2017     Priority: Medium     Recurrent major depressive disorder, in partial remission (H) 11/21/2015     Priority: Medium     PTSD (post-traumatic stress disorder) 08/19/2015     Priority: Medium     Seasonal allergies 08/19/2015     Priority: Medium     Anxiety state 07/07/2011     Priority: Medium     Back pain, chronic 07/07/2011     Priority: Medium     Hearing loss 07/07/2011     Priority: Medium     Insomnia, unspecified 07/07/2011     Priority: Medium     Sleep apnea 07/07/2011     Priority: Medium        Past Medical History:    Past Medical History:   Diagnosis Date     COPD (chronic obstructive pulmonary disease) (H)      Hypertension      Uncomplicated asthma        Past Surgical History:    Past Surgical History:   Procedure Laterality Date     CV CORONARY ANGIOGRAM N/A 12/11/2020    Procedure: Coronary Angiogram;  Surgeon: Aman Delgado MD;  Location: [facility] CARDIAC CATH LAB     CV RIGHT HEART CATH MEASUREMENTS RECORDED N/A 12/11/2020    Procedure: CV RIGHT HEART CATH;  Surgeon: Aman Delgado MD;  Location: [facility]  CARDIAC CATH LAB     ENT SURGERY      patient has had three left ear surgeries, unsure what they did     FUSION CERVICAL POSTERIOR THREE+ LEVELS       HERNIA REPAIR, INGUINAL RT/LT Right     done times 3     JOINT REPLACEMENT Left      REPAIR HAMMER TOE Right        Family History:    Family History   Problem Relation Age of Onset     Lung Cancer Mother      Ovarian Cancer Sister        Social History:  Marital Status:   [4]  Social History     Tobacco Use     Smoking status: Former Smoker     Years: 50.00     Types: Cigars     Quit date: 2020     Years since quittin.4     Smokeless tobacco: Never Used   Substance Use Topics     Alcohol use: Never     Frequency: Never     Drug use: Never        Medications:    azithromycin (ZITHROMAX) 250 MG tablet  benzonatate (TESSALON) 100 MG capsule  predniSONE (DELTASONE) 20 MG tablet  torsemide (DEMADEX) 20 MG tablet  acetaminophen (TYLENOL) 500 MG tablet  albuterol (PROAIR HFA/PROVENTIL HFA/VENTOLIN HFA) 108 (90 Base) MCG/ACT inhaler  ARIPiprazole (ABILIFY) 10 MG tablet  aspirin (ASA) 81 MG EC tablet  cetirizine (ZYRTEC) 10 MG tablet  cetirizine (ZYRTEC) 10 MG tablet  Cholecalciferol (D 1000) 25 MCG (1000 UT) CAPS  cyclobenzaprine (FLEXERIL) 10 MG tablet  DULoxetine (CYMBALTA) 30 MG capsule  EPINEPHrine (ANY BX GENERIC EQUIV) 0.3 MG/0.3ML injection 2-pack  fluticasone (FLONASE) 50 MCG/ACT nasal spray  Fluticasone-Umeclidin-Vilanterol (TRELEGY ELLIPTA) 100-62.5-25 MCG/INH oral inhaler  gabapentin (NEURONTIN) 300 MG capsule  guaiFENesin (MUCINEX) 600 MG 12 hr tablet  hydrOXYzine (VISTARIL) 50 MG capsule  ketorolac (ACULAR) 0.5 % ophthalmic solution  montelukast (SINGULAIR) 10 MG tablet  moxifloxacin (VIGAMOX) 0.5 % ophthalmic solution  naproxen (NAPROSYN) 500 MG tablet  nicotine (NICODERM CQ) 21 MG/24HR 24 hr patch  omeprazole (PRILOSEC) 20 MG DR capsule  potassium chloride ER (MICRO-K) 10 MEQ CR capsule  prednisoLONE acetate (PRED FORTE) 1 % ophthalmic  suspension  SYMBICORT 80-4.5 MCG/ACT Inhaler  torsemide (DEMADEX) 20 MG tablet  traZODone (DESYREL) 50 MG tablet  vitamin C (ASCORBIC ACID) 500 MG tablet  zolpidem (AMBIEN) 10 MG tablet          Review of Systems   Constitutional: Positive for activity change. Negative for chills, fatigue and fever.   HENT: Positive for congestion (chest).    Respiratory: Positive for cough and shortness of breath (with coughing or exertion).    Gastrointestinal: Negative for diarrhea, nausea and vomiting.   Musculoskeletal:        Left sided rib pain   Skin: Negative.    Neurological: Negative for dizziness, light-headedness and headaches.       Physical Exam   BP: 130/86  Pulse: 102  Temp: 98.4  F (36.9  C)  Resp: 16  SpO2: 93 %      Physical Exam  Vitals signs and nursing note reviewed.   Constitutional:       General: He is in acute distress (moderate).   HENT:      Head: Normocephalic.      Right Ear: Tympanic membrane and ear canal normal.      Left Ear: Tympanic membrane and ear canal normal.      Nose:      Right Sinus: Maxillary sinus tenderness present. No frontal sinus tenderness.      Left Sinus: Maxillary sinus tenderness present. No frontal sinus tenderness.      Mouth/Throat:      Lips: Pink.      Mouth: Mucous membranes are moist.      Pharynx: Uvula midline. No posterior oropharyngeal erythema.   Cardiovascular:      Rate and Rhythm: Normal rate and regular rhythm.      Heart sounds: Normal heart sounds. No murmur.   Pulmonary:      Effort: Pulmonary effort is normal. No respiratory distress.      Breath sounds: Normal air entry. Examination of the left-lower field reveals rhonchi. Rhonchi (barely noticeable) present. No wheezing.   Chest:      Chest wall: Tenderness present. No crepitus.       Lymphadenopathy:      Cervical: No cervical adenopathy.   Skin:     General: Skin is warm and dry.   Neurological:      Mental Status: He is alert and oriented to person, place, and time.   Psychiatric:         Behavior:  "Behavior normal.         ED Course        Procedures           Results for orders placed or performed during the hospital encounter of 05/13/21 (from the past 24 hour(s))   Ribs XR, unilat 3 views + PA chest,  left    Narrative    PROCEDURE: XR RIBS & CHEST LT 3VW 5/13/2021 12:09 PM    HISTORY: unknown injury of the ribs for the last 10 days    COMPARISONS: April 20, 2021    TECHNIQUE: Chest one view, left RIBS 2 views    FINDINGS: Chest: There is widespread interstitial thickening without  change. The heart is normal in size. There is no pneumothorax or  pleural effusion.    Left RIBS: There are no fractures or destructive lesions. Stimulating  electrode is seen in the lower thoracic spine. Postoperative changes  are seen in the lower cervical spine         Impression    IMPRESSION: Interstitial thickening in the lungs stable from previous  examination. No rib fracture or destructive lesion is noted    MANA SOUSA MD   Troponin I   Result Value Ref Range    Troponin I ES 0.018 0.000 - 0.045 ug/L   Nt probnp inpatient   Result Value Ref Range    N-Terminal Pro BNP Inpatient 3,218 (H) 0 - 900 pg/mL       Medications   HYDROcodone-acetaminophen (NORCO) 5-325 MG per tablet 1 tablet (1 tablet Oral Given 5/13/21 1257)   acetaminophen (TYLENOL) tablet 650 mg (650 mg Oral Given 5/13/21 1257)       Assessments & Plan (with Medical Decision Making)     I have reviewed the nursing notes.    I have reviewed the findings, diagnosis, plan and need for follow up with the patient.    (R79.89) Elevated brain natriuretic peptide (BNP) level    (R07.89) Left-sided chest wall pain    (J20.9) Acute bronchitis with symptoms > 10 days  Comment: 65 year old male who is accompanied per his, \"new girlfriend.\" he presents with an eleven day history of  Left sided, anterior chest discomfort and a cough. He only has shortness of breath when he has a bad coughing spell. He was evaluated in ER  5/5/2021 for this discomfort and had negative " labs except for elevated BNP and negative CTA. History of chronic bronchitis with emphysema and heart failure. He has tried Aleve and ibuprofen. Last dose yesterday.  No known sick contacts. Smokes one cigar daily. No obtained covid vaccination. Denies fevers, chills, nausea, vomiting, and diarrhea.    MDM: NHT. Lungs CTA except for very fine rhonchi LLL. Left, lateral and anterior, distal chest wall pain reproducible with palpation. No subcutaneous emphysema palpated.  Non-productive harsh cough with deep breaths.    CXR reviewed and per radiology:  FINDINGS: Chest: There is widespread interstitial thickening without  change. The heart is normal in size. There is no pneumothorax or  pleural effusion.     Left RIBS: There are no fractures or destructive lesions. Stimulating  electrode is seen in the lower thoracic spine. Postoperative changes  are seen in the lower cervical spine                                                                     IMPRESSION: Interstitial thickening in the lungs stable from previous  examination. No rib fracture or destructive lesion is noted    Troponin negative  BNP doubles since 5/5/2021.    Spoke with Dr. Poon and will increase torsemide 20 mg in the am and 20 mg at noon for three days. She is in agreement with plan of care    One hydrocodone and 650 mg of acetaminophen did take the edge of of his left chest discomfort.    Plan: Z-pack, Prednisone burst, Tessalon Perles and increased doses of torsemide for three days. Education provided and/or discussed for this/these medications and for noncardiac chest pain, heart failure, and bronchitis.  Return to ER or call 911 for shortness of breath, chest pain, or development of stroke like symptoms (facial droop, unexpected or increased weakness, confusion, or difficulty with speech).    -Increase fluids.   -Complete all antibiotics even if feeling better.    -Taking antibiotics with food may decrease the symptoms, of an upset stomach,  that can occur when taking antibiotics. Antibiotics frequently cause diarrhea. Probiotics or yogurt may help prevent or decrease these symptoms.   -Return to be reevaluated if symptoms do not improve, or worsen.  These discharge instructions and medications were reviewed with him and his girlfriend and understanding verbalized.    New Prescriptions    AZITHROMYCIN (ZITHROMAX) 250 MG TABLET    Take 2 tablets (500 mg) by mouth daily for 1 day, THEN 1 tablet (250 mg) daily for 4 days.    BENZONATATE (TESSALON) 100 MG CAPSULE    Take 1 capsule (100 mg) by mouth 3 times daily as needed for cough May take one to two tablets tid prn    PREDNISONE (DELTASONE) 20 MG TABLET    1 tab daily for 3 days, then 1/2 tab daily for 4 days    TORSEMIDE (DEMADEX) 20 MG TABLET    Take 3 tablets (60 mg) by mouth 2 times daily for 3 days       Final diagnoses:   Elevated brain natriuretic peptide (BNP) level   Left-sided chest wall pain   Acute bronchitis with symptoms > 10 days       5/13/2021   [facility]       Ro Chen, CNP  05/13/21 142       Ro Chen, CNP  05/13/21 1425

## 2021-05-13 NOTE — DISCHARGE INSTRUCTIONS
Return to ER or call 911 for shortness of breath, chest pain, or development of stroke like symptoms (facial droop, unexpected or increased weakness, confusion, or difficulty with speech).    -Increase fluids.   -Complete all antibiotics even if feeling better.    -Taking antibiotics with food may decrease the symptoms, of an upset stomach, that can occur when taking antibiotics. Antibiotics frequently cause diarrhea. Probiotics or yogurt may help prevent or decrease these symptoms.   -Return to be reevaluated if symptoms do not improve, or worsen.

## 2021-05-13 NOTE — TELEPHONE ENCOUNTER
"Call transferred for chest pain and cough.At first pt did say he has left sided chest paint and then states it is   7/10 left ribcage,upper abdominal molly.Increased with cough. He states it is a stabbing pain.This started about a week ago and it is worsening.It awakens him from his sleep and does get worse with exertion.Spoke with PCP and pt should go to ED.Updadted pt and advised if s/s worsen call 911. He has a  and will go to ED.    Please note.    Erlinda Deutsch RN      Reason for Disposition    [1] Chest pain lasts > 5 minutes AND [2] age > 50    Additional Information    Negative: Severe difficulty breathing (e.g., struggling for each breath, speaks in single words)    Negative: Difficult to awaken or acting confused (e.g., disoriented, slurred speech)    Negative: Shock suspected (e.g., cold/pale/clammy skin, too weak to stand, low BP, rapid pulse)    Negative: [1] Chest pain lasts > 5 minutes AND [2] history of heart disease  (i.e., heart attack, bypass surgery, angina, angioplasty, CHF; not just a heart murmur)    Negative: [1] Chest pain lasts > 5 minutes AND [2] described as crushing, pressure-like, or heavy    Answer Assessment - Initial Assessment Questions  1. LOCATION: \"Where does it hurt?\"        Left side of chest lower part of the ribs  2. RADIATION: \"Does the pain go anywhere else?\" (e.g., into neck, jaw, arms, back)     no  3. ONSET: \"When did the chest pain begin?\" (Minutes, hours or days)       Week ago and it is worsening  4. PATTERN \"Does the pain come and go, or has it been constant since it started?\"  \"Does it get worse with exertion?\"       Constant,worse with exertion  5. DURATION: \"How long does it last\" (e.g., seconds, minutes, hours)      Couple minutes after a cough  6. SEVERITY: \"How bad is the pain?\"  (e.g., Scale 1-10; mild, moderate, or severe)     - MILD (1-3): doesn't interfere with normal activities      - MODERATE (4-7): interferes with normal activities or awakens from " "sleep     - SEVERE (8-10): excruciating pain, unable to do any normal activities        Wakes him up at night it worse Tues and Wed.  7. CARDIAC RISK FACTORS: \"Do you have any history of heart problems or risk factors for heart disease?\" (e.g., prior heart attack, angina; high blood pressure, diabetes, being overweight, high cholesterol, smoking, or strong family history of heart disease)      CHF,smoke  8. PULMONARY RISK FACTORS: \"Do you have any history of lung disease?\"  (e.g., blood clots in lung, asthma, emphysema, birth control pills)      COPD and emphysema  9. CAUSE: \"What do you think is causing the chest pain?\"      Not sure  10. OTHER SYMPTOMS: \"Do you have any other symptoms?\" (e.g., dizziness, nausea, vomiting, sweating, fever, difficulty breathing, cough)       When coughing it is a stabbing pain on the left side of ribs  11. PREGNANCY: \"Is there any chance you are pregnant?\" \"When was your last menstrual period?\"        na    Protocols used: CHEST PAIN-A-AH      "

## 2021-05-13 NOTE — ED TRIAGE NOTES
Pt presents today with left side rib pain. Pt has SOB with rib pain. Moving and coughing makes pain worse. Pain started 10 days ago. PT states he was already in the ER for pain. Pt states that pain just started hurting and doesn't remember how. Pt has taken ibuprofen and mucinex. Nothing has helped with pain.

## 2021-05-14 ENCOUNTER — PATIENT OUTREACH (OUTPATIENT)
Dept: CARE COORDINATION | Facility: OTHER | Age: 66
End: 2021-05-14

## 2021-05-14 DIAGNOSIS — I50.30 DIASTOLIC HEART FAILURE, UNSPECIFIED HF CHRONICITY (H): Primary | ICD-10-CM

## 2021-05-14 DIAGNOSIS — I51.7 CARDIOMEGALY: ICD-10-CM

## 2021-05-14 DIAGNOSIS — R06.09 DOE (DYSPNEA ON EXERTION): ICD-10-CM

## 2021-05-14 NOTE — PROGRESS NOTES
Clinic Care Coordination Contact    Follow Up Progress Note      Assessment: patient in ER for chest pain- provider increased diuretics. Outreach to patient regarding visit.   Reports started with a cough and then he was coughing so hard he got chest pain.       Are you experiencing any shortness of breath? not really      How would you describe your shortness of breath?  denies    Are you experiencing any swelling in your legs or feet?  denies    Do you cough at night?  yes- when coughing ribs hurt on left side    Previous weight   Wt Readings from Last 2 Encounters:   05/05/21 111.3 kg (245 lb 6 oz)   04/20/21 112.9 kg (249 lb)         Are you having any side effects from your medications?  denies    Since your last visit, how many times have you gone to the cardiologist, urgent care, emergency room, or hospital because of your heart failure? 1- chest/rib pain    Diuretic? See ER note below    Medication compliance issue? denies    New symptoms or concerns relating to CHF? denies    Follow up scheduled for 5- with BERNARDINO Bautista CNP      ER notes:  Plan: Z-pack, Prednisone burst, Tessalon Perles and increased doses of torsemide for three days. Education provided and/or discussed for this/these medications and for noncardiac chest pain, heart failure, and bronchitis.  -Increase fluids.   -Complete all antibiotics even if feeling better.    -Taking antibiotics with food may decrease the symptoms, of an upset stomach, that can occur when taking antibiotics. Antibiotics frequently cause diarrhea. Probiotics or yogurt may help prevent or decrease these symptoms.   -Return to be reevaluated if symptoms do not improve, or worsen.  These discharge instructions and medications were reviewed with him and his girlfriend and understanding verbalized.     N-Terminal Pro BNP Inpatient 0 - 900 pg/mL 3,218High           Plan:   Follow directions of medication regimen from ER provider until assessed in clinic by  Marleny Bauer.   Scheduled follow up with BERNARDINO Bautista CNP for Thursday 5- at 11:30AM.  Patient agrees with plan. Will call CC with any questions or concerns before appt.   Marleny is there any labs you would like to order prior to appt or wait until seen?  Please advise.   Thank you.     Bharath MIRANDA RN  CHF Care Coordinator   [phone] Option #8  Ext. *[phone]

## 2021-05-14 NOTE — PROGRESS NOTES
Clinic Care Coordination Contact  Care Team Conversations    Labs and chest xray ordered per BERNARDINO Bautista CNP.     Bharath MIRANDA RN  CHF Care Coordinator   [phone] Option #8  Ext. *[phone]

## 2021-05-14 NOTE — PROGRESS NOTES
Marleny Bauer APRN CNP  You 3 minutes ago (3:10 PM)     Thanks!   When he comes in next week lets get BNP and BMP again. I would also recommend chest CX 1 view that day.   Thanks,   Marleny MOHAMUD. BERNARDINO Bauer CNP    Message text

## 2021-05-18 NOTE — PROGRESS NOTES
Assessment & Plan     Acute bronchitis with symptoms greater than 10 days  Cough  Pulmonary emphysema, unspecified emphysema type (H)  Severe Pulmonary Arterial Systolic Hypertension   Acute on chronic right heart failure (H)  Patient feeling better. No fevers. Cough has almost resolved. He does have severe MURRAY. I talked with cardiology. Gruetli Laager to be related to his right sided heart failure and severe pulmonary hypertension. As noted below, he has no showed Dr. Carbone twice. Trying to set up virtual visit. I did not repeat labs today as feeling better. He did complete the 6 minute walk test, however, and his oxygen dropped to 84 percent. Oxygen was applied and his sats came up to 87 percent with 2 liters. Oxygen was ordered. Will continue close follow up with cardiology.     Tobacco use disorder  Cessation encouraged.     Screening for colon cancer  - COLOGUARD([employer])  -Declines colonoscopy. Willing to do Cologuard. Ordered.     Enlarged prostate  Elevated prostate specific antigen (PSA)  Follows with urology. Prostate biopsy ordered. Patient declined. Aware of the risks of prostate cancer.       Review of external notes as documented elsewhere in note  Prescription drug management  40 minutes spent on the date of the encounter doing chart review, review of outside records, review of test results, interpretation of tests, patient visit, documentation and discussion with other provider(s) Talked with cardiology        Amparo Alvares NP  [facility] [place]    I certify that this patient, Joey Irene has been under my care (or a nurse practitioner or physican's assistant working with me). This is the face-to-face encounter for oxygen medical necessity.      Joey Irene is now in a chronic stable state and continues to require supplemental oxygen. Patient has continued oxygen desaturation due to   Chronic Heart Failure I50  COPD J44.9  Emphysema J43.9.  Pulmonary Hypertension  "    Alternative treatment(s) tried or considered and deemed clinically infective for treatment of Chronic Heart Failure I50  COPD J44.9  Emphysema J43.9 include inhalers.  Pulmonary Hypertension   If portability is ordered, is the patient mobile within the home? yes    **Patients who qualify for home O2 coverage under the CMS guidelines require ABG tests or O2 sat readings obtained closest to, but no earlier than 2 days prior to the discharge, as evidence of the need for home oxygen therapy. Testing must be performed while patient is in the chronic stable state. See notes for O2 sats.**    O2- 91% at rest on RA   Pulse 88   O2- 84% on RA on exertion  Pulse- 105   O2- 93% on 2 LPM   Pulse -99   O2- 87% on exertion on 2 LPM   Pulse 88      Subjective   Joey is a 65 year old who presents for the following health issues    HPI     ED/UC Followup:    Facility:  [facility]  Date of visit: 5/13/21  Reason for visit: chest wall pain, elevated BNP         Patient presented to the ED on 5/13/21 with a cough and left chest wall pain. ER note was reviewed. Troponin was negative. BNP was elevated at 3,218. CXR was stable. Recent CTA was negative so this was not repeated. He was diagnosed with bronchitis. Give a prednisone burst, azithromycin, and his torsemide was increased from 20 mg daily to 20 mg BID times 3 days. Discharged home.   Current Status: Today he notes that he feels much better. When asked if he was feeling better, he stated, \"Oh God Yeah.\" Cough has almost resolved. No fevers. Breathing has improved. No shortness of breath or wheezes with rest. He does admit that he gets very short of breath with exertion, but tells this has been occurring for years. No nausea, vomiting, or abdominal pain. No lower leg swelling. No palpitations. No dizziness.     He did see cardiology on 5/20/21. Note was reviewed. Complained of MURRAY. Was told to continue Demadex 40 mg daily with potassium chloride. BNP had decreased from 3218 " "in the ER to 1,820. Was also referred to Dr. Pennington for his pulmonary hypertension and strongly encouraged to complete PFTs. Do note, he had \"no-showed\" two appointments for PFTs as well as with Dr. Tran.     Patient underwent right heart cath and coronary angiogram at [facility] on 12/11/2020- minimal non-obstructing CAD, right sided filling pressures where milldly elevated, moderate pre-capillary pulmonary hypertension, left sided filling pressures normal and borderline cardiac output.    Echocardiogram completed on 8/27/2020. Normal LV size and function with LVEF 60 to 65%.  Grade 1/early diastolic dysfunction. Mild to moderate RV dilation.  Mild left atrial enlargement.  Mild mitral insufficiency.  The aortic valve is normal in structure and function.  No pericardial effusion.     NM Lexiscan stress (10/2019) reported as normal without evidence of ischemia or infarct.        Due for colonoscopy. Declines. Willing to complete the cologuard.     He also has a h/o an elevated PSA. Has seen urology and a prostate biopsy was ordered. He canceled this and tells me today that he does not want to proceed. He is aware that he may have prostate cancer and is ok with this.     Review of Systems   As noted in the HPI.       Objective    BP 96/62 (BP Location: Left arm, Patient Position: Chair, Cuff Size: Adult Large)   Pulse 88   Temp 97.8  F (36.6  C) (Tympanic)   Wt 108.9 kg (240 lb)   SpO2 (!) 87%   BMI 33.47 kg/m    Body mass index is 33.47 kg/m .  Physical Exam   GENERAL: alert and appears SOB when walking  EYES: Eyes grossly normal to inspection, PERRL and conjunctivae and sclerae normal  HENT: ear canals and TM's normal, nose and mouth without ulcers or lesions  NECK: no adenopathy, supple, JVD not seen.   RESP: lungs clear to auscultation - no rales, rhonchi or wheezes, lungs are diminished in the bases  CV: regular rate and rhythm, normal S1 S2, no S3 or S4, no murmur, click or rub, no peripheral edema "   ABDOMEN: soft, nontender, mildly distended  NEURO: Normal strength and tone, mentation intact and speech normal  PSYCH: mentation appears normal, affect normal/bright          Component      Latest Ref Rng & Units 5/20/2021   Sodium      133 - 144 mmol/L 139   Potassium      3.4 - 5.3 mmol/L 3.6   Chloride      94 - 109 mmol/L 103   Carbon Dioxide      20 - 32 mmol/L 32   Anion Gap      3 - 14 mmol/L 4   Glucose      70 - 99 mg/dL 84   Urea Nitrogen      7 - 30 mg/dL 19   Creatinine      0.66 - 1.25 mg/dL 1.07   GFR Estimate      >60 mL/min/1.73:m2 72   GFR Estimate If Black      >60 mL/min/1.73:m2 84   Calcium      8.5 - 10.1 mg/dL 9.0   N-Terminal Pro Bnp      0 - 125 pg/mL 1,820 (H)

## 2021-05-20 ENCOUNTER — ANCILLARY PROCEDURE (OUTPATIENT)
Dept: GENERAL RADIOLOGY | Facility: OTHER | Age: 66
End: 2021-05-20
Attending: NURSE PRACTITIONER
Payer: COMMERCIAL

## 2021-05-20 ENCOUNTER — OFFICE VISIT (OUTPATIENT)
Dept: CARDIOLOGY | Facility: OTHER | Age: 66
End: 2021-05-20
Attending: NURSE PRACTITIONER
Payer: COMMERCIAL

## 2021-05-20 VITALS
BODY MASS INDEX: 33.78 KG/M2 | DIASTOLIC BLOOD PRESSURE: 86 MMHG | SYSTOLIC BLOOD PRESSURE: 122 MMHG | WEIGHT: 242.2 LBS | TEMPERATURE: 97.6 F | HEART RATE: 95 BPM | OXYGEN SATURATION: 90 %

## 2021-05-20 DIAGNOSIS — R06.09 DOE (DYSPNEA ON EXERTION): ICD-10-CM

## 2021-05-20 DIAGNOSIS — E87.6 DRUG-INDUCED HYPOKALEMIA: ICD-10-CM

## 2021-05-20 DIAGNOSIS — I51.7 CARDIOMEGALY: ICD-10-CM

## 2021-05-20 DIAGNOSIS — I50.30 DIASTOLIC HEART FAILURE, UNSPECIFIED HF CHRONICITY (H): ICD-10-CM

## 2021-05-20 DIAGNOSIS — Z98.890: ICD-10-CM

## 2021-05-20 DIAGNOSIS — I50.813 ACUTE ON CHRONIC RIGHT-SIDED HEART FAILURE (H): ICD-10-CM

## 2021-05-20 DIAGNOSIS — R60.0 BILATERAL LOWER EXTREMITY EDEMA: ICD-10-CM

## 2021-05-20 DIAGNOSIS — I51.7 RIGHT VENTRICULAR DILATION: ICD-10-CM

## 2021-05-20 DIAGNOSIS — J43.2 CENTRILOBULAR EMPHYSEMA (H): ICD-10-CM

## 2021-05-20 DIAGNOSIS — I27.21 PULMONARY ARTERIAL HYPERTENSION (H): Primary | ICD-10-CM

## 2021-05-20 DIAGNOSIS — T50.905A DRUG-INDUCED HYPOKALEMIA: ICD-10-CM

## 2021-05-20 DIAGNOSIS — Z98.890 STATUS POST CORONARY ANGIOGRAM: ICD-10-CM

## 2021-05-20 LAB
ANION GAP SERPL CALCULATED.3IONS-SCNC: 4 MMOL/L (ref 3–14)
BUN SERPL-MCNC: 19 MG/DL (ref 7–30)
CALCIUM SERPL-MCNC: 9 MG/DL (ref 8.5–10.1)
CHLORIDE SERPL-SCNC: 103 MMOL/L (ref 94–109)
CO2 SERPL-SCNC: 32 MMOL/L (ref 20–32)
CREAT SERPL-MCNC: 1.07 MG/DL (ref 0.66–1.25)
GFR SERPL CREATININE-BSD FRML MDRD: 72 ML/MIN/{1.73_M2}
GLUCOSE SERPL-MCNC: 84 MG/DL (ref 70–99)
NT-PROBNP SERPL-MCNC: 1820 PG/ML (ref 0–125)
POTASSIUM SERPL-SCNC: 3.6 MMOL/L (ref 3.4–5.3)
SODIUM SERPL-SCNC: 139 MMOL/L (ref 133–144)

## 2021-05-20 PROCEDURE — 36415 COLL VENOUS BLD VENIPUNCTURE: CPT | Mod: ZL | Performed by: NURSE PRACTITIONER

## 2021-05-20 PROCEDURE — G0463 HOSPITAL OUTPT CLINIC VISIT: HCPCS | Mod: 25

## 2021-05-20 PROCEDURE — 99214 OFFICE O/P EST MOD 30 MIN: CPT | Performed by: NURSE PRACTITIONER

## 2021-05-20 PROCEDURE — G0463 HOSPITAL OUTPT CLINIC VISIT: HCPCS

## 2021-05-20 PROCEDURE — 71046 X-RAY EXAM CHEST 2 VIEWS: CPT | Mod: TC

## 2021-05-20 PROCEDURE — 80048 BASIC METABOLIC PNL TOTAL CA: CPT | Mod: ZL | Performed by: NURSE PRACTITIONER

## 2021-05-20 PROCEDURE — 83880 ASSAY OF NATRIURETIC PEPTIDE: CPT | Mod: ZL | Performed by: NURSE PRACTITIONER

## 2021-05-20 RX ORDER — POTASSIUM CHLORIDE 1500 MG/1
20 TABLET, EXTENDED RELEASE ORAL 2 TIMES DAILY
Qty: 90 TABLET | Refills: 1 | Status: SHIPPED | OUTPATIENT
Start: 2021-05-20 | End: 2021-07-13

## 2021-05-20 RX ORDER — TORSEMIDE 20 MG/1
TABLET ORAL
Qty: 90 TABLET | Refills: 3 | Status: SHIPPED | OUTPATIENT
Start: 2021-05-20 | End: 2021-08-13

## 2021-05-20 ASSESSMENT — PAIN SCALES - GENERAL: PAINLEVEL: SEVERE PAIN (7)

## 2021-05-20 NOTE — PATIENT INSTRUCTIONS
Thank you for allowing Marleny Bauer and our team to participate in your care. Please call our office at [phone] with scheduling questions or if you need to cancel or change your appointment. With any other questions or concerns you may call Joaquina cardiology nurse at [phone].       If you experience chest pain, chest pressure, chest tightness, shortness of breath, fainting, lightheadedness, nausea, vomiting, or other concerning symptoms, please report to the Emergency Department or call 911. These symptoms may be emergent, and best treated in the Emergency Department.    Follow up in 4 weeks.       I have sent a message to Dr. Robin's office to arrange a visit. Someone will contact you.     You will go to the [facility] on Saturday 5- at 11:30AM for your COVID Vaccine- questions about scheduling please call [phone].    Labs in 1 week. Call [phone] to schedule this at your convenience.     Potassium 20 mEq twice per day.     Torsemide 20 mg-    60 mg in AM (3 tabs) and 40 mg (2 tabs) at noon    Bharath MIRANDA RN  CHF Care Coordinator   [phone] Option #8  Ext. *[phone]

## 2021-05-20 NOTE — NURSING NOTE
"Chief Complaint   Patient presents with     RECHECK     Chest Xray Labs today        Initial /86 (BP Location: Right arm, Patient Position: Sitting, Cuff Size: Adult Regular)   Pulse 95   Temp 97.6  F (36.4  C) (Tympanic)   Wt 109.9 kg (242 lb 3.2 oz)   SpO2 90%   BMI 33.78 kg/m   Estimated body mass index is 33.78 kg/m  as calculated from the following:    Height as of 4/20/21: 1.803 m (5' 11\").    Weight as of this encounter: 109.9 kg (242 lb 3.2 oz).  Medication Reconciliation: complete  Joaquina Jonas LPN    "
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
## 2021-05-22 ENCOUNTER — IMMUNIZATION (OUTPATIENT)
Dept: FAMILY MEDICINE | Facility: OTHER | Age: 66
End: 2021-05-22
Attending: NURSE PRACTITIONER
Payer: COMMERCIAL

## 2021-05-22 PROCEDURE — 91300 PR COVID VAC PFIZER DIL RECON 30 MCG/0.3 ML IM: CPT

## 2021-05-25 ENCOUNTER — CARE COORDINATION (OUTPATIENT)
Dept: CARDIOLOGY | Facility: CLINIC | Age: 66
End: 2021-05-25

## 2021-05-25 ENCOUNTER — OFFICE VISIT (OUTPATIENT)
Dept: FAMILY MEDICINE | Facility: OTHER | Age: 66
End: 2021-05-25
Attending: NURSE PRACTITIONER
Payer: COMMERCIAL

## 2021-05-25 VITALS
TEMPERATURE: 97.8 F | BODY MASS INDEX: 33.47 KG/M2 | HEART RATE: 88 BPM | SYSTOLIC BLOOD PRESSURE: 96 MMHG | DIASTOLIC BLOOD PRESSURE: 62 MMHG | WEIGHT: 240 LBS | OXYGEN SATURATION: 87 %

## 2021-05-25 DIAGNOSIS — R97.20 ELEVATED PROSTATE SPECIFIC ANTIGEN (PSA): ICD-10-CM

## 2021-05-25 DIAGNOSIS — R05.9 COUGH: ICD-10-CM

## 2021-05-25 DIAGNOSIS — J20.9 ACUTE BRONCHITIS WITH SYMPTOMS GREATER THAN 10 DAYS: Primary | ICD-10-CM

## 2021-05-25 DIAGNOSIS — N40.0 ENLARGED PROSTATE: ICD-10-CM

## 2021-05-25 DIAGNOSIS — I27.21 SEVERE PULMONARY ARTERIAL SYSTOLIC HYPERTENSION (H): ICD-10-CM

## 2021-05-25 DIAGNOSIS — Z12.11 SCREENING FOR COLON CANCER: ICD-10-CM

## 2021-05-25 DIAGNOSIS — I50.813 ACUTE ON CHRONIC RIGHT HEART FAILURE (H): ICD-10-CM

## 2021-05-25 DIAGNOSIS — J43.9 PULMONARY EMPHYSEMA, UNSPECIFIED EMPHYSEMA TYPE (H): ICD-10-CM

## 2021-05-25 DIAGNOSIS — F17.200 TOBACCO USE DISORDER: ICD-10-CM

## 2021-05-25 PROCEDURE — 99215 OFFICE O/P EST HI 40 MIN: CPT | Performed by: NURSE PRACTITIONER

## 2021-05-25 PROCEDURE — G0463 HOSPITAL OUTPT CLINIC VISIT: HCPCS

## 2021-05-25 PROCEDURE — G0463 HOSPITAL OUTPT CLINIC VISIT: HCPCS | Mod: 25

## 2021-05-25 ASSESSMENT — ANXIETY QUESTIONNAIRES
GAD7 TOTAL SCORE: 11
2. NOT BEING ABLE TO STOP OR CONTROL WORRYING: NOT AT ALL
IF YOU CHECKED OFF ANY PROBLEMS ON THIS QUESTIONNAIRE, HOW DIFFICULT HAVE THESE PROBLEMS MADE IT FOR YOU TO DO YOUR WORK, TAKE CARE OF THINGS AT HOME, OR GET ALONG WITH OTHER PEOPLE: SOMEWHAT DIFFICULT
3. WORRYING TOO MUCH ABOUT DIFFERENT THINGS: SEVERAL DAYS
5. BEING SO RESTLESS THAT IT IS HARD TO SIT STILL: NEARLY EVERY DAY
7. FEELING AFRAID AS IF SOMETHING AWFUL MIGHT HAPPEN: SEVERAL DAYS
4. TROUBLE RELAXING: MORE THAN HALF THE DAYS
1. FEELING NERVOUS, ANXIOUS, OR ON EDGE: MORE THAN HALF THE DAYS
6. BECOMING EASILY ANNOYED OR IRRITABLE: MORE THAN HALF THE DAYS

## 2021-05-25 ASSESSMENT — PATIENT HEALTH QUESTIONNAIRE - PHQ9: SUM OF ALL RESPONSES TO PHQ QUESTIONS 1-9: 6

## 2021-05-25 ASSESSMENT — PAIN SCALES - GENERAL: PAINLEVEL: MODERATE PAIN (5)

## 2021-05-25 NOTE — PROGRESS NOTES
Referral forwarded from PH office. Patient has been followed in [place] by heart failure NP and notes indicate he had appt with Dr. Robin in [place] but he did not show up for the appt. The NP is requesting a virtual visit with Dr. Robin in 1-2 weeks. Patient had echo and RHC done at [facility].     5/25 - Called and left message for patient to return call.  Will hold appt for next week for 24 hours.

## 2021-05-25 NOTE — LETTER
" My COPD Action Plan     Name: Joey Irene    YOB: 1955   Date: 5/18/2021    My doctor: Amparo Alvares NP   My clinic: [facility] [place]    [address]  [place] [address]  [phone]  My Controller Medicine: { :587542}   Dose: ***     My Rescue Medicine: { :718701}   Dose: ***     My Flare Up Medicine: { :155362}   Dose: ***     My COPD Severity: { :522000}      Use of Oxygen: { :176484::\"Oxygen Not Prescribed \"}     Make sure you've had your pneumonia   vaccines.          GREEN ZONE       Doing well today      Usual level of activity and exercise    Usual amount of cough and mucus    No shortness of breath    Usual level of health (thinking clearly, sleeping well, feel like eating) Actions:      Take daily medicines    Use oxygen as prescribed    Follow regular exercise and diet plan    Avoid cigarette smoke and other irritants that harm the lungs           YELLOW ZONE          Having a bad day or flare up      Short of breath more than usual    A lot more sputum (mucus) than usual    Sputum looks yellow, green, tan, brown or bloody    More coughing or wheezing    Fever or chills    Less energy; trouble completing activities    Trouble thinking or focusing    Using quick relief inhaler or nebulizer more often    Poor sleep; symptoms wake me up    Do not feel like eating Actions:      Get plenty of rest    Take daily medicines    Use quick relief inhaler every *** hours    If you use oxygen, call you doctor to see if you should adjust your oxygen    Do breathing exercises or other things to help you relax    Let a loved one, friend or neighbor know you are feeling worse    Call your care team if you have 2 or more symptoms.  Start taking steroids or antibiotics if directed by your care team           RED ZONE       Need medical care now      Severe shortness of breath (feel you can't breathe)    Fever, chills    Not enough breath to do any activity    Trouble coughing up " mucus, walking or talking    Blood in mucus    Frequent coughing   Rescue medicines are not working    Not able to sleep because of breathing    Feel confused or drowsy    Chest pain    Actions:      Call your health care team.  If you cannot reach your care team, call 911 or go to the emergency room.        Annual Reminders:  Meet with Care Team, Flu Shot every Fall  [facility] [place], [place] - [address]

## 2021-05-25 NOTE — NURSING NOTE
"Chief Complaint   Patient presents with     ER F/U     rib pain         Initial BP 96/62 (BP Location: Left arm, Patient Position: Chair, Cuff Size: Adult Large)   Pulse 88   Temp 97.8  F (36.6  C) (Tympanic)   Wt 108.9 kg (240 lb)   SpO2 91%   BMI 33.47 kg/m   Estimated body mass index is 33.47 kg/m  as calculated from the following:    Height as of 4/20/21: 1.803 m (5' 11\").    Weight as of this encounter: 108.9 kg (240 lb).  Medication Reconciliation: complete  Rose Her LPN  "

## 2021-05-25 NOTE — PROGRESS NOTES
"Referral- Heart Failure    REFERRING CLINIC INFORMATION:    Referring Info: Marleny LEBRON CNP / [facility]  Provider Contact Info: Phone: [phone]; Fax: [phone]    REFERRING PATIENT INFORMATION:    Patient Preferred Phone Number:  [phone]   Consent to Communicate: None found  Insurance Obtained: Yes    REFERRAL Info/Hx:       May 25, 2021  Per note in staff message \"We have a patient here in [place] seeing BERNARDINO Bautista CNP for heart failure, and he was supposed to have an appointment in [place] with Dr. Robin but he no showed. Marleny is wondering if there is a way to have this pt seen virtually. She would like Dr. Robin's opinion on different medications.  Sending to HF nurse to review.     Would like to get a appt within 2 weeks if possible    PLAN:       Video appt with Bacilio on 6/3         ATTEMPTS TO CONTACT:  1. May 25, 2021 none made   2. May 26, 2021 patient contacted and appt made with Dr. Robin on 6/3    Osmar Black CMA  Heart Failure, Advanced Heart Failure & CORE  Referral Specialist &     [facility]  Cardiology  Office: [phone]  [phone]             "

## 2021-05-26 ENCOUNTER — MEDICAL CORRESPONDENCE (OUTPATIENT)
Dept: HEALTH INFORMATION MANAGEMENT | Facility: CLINIC | Age: 66
End: 2021-05-26

## 2021-05-26 ASSESSMENT — ANXIETY QUESTIONNAIRES: GAD7 TOTAL SCORE: 11

## 2021-05-28 DIAGNOSIS — I50.30 DIASTOLIC HEART FAILURE, UNSPECIFIED HF CHRONICITY (H): Primary | ICD-10-CM

## 2021-06-02 ENCOUNTER — MEDICAL CORRESPONDENCE (OUTPATIENT)
Dept: HEALTH INFORMATION MANAGEMENT | Facility: CLINIC | Age: 66
End: 2021-06-02
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
## 2021-06-03 ENCOUNTER — VIRTUAL VISIT (OUTPATIENT)
Dept: CARDIOLOGY | Facility: CLINIC | Age: 66
End: 2021-06-03
Attending: NURSE PRACTITIONER
Payer: COMMERCIAL

## 2021-06-03 DIAGNOSIS — Z98.890 STATUS POST CORONARY ANGIOGRAM: ICD-10-CM

## 2021-06-03 DIAGNOSIS — I27.21 PULMONARY ARTERIAL HYPERTENSION (H): ICD-10-CM

## 2021-06-03 DIAGNOSIS — Z98.890: ICD-10-CM

## 2021-06-03 DIAGNOSIS — I51.7 RIGHT VENTRICULAR DILATION: ICD-10-CM

## 2021-06-03 DIAGNOSIS — R06.09 DOE (DYSPNEA ON EXERTION): ICD-10-CM

## 2021-06-03 PROCEDURE — 99215 OFFICE O/P EST HI 40 MIN: CPT | Mod: 95 | Performed by: INTERNAL MEDICINE

## 2021-06-03 NOTE — LETTER
6/3/2021      RE: Joey Irene  [address] [place] [address]       Dear Colleague,    Thank you for the opportunity to participate in the care of your patient, Joey Irene, at the [facility] [place] [facility]. Please see a copy of my visit note below.    Joey is a 65 year old who is being evaluated via a billable telephone visit.        Service Date: 2021    Marleny Bauer, APRN, CNP  [facility]  [address] [place]  [address]    RE:  Joey Irene  MRN:  [id number]  :  1955    Dear Ms. Bauer:    We had the pleasure of seeing Mr. Irene in consultation in our [facility] Pulmonary Hypertension Clinic.  Although you are familiar with his history, please allow me to summarize it for the purpose of our records.    Mr. Irene is a very pleasant 65-year-old male with past medical history significant for systemic hypertension and COPD.  He presented with worsening exertional shortness of breath in 2020.  He underwent a very thorough and detailed workup, which revealed pulmonary hypertension associated with chronic obstructive lung disease.  In light of this, he was referred to us for recommendations regarding further management of his pulmonary hypertension.    We did a telephonic consultation today as the patient was not able to come to the [facility] for an in-person visit.  We reviewed his records and talked to him over the phone.    Mr. Irene's main symptom at present is exertional shortness of breath.  He does not have any shortness of breath at rest.  However, he cannot walk to his mailbox without stopping due to shortness of breath.  He lives alone and is independent for his ADLs.  I would currently characterize him as NYHA functional class III.  He denies having any exertional chest pressure or pain.  He does have exertional presyncope but no syncope.  He had  lower extremity swelling this past winter, which has now improved after being on diuretics.  He was hospitalized recently for noncardiac reasons locally in [place].  He states that his shortness of breath has been ongoing for the last several years, but more so in the last year.  He has been diagnosed with chronic obstructive pulmonary disease for the last 3-5 years and he has been on inhalers for this.    He has had an extensive cardiac workup to evaluate his exertional shortness of breath.  His EKG showed sinus rhythm, premature ventricular complex, possible lateral and inferior infarct.  His most recent echocardiogram showed moderate right ventricular dilatation with moderately reduced right ventricular systolic function.  The interventricular septum was flattened consistent with right ventricular pressure and volume overload.  His right atrium was enlarged.  His PA pressures could not be accurately estimated.  His IVC was not well visualized.  His left ventricle was normal in size and systolic function.  His estimated ejection fraction was 55%-60%.  He had grade 1 diastolic dysfunction.  Mild mitral insufficiency.  He had mild aortic sclerosis without stenosis.  He had no pericardial effusion.    He had a myocardial perfusion imaging in the fall of 2020 that showed no inducible or reversible ischemia.    He most recently had a coronary angiogram that showed very minimal coronary artery disease.    He had a right heart catheterization that showed an RA pressure of 9, RV of 68/12, PA of 68/27 with a mean of 41, pulmonary capillary wedge pressure of 14, PA saturation of 59.80%, systemic saturation of 89%, estimated [place] cardiac output of 4.5 L/minute, index of 2.02 L/minute/m2, and a PVR of 5.95 Wood units.  Systemic blood pressure was 82/64 with a mean of 72 mmHg.  He did not have acute vasodilator testing.    He had a CT scan of the chest, abdomen and pelvis.  This showed severe emphysematous changes in both  lungs.  He had enlarged pulmonary arteries.  He had no pulmonary embolism.    He had a V/Q scan that was negative for pulmonary embolism.    He was recommended to have a pulmonary function test, which he has not done so far.  His DIVINE was negative.  His HIV and hepatitis serology were negative.  His NT-proBNP has been significantly elevated.  TSH was within normal limits.  He has not had rheumatoid factor checked.    Based on this very thorough workup, he was diagnosed with precapillary pulmonary hypertension in the setting of severe COPD.  He has been on supplemental oxygen 2 liters per minute for the last 1 week.  He continues to smoke but has significantly reduced to 1 cigarette a day.  He has been on diuretics.  He is also on long-acting bronchodilator and steroid inhaler for his COPD for a while.    PAST MEDICAL HISTORY:    1.  COPD.  2.  Hypertension.    PAST SURGICAL HISTORY:    1.  Cervical spine fusion surgery.  2.  Bilateral inguinal hernia repair.  3.  Right hammertoe repair.    4.  Joint replacement.    MEDICATIONS:    Current Outpatient Medications   Medication Sig     acetaminophen (TYLENOL) 500 MG tablet Take 1 tablet by mouth     albuterol (PROAIR HFA/PROVENTIL HFA/VENTOLIN HFA) 108 (90 Base) MCG/ACT inhaler Inhale 1-2 puffs into the lungs every 4 hours as needed for shortness of breath / dyspnea or wheezing     ARIPiprazole (ABILIFY) 10 MG tablet      aspirin (ASA) 81 MG EC tablet Take 1 tablet (81 mg) by mouth daily     cetirizine (ZYRTEC) 10 MG tablet Take 1 tablet (10 mg) by mouth daily     Cholecalciferol (D 1000) 25 MCG (1000 UT) CAPS      cyclobenzaprine (FLEXERIL) 10 MG tablet TAKE 1/2-1 TABLET BY MOUTH 3 TIMES A DAY AS NEEDED FOR MUSCLE SPASMS     DULoxetine (CYMBALTA) 30 MG capsule      EPINEPHrine (ANY BX GENERIC EQUIV) 0.3 MG/0.3ML injection 2-pack Inject 0.3 mLs (0.3 mg) into the muscle as needed for anaphylaxis     fluticasone (FLONASE) 50 MCG/ACT nasal spray Spray 2 sprays in nostril      Fluticasone-Umeclidin-Vilanterol (TRELEGY ELLIPTA) 100-62.5-25 MCG/INH oral inhaler Inhale 1 puff into the lungs daily     gabapentin (NEURONTIN) 300 MG capsule      guaiFENesin (MUCINEX) 600 MG 12 hr tablet Take 1,200 mg by mouth     hydrOXYzine (VISTARIL) 50 MG capsule      montelukast (SINGULAIR) 10 MG tablet TAKE ONE TABLET BY MOUTH AT BEDTIME     naproxen (NAPROSYN) 500 MG tablet Take 1 tablet by mouth twice daily with food     nicotine (NICODERM CQ) 21 MG/24HR 24 hr patch Place 1 patch onto the skin every 24 hours     omeprazole (PRILOSEC) 20 MG DR capsule TAKE ONE CAPSULE BY MOUTH ONCE DAILY BEFORE MEALS. DO NOT CRUSH.     potassium chloride ER (KLOR-CON M) 20 MEQ CR tablet Take 1 tablet (20 mEq) by mouth 2 times daily     SYMBICORT 80-4.5 MCG/ACT Inhaler INHALE 2 PUFFS INTO THE LUNGS TWO TIMES A DAY     torsemide (DEMADEX) 20 MG tablet Take 3 tabs (60 mg) in the AM and 2 tabs (40 mg) at noon.     traZODone (DESYREL) 50 MG tablet Take 100 mg by mouth     vitamin C (ASCORBIC ACID) 500 MG tablet      zolpidem (AMBIEN) 10 MG tablet      Current Facility-Administered Medications   Medication     gentamicin (GARAMYCIN) injection 120 mg       REVIEW OF SYSTEMS:  A detailed 10-point review of systems obtained as described in History of Present Illness.  All other systems reviewed and were negative.    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  He lives alone.  His kids are grown.  He has significantly cut down on his smoking.  He smokes 1 cigarette a day.  He does not drink alcohol.  He does not use any recreational drugs.  He has 2 sons and a daughter.    FAMILY HISTORY:  No significant family history of premature coronary artery disease, sudden cardiac death or pulmonary hypertension.    PHYSICAL EXAMINATION:  A detailed exam could not be done due to the telephonic nature of the visit.    LABORATORY DATA:  His most recent chemistry showed a  sodium of 139, potassium 3.6, chloride of 103, bicarbonate of 32, BUN of 19, creatinine of 1.07, calcium of 9, and anion gap of 4.  His NT-proBNP was 1820.  CBC showed a hemoglobin of 19.6, hematocrit of 57.4, WBC of 10.4 and a platelet count of 284.    ASSESSMENT AND PLAN:      In summary, Mr. Joey Irene is a 65-year-old male with past medical history significant for systemic hypertension and COPD who was recently diagnosed with pulmonary hypertension.  He was referred to us for review of his records and recommendation for further management of his pulmonary hypertension.    Based on his symptoms, history and the evaluations he has so far had, I completely agree that he has severe precapillary pulmonary hypertension in the setting of severe emphysema.  He does not have any evidence of chronic thromboembolic disease.  His left sided filling pressures were normal excluding pulmonary venous hypertension.  He has no other risk factors for pulmonary arterial hypertension.  He also has moderate RV dysfunction with right heart failure.    Unfortunately, as you know, the role of pulmonary vasodilator therapy in patients with pulmonary hypertension in the setting of severe COPD is unclear.  Currently, this would be an off-label use.  The first line of management would be conservative with supplemental oxygen, diuretics and treatment of underlying COPD.  He has been on oxygen 2 liters for the last 1 week.  I would recommend him having a 6-minute walk test with O2 titration to determine how much of supplemental oxygen he needs.  He is currently on torsemide 60 mg in the morning and 40 mg in the evening.  This should be titrated as needed.  I would also suggest starting him on digoxin 125 mcg daily without a loading dose for inotropic support.  Serum digoxin level should be monitored.  He is currently being treated for COPD by his primary care physician with long-acting bronchodilator and steroid inhaler.  I also discussed  with him the importance of low salt diet and fluid restriction.  Pulmonary rehab therapy has been shown to improve exercise capacity and quality of life in these patients.    We are in the process of starting a phase 2, clinical phase 3 clinical trial to study the safety and efficacy of inhaled treprostinil in patients with COPD.  We will be starting this trial this summer.  I briefly discussed this option with him and he is interested.  We will call him once the trial is up and running.    If he were to deteriorate further despite conservative management, the next best therapy that could prolong survival and improve his symptoms would be lung transplantation.  He is currently smoking.  He is also on the heavier side.  His BMI is 32.  The BMI cutoff for the consideration of lung transplant at [facility] is 30 or below.  He also has to quit smoking for a minimum of 6 months.  I explained the importance of this as he may need lung transplantation in the near future.    Mr. Irene states that it will be difficult for him to come to the [facility] for followup.  As he is currently on conservative therapy, I have recommended him to continue to follow up with Ms. Marleny Bauer locally in [place].  We will be happy to see him on an as-needed basis.  We will be contacting him once we have the inhaled treprostinil clinical trial up and running at the [facility].    It was a pleasure to talk to Mr. Irene in our Pulmonary Hypertension Clinic at the [facility].  We thank you for involving us in the care.  Please do not hesitate to call us in the interim if you have any further questions.    Total time today was 60 minutes reviewing notes, imaging, labs, patient visit, orders and documentation         Sincerely,  Lobito Robin MD   [facility]  Heart Failure, Transplant, and Mechanical Circulatory Support Cardiology   Cardiovascular  Division  [facility]   [phone]        cc:  Amparo Alvares, [facility]  [address] [place]  [address]          D: 2021   T: 2021   MT: hu    Name:     AGUEDA BENITEZ  MRN:      [id number]        Account:      [id number]   :      1955           Service Date: 2021       Document: [id number]

## 2021-06-03 NOTE — PATIENT INSTRUCTIONS
"You were seen today in the Cardiovascular Clinic at the [facility].       Cardiology Providers you saw during your visit: Dr. Robin      Medication Changes:   1.       Follow up Appointment Information:  1.       Results:          [address] [facility]  Clinic on 3rd Floor   [place] [id number]        Thank you for allowing us to be a part of your care here at the [facility]      If you have questions or concerns please contact us at:      Rosenda العراقي RN BSN   Cardiology Care Coordinator  [facility]   Questions and [phone]   First press #1 for the TIMPIK and then press #4 to \"send a message to your care team\"     "

## 2021-06-03 NOTE — LETTER
6/3/2021      RE: Joey Irene  [id number] [facility] [address]  [place] [address]       Service Date: 2021    Marleny Bauer, APRN, CNP  [facility]  [address] [place]  [address]    RE:  Joey Irene  MRN:  [id number]  :  1955    Dear Ms. Bauer:    We had the pleasure of seeing Mr. Irene in consultation in our [facility] Pulmonary Hypertension Clinic.  Although you are familiar with his history, please allow me to summarize it for the purpose of our records.    Mr. Irene is a very pleasant 65-year-old male with past medical history significant for systemic hypertension and COPD.  He presented with worsening exertional shortness of breath in 2020.  He underwent a very thorough and detailed workup, which revealed pulmonary hypertension associated with chronic obstructive lung disease.  In light of this, he was referred to us for recommendations regarding further management of his pulmonary hypertension.    We did a telephonic consultation today as the patient was not able to come to the [place] [facility] for an in-person visit.  We reviewed his records and talked to him over the phone.    Mr. Irene's main symptom at present is exertional shortness of breath.  He does not have any shortness of breath at rest.  However, he cannot walk to his mailbox without stopping due to shortness of breath.  He lives alone and is independent for his ADLs.  I would currently characterize him as NYHA functional class III.  He denies having any exertional chest pressure or pain.  He does have exertional presyncope but no syncope.  He had lower extremity swelling this past winter, which has now improved after being on diuretics.  He was hospitalized recently for noncardiac reasons locally in [place].  He states that his shortness of breath has been ongoing for the last several years, but more so in the last year.  He has been diagnosed with chronic obstructive pulmonary disease for  the last 3-5 years and he has been on inhalers for this.    He has had an extensive cardiac workup to evaluate his exertional shortness of breath.  His EKG showed sinus rhythm, premature ventricular complex, possible lateral and inferior infarct.  His most recent echocardiogram showed moderate right ventricular dilatation with moderately reduced right ventricular systolic function.  The interventricular septum was flattened consistent with right ventricular pressure and volume overload.  His right atrium was enlarged.  His PA pressures could not be accurately estimated.  His IVC was not well visualized.  His left ventricle was normal in size and systolic function.  His estimated ejection fraction was 55%-60%.  He had grade 1 diastolic dysfunction.  Mild mitral insufficiency.  He had mild aortic sclerosis without stenosis.  He had no pericardial effusion.    He had a myocardial perfusion imaging in the fall of 2020 that showed no inducible or reversible ischemia.    He most recently had a coronary angiogram that showed very minimal coronary artery disease.    He had a right heart catheterization that showed an RA pressure of 9, RV of 68/12, PA of 68/27 with a mean of 41, pulmonary capillary wedge pressure of 14, PA saturation of 59.80%, systemic saturation of 89%, estimated [place] cardiac output of 4.5 L/minute, index of 2.02 L/minute/m2, and a PVR of 5.95 Wood units.  Systemic blood pressure was 82/64 with a mean of 72 mmHg.  He did not have acute vasodilator testing.    He had a CT scan of the chest, abdomen and pelvis.  This showed severe emphysematous changes in both lungs.  He had enlarged pulmonary arteries.  He had no pulmonary embolism.    He had a V/Q scan that was negative for pulmonary embolism.    He was recommended to have a pulmonary function test, which he has not done so far.  His DIVINE was negative.  His HIV and hepatitis serology were negative.  His NT-proBNP has been significantly elevated.  TSH was  within normal limits.  He has not had rheumatoid factor checked.    Based on this very thorough workup, he was diagnosed with precapillary pulmonary hypertension in the setting of severe COPD.  He has been on supplemental oxygen 2 liters per minute for the last 1 week.  He continues to smoke but has significantly reduced to 1 cigarette a day.  He has been on diuretics.  He is also on long-acting bronchodilator and steroid inhaler for his COPD for a while.    PAST MEDICAL HISTORY:    1.  COPD.  2.  Hypertension.    PAST SURGICAL HISTORY:    1.  Cervical spine fusion surgery.  2.  Bilateral inguinal hernia repair.  3.  Right hammertoe repair.    4.  Joint replacement.    MEDICATIONS:    Current Outpatient Medications   Medication Sig     acetaminophen (TYLENOL) 500 MG tablet Take 1 tablet by mouth     albuterol (PROAIR HFA/PROVENTIL HFA/VENTOLIN HFA) 108 (90 Base) MCG/ACT inhaler Inhale 1-2 puffs into the lungs every 4 hours as needed for shortness of breath / dyspnea or wheezing     ARIPiprazole (ABILIFY) 10 MG tablet      aspirin (ASA) 81 MG EC tablet Take 1 tablet (81 mg) by mouth daily     cetirizine (ZYRTEC) 10 MG tablet Take 1 tablet (10 mg) by mouth daily     Cholecalciferol (D 1000) 25 MCG (1000 UT) CAPS      cyclobenzaprine (FLEXERIL) 10 MG tablet TAKE 1/2-1 TABLET BY MOUTH 3 TIMES A DAY AS NEEDED FOR MUSCLE SPASMS     DULoxetine (CYMBALTA) 30 MG capsule      EPINEPHrine (ANY BX GENERIC EQUIV) 0.3 MG/0.3ML injection 2-pack Inject 0.3 mLs (0.3 mg) into the muscle as needed for anaphylaxis     fluticasone (FLONASE) 50 MCG/ACT nasal spray Spray 2 sprays in nostril     Fluticasone-Umeclidin-Vilanterol (TRELEGY ELLIPTA) 100-62.5-25 MCG/INH oral inhaler Inhale 1 puff into the lungs daily     gabapentin (NEURONTIN) 300 MG capsule      guaiFENesin (MUCINEX) 600 MG 12 hr tablet Take 1,200 mg by mouth     hydrOXYzine (VISTARIL) 50 MG capsule      montelukast (SINGULAIR) 10 MG tablet TAKE ONE TABLET BY MOUTH AT BEDTIME      naproxen (NAPROSYN) 500 MG tablet Take 1 tablet by mouth twice daily with food     nicotine (NICODERM CQ) 21 MG/24HR 24 hr patch Place 1 patch onto the skin every 24 hours     omeprazole (PRILOSEC) 20 MG DR capsule TAKE ONE CAPSULE BY MOUTH ONCE DAILY BEFORE MEALS. DO NOT CRUSH.     potassium chloride ER (KLOR-CON M) 20 MEQ CR tablet Take 1 tablet (20 mEq) by mouth 2 times daily     SYMBICORT 80-4.5 MCG/ACT Inhaler INHALE 2 PUFFS INTO THE LUNGS TWO TIMES A DAY     torsemide (DEMADEX) 20 MG tablet Take 3 tabs (60 mg) in the AM and 2 tabs (40 mg) at noon.     traZODone (DESYREL) 50 MG tablet Take 100 mg by mouth     vitamin C (ASCORBIC ACID) 500 MG tablet      zolpidem (AMBIEN) 10 MG tablet      Current Facility-Administered Medications   Medication     gentamicin (GARAMYCIN) injection 120 mg       REVIEW OF SYSTEMS:  A detailed 10-point review of systems obtained as described in History of Present Illness.  All other systems reviewed and were negative.    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  He lives alone.  His kids are grown.  He has significantly cut down on his smoking.  He smokes 1 cigarette a day.  He does not drink alcohol.  He does not use any recreational drugs.  He has 2 sons and a daughter.    FAMILY HISTORY:  No significant family history of premature coronary artery disease, sudden cardiac death or pulmonary hypertension.    PHYSICAL EXAMINATION:  A detailed exam could not be done due to the telephonic nature of the visit.    LABORATORY DATA:  His most recent chemistry showed a sodium of 139, potassium 3.6, chloride of 103, bicarbonate of 32, BUN of 19, creatinine of 1.07, calcium of 9, and anion gap of 4.  His NT-proBNP was 1820.  CBC showed a hemoglobin of 19.6, hematocrit of 57.4, WBC of 10.4 and a platelet count of 284.    ASSESSMENT AND PLAN:      In summary, Mr. Joey Irene is a 65-year-old male with past medical  history significant for systemic hypertension and COPD who was recently diagnosed with pulmonary hypertension.  He was referred to us for review of his records and recommendation for further management of his pulmonary hypertension.    Based on his symptoms, history and the evaluations he has so far had, I completely agree that he has severe precapillary pulmonary hypertension in the setting of severe emphysema.  He does not have any evidence of chronic thromboembolic disease.  His left sided filling pressures were normal excluding pulmonary venous hypertension.  He has no other risk factors for pulmonary arterial hypertension.  He also has moderate RV dysfunction with right heart failure.    Unfortunately, as you know, the role of pulmonary vasodilator therapy in patients with pulmonary hypertension in the setting of severe COPD is unclear.  Currently, this would be an off-label use.  The first line of management would be conservative with supplemental oxygen, diuretics and treatment of underlying COPD.  He has been on oxygen 2 liters for the last 1 week.  I would recommend him having a 6-minute walk test with O2 titration to determine how much of supplemental oxygen he needs.  He is currently on torsemide 60 mg in the morning and 40 mg in the evening.  This should be titrated as needed.  I would also suggest starting him on digoxin 125 mcg daily without a loading dose for inotropic support.  Serum digoxin level should be monitored.  He is currently being treated for COPD by his primary care physician with long-acting bronchodilator and steroid inhaler.  I also discussed with him the importance of low salt diet and fluid restriction.  Pulmonary rehab therapy has been shown to improve exercise capacity and quality of life in these patients.    We are in the process of starting a phase 2, clinical phase 3 clinical trial to study the safety and efficacy of inhaled treprostinil in patients with COPD.  We will be  starting this trial this summer.  I briefly discussed this option with him and he is interested.  We will call him once the trial is up and running.    If he were to deteriorate further despite conservative management, the next best therapy that could prolong survival and improve his symptoms would be lung transplantation.  He is currently smoking.  He is also on the heavier side.  His BMI is 32.  The BMI cutoff for the consideration of lung transplant at [facility] is 30 or below.  He also has to quit smoking for a minimum of 6 months.  I explained the importance of this as he may need lung transplantation in the near future.    Mr. Benitez states that it will be difficult for him to come to the [place] for followup.  As he is currently on conservative therapy, I have recommended him to continue to follow up with Ms. Marleny Bauer locally in [place].  We will be happy to see him on an as-needed basis.  We will be contacting him once we have the inhaled treprostinil clinical trial up and running at the [place].    It was a pleasure to talk to Mr. Benitez in our Pulmonary Hypertension Clinic at the [facility].  We thank you for involving us in the care.  Please do not hesitate to call us in the interim if you have any further questions.    Total time today was 60 minutes reviewing notes, imaging, labs, patient visit, orders and documentation         Sincerely,  Lobito Robin MD   Center for Pulmonary Hypertension  Heart Failure, Transplant, and Mechanical Circulatory Support Cardiology   Cardiovascular Division  [facility]   [phone]        cc:  Amparo Alvares [address]  [address]      Lobito Robin MD        D: 2021   T: 2021   MT: hu    Name:     AGUEDA BENITEZ  MRN:      [id number]        Account:      [id number]   :      1955            Service Date: 06/03/2021       Document: [id number]        Please do not hesitate to contact me if you have any questions/concerns.     Sincerely,     Lobito Robin MD

## 2021-06-03 NOTE — PROGRESS NOTES
Joey is a 65 year old who is being evaluated via a billable telephone visit.      What phone number would you like to be contacted at? [phone]  How would you like to obtain your AVS? Mail a copy    Vitals - Patient Reported  Weight (Patient Reported): 108.9 kg (240 lb)  Height (Patient Reported): 182.9 cm (6')  BMI (Based on Pt Reported Ht/Wt): 32.55  Pain Score: No Pain (0)(Pt hasSOB)      Vitals were taken and medications where reconciled.    Ran Grey, EMT  8:40 AM

## 2021-06-04 NOTE — PROGRESS NOTES
Service Date: 2021    Marleny Bauer, APRN, CNP  M [address] [place]  [address]    RE:  Joey Irene  MRN:  [id number]  :  1955    Dear Ms. Bauer:    We had the pleasure of seeing Mr. Irene in consultation in our [facility] Pulmonary Hypertension Clinic.  Although you are familiar with his history, please allow me to summarize it for the purpose of our records.    Mr. Irene is a very pleasant 65-year-old male with past medical history significant for systemic hypertension and COPD.  He presented with worsening exertional shortness of breath in 2020.  He underwent a very thorough and detailed workup, which revealed pulmonary hypertension associated with chronic obstructive lung disease.  In light of this, he was referred to us for recommendations regarding further management of his pulmonary hypertension.    We did a telephonic consultation today as the patient was not able to come to the [facility] for an in-person visit.  We reviewed his records and talked to him over the phone.    Mr. Irene's main symptom at present is exertional shortness of breath.  He does not have any shortness of breath at rest.  However, he cannot walk to his mailbox without stopping due to shortness of breath.  He lives alone and is independent for his ADLs.  I would currently characterize him as NYHA functional class III.  He denies having any exertional chest pressure or pain.  He does have exertional presyncope but no syncope.  He had lower extremity swelling this past winter, which has now improved after being on diuretics.  He was hospitalized recently for noncardiac reasons locally in [place].  He states that his shortness of breath has been ongoing for the last several years, but more so in the last year.  He has been diagnosed with chronic obstructive pulmonary disease for the last 3-5 years and he has been on inhalers for this.    He has had an  extensive cardiac workup to evaluate his exertional shortness of breath.  His EKG showed sinus rhythm, premature ventricular complex, possible lateral and inferior infarct.  His most recent echocardiogram showed moderate right ventricular dilatation with moderately reduced right ventricular systolic function.  The interventricular septum was flattened consistent with right ventricular pressure and volume overload.  His right atrium was enlarged.  His PA pressures could not be accurately estimated.  His IVC was not well visualized.  His left ventricle was normal in size and systolic function.  His estimated ejection fraction was 55%-60%.  He had grade 1 diastolic dysfunction.  Mild mitral insufficiency.  He had mild aortic sclerosis without stenosis.  He had no pericardial effusion.    He had a myocardial perfusion imaging in the fall of 2020 that showed no inducible or reversible ischemia.    He most recently had a coronary angiogram that showed very minimal coronary artery disease.    He had a right heart catheterization that showed an RA pressure of 9, RV of 68/12, PA of 68/27 with a mean of 41, pulmonary capillary wedge pressure of 14, PA saturation of 59.80%, systemic saturation of 89%, estimated [place] cardiac output of 4.5 L/minute, index of 2.02 L/minute/m2, and a PVR of 5.95 Wood units.  Systemic blood pressure was 82/64 with a mean of 72 mmHg.  He did not have acute vasodilator testing.    He had a CT scan of the chest, abdomen and pelvis.  This showed severe emphysematous changes in both lungs.  He had enlarged pulmonary arteries.  He had no pulmonary embolism.    He had a V/Q scan that was negative for pulmonary embolism.    He was recommended to have a pulmonary function test, which he has not done so far.  His DIVINE was negative.  His HIV and hepatitis serology were negative.  His NT-proBNP has been significantly elevated.  TSH was within normal limits.  He has not had rheumatoid factor checked.    Based on  this very thorough workup, he was diagnosed with precapillary pulmonary hypertension in the setting of severe COPD.  He has been on supplemental oxygen 2 liters per minute for the last 1 week.  He continues to smoke but has significantly reduced to 1 cigarette a day.  He has been on diuretics.  He is also on long-acting bronchodilator and steroid inhaler for his COPD for a while.    PAST MEDICAL HISTORY:    1.  COPD.  2.  Hypertension.    PAST SURGICAL HISTORY:    1.  Cervical spine fusion surgery.  2.  Bilateral inguinal hernia repair.  3.  Right hammertoe repair.    4.  Joint replacement.    MEDICATIONS:    Current Outpatient Medications   Medication Sig     acetaminophen (TYLENOL) 500 MG tablet Take 1 tablet by mouth     albuterol (PROAIR HFA/PROVENTIL HFA/VENTOLIN HFA) 108 (90 Base) MCG/ACT inhaler Inhale 1-2 puffs into the lungs every 4 hours as needed for shortness of breath / dyspnea or wheezing     ARIPiprazole (ABILIFY) 10 MG tablet      aspirin (ASA) 81 MG EC tablet Take 1 tablet (81 mg) by mouth daily     cetirizine (ZYRTEC) 10 MG tablet Take 1 tablet (10 mg) by mouth daily     Cholecalciferol (D 1000) 25 MCG (1000 UT) CAPS      cyclobenzaprine (FLEXERIL) 10 MG tablet TAKE 1/2-1 TABLET BY MOUTH 3 TIMES A DAY AS NEEDED FOR MUSCLE SPASMS     DULoxetine (CYMBALTA) 30 MG capsule      EPINEPHrine (ANY BX GENERIC EQUIV) 0.3 MG/0.3ML injection 2-pack Inject 0.3 mLs (0.3 mg) into the muscle as needed for anaphylaxis     fluticasone (FLONASE) 50 MCG/ACT nasal spray Spray 2 sprays in nostril     Fluticasone-Umeclidin-Vilanterol (TRELEGY ELLIPTA) 100-62.5-25 MCG/INH oral inhaler Inhale 1 puff into the lungs daily     gabapentin (NEURONTIN) 300 MG capsule      guaiFENesin (MUCINEX) 600 MG 12 hr tablet Take 1,200 mg by mouth     hydrOXYzine (VISTARIL) 50 MG capsule      montelukast (SINGULAIR) 10 MG tablet TAKE ONE TABLET BY MOUTH AT BEDTIME     naproxen (NAPROSYN) 500 MG tablet Take 1 tablet by mouth twice daily with  food     nicotine (NICODERM CQ) 21 MG/24HR 24 hr patch Place 1 patch onto the skin every 24 hours     omeprazole (PRILOSEC) 20 MG DR capsule TAKE ONE CAPSULE BY MOUTH ONCE DAILY BEFORE MEALS. DO NOT CRUSH.     potassium chloride ER (KLOR-CON M) 20 MEQ CR tablet Take 1 tablet (20 mEq) by mouth 2 times daily     SYMBICORT 80-4.5 MCG/ACT Inhaler INHALE 2 PUFFS INTO THE LUNGS TWO TIMES A DAY     torsemide (DEMADEX) 20 MG tablet Take 3 tabs (60 mg) in the AM and 2 tabs (40 mg) at noon.     traZODone (DESYREL) 50 MG tablet Take 100 mg by mouth     vitamin C (ASCORBIC ACID) 500 MG tablet      zolpidem (AMBIEN) 10 MG tablet      Current Facility-Administered Medications   Medication     gentamicin (GARAMYCIN) injection 120 mg       REVIEW OF SYSTEMS:  A detailed 10-point review of systems obtained as described in History of Present Illness.  All other systems reviewed and were negative.    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  He lives alone.  His kids are grown.  He has significantly cut down on his smoking.  He smokes 1 cigarette a day.  He does not drink alcohol.  He does not use any recreational drugs.  He has 2 sons and a daughter.    FAMILY HISTORY:  No significant family history of premature coronary artery disease, sudden cardiac death or pulmonary hypertension.    PHYSICAL EXAMINATION:  A detailed exam could not be done due to the telephonic nature of the visit.    LABORATORY DATA:  His most recent chemistry showed a sodium of 139, potassium 3.6, chloride of 103, bicarbonate of 32, BUN of 19, creatinine of 1.07, calcium of 9, and anion gap of 4.  His NT-proBNP was 1820.  CBC showed a hemoglobin of 19.6, hematocrit of 57.4, WBC of 10.4 and a platelet count of 284.    ASSESSMENT AND PLAN:      In summary, Mr. Joey Irene is a 65-year-old male with past medical history significant for systemic hypertension and COPD who was recently diagnosed  with pulmonary hypertension.  He was referred to us for review of his records and recommendation for further management of his pulmonary hypertension.    Based on his symptoms, history and the evaluations he has so far had, I completely agree that he has severe precapillary pulmonary hypertension in the setting of severe emphysema.  He does not have any evidence of chronic thromboembolic disease.  His left sided filling pressures were normal excluding pulmonary venous hypertension.  He has no other risk factors for pulmonary arterial hypertension.  He also has moderate RV dysfunction with right heart failure.    Unfortunately, as you know, the role of pulmonary vasodilator therapy in patients with pulmonary hypertension in the setting of severe COPD is unclear.  Currently, this would be an off-label use.  The first line of management would be conservative with supplemental oxygen, diuretics and treatment of underlying COPD.  He has been on oxygen 2 liters for the last 1 week.  I would recommend him having a 6-minute walk test with O2 titration to determine how much of supplemental oxygen he needs.  He is currently on torsemide 60 mg in the morning and 40 mg in the evening.  This should be titrated as needed.  I would also suggest starting him on digoxin 125 mcg daily without a loading dose for inotropic support.  Serum digoxin level should be monitored.  He is currently being treated for COPD by his primary care physician with long-acting bronchodilator and steroid inhaler.  I also discussed with him the importance of low salt diet and fluid restriction.  Pulmonary rehab therapy has been shown to improve exercise capacity and quality of life in these patients.    We are in the process of starting a phase 2, clinical phase 3 clinical trial to study the safety and efficacy of inhaled treprostinil in patients with COPD.  We will be starting this trial this summer.  I briefly discussed this option with him and he is  interested.  We will call him once the trial is up and running.    If he were to deteriorate further despite conservative management, the next best therapy that could prolong survival and improve his symptoms would be lung transplantation.  He is currently smoking.  He is also on the heavier side.  His BMI is 32.  The BMI cutoff for the consideration of lung transplant at [facility] is 30 or below.  He also has to quit smoking for a minimum of 6 months.  I explained the importance of this as he may need lung transplantation in the near future.    Mr. Benitez states that it will be difficult for him to come to the [facility] for followup.  As he is currently on conservative therapy, I have recommended him to continue to follow up with Ms. Marleny Bauer locally in [place].  We will be happy to see him on an as-needed basis.  We will be contacting him once we have the inhaled treprostinil clinical trial up and running at the [facility].    It was a pleasure to talk to Mr. Benitez in our Pulmonary Hypertension Clinic at the [facility].  We thank you for involving us in the care.  Please do not hesitate to call us in the interim if you have any further questions.    Total time today was 60 minutes reviewing notes, imaging, labs, patient visit, orders and documentation         Sincerely,  Lobito Robin MD   Center for Pulmonary Hypertension  Heart Failure, Transplant, and Mechanical Circulatory Support Cardiology   Cardiovascular Division  [facility]   [phone]        cc:  Amparo Alvares [address]  [address]      Lobito Robin MD        D: 2021   T: 2021   MT: hu    Name:     AGUEDA BENITEZ  MRN:      [id number]        Account:      [id number]   :      1955           Service Date: 2021       Document: [id number]

## 2021-06-07 ENCOUNTER — OFFICE VISIT (OUTPATIENT)
Dept: FAMILY MEDICINE | Facility: OTHER | Age: 66
End: 2021-06-07
Attending: NURSE PRACTITIONER
Payer: COMMERCIAL

## 2021-06-07 VITALS
BODY MASS INDEX: 33.6 KG/M2 | HEART RATE: 92 BPM | HEIGHT: 71 IN | TEMPERATURE: 97.7 F | WEIGHT: 240 LBS | SYSTOLIC BLOOD PRESSURE: 102 MMHG | OXYGEN SATURATION: 88 % | DIASTOLIC BLOOD PRESSURE: 74 MMHG

## 2021-06-07 DIAGNOSIS — R06.09 DOE (DYSPNEA ON EXERTION): ICD-10-CM

## 2021-06-07 DIAGNOSIS — I27.21 SEVERE PULMONARY ARTERIAL SYSTOLIC HYPERTENSION (H): ICD-10-CM

## 2021-06-07 DIAGNOSIS — K21.9 GASTROESOPHAGEAL REFLUX DISEASE WITHOUT ESOPHAGITIS: ICD-10-CM

## 2021-06-07 DIAGNOSIS — F41.1 GENERALIZED ANXIETY DISORDER: ICD-10-CM

## 2021-06-07 DIAGNOSIS — F33.41 RECURRENT MAJOR DEPRESSIVE DISORDER, IN PARTIAL REMISSION (H): Primary | ICD-10-CM

## 2021-06-07 DIAGNOSIS — F17.200 TOBACCO USE DISORDER: ICD-10-CM

## 2021-06-07 DIAGNOSIS — Z12.11 SCREEN FOR COLON CANCER: ICD-10-CM

## 2021-06-07 DIAGNOSIS — F43.10 PTSD (POST-TRAUMATIC STRESS DISORDER): ICD-10-CM

## 2021-06-07 DIAGNOSIS — J43.9 PULMONARY EMPHYSEMA, UNSPECIFIED EMPHYSEMA TYPE (H): ICD-10-CM

## 2021-06-07 PROCEDURE — G0463 HOSPITAL OUTPT CLINIC VISIT: HCPCS | Mod: 25

## 2021-06-07 PROCEDURE — G0463 HOSPITAL OUTPT CLINIC VISIT: HCPCS

## 2021-06-07 PROCEDURE — 99214 OFFICE O/P EST MOD 30 MIN: CPT | Performed by: NURSE PRACTITIONER

## 2021-06-07 RX ORDER — DULOXETIN HYDROCHLORIDE 60 MG/1
60 CAPSULE, DELAYED RELEASE ORAL DAILY
COMMUNITY
End: 2021-06-19

## 2021-06-07 RX ORDER — DULOXETIN HYDROCHLORIDE 60 MG/1
60 CAPSULE, DELAYED RELEASE ORAL 2 TIMES DAILY
Qty: 60 CAPSULE | Refills: 1 | Status: SHIPPED | OUTPATIENT
Start: 2021-06-07 | End: 2021-08-25

## 2021-06-07 ASSESSMENT — PAIN SCALES - GENERAL: PAINLEVEL: MODERATE PAIN (5)

## 2021-06-07 ASSESSMENT — MIFFLIN-ST. JEOR: SCORE: 1895.76

## 2021-06-12 ENCOUNTER — IMMUNIZATION (OUTPATIENT)
Dept: FAMILY MEDICINE | Facility: OTHER | Age: 66
End: 2021-06-12
Attending: FAMILY MEDICINE
Payer: COMMERCIAL

## 2021-06-12 PROCEDURE — 91300 PR COVID VAC PFIZER DIL RECON 30 MCG/0.3 ML IM: CPT | Performed by: COUNSELOR

## 2021-06-14 DIAGNOSIS — J30.2 SEASONAL ALLERGIES: ICD-10-CM

## 2021-06-15 ENCOUNTER — HOSPITAL ENCOUNTER (EMERGENCY)
Facility: HOSPITAL | Age: 66
Discharge: LEFT BEFORE DISCHARGE | End: 2021-06-15
Attending: EMERGENCY MEDICINE | Admitting: EMERGENCY MEDICINE
Payer: COMMERCIAL

## 2021-06-15 ENCOUNTER — APPOINTMENT (OUTPATIENT)
Dept: CT IMAGING | Facility: HOSPITAL | Age: 66
End: 2021-06-15
Attending: EMERGENCY MEDICINE
Payer: COMMERCIAL

## 2021-06-15 VITALS
DIASTOLIC BLOOD PRESSURE: 99 MMHG | OXYGEN SATURATION: 95 % | TEMPERATURE: 97.2 F | HEART RATE: 80 BPM | RESPIRATION RATE: 16 BRPM | SYSTOLIC BLOOD PRESSURE: 114 MMHG

## 2021-06-15 DIAGNOSIS — R07.9 CHEST PAIN, UNSPECIFIED TYPE: ICD-10-CM

## 2021-06-15 DIAGNOSIS — K57.32 DIVERTICULITIS OF COLON: ICD-10-CM

## 2021-06-15 LAB
ALBUMIN SERPL-MCNC: 3.5 G/DL (ref 3.4–5)
ALP SERPL-CCNC: 88 U/L (ref 40–150)
ALT SERPL W P-5'-P-CCNC: 17 U/L (ref 0–70)
ANION GAP SERPL CALCULATED.3IONS-SCNC: 8 MMOL/L (ref 3–14)
AST SERPL W P-5'-P-CCNC: 10 U/L (ref 0–45)
BASOPHILS # BLD AUTO: 0.1 10E9/L (ref 0–0.2)
BASOPHILS NFR BLD AUTO: 0.7 %
BILIRUB SERPL-MCNC: 0.6 MG/DL (ref 0.2–1.3)
BUN SERPL-MCNC: 15 MG/DL (ref 7–30)
CALCIUM SERPL-MCNC: 8.7 MG/DL (ref 8.5–10.1)
CHLORIDE SERPL-SCNC: 101 MMOL/L (ref 94–109)
CO2 SERPL-SCNC: 28 MMOL/L (ref 20–32)
CREAT SERPL-MCNC: 1.15 MG/DL (ref 0.66–1.25)
DIFFERENTIAL METHOD BLD: ABNORMAL
EOSINOPHIL # BLD AUTO: 0.1 10E9/L (ref 0–0.7)
EOSINOPHIL NFR BLD AUTO: 1.6 %
ERYTHROCYTE [DISTWIDTH] IN BLOOD BY AUTOMATED COUNT: 12.7 % (ref 10–15)
GFR SERPL CREATININE-BSD FRML MDRD: 66 ML/MIN/{1.73_M2}
GLUCOSE SERPL-MCNC: 143 MG/DL (ref 70–99)
HCT VFR BLD AUTO: 53.6 % (ref 40–53)
HGB BLD-MCNC: 18.4 G/DL (ref 13.3–17.7)
IMM GRANULOCYTES # BLD: 0 10E9/L (ref 0–0.4)
IMM GRANULOCYTES NFR BLD: 0.3 %
LACTATE BLD-SCNC: 2.1 MMOL/L (ref 0.7–2)
LYMPHOCYTES # BLD AUTO: 2.8 10E9/L (ref 0.8–5.3)
LYMPHOCYTES NFR BLD AUTO: 30.7 %
MCH RBC QN AUTO: 31.3 PG (ref 26.5–33)
MCHC RBC AUTO-ENTMCNC: 34.3 G/DL (ref 31.5–36.5)
MCV RBC AUTO: 91 FL (ref 78–100)
MONOCYTES # BLD AUTO: 0.6 10E9/L (ref 0–1.3)
MONOCYTES NFR BLD AUTO: 6.9 %
NEUTROPHILS # BLD AUTO: 5.4 10E9/L (ref 1.6–8.3)
NEUTROPHILS NFR BLD AUTO: 59.8 %
NRBC # BLD AUTO: 0 10*3/UL
NRBC BLD AUTO-RTO: 0 /100
NT-PROBNP SERPL-MCNC: 2324 PG/ML (ref 0–900)
PLATELET # BLD AUTO: 344 10E9/L (ref 150–450)
POTASSIUM SERPL-SCNC: 3.8 MMOL/L (ref 3.4–5.3)
PROT SERPL-MCNC: 7.2 G/DL (ref 6.8–8.8)
RBC # BLD AUTO: 5.88 10E12/L (ref 4.4–5.9)
SODIUM SERPL-SCNC: 137 MMOL/L (ref 133–144)
TROPONIN I SERPL-MCNC: <0.015 UG/L (ref 0–0.04)
TROPONIN I SERPL-MCNC: <0.015 UG/L (ref 0–0.04)
WBC # BLD AUTO: 9 10E9/L (ref 4–11)

## 2021-06-15 PROCEDURE — 93005 ELECTROCARDIOGRAM TRACING: CPT

## 2021-06-15 PROCEDURE — 71275 CT ANGIOGRAPHY CHEST: CPT

## 2021-06-15 PROCEDURE — 80053 COMPREHEN METABOLIC PANEL: CPT | Performed by: EMERGENCY MEDICINE

## 2021-06-15 PROCEDURE — 83605 ASSAY OF LACTIC ACID: CPT | Performed by: EMERGENCY MEDICINE

## 2021-06-15 PROCEDURE — 85025 COMPLETE CBC W/AUTO DIFF WBC: CPT | Performed by: EMERGENCY MEDICINE

## 2021-06-15 PROCEDURE — 250N000011 HC RX IP 250 OP 636: Performed by: RADIOLOGY

## 2021-06-15 PROCEDURE — 96374 THER/PROPH/DIAG INJ IV PUSH: CPT | Mod: XU

## 2021-06-15 PROCEDURE — 83880 ASSAY OF NATRIURETIC PEPTIDE: CPT | Performed by: EMERGENCY MEDICINE

## 2021-06-15 PROCEDURE — 36415 COLL VENOUS BLD VENIPUNCTURE: CPT | Performed by: EMERGENCY MEDICINE

## 2021-06-15 PROCEDURE — 93010 ELECTROCARDIOGRAM REPORT: CPT | Performed by: INTERNAL MEDICINE

## 2021-06-15 PROCEDURE — 99285 EMERGENCY DEPT VISIT HI MDM: CPT | Mod: 25

## 2021-06-15 PROCEDURE — 99285 EMERGENCY DEPT VISIT HI MDM: CPT | Performed by: EMERGENCY MEDICINE

## 2021-06-15 PROCEDURE — 84484 ASSAY OF TROPONIN QUANT: CPT | Performed by: EMERGENCY MEDICINE

## 2021-06-15 PROCEDURE — 250N000011 HC RX IP 250 OP 636

## 2021-06-15 RX ORDER — CETIRIZINE HYDROCHLORIDE 10 MG/1
TABLET ORAL
Qty: 30 TABLET | Refills: 0 | Status: SHIPPED | OUTPATIENT
Start: 2021-06-15 | End: 2021-07-08

## 2021-06-15 RX ORDER — MORPHINE SULFATE 2 MG/ML
2 INJECTION, SOLUTION INTRAMUSCULAR; INTRAVENOUS ONCE
Status: COMPLETED | OUTPATIENT
Start: 2021-06-15 | End: 2021-06-15

## 2021-06-15 RX ORDER — MORPHINE SULFATE 2 MG/ML
INJECTION, SOLUTION INTRAMUSCULAR; INTRAVENOUS
Status: COMPLETED
Start: 2021-06-15 | End: 2021-06-15

## 2021-06-15 RX ORDER — CIPROFLOXACIN 500 MG/1
500 TABLET, FILM COATED ORAL 2 TIMES DAILY
Qty: 14 TABLET | Refills: 0 | Status: SHIPPED | OUTPATIENT
Start: 2021-06-15 | End: 2021-06-19 | Stop reason: DRUGHIGH

## 2021-06-15 RX ORDER — IOPAMIDOL 755 MG/ML
100 INJECTION, SOLUTION INTRAVASCULAR ONCE
Status: COMPLETED | OUTPATIENT
Start: 2021-06-15 | End: 2021-06-15

## 2021-06-15 RX ORDER — METRONIDAZOLE 500 MG/1
500 TABLET ORAL 3 TIMES DAILY
Qty: 21 TABLET | Refills: 0 | Status: SHIPPED | OUTPATIENT
Start: 2021-06-15 | End: 2021-06-19 | Stop reason: DRUGHIGH

## 2021-06-15 RX ADMIN — MORPHINE SULFATE 2 MG: 2 INJECTION, SOLUTION INTRAMUSCULAR; INTRAVENOUS at 09:34

## 2021-06-15 RX ADMIN — IOPAMIDOL 100 ML: 755 INJECTION, SOLUTION INTRAVENOUS at 10:31

## 2021-06-15 NOTE — ED NOTES
Pt called for water and ETA to discharge. Spoke with pt nurse and updated her. Pt is aware of his care process at this time.

## 2021-06-15 NOTE — ED NOTES
Reports cough for the past month. Yesterday was making coffee at home and coughed. Started having immediate L sided rib pain. Reports worse with movement, palpation, and coughing. Reports SOB. Denies fever, chills, dizziness, nausea.

## 2021-06-15 NOTE — TELEPHONE ENCOUNTER
Zyrtec      Last Written Prescription Date:  8/14/20  Last Fill Quantity: 90,   # refills: 3  Last Office Visit: 6/7/21  Future Office visit:    Next 5 appointments (look out 90 days)    Jul 07, 2021  9:00 AM  (Arrive by 8:45 AM)  Office Visit with Amparo Alvares NP  [facility] [place] ([facility] [place] ) [address]  [place] [address]  [phone]           Routing refill request to provider for review/approval because:
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
## 2021-06-17 ENCOUNTER — OFFICE VISIT (OUTPATIENT)
Dept: CARDIOLOGY | Facility: OTHER | Age: 66
End: 2021-06-17
Attending: NURSE PRACTITIONER
Payer: COMMERCIAL

## 2021-06-17 VITALS
HEART RATE: 97 BPM | BODY MASS INDEX: 32.47 KG/M2 | RESPIRATION RATE: 18 BRPM | DIASTOLIC BLOOD PRESSURE: 84 MMHG | HEIGHT: 72 IN | OXYGEN SATURATION: 92 % | SYSTOLIC BLOOD PRESSURE: 123 MMHG | TEMPERATURE: 96.9 F | WEIGHT: 239.7 LBS

## 2021-06-17 DIAGNOSIS — Z51.81 ENCOUNTER FOR MONITORING DIGOXIN THERAPY: ICD-10-CM

## 2021-06-17 DIAGNOSIS — Z79.899 ENCOUNTER FOR MONITORING DIGOXIN THERAPY: ICD-10-CM

## 2021-06-17 DIAGNOSIS — I51.7 RIGHT VENTRICULAR DILATION: ICD-10-CM

## 2021-06-17 DIAGNOSIS — Z98.890: ICD-10-CM

## 2021-06-17 DIAGNOSIS — J44.9 SEVERE CHRONIC OBSTRUCTIVE PULMONARY DISEASE (H): ICD-10-CM

## 2021-06-17 DIAGNOSIS — I50.810 RIGHT HEART FAILURE, NYHA CLASS 3 (H): ICD-10-CM

## 2021-06-17 DIAGNOSIS — F17.200 TOBACCO USE DISORDER: ICD-10-CM

## 2021-06-17 DIAGNOSIS — I27.21 PULMONARY ARTERIAL HYPERTENSION (H): Primary | ICD-10-CM

## 2021-06-17 DIAGNOSIS — Z98.890 STATUS POST CORONARY ANGIOGRAM: ICD-10-CM

## 2021-06-17 DIAGNOSIS — R06.02 SHORTNESS OF BREATH: ICD-10-CM

## 2021-06-17 PROCEDURE — G0463 HOSPITAL OUTPT CLINIC VISIT: HCPCS

## 2021-06-17 PROCEDURE — 99215 OFFICE O/P EST HI 40 MIN: CPT | Performed by: NURSE PRACTITIONER

## 2021-06-17 RX ORDER — DIGOXIN 125 MCG
125 TABLET ORAL DAILY
Qty: 90 TABLET | Refills: 1 | Status: SHIPPED | OUTPATIENT
Start: 2021-06-17 | End: 2021-12-07

## 2021-06-17 ASSESSMENT — MIFFLIN-ST. JEOR: SCORE: 1910.27

## 2021-06-17 ASSESSMENT — PAIN SCALES - GENERAL: PAINLEVEL: MODERATE PAIN (4)
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
## 2021-06-17 NOTE — NURSING NOTE
Chief Complaint   Patient presents with     RECHECK     F/U / phone appt with Dr. Robin       Initial /84   Pulse 97   Temp 96.9  F (36.1  C) (Tympanic)   Resp 18   Ht 1.829 m (6')   Wt 108.7 kg (239 lb 11.2 oz)   SpO2 92%   BMI 32.51 kg/m   Estimated body mass index is 32.51 kg/m  as calculated from the following:    Height as of this encounter: 1.829 m (6').    Weight as of this encounter: 108.7 kg (239 lb 11.2 oz).  Medication Reconciliation: complete  Elizabeth Graves LPN

## 2021-06-17 NOTE — PATIENT INSTRUCTIONS
Thank you for allowing Marleny Juancarlos and our team to participate in your care. Please call our office at [phone] with scheduling questions or if you need to cancel or change your appointment. With any other questions or concerns you may call [facility] cardiology nurse at [phone].       If you experience chest pain, chest pressure, chest tightness, shortness of breath, fainting, lightheadedness, nausea, vomiting, or other concerning symptoms, please report to the Emergency Department or call 911. These symptoms may be emergent, and best treated in the Emergency Department.    Follow up in 4 weeks     She has referred you to pulmonary rehab and someone from that department will call you to set appointment time and date.     You will have a overnight pulse ox. Some one will call you to set this up.     Your will have a 6 minute waling test and some one from respiratory therapy will call you to schedule this.     Start taking Digoxin at 125 mcg daily

## 2021-06-19 ENCOUNTER — HOSPITAL ENCOUNTER (EMERGENCY)
Facility: HOSPITAL | Age: 66
Discharge: HOME | End: 2021-06-19
Attending: STUDENT IN AN ORGANIZED HEALTH CARE EDUCATION/TRAINING PROGRAM | Admitting: STUDENT IN AN ORGANIZED HEALTH CARE EDUCATION/TRAINING PROGRAM
Payer: COMMERCIAL

## 2021-06-19 ENCOUNTER — APPOINTMENT (OUTPATIENT)
Dept: CT IMAGING | Facility: HOSPITAL | Age: 66
End: 2021-06-19
Attending: STUDENT IN AN ORGANIZED HEALTH CARE EDUCATION/TRAINING PROGRAM
Payer: COMMERCIAL

## 2021-06-19 VITALS
TEMPERATURE: 97.6 F | OXYGEN SATURATION: 89 % | DIASTOLIC BLOOD PRESSURE: 82 MMHG | SYSTOLIC BLOOD PRESSURE: 119 MMHG | HEART RATE: 91 BPM | RESPIRATION RATE: 18 BRPM

## 2021-06-19 DIAGNOSIS — R10.84 ABDOMINAL PAIN, GENERALIZED: ICD-10-CM

## 2021-06-19 DIAGNOSIS — K52.9 COLITIS: ICD-10-CM

## 2021-06-19 LAB
ALBUMIN SERPL-MCNC: 3.6 G/DL (ref 3.4–5)
ALP SERPL-CCNC: 88 U/L (ref 40–150)
ALT SERPL W P-5'-P-CCNC: 14 U/L (ref 0–70)
ANION GAP SERPL CALCULATED.3IONS-SCNC: 7 MMOL/L (ref 3–14)
APTT PPP: 41 SEC (ref 22–37)
AST SERPL W P-5'-P-CCNC: 14 U/L (ref 0–45)
BASOPHILS # BLD AUTO: 0.1 10E9/L (ref 0–0.2)
BASOPHILS NFR BLD AUTO: 0.7 %
BILIRUB SERPL-MCNC: 0.5 MG/DL (ref 0.2–1.3)
BUN SERPL-MCNC: 17 MG/DL (ref 7–30)
CALCIUM SERPL-MCNC: 8.4 MG/DL (ref 8.5–10.1)
CHLORIDE SERPL-SCNC: 103 MMOL/L (ref 94–109)
CO2 SERPL-SCNC: 27 MMOL/L (ref 20–32)
CREAT SERPL-MCNC: 1.06 MG/DL (ref 0.66–1.25)
CRP SERPL-MCNC: 9.8 MG/L (ref 0–8)
DIFFERENTIAL METHOD BLD: ABNORMAL
EOSINOPHIL # BLD AUTO: 0.1 10E9/L (ref 0–0.7)
EOSINOPHIL NFR BLD AUTO: 1.1 %
ERYTHROCYTE [DISTWIDTH] IN BLOOD BY AUTOMATED COUNT: 12.6 % (ref 10–15)
GFR SERPL CREATININE-BSD FRML MDRD: 73 ML/MIN/{1.73_M2}
GLUCOSE SERPL-MCNC: 117 MG/DL (ref 70–99)
HCT VFR BLD AUTO: 53.9 % (ref 40–53)
HGB BLD-MCNC: 18.4 G/DL (ref 13.3–17.7)
IMM GRANULOCYTES # BLD: 0 10E9/L (ref 0–0.4)
IMM GRANULOCYTES NFR BLD: 0.3 %
INR PPP: 1.05 (ref 0.86–1.14)
LACTATE BLD-SCNC: 2.8 MMOL/L (ref 0.7–2)
LIPASE SERPL-CCNC: 101 U/L (ref 73–393)
LYMPHOCYTES # BLD AUTO: 2.2 10E9/L (ref 0.8–5.3)
LYMPHOCYTES NFR BLD AUTO: 24.7 %
MCH RBC QN AUTO: 31.1 PG (ref 26.5–33)
MCHC RBC AUTO-ENTMCNC: 34.1 G/DL (ref 31.5–36.5)
MCV RBC AUTO: 91 FL (ref 78–100)
MONOCYTES # BLD AUTO: 0.9 10E9/L (ref 0–1.3)
MONOCYTES NFR BLD AUTO: 9.9 %
NEUTROPHILS # BLD AUTO: 5.5 10E9/L (ref 1.6–8.3)
NEUTROPHILS NFR BLD AUTO: 63.3 %
NRBC # BLD AUTO: 0 10*3/UL
NRBC BLD AUTO-RTO: 0 /100
PLATELET # BLD AUTO: 344 10E9/L (ref 150–450)
POTASSIUM SERPL-SCNC: 3.6 MMOL/L (ref 3.4–5.3)
PROT SERPL-MCNC: 7.3 G/DL (ref 6.8–8.8)
RBC # BLD AUTO: 5.91 10E12/L (ref 4.4–5.9)
SODIUM SERPL-SCNC: 137 MMOL/L (ref 133–144)
WBC # BLD AUTO: 8.8 10E9/L (ref 4–11)

## 2021-06-19 PROCEDURE — 74177 CT ABD & PELVIS W/CONTRAST: CPT

## 2021-06-19 PROCEDURE — 80053 COMPREHEN METABOLIC PANEL: CPT | Performed by: STUDENT IN AN ORGANIZED HEALTH CARE EDUCATION/TRAINING PROGRAM

## 2021-06-19 PROCEDURE — 250N000011 HC RX IP 250 OP 636: Performed by: STUDENT IN AN ORGANIZED HEALTH CARE EDUCATION/TRAINING PROGRAM

## 2021-06-19 PROCEDURE — 85610 PROTHROMBIN TIME: CPT | Performed by: STUDENT IN AN ORGANIZED HEALTH CARE EDUCATION/TRAINING PROGRAM

## 2021-06-19 PROCEDURE — 83605 ASSAY OF LACTIC ACID: CPT | Performed by: STUDENT IN AN ORGANIZED HEALTH CARE EDUCATION/TRAINING PROGRAM

## 2021-06-19 PROCEDURE — 99285 EMERGENCY DEPT VISIT HI MDM: CPT | Mod: 25

## 2021-06-19 PROCEDURE — 96374 THER/PROPH/DIAG INJ IV PUSH: CPT

## 2021-06-19 PROCEDURE — 99285 EMERGENCY DEPT VISIT HI MDM: CPT | Performed by: STUDENT IN AN ORGANIZED HEALTH CARE EDUCATION/TRAINING PROGRAM

## 2021-06-19 PROCEDURE — 85025 COMPLETE CBC W/AUTO DIFF WBC: CPT | Performed by: STUDENT IN AN ORGANIZED HEALTH CARE EDUCATION/TRAINING PROGRAM

## 2021-06-19 PROCEDURE — 96376 TX/PRO/DX INJ SAME DRUG ADON: CPT

## 2021-06-19 PROCEDURE — 85730 THROMBOPLASTIN TIME PARTIAL: CPT | Performed by: STUDENT IN AN ORGANIZED HEALTH CARE EDUCATION/TRAINING PROGRAM

## 2021-06-19 PROCEDURE — 96375 TX/PRO/DX INJ NEW DRUG ADDON: CPT

## 2021-06-19 PROCEDURE — 258N000003 HC RX IP 258 OP 636: Performed by: STUDENT IN AN ORGANIZED HEALTH CARE EDUCATION/TRAINING PROGRAM

## 2021-06-19 PROCEDURE — 96361 HYDRATE IV INFUSION ADD-ON: CPT

## 2021-06-19 PROCEDURE — 83690 ASSAY OF LIPASE: CPT | Performed by: STUDENT IN AN ORGANIZED HEALTH CARE EDUCATION/TRAINING PROGRAM

## 2021-06-19 PROCEDURE — 36415 COLL VENOUS BLD VENIPUNCTURE: CPT

## 2021-06-19 PROCEDURE — 86140 C-REACTIVE PROTEIN: CPT | Performed by: STUDENT IN AN ORGANIZED HEALTH CARE EDUCATION/TRAINING PROGRAM

## 2021-06-19 RX ORDER — ONDANSETRON 2 MG/ML
4 INJECTION INTRAMUSCULAR; INTRAVENOUS EVERY 30 MIN PRN
Status: DISCONTINUED | OUTPATIENT
Start: 2021-06-19 | End: 2021-06-19 | Stop reason: HOSPADM

## 2021-06-19 RX ORDER — ONDANSETRON 4 MG/1
4 TABLET, ORALLY DISINTEGRATING ORAL EVERY 8 HOURS PRN
Qty: 10 TABLET | Refills: 0 | Status: SHIPPED | OUTPATIENT
Start: 2021-06-19 | End: 2021-06-26

## 2021-06-19 RX ORDER — SODIUM CHLORIDE 9 MG/ML
INJECTION, SOLUTION INTRAVENOUS CONTINUOUS
Status: DISCONTINUED | OUTPATIENT
Start: 2021-06-19 | End: 2021-06-19 | Stop reason: HOSPADM

## 2021-06-19 RX ORDER — IOPAMIDOL 612 MG/ML
100 INJECTION, SOLUTION INTRATHECAL ONCE
Status: COMPLETED | OUTPATIENT
Start: 2021-06-19 | End: 2021-06-19

## 2021-06-19 RX ORDER — METRONIDAZOLE 500 MG/1
500 TABLET ORAL 2 TIMES DAILY
Qty: 14 TABLET | Refills: 0 | Status: SHIPPED | OUTPATIENT
Start: 2021-06-19 | End: 2021-06-26

## 2021-06-19 RX ORDER — ONDANSETRON 2 MG/ML
4 INJECTION INTRAMUSCULAR; INTRAVENOUS ONCE
Status: COMPLETED | OUTPATIENT
Start: 2021-06-19 | End: 2021-06-19

## 2021-06-19 RX ORDER — CIPROFLOXACIN 500 MG/1
500 TABLET, FILM COATED ORAL 2 TIMES DAILY
Qty: 14 TABLET | Refills: 0 | Status: SHIPPED | OUTPATIENT
Start: 2021-06-19 | End: 2021-06-26

## 2021-06-19 RX ADMIN — ONDANSETRON 4 MG: 2 INJECTION INTRAMUSCULAR; INTRAVENOUS at 17:53

## 2021-06-19 RX ADMIN — SODIUM CHLORIDE 1000 ML: 9 INJECTION, SOLUTION INTRAVENOUS at 17:48

## 2021-06-19 RX ADMIN — IOPAMIDOL 100 ML: 612 INJECTION, SOLUTION INTRAVENOUS at 18:26

## 2021-06-19 RX ADMIN — HYDROMORPHONE HYDROCHLORIDE 1 MG: 1 INJECTION, SOLUTION INTRAMUSCULAR; INTRAVENOUS; SUBCUTANEOUS at 17:55

## 2021-06-19 RX ADMIN — ONDANSETRON 4 MG: 2 INJECTION INTRAMUSCULAR; INTRAVENOUS at 19:58

## 2021-06-19 ASSESSMENT — ENCOUNTER SYMPTOMS
WOUND: 0
SHORTNESS OF BREATH: 0
FEVER: 0
NECK PAIN: 0
DIARRHEA: 0
NAUSEA: 1
WHEEZING: 0
WEAKNESS: 0
HEADACHES: 0
SORE THROAT: 0
COUGH: 0
DIZZINESS: 0
ABDOMINAL PAIN: 1
ABDOMINAL DISTENTION: 0
EYE DISCHARGE: 0
EYE PAIN: 0
NUMBNESS: 0
BACK PAIN: 0
RHINORRHEA: 0
HEMATURIA: 0
CHILLS: 0
VOMITING: 0

## 2021-06-19 NOTE — ED NOTES
Pt normally wears home O2 at night,  Oxygen dipping to 87%.   Placed on oxygen at 1 lpm via NC, provider updated.

## 2021-06-19 NOTE — ED TRIAGE NOTES
"Patient states that he was seen last week and diagnosed with diverticulitis. Patient states \"they wanted to admit me\" but patient did not want to be admitted and left AMA. Patient states that he is not feeling better and thought he should come in.  "

## 2021-06-19 NOTE — DISCHARGE INSTRUCTIONS
Please take all medications as prescribed and instructed. Please follow up with your primary care provider as discussed, return to the Emergency Department should your symptoms worsen or change.      What to expect when you have contrast    During your exam, we will inject  contrast  into your vein or artery. (Contrast is a clear liquid with iodine in it. It shows up on X-rays.)    You may feel warm or hot. You may have a metal taste in your mouth and a slight upset stomach. You may also feel pressure near the kidneys and bladder. These effects will last about 1 to 3 minutes.    Please tell us if you have:   Sneezing    Itching   Hives    Swelling in the face   A hoarse voice   Breathing problems   Other new symptoms    Serious problems are rare.  They may include:   Irregular heartbeat    Seizures   Kidney failure             Tissue damage   Shock     Death    If you have any problems during the exam, we  will treat them right away.    When you get home    Call your hospital if you have any new symptoms in the next 2 days, like hives or swelling. (Phone numbers are at the bottom of this page.) Or call your family doctor.     If you have wheezing or trouble breathing, call 911.    Self-care  -Drink at least 4 extra glasses of water today.   This reduces the stress on your kidneys.  -Keep taking your regular medicines.    The contrast will pass out of your body in your  Urine(pee). This will happen in the next 24 hours. You  will not feel this. Your urine will not  change color.    If you have kidney problems or take metformin    Drink 4 to 8 large glasses of water for the next  2 days, if you are not on a fluid restriction.    ?If you take metformin (Glucophage or Glucovance) for diabetes, keep taking it.      ?Your kidney function tests are abnormal.  If you take Metformin, do not take it for 48 hours. Please go to your clinic for a blood test within 3 days after your exam before the restarting this medicine.      (Note to provider:please give patient prescription for lab tests.)    ?Special instructions:    I have read and understand the above information.    Patient Sign Here:______________________________________Date:________Time:______    Staff Sign Here:________________________________________Date:_______Time:______      Radiology Departments:     ?[facility]: [phone] ?[place]: [phone]     ?[place]: [phone] ?[facility]:[phone]      ?[place]: [phone]  ?[place]: [phone]  ?[place]:[phone]    ?[facility] [place]:[phone]  ?[facility] [place]:[phone]

## 2021-06-20 NOTE — ED PROVIDER NOTES
History     Chief Complaint   Patient presents with     Abdominal Pain     LUQ, hx diverticulitis     HPI  Joey Irene is a 65 year old male who presents with his wife over concern of ongoing left lower quadrant left upper quadrant pain, patient left the emergency department AGAINST MEDICAL ADVICE approximately week ago with a diagnosis of diverticulitis, colitis.  He has been taking his antibiotics intermittently, was not feeling improved, having a difficult time keeping up with his oral intake, prompting him to present to the emergency department for further care.    Allergies:  Allergies   Allergen Reactions     Seasonal Allergies Difficulty breathing and Cough     Bupropion Other (See Comments)     Tramadol Nausea and Swelling     [place] Nite Time Dizziness and Nausea and Vomiting     Nyquil Cold &  [Night-Time Cold-Flu Relief] Dizziness and Nausea and Vomiting     Trichophyton Other (See Comments)       Problem List:    Patient Active Problem List    Diagnosis Date Noted     Encounter for smoking cessation counseling 04/19/2021     Priority: Medium     Diastolic heart failure, unspecified HF chronicity (H) 04/19/2021     Priority: Medium     Status post coronary angiogram 12/11/2020     Priority: Medium     Severe pulmonary arterial systolic hypertension (H) 11/20/2020     Priority: Medium     Right ventricular dilation 10/29/2020     Priority: Medium     Added automatically from request for surgery [id number]       Bee sting allergy 08/07/2020     Priority: Medium     Hyperglycemia 08/07/2020     Priority: Medium     Elevated prostate specific antigen (PSA) 08/07/2020     Priority: Medium     History of fusion of cervical spine 08/06/2020     Priority: Medium     Generalized anxiety disorder 08/06/2020     Priority: Medium     Tobacco use disorder 08/06/2020     Priority: Medium     Pulmonary emphysema, unspecified emphysema type (H) 08/05/2020     Priority: Medium     Cardiomegaly 08/05/2020     Priority:  Medium     Enlarged prostate 08/05/2020     Priority: Medium     Diverticulosis 03/25/2020     Priority: Medium     Chronic bronchitis with emphysema (H) 02/03/2020     Priority: Medium     Class 1 obesity with body mass index (BMI) of 33.0 to 33.9 in adult 02/03/2020     Priority: Medium     Smoking greater than 40 pack years 02/03/2020     Priority: Medium     Pain due to total left knee replacement, sequela 01/27/2020     Priority: Medium     Cervical stenosis of spinal canal 01/13/2020     Priority: Medium     Cholesteatoma of left ear 03/04/2019     Priority: Medium     Nasal septal deviation 12/18/2017     Priority: Medium     Primary osteoarthritis of right knee 02/25/2017     Priority: Medium     Status post total left knee replacement 02/25/2017     Priority: Medium     Recurrent major depressive disorder, in partial remission (H) 11/21/2015     Priority: Medium     PTSD (post-traumatic stress disorder) 08/19/2015     Priority: Medium     Seasonal allergies 08/19/2015     Priority: Medium     Anxiety state 07/07/2011     Priority: Medium     Back pain, chronic 07/07/2011     Priority: Medium     Hearing loss 07/07/2011     Priority: Medium     Insomnia, unspecified 07/07/2011     Priority: Medium     Sleep apnea 07/07/2011     Priority: Medium        Past Medical History:    Past Medical History:   Diagnosis Date     COPD (chronic obstructive pulmonary disease) (H)      Hypertension      Uncomplicated asthma        Past Surgical History:    Past Surgical History:   Procedure Laterality Date     CV CORONARY ANGIOGRAM N/A 12/11/2020    Procedure: Coronary Angiogram;  Surgeon: Aman Delgado MD;  Location:  [facility]     CV RIGHT HEART CATH MEASUREMENTS RECORDED N/A 12/11/2020    Procedure: CV RIGHT HEART CATH;  Surgeon: Aman Delgado MD;  Location: [facility]     ENT SURGERY      patient has had three left ear surgeries, unsure what they did     FUSION CERVICAL  POSTERIOR THREE+ LEVELS       HERNIA REPAIR, INGUINAL RT/LT Right     done times 3     JOINT REPLACEMENT Left      REPAIR HAMMER TOE Right        Family History:    Family History   Problem Relation Age of Onset     Lung Cancer Mother      Ovarian Cancer Sister        Social History:  Marital Status:   [4]  Social History     Tobacco Use     Smoking status: Former Smoker     Years: 50.00     Types: Cigars     Quit date: 2020     Years since quittin.5     Smokeless tobacco: Never Used   Substance Use Topics     Alcohol use: Never     Frequency: Never     Drug use: Never        Medications:    acetaminophen (TYLENOL) 500 MG tablet  albuterol (PROAIR HFA/PROVENTIL HFA/VENTOLIN HFA) 108 (90 Base) MCG/ACT inhaler  ARIPiprazole (ABILIFY) 10 MG tablet  aspirin (ASA) 81 MG EC tablet  cetirizine (ZYRTEC) 10 MG tablet  Cholecalciferol (D 1000) 25 MCG (1000 UT) CAPS  ciprofloxacin (CIPRO) 500 MG tablet  cyclobenzaprine (FLEXERIL) 10 MG tablet  digoxin (LANOXIN) 125 MCG tablet  DULoxetine (CYMBALTA) 60 MG capsule  EPINEPHrine (ANY BX GENERIC EQUIV) 0.3 MG/0.3ML injection 2-pack  fluticasone (FLONASE) 50 MCG/ACT nasal spray  Fluticasone-Umeclidin-Vilanterol (TRELEGY ELLIPTA) 100-62.5-25 MCG/INH oral inhaler  gabapentin (NEURONTIN) 300 MG capsule  guaiFENesin (MUCINEX) 600 MG 12 hr tablet  hydrOXYzine (VISTARIL) 50 MG capsule  metroNIDAZOLE (FLAGYL) 500 MG tablet  montelukast (SINGULAIR) 10 MG tablet  naproxen (NAPROSYN) 500 MG tablet  omeprazole (PRILOSEC) 20 MG DR capsule  ondansetron (ZOFRAN ODT) 4 MG ODT tab  potassium chloride ER (KLOR-CON M) 20 MEQ CR tablet  SYMBICORT 80-4.5 MCG/ACT Inhaler  torsemide (DEMADEX) 20 MG tablet  traZODone (DESYREL) 50 MG tablet  vitamin C (ASCORBIC ACID) 500 MG tablet  zolpidem (AMBIEN) 10 MG tablet  nicotine (NICODERM CQ) 21 MG/24HR 24 hr patch          Review of Systems   Constitutional: Negative for chills and fever.   HENT: Negative for congestion, rhinorrhea and sore  throat.    Eyes: Negative for pain and discharge.   Respiratory: Negative for cough, shortness of breath and wheezing.    Cardiovascular: Negative for chest pain and leg swelling.   Gastrointestinal: Positive for abdominal pain and nausea. Negative for abdominal distention, diarrhea and vomiting.   Genitourinary: Negative for hematuria and urgency.   Musculoskeletal: Negative for back pain and neck pain.   Skin: Negative for rash and wound.   Neurological: Negative for dizziness, weakness, numbness and headaches.       Physical Exam   BP: 129/82  Pulse: 97  Temp: 97.6  F (36.4  C)  Resp: 18  SpO2: 95 %      Physical Exam  Constitutional:       General: He is awake. He is not in acute distress.     Appearance: Normal appearance.   HENT:      Head: Normocephalic and atraumatic.      Nose: Nose normal.      Mouth/Throat:      Mouth: Mucous membranes are dry.      Pharynx: Oropharynx is clear.   Eyes:      General: Lids are normal.      Extraocular Movements: Extraocular movements intact.      Conjunctiva/sclera: Conjunctivae normal.      Pupils: Pupils are equal, round, and reactive to light.   Neck:      Musculoskeletal: Full passive range of motion without pain, normal range of motion and neck supple.   Cardiovascular:      Rate and Rhythm: Normal rate and regular rhythm.      Pulses: Normal pulses.      Heart sounds: Normal heart sounds. No murmur. No friction rub. No gallop.    Pulmonary:      Effort: Pulmonary effort is normal. No respiratory distress.      Breath sounds: Normal breath sounds. No stridor. No wheezing or rhonchi.   Abdominal:      General: Abdomen is flat.      Palpations: Abdomen is soft.      Tenderness: There is abdominal tenderness in the left upper quadrant and left lower quadrant. There is guarding. There is no rebound.   Musculoskeletal: Normal range of motion.   Skin:     General: Skin is warm and dry.      Capillary Refill: Capillary refill takes less than 2 seconds.   Neurological:       General: No focal deficit present.      Mental Status: He is alert and oriented to person, place, and time. Mental status is at baseline.   Psychiatric:         Attention and Perception: Attention normal.         Mood and Affect: Mood normal.         Behavior: Behavior normal. Behavior is cooperative.         ED Course     ED Course as of Jun 19 1925   Sat Jun 19, 2021   1849 Elevated concern for advancing infectious process   Lactic Acid(!): 2.8   1850 No noted leukocytosis   WBC: 8.8   1850 Normal renal function   Creatinine: 1.06   1900 Mild elevation   CRP Inflammation(!): 9.8     Procedures          The Lactic acid level is elevated due to inadequate  oral intake, at this time there is no sign of severe sepsis or septic shock.         Results for orders placed or performed during the hospital encounter of 06/19/21 (from the past 24 hour(s))   CBC with platelets differential   Result Value Ref Range    WBC 8.8 4.0 - 11.0 10e9/L    RBC Count 5.91 (H) 4.4 - 5.9 10e12/L    Hemoglobin 18.4 (H) 13.3 - 17.7 g/dL    Hematocrit 53.9 (H) 40.0 - 53.0 %    MCV 91 78 - 100 fl    MCH 31.1 26.5 - 33.0 pg    MCHC 34.1 31.5 - 36.5 g/dL    RDW 12.6 10.0 - 15.0 %    Platelet Count 344 150 - 450 10e9/L    Diff Method Automated Method     % Neutrophils 63.3 %    % Lymphocytes 24.7 %    % Monocytes 9.9 %    % Eosinophils 1.1 %    % Basophils 0.7 %    % Immature Granulocytes 0.3 %    Nucleated RBCs 0 0 /100    Absolute Neutrophil 5.5 1.6 - 8.3 10e9/L    Absolute Lymphocytes 2.2 0.8 - 5.3 10e9/L    Absolute Monocytes 0.9 0.0 - 1.3 10e9/L    Absolute Eosinophils 0.1 0.0 - 0.7 10e9/L    Absolute Basophils 0.1 0.0 - 0.2 10e9/L    Abs Immature Granulocytes 0.0 0 - 0.4 10e9/L    Absolute Nucleated RBC 0.0    Comprehensive metabolic panel   Result Value Ref Range    Sodium 137 133 - 144 mmol/L    Potassium 3.6 3.4 - 5.3 mmol/L    Chloride 103 94 - 109 mmol/L    Carbon Dioxide 27 20 - 32 mmol/L    Anion Gap 7 3 - 14 mmol/L    Glucose 117 (H)  70 - 99 mg/dL    Urea Nitrogen 17 7 - 30 mg/dL    Creatinine 1.06 0.66 - 1.25 mg/dL    GFR Estimate 73 >60 mL/min/[1.73_m2]    GFR Estimate If Black 85 >60 mL/min/[1.73_m2]    Calcium 8.4 (L) 8.5 - 10.1 mg/dL    Bilirubin Total 0.5 0.2 - 1.3 mg/dL    Albumin 3.6 3.4 - 5.0 g/dL    Protein Total 7.3 6.8 - 8.8 g/dL    Alkaline Phosphatase 88 40 - 150 U/L    ALT 14 0 - 70 U/L    AST 14 0 - 45 U/L   Lipase   Result Value Ref Range    Lipase 101 73 - 393 U/L   Lactic acid whole blood   Result Value Ref Range    Lactic Acid 2.8 (H) 0.7 - 2.0 mmol/L   CRP inflammation   Result Value Ref Range    CRP Inflammation 9.8 (H) 0.0 - 8.0 mg/L   INR   Result Value Ref Range    INR 1.05 0.86 - 1.14   Partial thromboplastin time   Result Value Ref Range    PTT 41 (H) 22 - 37 sec   CT Abdomen Pelvis w Contrast    Narrative    EXAMINATION: CT ABDOMEN PELVIS W CONTRAST, 6/19/2021 6:41 PM    TECHNIQUE:  Helical CT images from the lung bases through the  symphysis pubis were obtained  with IV contrast. Contrast dose: Isove  300 100ml    COMPARISON: Jennifer 15, 2021    HISTORY: Abdominal abscess/infection suspected; Evaluate  interval   changes f/ prior diverticulitis abdominal pain    FINDINGS:    There is dependent atelectasis at the lung bases. Emphysematous  changes are noted in the lungs. There is borderline heart size.    There are some low-density lesions in the liver most likely cysts  stable from previous examination. There are no calcified gallstones.    The the spleen and pancreas appear normal.    The adrenal glands are normal.    There is a benign-appearing cyst seen in the right kidney. There is no  hydronephrosis.    The periaortic lymph nodes are normal in caliber.    The inflammatory changes adjacent to the sigmoid colon have improved  as compared to previous examination. No intraperitoneal abscess is  seen.    In the pelvis the bladder and rectum appear normal. The prostate is  enlarged.    Degenerative changes are present in  the thoracic and lumbar spine      Impression    IMPRESSION: Improving diverticulitis of the sigmoid colon as compared  to previous examination on Jennifer 15, 2021     MANA SOUSA MD       Medications   0.9% sodium chloride BOLUS (1,000 mLs Intravenous New Bag 6/19/21 1748)     Followed by   sodium chloride 0.9% infusion (has no administration in time range)   ondansetron (ZOFRAN) injection 4 mg (4 mg Intravenous Given 6/19/21 1753)   ondansetron (ZOFRAN) injection 4 mg (has no administration in time range)   HYDROmorphone (DILAUDID) injection 1 mg (1 mg Intravenous Given 6/19/21 1755)   iopamidol (ISOVUE-M-300) solution 100 mL (100 mLs Intravenous Given 6/19/21 1826)   sodium chloride (PF) 0.9% PF flush 50 mL (50 mLs Intravenous Given 6/19/21 1826)       Assessments & Plan (with Medical Decision Making)     I have reviewed the nursing notes.    I have reviewed the findings, diagnosis, plan and need for follow up with the patient.  This is a 65 year old gentleman who presents to the ED for abdominal pain. Given the patient's history and examination my suspicion is that this pain is secondary to diverticulitis vs colitis. Other possible etiologies include appendicitis, cholecystitis, PUD, constipation, pancreatitis, diverticulitis, bowel obstruction or perforation, mesenteric ischemia, kidney stone, AAA, testicular torsion.   Evaluation and management will include CBC, CMP, PTT/INR, CRP, lactate, abdominal CT, as well as symptom management. Disposition pending results and patient course.  Noted polycythemia, likely secondary to hemoconcentration due to inadequate oral intake.  Mild elevation of CRP, likely secondary to ongoing diverticulitis, normal renal function, no acute electrode abnormalities requiring acute correction.    Abdominal CT shows interval improvement from his previously diagnosed diverticulitis.    Patient is able to tolerate oral intake with Zofran, patient would prefer to return home, given the  interval improvement, I do think this is reasonable, counseled patient on the importance of taking antibiotics as instructed, and completed the full course, will represcribe the ciprofloxacin and metronidazole, will provide Zofran to facilitate oral intake, return precautions were provided.  We will have him follow-up with primary care.    The patient's workup and evaluation during their Emergency Department stay was reviewed with the patient. He is comfortable going home based on our discussion with him. They are amenable to this plan. They will follow up with PCP in 3 days time. The signs/symptoms to prompt return to the Emergency Department were discussed with the patient and they expressed understanding. All questions were answered.       Work-up shows no significant leukocytosis, noted    New Prescriptions    CIPROFLOXACIN (CIPRO) 500 MG TABLET    Take 1 tablet (500 mg) by mouth 2 times daily for 7 days    METRONIDAZOLE (FLAGYL) 500 MG TABLET    Take 1 tablet (500 mg) by mouth 2 times daily for 7 days    ONDANSETRON (ZOFRAN ODT) 4 MG ODT TAB    Take 1 tablet (4 mg) by mouth every 8 hours as needed for nausea       Final diagnoses:   Colitis   Abdominal pain, generalized       6/19/2021   [facility]     Todd Wilkerson MD  06/19/21 [id number]

## 2021-07-01 ENCOUNTER — HOSPITAL ENCOUNTER (EMERGENCY)
Facility: HOSPITAL | Age: 66
Discharge: HOME | End: 2021-07-02
Attending: INTERNAL MEDICINE | Admitting: INTERNAL MEDICINE
Payer: COMMERCIAL

## 2021-07-01 ENCOUNTER — APPOINTMENT (OUTPATIENT)
Dept: CT IMAGING | Facility: HOSPITAL | Age: 66
End: 2021-07-01
Attending: EMERGENCY MEDICINE
Payer: COMMERCIAL

## 2021-07-01 ENCOUNTER — APPOINTMENT (OUTPATIENT)
Dept: GENERAL RADIOLOGY | Facility: HOSPITAL | Age: 66
End: 2021-07-01
Attending: EMERGENCY MEDICINE
Payer: COMMERCIAL

## 2021-07-01 ENCOUNTER — HOSPITAL ENCOUNTER (EMERGENCY)
Facility: HOSPITAL | Age: 66
Discharge: HOME | End: 2021-07-01
Attending: EMERGENCY MEDICINE | Admitting: EMERGENCY MEDICINE
Payer: COMMERCIAL

## 2021-07-01 VITALS
OXYGEN SATURATION: 95 % | TEMPERATURE: 98.6 F | SYSTOLIC BLOOD PRESSURE: 124 MMHG | DIASTOLIC BLOOD PRESSURE: 84 MMHG | HEART RATE: 97 BPM | RESPIRATION RATE: 20 BRPM

## 2021-07-01 DIAGNOSIS — S29.011A MUSCLE STRAIN OF CHEST WALL, INITIAL ENCOUNTER: ICD-10-CM

## 2021-07-01 DIAGNOSIS — R07.89 CHEST WALL PAIN: ICD-10-CM

## 2021-07-01 DIAGNOSIS — J44.9 CHRONIC OBSTRUCTIVE PULMONARY DISEASE, UNSPECIFIED COPD TYPE (H): ICD-10-CM

## 2021-07-01 DIAGNOSIS — M54.6 LEFT-SIDED THORACIC BACK PAIN, UNSPECIFIED CHRONICITY: ICD-10-CM

## 2021-07-01 LAB
ALBUMIN SERPL-MCNC: 3.4 G/DL (ref 3.4–5)
ALP SERPL-CCNC: 74 U/L (ref 40–150)
ALT SERPL W P-5'-P-CCNC: 16 U/L (ref 0–70)
ANION GAP SERPL CALCULATED.3IONS-SCNC: 7 MMOL/L (ref 3–14)
AST SERPL W P-5'-P-CCNC: 18 U/L (ref 0–45)
BASOPHILS # BLD AUTO: 0.1 10E9/L (ref 0–0.2)
BASOPHILS NFR BLD AUTO: 0.7 %
BILIRUB SERPL-MCNC: 0.5 MG/DL (ref 0.2–1.3)
BUN SERPL-MCNC: 15 MG/DL (ref 7–30)
CALCIUM SERPL-MCNC: 8.6 MG/DL (ref 8.5–10.1)
CHLORIDE SERPL-SCNC: 105 MMOL/L (ref 94–109)
CO2 SERPL-SCNC: 26 MMOL/L (ref 20–32)
CREAT SERPL-MCNC: 0.96 MG/DL (ref 0.66–1.25)
D DIMER PPP FEU-MCNC: 1.9 UG/ML FEU (ref 0–0.5)
DIFFERENTIAL METHOD BLD: ABNORMAL
EOSINOPHIL # BLD AUTO: 0.1 10E9/L (ref 0–0.7)
EOSINOPHIL NFR BLD AUTO: 1.4 %
ERYTHROCYTE [DISTWIDTH] IN BLOOD BY AUTOMATED COUNT: 13.2 % (ref 10–15)
GFR SERPL CREATININE-BSD FRML MDRD: 83 ML/MIN/{1.73_M2}
GLUCOSE SERPL-MCNC: 100 MG/DL (ref 70–99)
HCT VFR BLD AUTO: 53.8 % (ref 40–53)
HGB BLD-MCNC: 18.4 G/DL (ref 13.3–17.7)
IMM GRANULOCYTES # BLD: 0 10E9/L (ref 0–0.4)
IMM GRANULOCYTES NFR BLD: 0.4 %
LIPASE SERPL-CCNC: 115 U/L (ref 73–393)
LYMPHOCYTES # BLD AUTO: 2.6 10E9/L (ref 0.8–5.3)
LYMPHOCYTES NFR BLD AUTO: 30.5 %
MCH RBC QN AUTO: 30.6 PG (ref 26.5–33)
MCHC RBC AUTO-ENTMCNC: 34.2 G/DL (ref 31.5–36.5)
MCV RBC AUTO: 90 FL (ref 78–100)
MONOCYTES # BLD AUTO: 1.1 10E9/L (ref 0–1.3)
MONOCYTES NFR BLD AUTO: 12.5 %
NEUTROPHILS # BLD AUTO: 4.6 10E9/L (ref 1.6–8.3)
NEUTROPHILS NFR BLD AUTO: 54.5 %
NRBC # BLD AUTO: 0 10*3/UL
NRBC BLD AUTO-RTO: 0 /100
PLATELET # BLD AUTO: 288 10E9/L (ref 150–450)
POTASSIUM SERPL-SCNC: 3.5 MMOL/L (ref 3.4–5.3)
PROT SERPL-MCNC: 7.3 G/DL (ref 6.8–8.8)
RBC # BLD AUTO: 6.01 10E12/L (ref 4.4–5.9)
SODIUM SERPL-SCNC: 138 MMOL/L (ref 133–144)
TROPONIN I SERPL-MCNC: <0.015 UG/L (ref 0–0.04)
WBC # BLD AUTO: 8.4 10E9/L (ref 4–11)

## 2021-07-01 PROCEDURE — 250N000011 HC RX IP 250 OP 636: Performed by: EMERGENCY MEDICINE

## 2021-07-01 PROCEDURE — 99285 EMERGENCY DEPT VISIT HI MDM: CPT | Mod: 25,27

## 2021-07-01 PROCEDURE — 99285 EMERGENCY DEPT VISIT HI MDM: CPT | Performed by: INTERNAL MEDICINE

## 2021-07-01 PROCEDURE — 36415 COLL VENOUS BLD VENIPUNCTURE: CPT | Performed by: EMERGENCY MEDICINE

## 2021-07-01 PROCEDURE — 250N000013 HC RX MED GY IP 250 OP 250 PS 637: Performed by: EMERGENCY MEDICINE

## 2021-07-01 PROCEDURE — 96374 THER/PROPH/DIAG INJ IV PUSH: CPT | Mod: XU

## 2021-07-01 PROCEDURE — 93005 ELECTROCARDIOGRAM TRACING: CPT

## 2021-07-01 PROCEDURE — 250N000012 HC RX MED GY IP 250 OP 636 PS 637: Mod: GY | Performed by: INTERNAL MEDICINE

## 2021-07-01 PROCEDURE — 99284 EMERGENCY DEPT VISIT MOD MDM: CPT | Mod: 25

## 2021-07-01 PROCEDURE — 83690 ASSAY OF LIPASE: CPT | Performed by: EMERGENCY MEDICINE

## 2021-07-01 PROCEDURE — 250N000013 HC RX MED GY IP 250 OP 250 PS 637: Performed by: INTERNAL MEDICINE

## 2021-07-01 PROCEDURE — 85379 FIBRIN DEGRADATION QUANT: CPT | Performed by: EMERGENCY MEDICINE

## 2021-07-01 PROCEDURE — 93010 ELECTROCARDIOGRAM REPORT: CPT | Performed by: INTERNAL MEDICINE

## 2021-07-01 PROCEDURE — 80053 COMPREHEN METABOLIC PANEL: CPT | Performed by: EMERGENCY MEDICINE

## 2021-07-01 PROCEDURE — 250N000011 HC RX IP 250 OP 636: Performed by: INTERNAL MEDICINE

## 2021-07-01 PROCEDURE — 85025 COMPLETE CBC W/AUTO DIFF WBC: CPT | Performed by: EMERGENCY MEDICINE

## 2021-07-01 PROCEDURE — 250N000011 HC RX IP 250 OP 636: Performed by: RADIOLOGY

## 2021-07-01 PROCEDURE — 71046 X-RAY EXAM CHEST 2 VIEWS: CPT

## 2021-07-01 PROCEDURE — 96372 THER/PROPH/DIAG INJ SC/IM: CPT | Performed by: INTERNAL MEDICINE

## 2021-07-01 PROCEDURE — 71275 CT ANGIOGRAPHY CHEST: CPT

## 2021-07-01 PROCEDURE — 96376 TX/PRO/DX INJ SAME DRUG ADON: CPT | Mod: XU

## 2021-07-01 PROCEDURE — 84484 ASSAY OF TROPONIN QUANT: CPT | Performed by: EMERGENCY MEDICINE

## 2021-07-01 RX ORDER — LIDOCAINE 4 G/G
1 PATCH TOPICAL ONCE
Status: DISCONTINUED | OUTPATIENT
Start: 2021-07-01 | End: 2021-07-01 | Stop reason: HOSPADM

## 2021-07-01 RX ORDER — DIAZEPAM 5 MG
5 TABLET ORAL ONCE
Status: COMPLETED | OUTPATIENT
Start: 2021-07-01 | End: 2021-07-01

## 2021-07-01 RX ORDER — LIDOCAINE 50 MG/G
1 PATCH TOPICAL EVERY 24 HOURS
Qty: 20 PATCH | Refills: 0 | Status: SHIPPED | OUTPATIENT
Start: 2021-07-01 | End: 2021-07-21

## 2021-07-01 RX ORDER — PREDNISONE 20 MG/1
40 TABLET ORAL ONCE
Status: COMPLETED | OUTPATIENT
Start: 2021-07-01 | End: 2021-07-01

## 2021-07-01 RX ORDER — HYDROMORPHONE HYDROCHLORIDE 1 MG/ML
0.5 INJECTION, SOLUTION INTRAMUSCULAR; INTRAVENOUS; SUBCUTANEOUS ONCE
Status: COMPLETED | OUTPATIENT
Start: 2021-07-01 | End: 2021-07-01

## 2021-07-01 RX ORDER — IOPAMIDOL 755 MG/ML
100 INJECTION, SOLUTION INTRAVASCULAR ONCE
Status: COMPLETED | OUTPATIENT
Start: 2021-07-01 | End: 2021-07-01

## 2021-07-01 RX ORDER — KETOROLAC TROMETHAMINE 30 MG/ML
60 INJECTION, SOLUTION INTRAMUSCULAR; INTRAVENOUS ONCE
Status: COMPLETED | OUTPATIENT
Start: 2021-07-01 | End: 2021-07-01

## 2021-07-01 RX ORDER — LIDOCAINE 4 G/G
1 PATCH TOPICAL
Status: DISCONTINUED | OUTPATIENT
Start: 2021-07-02 | End: 2021-07-01

## 2021-07-01 RX ADMIN — DIAZEPAM 5 MG: 5 TABLET ORAL at 23:47

## 2021-07-01 RX ADMIN — PREDNISONE 40 MG: 20 TABLET ORAL at 23:47

## 2021-07-01 RX ADMIN — HYDROMORPHONE HYDROCHLORIDE 1 MG: 1 INJECTION, SOLUTION INTRAMUSCULAR; INTRAVENOUS; SUBCUTANEOUS at 13:10

## 2021-07-01 RX ADMIN — Medication 1 PATCH: at 14:37

## 2021-07-01 RX ADMIN — HYDROMORPHONE HYDROCHLORIDE 0.5 MG: 1 INJECTION, SOLUTION INTRAMUSCULAR; INTRAVENOUS; SUBCUTANEOUS at 14:39

## 2021-07-01 RX ADMIN — IOPAMIDOL 100 ML: 755 INJECTION, SOLUTION INTRAVENOUS at 14:35

## 2021-07-01 RX ADMIN — KETOROLAC TROMETHAMINE 60 MG: 30 INJECTION, SOLUTION INTRAMUSCULAR at 23:47

## 2021-07-01 NOTE — DISCHARGE INSTRUCTIONS
Follow-up with your doctor.  Follow-up with neurosurgery as discussed.  Follow-up for MRI if able to undergo MRI in light of hardware

## 2021-07-01 NOTE — ED PROVIDER NOTES
History     Chief Complaint   Patient presents with     Flank Pain     L sided intermittent for the last month, last night much worse, SOB due to pain, Denies chest pain     HPI  Joey Irene is a 65 year old male who presents with report of left-sided chest pain worse with deep inspiration and worse with movement.  He describes having similar for the last month.  No improvement.  Duration ongoing.  Character persistent.  He has had a thorough evaluation per his own report.  Most recent labs on 6/19 with normal chemistries and negative troponin, white count 8.8.  CT PE study on 6/15 negative.  Patient notes no new trauma.  No new prolonged immobilization.  No history of blood clots.  No other associated symptoms.  Abdominal discomfort from diverticulitis diagnosis has resolved.    Allergies:  Allergies   Allergen Reactions     Seasonal Allergies Difficulty breathing and Cough     Bupropion Other (See Comments)     Tramadol Nausea and Swelling     Muir Nite Time Dizziness and Nausea and Vomiting     Nyquil Cold &  [Night-Time Cold-Flu Relief] Dizziness and Nausea and Vomiting     Trichophyton Other (See Comments)       Problem List:    Patient Active Problem List    Diagnosis Date Noted     Encounter for smoking cessation counseling 04/19/2021     Priority: Medium     Diastolic heart failure, unspecified HF chronicity (H) 04/19/2021     Priority: Medium     Status post coronary angiogram 12/11/2020     Priority: Medium     Severe pulmonary arterial systolic hypertension (H) 11/20/2020     Priority: Medium     Right ventricular dilation 10/29/2020     Priority: Medium     Added automatically from request for surgery [id number]       Bee sting allergy 08/07/2020     Priority: Medium     Hyperglycemia 08/07/2020     Priority: Medium     Elevated prostate specific antigen (PSA) 08/07/2020     Priority: Medium     History of fusion of cervical spine 08/06/2020     Priority: Medium     Generalized anxiety disorder  08/06/2020     Priority: Medium     Tobacco use disorder 08/06/2020     Priority: Medium     Pulmonary emphysema, unspecified emphysema type (H) 08/05/2020     Priority: Medium     Cardiomegaly 08/05/2020     Priority: Medium     Enlarged prostate 08/05/2020     Priority: Medium     Diverticulosis 03/25/2020     Priority: Medium     Chronic bronchitis with emphysema (H) 02/03/2020     Priority: Medium     Class 1 obesity with body mass index (BMI) of 33.0 to 33.9 in adult 02/03/2020     Priority: Medium     Smoking greater than 40 pack years 02/03/2020     Priority: Medium     Pain due to total left knee replacement, sequela 01/27/2020     Priority: Medium     Cervical stenosis of spinal canal 01/13/2020     Priority: Medium     Cholesteatoma of left ear 03/04/2019     Priority: Medium     Nasal septal deviation 12/18/2017     Priority: Medium     Primary osteoarthritis of right knee 02/25/2017     Priority: Medium     Status post total left knee replacement 02/25/2017     Priority: Medium     Recurrent major depressive disorder, in partial remission (H) 11/21/2015     Priority: Medium     PTSD (post-traumatic stress disorder) 08/19/2015     Priority: Medium     Seasonal allergies 08/19/2015     Priority: Medium     Anxiety state 07/07/2011     Priority: Medium     Back pain, chronic 07/07/2011     Priority: Medium     Hearing loss 07/07/2011     Priority: Medium     Insomnia, unspecified 07/07/2011     Priority: Medium     Sleep apnea 07/07/2011     Priority: Medium        Past Medical History:    Past Medical History:   Diagnosis Date     COPD (chronic obstructive pulmonary disease) (H)      Hypertension      Uncomplicated asthma        Past Surgical History:    Past Surgical History:   Procedure Laterality Date     CV CORONARY ANGIOGRAM N/A 12/11/2020    Procedure: Coronary Angiogram;  Surgeon: Aman Delgado MD;  Location:  [facility]     CV RIGHT HEART CATH MEASUREMENTS RECORDED N/A  2020    Procedure: CV RIGHT HEART CATH;  Surgeon: Aman Delgado MD;  Location:  [facility]     ENT SURGERY      patient has had three left ear surgeries, unsure what they did     FUSION CERVICAL POSTERIOR THREE+ LEVELS       HERNIA REPAIR, INGUINAL RT/LT Right     done times 3     JOINT REPLACEMENT Left      REPAIR HAMMER TOE Right        Family History:    Family History   Problem Relation Age of Onset     Lung Cancer Mother      Ovarian Cancer Sister        Social History:  Marital Status:   [4]  Social History     Tobacco Use     Smoking status: Former Smoker     Years: 50.00     Types: Cigars     Quit date: 2020     Years since quittin.5     Smokeless tobacco: Never Used   Substance Use Topics     Alcohol use: Never     Frequency: Never     Drug use: Never        Medications:    lidocaine (LIDODERM) 5 % patch  acetaminophen (TYLENOL) 500 MG tablet  albuterol (PROAIR HFA/PROVENTIL HFA/VENTOLIN HFA) 108 (90 Base) MCG/ACT inhaler  ARIPiprazole (ABILIFY) 10 MG tablet  aspirin (ASA) 81 MG EC tablet  cetirizine (ZYRTEC) 10 MG tablet  Cholecalciferol (D 1000) 25 MCG (1000 UT) CAPS  cyclobenzaprine (FLEXERIL) 10 MG tablet  cyclobenzaprine (FLEXERIL) 10 MG tablet  digoxin (LANOXIN) 125 MCG tablet  DULoxetine (CYMBALTA) 60 MG capsule  EPINEPHrine (ANY BX GENERIC EQUIV) 0.3 MG/0.3ML injection 2-pack  fluticasone (FLONASE) 50 MCG/ACT nasal spray  Fluticasone-Umeclidin-Vilanterol (TRELEGY ELLIPTA) 100-62.5-25 MCG/INH oral inhaler  gabapentin (NEURONTIN) 300 MG capsule  guaiFENesin (MUCINEX) 600 MG 12 hr tablet  hydrOXYzine (VISTARIL) 50 MG capsule  montelukast (SINGULAIR) 10 MG tablet  naproxen (NAPROSYN) 500 MG tablet  nicotine (NICODERM CQ) 21 MG/24HR 24 hr patch  omeprazole (PRILOSEC) 20 MG DR capsule  potassium chloride ER (KLOR-CON M) 20 MEQ CR tablet  predniSONE (DELTASONE) 20 MG tablet  SYMBICORT 80-4.5 MCG/ACT Inhaler  torsemide (DEMADEX) 20 MG tablet  traZODone  (DESYREL) 50 MG tablet  vitamin C (ASCORBIC ACID) 500 MG tablet  zolpidem (AMBIEN) 10 MG tablet          Review of Systems   Respiratory per HPI.  Cardiovascular per HPI.  GI denies.  Diverticulitis has resolved.   denies.  Constitutional denies.  Remainder of complete 10 point review of systems negative.    Physical Exam   BP: 109/82  Pulse: 91  Temp: 98  F (36.7  C)  Resp: 20  SpO2: 95 %      Physical Exam  HENT:      Head: Normocephalic and atraumatic.      Nose: Nose normal. No congestion.      Mouth/Throat:      Mouth: Mucous membranes are moist.   Eyes:      Extraocular Movements: Extraocular movements intact.      Conjunctiva/sclera: Conjunctivae normal.      Pupils: Pupils are equal, round, and reactive to light.   Neck:      Musculoskeletal: Normal range of motion and neck supple.   Cardiovascular:      Rate and Rhythm: Normal rate.      Pulses: Normal pulses.   Pulmonary:      Effort: Pulmonary effort is normal. No respiratory distress.      Breath sounds: No wheezing.   Abdominal:      General: There is no distension.      Palpations: Abdomen is soft.      Tenderness: There is no abdominal tenderness.   Musculoskeletal:      Comments: Left chest wall tender to palpation without focality.  No external signs of trauma.  No subcu air.  No midline tenderness of back.  No lateralizing discomfort to AP compression.     Neurological:      Mental Status: He is alert.       Recent cath reviewed from December 2020 with minimal nonobstructive coronary artery disease.    EKG today shows T wave inversions in precordial and lateral leads without evidence for STEMI.  Unchanged from EKG several weeks ago.  Ventricular rate 89, GA interval 170,      PROCEDURE:  CTA CHEST WITH CONTRAST.     HISTORY:  Evaluate for pulmonary embolism.  PE suspected,  low/intermediate prob, positive D-dimer     TECHNIQUE:  Initial pre-contrast  and localizer images were  obtained.  Contrast enhanced helical CT pulmonary  angiography was then  performed.  Routine transaxial and post-processed (multiplanar and/or  MIP) reformations were obtained.     COMPARISON:  6/15/2021     MEDS/CONTRAST: Isovue 370, 100ml     PULMONARY CTA FINDINGS:  Respiratory motion limits assessment. No  acute pulmonary embolus is identified to the subsegmental level in the  upper lobes or segmental level in the lower lobes.     OTHER FINDINGS:       The heart is enlarged. The main pulmonary artery is severely enlarged.  The central airways are narrowed diffusely. Advanced mixed  emphysematous changes are redemonstrated. Trace pericardial fluid is  seen. No abnormal thoracic adenopathy is identified.     Limited views of the upper abdomen reveal no adrenal mass or acute  process.      No suspicious osseous lesion is identified.                                                                      IMPRESSION:     No acute pulmonary emboli allowing for respiratory motion.     Cardiomegaly. Chronic pulmonary hypertension evidenced by severe  dilation of the central pulmonary arteries. Advanced emphysema.     Tracheobronchomalacia.     EMMY EARL MD          SYSTEM ID:  [id number]   Order-Level Documents:    There are no order-level documents.  Lab and Collection    CTA Chest with Contrast (Order: [id number]) - 7/1/2021  Result History    CTA Chest with Contrast (Order #[id number]) on 7/1/2021 - Order Result History Report   Result Information    Status: Final result (Exam End: 7/1/2021  2:44 PM) Provider Status: Open   Reading Providers     Reading Role Read Date   Emmy Earl MD SPECT [place] 7/1/2021   Signed by    Signed Date/Time  Phone Pager   EMMY EARL 7/01/2021 14:49 [phone]      Troponin I ES 0.000 - 0.045 ug/L <0.015  <0.015 CM      Lipase 73 - 393 U/L 115  101       Ref Range & Units 1d ago 13d ago     Sodium 133 - 144 mmol/L 138  137     Potassium 3.4 - 5.3 mmol/L 3.5  3.6     Chloride 94 - 109 mmol/L 105  103     Carbon  Dioxide 20 - 32 mmol/L 26  27     Anion Gap 3 - 14 mmol/L 7  7     Glucose 70 - 99 mg/dL 100High   117High      Urea Nitrogen 7 - 30 mg/dL 15  17     Creatinine 0.66 - 1.25 mg/dL 0.96  1.06     GFR Estimate >60 mL/min/ 83  73 CM    Comment: Non  GFR Calc   Starting 12/18/2018, serum creatinine based estimated GFR (eGFR) will be   calculated using the Chronic Kidney Disease Epidemiology Collaboration   (CKD-EPI) equation.     GFR Estimate If Black >60 mL/min/ >90  85 CM    Comment:  GFR Calc   Starting 12/18/2018, serum creatinine based estimated GFR (eGFR) will be   calculated using the Chronic Kidney Disease Epidemiology Collaboration   (CKD-EPI) equation.     Calcium 8.5 - 10.1 mg/dL 8.6  8.4Low      Bilirubin Total 0.2 - 1.3 mg/dL 0.5  0.5     Albumin 3.4 - 5.0 g/dL 3.4  3.6     Protein Total 6.8 - 8.8 g/dL 7.3  7.3     Alkaline Phosphatase 40 - 150 U/L 74  88     ALT 0 - 70 U/L 16  14     AST 0 - 45 U/L 18  14    Resulting Agency  [facility]         Specimen Collected: 07/01/21  1:16 PM Last Resulted: 07/01/21  1:39 PM             4.0 - 11.0 10e9/L 8.4  8.8      RBC Count 4.4 - 5.9 10e12/L 6.01High   5.91High      Hemoglobin 13.3 - 17.7 g/dL 18.4High   18.4High      Hematocrit 40.0 - 53.0 % 53.8High   53.9High      MCV 78 - 100 fl 90  91     MCH 26.5 - 33.0 pg 30.6  31.1          Medications   HYDROmorphone (DILAUDID) injection 1 mg (1 mg Intravenous Given 7/1/21 1310)   HYDROmorphone (PF) (DILAUDID) injection 0.5 mg (0.5 mg Intravenous Given 7/1/21 1439)   sodium chloride (PF) 0.9% PF flush 100 mL (100 mLs Intravenous Given 7/1/21 1435)   iopamidol (ISOVUE-370) solution 100 mL (100 mLs Intravenous Given 7/1/21 6209)       Assessments & Plan (with Medical Decision Making)   Patient presenting with persistent left chest wall discomfort.  Presents etiology somewhat unclear.  Thorough evaluation undertaken and  CT nondiagnostic.  Laboratories reassuring.  EKG nondiagnostic.  It has been too long to represent shingles as there is no outward sign thereof.  It does appear to have a radicular /dermatomal nature wrapping around the left chest wall from the midline back.  The patient does have a history of spinal degenerative changes and surgery.  The question arises as to whether or not he has nerve root impingement precipitating radicular pain about his left chest wall.  The patient is a fair amount of hardware and I did discuss with MRI and they will research whether he can undergo an MRI.  I am also recommending that he follow-up with his spinal surgery team who have cared from the past in the [place] area.  I think at this juncture given the negative evaluation, we need to think about other etiologies ruling out nerve root compression prior to considering a complex regional pain syndrome in the absence of any other findings suggestive of alternate pathology at this time.  Lidoderm patches prescribed.  Advised the patient to follow-up accordingly with ideally an MRI if MRI compatible, spinal surgeon and his PCP.  Patient agrees with plan.    Discharge Medication List as of 7/1/2021  4:17 PM      START taking these medications    Details   lidocaine (LIDODERM) 5 % patch Place 1 patch onto the skin every 24 hours for 20 daysDisp-20 patch, [id number]             Final diagnoses:   Left-sided thoracic back pain, unspecified chronicity   Chest wall pain       7/1/2021   [facility]     Yandel Thorpe MD  07/02/21 [id number]

## 2021-07-02 VITALS
SYSTOLIC BLOOD PRESSURE: 138 MMHG | RESPIRATION RATE: 18 BRPM | HEART RATE: 76 BPM | OXYGEN SATURATION: 89 % | DIASTOLIC BLOOD PRESSURE: 64 MMHG | TEMPERATURE: 98.2 F

## 2021-07-02 PROCEDURE — 250N000009 HC RX 250: Performed by: INTERNAL MEDICINE

## 2021-07-02 PROCEDURE — 94640 AIRWAY INHALATION TREATMENT: CPT

## 2021-07-02 RX ORDER — PREDNISONE 20 MG/1
TABLET ORAL
Qty: 5 TABLET | Refills: 0 | Status: SHIPPED | OUTPATIENT
Start: 2021-07-02 | End: 2021-07-09

## 2021-07-02 RX ORDER — CYCLOBENZAPRINE HCL 10 MG
10 TABLET ORAL 2 TIMES DAILY PRN
Qty: 10 TABLET | Refills: 0 | Status: SHIPPED | OUTPATIENT
Start: 2021-07-02 | End: 2021-07-07

## 2021-07-02 RX ORDER — IPRATROPIUM BROMIDE AND ALBUTEROL SULFATE 2.5; .5 MG/3ML; MG/3ML
3 SOLUTION RESPIRATORY (INHALATION) ONCE
Status: COMPLETED | OUTPATIENT
Start: 2021-07-02 | End: 2021-07-02

## 2021-07-02 RX ADMIN — IPRATROPIUM BROMIDE AND ALBUTEROL SULFATE 3 ML: .5; 3 SOLUTION RESPIRATORY (INHALATION) at 01:00

## 2021-07-02 NOTE — ED NOTES
Plan of care discussed with patient. Patient denies any questions or concerns. Patient advised to return to ED with any new or worsening symptoms. Patient verbalized understanding.

## 2021-07-02 NOTE — ED NOTES
"Patient wheeled to room in w/c. Patient reports he did not want to come back in but that his girlfriend told him he had to come back in or she was going to call the ambulance. Patient reports he is unable to take a deep breath due to the pain. Patient also reports the pain has been going on for a while and he was in here earlier today. Patient reports he tried to lay down after dinner and the pain is just \"so intense, I cannot get comfortable.\"  "

## 2021-07-02 NOTE — ED NOTES
"Patient was asked what his oxygen is normally at home. Patient reported that he typically runs high 80's to low 90's. \"Every once in a while I get surprised if they tell me it is mid 90's.\" Patient reports he feels steady, does not feel light headed. Patient reports he is unable to take a deep breath due to the pain.   "

## 2021-07-02 NOTE — ED NOTES
Patient rates left side pain 7/10. Oxygen saturations 91% on 4L via [place]. Pt asking what the plan is?. Updated Dr. Gomes.

## 2021-07-04 ASSESSMENT — ENCOUNTER SYMPTOMS
BACK PAIN: 0
DYSURIA: 0
FEVER: 0
CHILLS: 0
VOICE CHANGE: 0
VOMITING: 0
CHEST TIGHTNESS: 0
FREQUENCY: 0
COLOR CHANGE: 0
SHORTNESS OF BREATH: 1
COUGH: 1
SLEEP DISTURBANCE: 0
DIAPHORESIS: 0
PALPITATIONS: 0
NUMBNESS: 0
ABDOMINAL PAIN: 0
WEAKNESS: 0
NAUSEA: 0
FLANK PAIN: 0
ABDOMINAL DISTENTION: 0
WHEEZING: 1
HEADACHES: 0
MYALGIAS: 0
NECK PAIN: 0
BLOOD IN STOOL: 0
CONFUSION: 0
DIZZINESS: 0
LIGHT-HEADEDNESS: 0
ANAL BLEEDING: 0

## 2021-07-04 NOTE — ED PROVIDER NOTES
History     Chief Complaint   Patient presents with     Back Pain     Seen earlier today in the ED     The history is provided by the patient.   Chest Pain  Pain location:  L chest  Pain quality: sharp    Pain radiates to:  Mid back  Pain severity:  Severe  Onset quality:  Gradual  Timing:  Intermittent  Progression:  Waxing and waning  Chronicity:  New  Context: breathing    Associated symptoms: cough and shortness of breath    Associated symptoms: no abdominal pain, no back pain, no diaphoresis, no dizziness, no fever, no headache, no nausea, no numbness, no palpitations, no vomiting and no weakness          Allergies:  Allergies   Allergen Reactions     Seasonal Allergies Difficulty breathing and Cough     Bupropion Other (See Comments)     Tramadol Nausea and Swelling     [place] Nite Time Dizziness and Nausea and Vomiting     Nyquil Cold &  [Night-Time Cold-Flu Relief] Dizziness and Nausea and Vomiting     Trichophyton Other (See Comments)       Problem List:    Patient Active Problem List    Diagnosis Date Noted     Encounter for smoking cessation counseling 04/19/2021     Priority: Medium     Diastolic heart failure, unspecified HF chronicity (H) 04/19/2021     Priority: Medium     Status post coronary angiogram 12/11/2020     Priority: Medium     Severe pulmonary arterial systolic hypertension (H) 11/20/2020     Priority: Medium     Right ventricular dilation 10/29/2020     Priority: Medium     Added automatically from request for surgery [id number]       Bee sting allergy 08/07/2020     Priority: Medium     Hyperglycemia 08/07/2020     Priority: Medium     Elevated prostate specific antigen (PSA) 08/07/2020     Priority: Medium     History of fusion of cervical spine 08/06/2020     Priority: Medium     Generalized anxiety disorder 08/06/2020     Priority: Medium     Tobacco use disorder 08/06/2020     Priority: Medium     Pulmonary emphysema, unspecified emphysema type (H) 08/05/2020     Priority: Medium      Cardiomegaly 08/05/2020     Priority: Medium     Enlarged prostate 08/05/2020     Priority: Medium     Diverticulosis 03/25/2020     Priority: Medium     Chronic bronchitis with emphysema (H) 02/03/2020     Priority: Medium     Class 1 obesity with body mass index (BMI) of 33.0 to 33.9 in adult 02/03/2020     Priority: Medium     Smoking greater than 40 pack years 02/03/2020     Priority: Medium     Pain due to total left knee replacement, sequela 01/27/2020     Priority: Medium     Cervical stenosis of spinal canal 01/13/2020     Priority: Medium     Cholesteatoma of left ear 03/04/2019     Priority: Medium     Nasal septal deviation 12/18/2017     Priority: Medium     Primary osteoarthritis of right knee 02/25/2017     Priority: Medium     Status post total left knee replacement 02/25/2017     Priority: Medium     Recurrent major depressive disorder, in partial remission (H) 11/21/2015     Priority: Medium     PTSD (post-traumatic stress disorder) 08/19/2015     Priority: Medium     Seasonal allergies 08/19/2015     Priority: Medium     Anxiety state 07/07/2011     Priority: Medium     Back pain, chronic 07/07/2011     Priority: Medium     Hearing loss 07/07/2011     Priority: Medium     Insomnia, unspecified 07/07/2011     Priority: Medium     Sleep apnea 07/07/2011     Priority: Medium        Past Medical History:    Past Medical History:   Diagnosis Date     COPD (chronic obstructive pulmonary disease) (H)      Hypertension      Uncomplicated asthma        Past Surgical History:    Past Surgical History:   Procedure Laterality Date     CV CORONARY ANGIOGRAM N/A 12/11/2020    Procedure: Coronary Angiogram;  Surgeon: Aman Delgado MD;  Location: [facility]     CV RIGHT HEART CATH MEASUREMENTS RECORDED N/A 12/11/2020    Procedure: CV RIGHT HEART CATH;  Surgeon: Aman Delgado MD;  Location: [facility]     ENT SURGERY      patient has had three left ear surgeries,  unsure what they did     FUSION CERVICAL POSTERIOR THREE+ LEVELS       HERNIA REPAIR, INGUINAL RT/LT Right     done times 3     JOINT REPLACEMENT Left      REPAIR HAMMER TOE Right        Family History:    Family History   Problem Relation Age of Onset     Lung Cancer Mother      Ovarian Cancer Sister        Social History:  Marital Status:   [4]  Social History     Tobacco Use     Smoking status: Former Smoker     Years: 50.00     Types: Cigars     Quit date: 2020     Years since quittin.5     Smokeless tobacco: Never Used   Substance Use Topics     Alcohol use: Never     Frequency: Never     Drug use: Never        Medications:    cyclobenzaprine (FLEXERIL) 10 MG tablet  predniSONE (DELTASONE) 20 MG tablet  acetaminophen (TYLENOL) 500 MG tablet  albuterol (PROAIR HFA/PROVENTIL HFA/VENTOLIN HFA) 108 (90 Base) MCG/ACT inhaler  ARIPiprazole (ABILIFY) 10 MG tablet  aspirin (ASA) 81 MG EC tablet  cetirizine (ZYRTEC) 10 MG tablet  Cholecalciferol (D 1000) 25 MCG (1000 UT) CAPS  cyclobenzaprine (FLEXERIL) 10 MG tablet  digoxin (LANOXIN) 125 MCG tablet  DULoxetine (CYMBALTA) 60 MG capsule  EPINEPHrine (ANY BX GENERIC EQUIV) 0.3 MG/0.3ML injection 2-pack  fluticasone (FLONASE) 50 MCG/ACT nasal spray  Fluticasone-Umeclidin-Vilanterol (TRELEGY ELLIPTA) 100-62.5-25 MCG/INH oral inhaler  gabapentin (NEURONTIN) 300 MG capsule  guaiFENesin (MUCINEX) 600 MG 12 hr tablet  hydrOXYzine (VISTARIL) 50 MG capsule  lidocaine (LIDODERM) 5 % patch  montelukast (SINGULAIR) 10 MG tablet  naproxen (NAPROSYN) 500 MG tablet  nicotine (NICODERM CQ) 21 MG/24HR 24 hr patch  omeprazole (PRILOSEC) 20 MG DR capsule  potassium chloride ER (KLOR-CON M) 20 MEQ CR tablet  SYMBICORT 80-4.5 MCG/ACT Inhaler  torsemide (DEMADEX) 20 MG tablet  traZODone (DESYREL) 50 MG tablet  vitamin C (ASCORBIC ACID) 500 MG tablet  zolpidem (AMBIEN) 10 MG tablet          Review of Systems   Constitutional: Negative for chills, diaphoresis and fever.    HENT: Negative for voice change.    Eyes: Negative for visual disturbance.   Respiratory: Positive for cough, shortness of breath and wheezing. Negative for chest tightness.    Cardiovascular: Positive for chest pain. Negative for palpitations and leg swelling.   Gastrointestinal: Negative for abdominal distention, abdominal pain, anal bleeding, blood in stool, nausea and vomiting.   Genitourinary: Negative for decreased urine volume, dysuria, flank pain and frequency.   Musculoskeletal: Negative for back pain, gait problem, myalgias and neck pain.   Skin: Negative for color change, pallor and rash.   Neurological: Negative for dizziness, syncope, weakness, light-headedness, numbness and headaches.   Psychiatric/Behavioral: Negative for confusion, sleep disturbance and suicidal ideas.       Physical Exam   BP: 121/80  Pulse: 100  Temp: 98.2  F (36.8  C)  Resp: 22  SpO2: (!) 90 %      Physical Exam  Vitals signs and nursing note reviewed.   Constitutional:       Appearance: He is well-developed.   HENT:      Head: Normocephalic and atraumatic.   Eyes:      Conjunctiva/sclera: Conjunctivae normal.      Pupils: Pupils are equal, round, and reactive to light.   Neck:      Musculoskeletal: Normal range of motion and neck supple.      Thyroid: No thyromegaly.      Vascular: No JVD.      Trachea: No tracheal deviation.   Cardiovascular:      Rate and Rhythm: Normal rate and regular rhythm.      Heart sounds: Normal heart sounds. No murmur. No gallop.    Pulmonary:      Effort: Pulmonary effort is normal. No respiratory distress.      Breath sounds: Normal breath sounds. No stridor. No wheezing or rales.       Chest:      Chest wall: Tenderness present.       Abdominal:      General: Bowel sounds are normal. There is no distension.      Palpations: Abdomen is soft. There is no mass.      Tenderness: There is no abdominal tenderness. There is no guarding or rebound.   Musculoskeletal: Normal range of motion.          General: No tenderness.   Lymphadenopathy:      Cervical: No cervical adenopathy.   Skin:     General: Skin is warm.      Coloration: Skin is not pale.      Findings: No erythema or rash.   Neurological:      Mental Status: He is alert and oriented to person, place, and time.   Psychiatric:         Behavior: Behavior normal.         ED Course        Procedures                 No results found for this or any previous visit (from the past 24 hour(s)).    Medications   diazepam (VALIUM) tablet 5 mg (5 mg Oral Given 7/1/21 2347)   ketorolac (TORADOL) injection 60 mg (60 mg Intramuscular Given 7/1/21 2347)   predniSONE (DELTASONE) tablet 40 mg (40 mg Oral Given 7/1/21 2347)   ipratropium - albuterol 0.5 mg/2.5 mg/3 mL (DUONEB) neb solution 3 mL (3 mLs Nebulization Given 7/2/21 0100)       Assessments & Plan (with Medical Decision Making)   Left lower chest wall pain  + tenderness radiating to left mid back  Poorly controlled COPD  2rd ER visit for the same, multiple imaging studies did not show any definite etiology  After receiving prednisone + Toradol , his pan resolved and felt much better  D C home, follow-up with PCP  I have reviewed the nursing notes.    I have reviewed the findings, diagnosis, plan and need for follow up with the patient.      Discharge Medication List as of 7/2/2021  2:23 AM      START taking these medications    Details   !! cyclobenzaprine (FLEXERIL) 10 MG tablet Take 1 tablet (10 mg) by mouth 2 times daily as needed for muscle spasms, Disp-10 tablet, R-0, Local Print      predniSONE (DELTASONE) 20 MG tablet 1 tab daily for 3 days, then 1/2 tab daily for 4 days, Disp-5 tablet, R-0, Local Print       !! - Potential duplicate medications found. Please discuss with provider.          Final diagnoses:   Chest wall pain   Chronic obstructive pulmonary disease, unspecified COPD type (H)   Muscle strain of chest wall, initial encounter       7/1/2021   [facility]     Gregory Gomes,  MD  07/04/21 0650

## 2021-07-13 ENCOUNTER — LAB (OUTPATIENT)
Dept: LAB | Facility: OTHER | Age: 66
End: 2021-07-13
Attending: NURSE PRACTITIONER
Payer: COMMERCIAL

## 2021-07-13 ENCOUNTER — PATIENT OUTREACH (OUTPATIENT)
Dept: CARDIOLOGY | Facility: OTHER | Age: 66
End: 2021-07-13

## 2021-07-13 ENCOUNTER — OFFICE VISIT (OUTPATIENT)
Dept: CARDIOLOGY | Facility: OTHER | Age: 66
End: 2021-07-13
Attending: NURSE PRACTITIONER
Payer: COMMERCIAL

## 2021-07-13 VITALS
SYSTOLIC BLOOD PRESSURE: 140 MMHG | HEIGHT: 71 IN | WEIGHT: 230 LBS | OXYGEN SATURATION: 92 % | BODY MASS INDEX: 32.2 KG/M2 | DIASTOLIC BLOOD PRESSURE: 62 MMHG | HEART RATE: 106 BPM | TEMPERATURE: 97.7 F | RESPIRATION RATE: 20 BRPM

## 2021-07-13 DIAGNOSIS — Z51.81 ENCOUNTER FOR MONITORING DIGOXIN THERAPY: ICD-10-CM

## 2021-07-13 DIAGNOSIS — R00.0 SINUS TACHYCARDIA: ICD-10-CM

## 2021-07-13 DIAGNOSIS — I27.21 PULMONARY ARTERIAL HYPERTENSION (H): Primary | ICD-10-CM

## 2021-07-13 DIAGNOSIS — I51.7 RIGHT VENTRICULAR DILATION: ICD-10-CM

## 2021-07-13 DIAGNOSIS — I27.21 PULMONARY ARTERIAL HYPERTENSION (H): ICD-10-CM

## 2021-07-13 DIAGNOSIS — E87.6 DRUG-INDUCED HYPOKALEMIA: ICD-10-CM

## 2021-07-13 DIAGNOSIS — Z79.899 ENCOUNTER FOR MONITORING DIGOXIN THERAPY: ICD-10-CM

## 2021-07-13 DIAGNOSIS — I50.810 RIGHT HEART FAILURE, NYHA CLASS 3 (H): ICD-10-CM

## 2021-07-13 DIAGNOSIS — T50.905A DRUG-INDUCED HYPOKALEMIA: ICD-10-CM

## 2021-07-13 DIAGNOSIS — Z98.890 STATUS POST CORONARY ANGIOGRAM: ICD-10-CM

## 2021-07-13 DIAGNOSIS — Z98.890: ICD-10-CM

## 2021-07-13 DIAGNOSIS — F17.200 TOBACCO USE DISORDER: ICD-10-CM

## 2021-07-13 DIAGNOSIS — J44.9 SEVERE CHRONIC OBSTRUCTIVE PULMONARY DISEASE (H): ICD-10-CM

## 2021-07-13 LAB — DIGOXIN SERPL-MCNC: 0.7 UG/L

## 2021-07-13 PROCEDURE — 93010 ELECTROCARDIOGRAM REPORT: CPT | Performed by: INTERNAL MEDICINE

## 2021-07-13 PROCEDURE — 80162 ASSAY OF DIGOXIN TOTAL: CPT | Mod: ZL

## 2021-07-13 PROCEDURE — 36415 COLL VENOUS BLD VENIPUNCTURE: CPT | Mod: ZL

## 2021-07-13 PROCEDURE — G0463 HOSPITAL OUTPT CLINIC VISIT: HCPCS

## 2021-07-13 PROCEDURE — G0463 HOSPITAL OUTPT CLINIC VISIT: HCPCS | Mod: 25

## 2021-07-13 PROCEDURE — 99214 OFFICE O/P EST MOD 30 MIN: CPT | Performed by: NURSE PRACTITIONER

## 2021-07-13 PROCEDURE — 93005 ELECTROCARDIOGRAM TRACING: CPT

## 2021-07-13 RX ORDER — METOPROLOL SUCCINATE 25 MG/1
25 TABLET, EXTENDED RELEASE ORAL DAILY
Qty: 30 TABLET | Refills: 0 | Status: SHIPPED | OUTPATIENT
Start: 2021-07-13 | End: 2021-08-09

## 2021-07-13 RX ORDER — POTASSIUM CHLORIDE 1500 MG/1
TABLET, EXTENDED RELEASE ORAL
Qty: 90 TABLET | Refills: 1 | Status: SHIPPED | OUTPATIENT
Start: 2021-07-13 | End: 2021-09-21

## 2021-07-13 ASSESSMENT — PAIN SCALES - GENERAL: PAINLEVEL: MODERATE PAIN (5)

## 2021-07-13 ASSESSMENT — MIFFLIN-ST. JEOR: SCORE: 1850.4

## 2021-07-13 NOTE — PATIENT INSTRUCTIONS
Thank you for allowing [facility] and our team to participate in your care. Please call our office at [phone] with scheduling questions or if you need to cancel or change your appointment. With any other questions or concerns you may call [facility] cardiology nurse at [phone].       If you experience chest pain, chest pressure, chest tightness, shortness of breath, fainting, lightheadedness, nausea, vomiting, or other concerning symptoms, please report to the Emergency Department or call 911. These symptoms may be emergent, and best treated in the Emergency Department.    Follow up in 3 months.      Increase morning potassium to 40 mEq and keep the 20 mEq in the afternoon.     Start Toprol XL 25 mg daily.

## 2021-07-13 NOTE — NURSING NOTE
"Chief Complaint   Patient presents with     Heart Problem       Initial BP (!) 150/80 (BP Location: Right arm, Patient Position: Chair, Cuff Size: Adult Regular)   Pulse 106   Temp 97.7  F (36.5  C) (Tympanic)   Resp 20   Ht 1.803 m (5' 11\")   Wt 104.3 kg (230 lb)   SpO2 92%   BMI 32.08 kg/m   Estimated body mass index is 32.08 kg/m  as calculated from the following:    Height as of this encounter: 1.803 m (5' 11\").    Weight as of this encounter: 104.3 kg (230 lb).  Medication Reconciliation: complete  LARRY MCKEON LPN    "
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
## 2021-07-13 NOTE — TELEPHONE ENCOUNTER
Outreach to patient regarding EKG- BERNARDINO Bautista CNP would like him to start metoprolol succinate 25 mg daily and return in 1 week for a BP and pulse NURSE ONLY.     Left voicemail to return call to set up NURSE ONLY and to start metoprolol 25 mg daily.

## 2021-07-14 NOTE — TELEPHONE ENCOUNTER
Outreach to patient regarding message below. Agrees with plan. Will start medication. Appt set up to see nurse for BP and HR check     Next 5 appointments (look out 90 days)    Jul 20, 2021 10:00 AM  (Arrive by 9:45 AM)  Nurse Only with [facility] CARDIOLOGY NURSE  [facility] ([facility] ) [address]  [place] [address]  [phone]     Verbalized understanding. Will call CC with any new questions or concerns.

## 2021-07-16 NOTE — PROGRESS NOTES
Assessment & Plan     Severe pulmonary arterial systolic hypertension (H)  Stable with oxygen. He will continue to wear 2 liters. He notes that he has not heard about scheduling his 6 minute walk test or pulmonary rehab. Will have the cardiac care coordinator help faciliate this. Will also continue close follow up with cardiology.     Generalized anxiety disorder  Recurrent major depressive disorder, in partial remission (H)  Cymbalta does not seem to be helping. Will add Wellbutrin to help with smoking and his mental health. Encouraged discussion with trusted relative or friend to monitor for negative mood changes and change in behaviour during medication initiation and titration. Recommended immediate help if increased thoughts of suicide and call if significant side effects occur. Encouraged patient that this type of medication is not effective immediately, and to be consistant with taking the medication. F/up 3-4 weeks for a recheck, sooner with new or worsening symptoms.     - buPROPion (WELLBUTRIN XL) 150 MG 24 hr tablet; Take 1 tablet (150 mg) by mouth every morning    Colitis  Doing well. No fevers. No abdominal pain. Completed full course of the cipro and flagyl. No further intervention as patient feels well.     Screening for colon cancer  - DEONNA([employer]  -Will notify patient of the results when available and intervene accordingly.     Tobacco abuse  Cessation encouraged. He wants help quitting. He has tried the patches and gum without success. Will try Wellbutrin as this should also help with this mental health. Reassess in 3-4 weeks.     - buPROPion (WELLBUTRIN XL) 150 MG 24 hr tablet; Take 1 tablet (150 mg) by mouth every morning    Acute left-sided thoracic back pain  Patient with acute left sided thoracic back pain. Will XR the area. Will notify patient of the results when available and intervene accordingly. Once obtaining the results, will also consider thoracic MRI. In the meantime,  rest, ice, and NSAIDS were also recommended.         Return in about 4 weeks (around 8/17/2021).    Amparo Alvares NP  [facility] - [place] Cook is a 65 year old who presents for the following health issues  accompanied by his friend:    HPI     He does have pulmonary hypertension with dyspnea on exertion. Recently met with Dr. Robin. Note was reviewed. Also follows with cardiology. Was last seen by Marleny Coppola on 7/13/21. Note was reviewed. Will continue his torsemide 60 mg in the am and 40 at noon. Digoxin was started by cardiology. He is also on a long-acting bronchodilator and a steroid inhaler. Pulmonary rehab was encouraged, but he has not heard about scheduling an appointment. He notes that he continues to be short of breath with exertion. We did start oxygen at his previous visit, but he tells me he rarely wears this. Cardiology did recommend a 6 minute walk test, but he has yet to schedule this. Denies any chest pain. No lower leg swelling. No orthopnea.    ED/UC Followup:    Facility:  [facility]  Date of visit: 6/19/21 and twice on 7/1/21  Reason for visit: abdominal pain/colitis and then chest wall pain; all three notes were reviewed        Patient initially presented to the ER on 6/19/21. Diagnosed with a diverticulitis. Prescribed Cipro and metronidazole. He then returned to the ER on 7/1/21 with chest wall pain. EKG was nondiagnostic. Lab work was unremarkable. Pain seemed to have a dermatomal nature. Lidoderm patches were prescribed. An MRI was also recommended, but it was unsure if this could be completed due to previous hardware. He then returned later that day with the same pain. Was given prednisone and Toradol and his pain resolved. Pain was felt to be coming frm his back. Was discharged home with 3 day prednisone taper.   Current Status: Today he notes that his pain has improved. Denies any abdominal pain or chest pain, but does complain of mid left  thoracic back pain. No hematuria. No dysuria. No fevers. No bowel or bladder incontinence. Eating and drinking well. No diarrhea or constipation.        Depression and Anxiety Follow-Up    Last seen on 21. Had been taking Cymbalta 90 mg daily. This was increased to 60 mg BID as his anxiety and depression had worsened. Today he notes that his mental is about the same. Still feels very anxious and depressed.      How are you doing with your depression since your last visit? No change    How are you doing with your anxiety since your last visit?  No change    Are you having other symptoms that might be associated with depression or anxiety? No    Have you had a significant life event? Health Concerns     Do you have any concerns with your use of alcohol or other drugs? No     No thoughts of suicide.    Interested in smoking cessation; smokes 1-2 cigars a day . Needs to stop for 6 months prior to a lung transplant and states he needs help. He has tried gum, lozenges, and the patch without success.     Social History     Tobacco Use     Smoking status: Former Smoker     Years: 50.00     Types: Cigars     Quit date: 2020     Years since quittin.5     Smokeless tobacco: Never Used   Substance Use Topics     Alcohol use: Never     Drug use: Never     PHQ 2020   PHQ-9 Total Score 4 0 6   Q9: Thoughts of better off dead/self-harm past 2 weeks Not at all Not at all Not at all     JEFF-7 SCORE 2020   Total Score 10 0 11     Last PHQ-9 2021   1.  Little interest or pleasure in doing things 2   2.  Feeling down, depressed, or hopeless 0   3.  Trouble falling or staying asleep, or sleeping too much 0   4.  Feeling tired or having little energy 2   5.  Poor appetite or overeating 2   6.  Feeling bad about yourself 0   7.  Trouble concentrating 0   8.  Moving slowly or restless 0   Q9: Thoughts of better off dead/self-harm past 2 weeks 0   PHQ-9 Total Score 6  "  Difficulty at work, home, or with people Somewhat difficult     JEFF-7  5/25/2021   1. Feeling nervous, anxious, or on edge 2   2. Not being able to stop or control worrying 0   3. Worrying too much about different things 1   4. Trouble relaxing 2   5. Being so restless that it is hard to sit still 3   6. Becoming easily annoyed or irritable 2   7. Feeling afraid, as if something awful might happen 1   JEFF-7 Total Score 11   If you checked any problems, how difficult have they made it for you to do your work, take care of things at home, or get along with other people? Somewhat difficult         Review of Systems   As noted in the HPI.       Objective    /78   Pulse 83   Temp 97.6  F (36.4  C) (Tympanic)   Ht 1.803 m (5' 11\")   Wt 103.9 kg (229 lb)   SpO2 93%   BMI 31.94 kg/m    Body mass index is 31.94 kg/m .  Physical Exam   GENERAL: healthy, alert and no distress  EYES: Eyes grossly normal to inspection, PERRL and conjunctivae and sclerae normal  HENT: ear canals and TM's normal, nose and mouth without ulcers or lesions  NECK: no adenopathy, no asymmetry, masses, or scars and thyroid normal to palpation  RESP: lungs clear to auscultation - no rales, rhonchi or wheezes; slightly diminished in the bases. Breathing comfortably without using accessory muscles  CV: regular rate and rhythm, normal S1 S2, no S3 or S4, no murmur, click or rub, no peripheral edema   ABDOMEN: soft, nontender, no hepatosplenomegaly, no masses and bowel sounds normal  NEURO: Normal strength and tone, mentation intact and speech normal  PSYCH: mentation appears normal, affect normal/bright  Musculoskeletal: Lumbar and thoracic spine without gross deformity, rash, erythema, or ecchymosis. No point tenderness. Some Paraspinous muscle tenderness to the left of his thoracic spine. No pain with extension. No pain with flexion. No pain with lateral flexion. No palpable spasm. Negative straight leg raises bilaterally. Patellar reflexes " 2+, LE strength 5/5 bilaterally. Sensation intact to light touch. Posterior tib pulses intact.

## 2021-07-20 ENCOUNTER — ALLIED HEALTH/NURSE VISIT (OUTPATIENT)
Dept: CARDIOLOGY | Facility: OTHER | Age: 66
End: 2021-07-20
Attending: NURSE PRACTITIONER
Payer: COMMERCIAL

## 2021-07-20 ENCOUNTER — ANCILLARY PROCEDURE (OUTPATIENT)
Dept: GENERAL RADIOLOGY | Facility: OTHER | Age: 66
End: 2021-07-20
Attending: NURSE PRACTITIONER
Payer: COMMERCIAL

## 2021-07-20 ENCOUNTER — OFFICE VISIT (OUTPATIENT)
Dept: FAMILY MEDICINE | Facility: OTHER | Age: 66
End: 2021-07-20
Attending: NURSE PRACTITIONER
Payer: COMMERCIAL

## 2021-07-20 VITALS
HEIGHT: 71 IN | OXYGEN SATURATION: 93 % | BODY MASS INDEX: 32.06 KG/M2 | WEIGHT: 229 LBS | HEART RATE: 83 BPM | DIASTOLIC BLOOD PRESSURE: 78 MMHG | SYSTOLIC BLOOD PRESSURE: 100 MMHG | TEMPERATURE: 97.6 F

## 2021-07-20 VITALS
BODY MASS INDEX: 31.52 KG/M2 | DIASTOLIC BLOOD PRESSURE: 88 MMHG | SYSTOLIC BLOOD PRESSURE: 125 MMHG | WEIGHT: 226 LBS | HEART RATE: 94 BPM

## 2021-07-20 DIAGNOSIS — F41.1 GENERALIZED ANXIETY DISORDER: ICD-10-CM

## 2021-07-20 DIAGNOSIS — I50.9 CONGESTIVE HEART FAILURE, UNSPECIFIED HF CHRONICITY, UNSPECIFIED HEART FAILURE TYPE (H): Primary | ICD-10-CM

## 2021-07-20 DIAGNOSIS — I27.21 SEVERE PULMONARY ARTERIAL SYSTOLIC HYPERTENSION (H): Primary | ICD-10-CM

## 2021-07-20 DIAGNOSIS — Z72.0 TOBACCO ABUSE: ICD-10-CM

## 2021-07-20 DIAGNOSIS — F33.41 RECURRENT MAJOR DEPRESSIVE DISORDER, IN PARTIAL REMISSION (H): ICD-10-CM

## 2021-07-20 DIAGNOSIS — K52.9 COLITIS: ICD-10-CM

## 2021-07-20 DIAGNOSIS — M54.6 ACUTE LEFT-SIDED THORACIC BACK PAIN: ICD-10-CM

## 2021-07-20 DIAGNOSIS — Z12.11 SCREENING FOR COLON CANCER: ICD-10-CM

## 2021-07-20 PROCEDURE — G0463 HOSPITAL OUTPT CLINIC VISIT: HCPCS

## 2021-07-20 PROCEDURE — 72070 X-RAY EXAM THORAC SPINE 2VWS: CPT | Mod: TC

## 2021-07-20 PROCEDURE — 99207 PR NO CHARGE NURSE ONLY: CPT

## 2021-07-20 PROCEDURE — G0463 HOSPITAL OUTPT CLINIC VISIT: HCPCS | Mod: 25

## 2021-07-20 PROCEDURE — 99214 OFFICE O/P EST MOD 30 MIN: CPT | Performed by: NURSE PRACTITIONER

## 2021-07-20 RX ORDER — BUPROPION HYDROCHLORIDE 150 MG/1
150 TABLET ORAL EVERY MORNING
Qty: 60 TABLET | Refills: 1 | Status: SHIPPED | OUTPATIENT
Start: 2021-07-20 | End: 2021-08-25

## 2021-07-20 RX ORDER — PRAZOSIN HYDROCHLORIDE 1 MG/1
1 CAPSULE ORAL AT BEDTIME
COMMUNITY

## 2021-07-20 ASSESSMENT — PAIN SCALES - GENERAL: PAINLEVEL: MODERATE PAIN (5)

## 2021-07-20 ASSESSMENT — MIFFLIN-ST. JEOR: SCORE: 1845.87
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
## 2021-07-20 NOTE — NURSING NOTE
"Chief Complaint   Patient presents with     Anxiety       Initial There were no vitals taken for this visit. Estimated body mass index is 31.52 kg/m  as calculated from the following:    Height as of 7/13/21: 1.803 m (5' 11\").    Weight as of an earlier encounter on 7/20/21: 102.5 kg (226 lb).  Medication Reconciliation: complete  Rose Her LPN  "

## 2021-07-30 DIAGNOSIS — J30.2 SEASONAL ALLERGIES: ICD-10-CM

## 2021-07-30 RX ORDER — CETIRIZINE HYDROCHLORIDE 10 MG/1
TABLET ORAL
Qty: 30 TABLET | Refills: 0 | Status: SHIPPED | OUTPATIENT
Start: 2021-07-30 | End: 2021-09-07

## 2021-07-30 NOTE — TELEPHONE ENCOUNTER
zyrtec      Last Written Prescription Date:  7/8/21  Last Fill Quantity: 30,   # refills: 0  Last Office Visit: 7/20/21  Future Office visit:    Next 5 appointments (look out 90 days)    Aug 04, 2021 11:20 AM  (Arrive by 11:05 AM)  Office Visit with Amparo Alvares NP  [facility] [place] ([facility] [place] ) [address]  [place] [address]  [phone]   Aug 25, 2021  8:20 AM  (Arrive by 8:05 AM)  Office Visit with Amparo Alvares NP  [facility] [place] ([facility] [place] ) [address]  [place] [address]  [phone]

## 2021-08-09 DIAGNOSIS — I50.810 RIGHT HEART FAILURE, NYHA CLASS 3 (H): ICD-10-CM

## 2021-08-09 DIAGNOSIS — R00.0 SINUS TACHYCARDIA: ICD-10-CM

## 2021-08-09 RX ORDER — METOPROLOL SUCCINATE 25 MG/1
TABLET, EXTENDED RELEASE ORAL
Qty: 90 TABLET | Refills: 0 | Status: SHIPPED | OUTPATIENT
Start: 2021-08-09 | End: 2021-09-10

## 2021-08-13 DIAGNOSIS — R06.09 DOE (DYSPNEA ON EXERTION): ICD-10-CM

## 2021-08-13 DIAGNOSIS — I27.21 PULMONARY ARTERIAL HYPERTENSION (H): ICD-10-CM

## 2021-08-13 DIAGNOSIS — I50.813 ACUTE ON CHRONIC RIGHT-SIDED HEART FAILURE (H): ICD-10-CM

## 2021-08-13 RX ORDER — TORSEMIDE 20 MG/1
TABLET ORAL
Qty: 150 TABLET | Refills: 3 | Status: SHIPPED | OUTPATIENT
Start: 2021-08-13 | End: 2021-09-21
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
## 2021-08-25 ENCOUNTER — OFFICE VISIT (OUTPATIENT)
Dept: FAMILY MEDICINE | Facility: OTHER | Age: 66
End: 2021-08-25
Attending: NURSE PRACTITIONER
Payer: COMMERCIAL

## 2021-08-25 VITALS
HEART RATE: 96 BPM | WEIGHT: 224.2 LBS | SYSTOLIC BLOOD PRESSURE: 98 MMHG | HEIGHT: 71 IN | BODY MASS INDEX: 31.39 KG/M2 | DIASTOLIC BLOOD PRESSURE: 68 MMHG | OXYGEN SATURATION: 91 % | TEMPERATURE: 98.8 F

## 2021-08-25 DIAGNOSIS — Z12.11 SPECIAL SCREENING FOR MALIGNANT NEOPLASMS, COLON: ICD-10-CM

## 2021-08-25 DIAGNOSIS — E78.5 HYPERLIPIDEMIA, UNSPECIFIED HYPERLIPIDEMIA TYPE: Primary | ICD-10-CM

## 2021-08-25 DIAGNOSIS — F33.41 RECURRENT MAJOR DEPRESSIVE DISORDER, IN PARTIAL REMISSION (H): ICD-10-CM

## 2021-08-25 DIAGNOSIS — Z13.220 LIPID SCREENING: ICD-10-CM

## 2021-08-25 DIAGNOSIS — F41.1 GENERALIZED ANXIETY DISORDER: Primary | ICD-10-CM

## 2021-08-25 LAB
CHOLEST SERPL-MCNC: 202 MG/DL
FASTING STATUS PATIENT QL REPORTED: YES
HDLC SERPL-MCNC: 26 MG/DL
LDLC SERPL CALC-MCNC: ABNORMAL MG/DL
NONHDLC SERPL-MCNC: 176 MG/DL
TRIGL SERPL-MCNC: 446 MG/DL

## 2021-08-25 PROCEDURE — 80061 LIPID PANEL: CPT | Mod: ZL | Performed by: NURSE PRACTITIONER

## 2021-08-25 PROCEDURE — 99213 OFFICE O/P EST LOW 20 MIN: CPT | Performed by: NURSE PRACTITIONER

## 2021-08-25 PROCEDURE — G0463 HOSPITAL OUTPT CLINIC VISIT: HCPCS | Mod: 25

## 2021-08-25 PROCEDURE — 36415 COLL VENOUS BLD VENIPUNCTURE: CPT | Mod: ZL | Performed by: NURSE PRACTITIONER

## 2021-08-25 PROCEDURE — G0463 HOSPITAL OUTPT CLINIC VISIT: HCPCS

## 2021-08-25 RX ORDER — ATORVASTATIN CALCIUM 20 MG/1
20 TABLET, FILM COATED ORAL DAILY
Qty: 30 TABLET | Refills: 1 | Status: SHIPPED | OUTPATIENT
Start: 2021-08-25 | End: 2021-10-19

## 2021-08-25 RX ORDER — DULOXETIN HYDROCHLORIDE 60 MG/1
60 CAPSULE, DELAYED RELEASE ORAL 2 TIMES DAILY
Qty: 60 CAPSULE | Refills: 1 | COMMUNITY
Start: 2021-08-25

## 2021-08-25 ASSESSMENT — ANXIETY QUESTIONNAIRES
1. FEELING NERVOUS, ANXIOUS, OR ON EDGE: MORE THAN HALF THE DAYS
7. FEELING AFRAID AS IF SOMETHING AWFUL MIGHT HAPPEN: NOT AT ALL
5. BEING SO RESTLESS THAT IT IS HARD TO SIT STILL: MORE THAN HALF THE DAYS
GAD7 TOTAL SCORE: 8
6. BECOMING EASILY ANNOYED OR IRRITABLE: NOT AT ALL
2. NOT BEING ABLE TO STOP OR CONTROL WORRYING: SEVERAL DAYS
4. TROUBLE RELAXING: MORE THAN HALF THE DAYS
IF YOU CHECKED OFF ANY PROBLEMS ON THIS QUESTIONNAIRE, HOW DIFFICULT HAVE THESE PROBLEMS MADE IT FOR YOU TO DO YOUR WORK, TAKE CARE OF THINGS AT HOME, OR GET ALONG WITH OTHER PEOPLE: SOMEWHAT DIFFICULT
3. WORRYING TOO MUCH ABOUT DIFFERENT THINGS: SEVERAL DAYS

## 2021-08-25 ASSESSMENT — MIFFLIN-ST. JEOR: SCORE: 1824.09

## 2021-08-25 ASSESSMENT — PAIN SCALES - GENERAL: PAINLEVEL: NO PAIN (0)

## 2021-08-25 ASSESSMENT — PATIENT HEALTH QUESTIONNAIRE - PHQ9: SUM OF ALL RESPONSES TO PHQ QUESTIONS 1-9: 7

## 2021-08-25 NOTE — NURSING NOTE
"Chief Complaint   Patient presents with     Depression     Anxiety       Initial BP 98/68 (BP Location: Left arm, Patient Position: Chair, Cuff Size: Adult Large)   Pulse 96   Temp 98.8  F (37.1  C) (Tympanic)   Ht 1.803 m (5' 11\")   Wt 101.7 kg (224 lb 3.2 oz)   SpO2 91%   BMI 31.27 kg/m   Estimated body mass index is 31.27 kg/m  as calculated from the following:    Height as of this encounter: 1.803 m (5' 11\").    Weight as of this encounter: 101.7 kg (224 lb 3.2 oz).  Medication Reconciliation: complete  Rose Her LPN  "

## 2021-08-26 ASSESSMENT — ANXIETY QUESTIONNAIRES: GAD7 TOTAL SCORE: 8

## 2021-09-03 DIAGNOSIS — J30.2 SEASONAL ALLERGIES: ICD-10-CM

## 2021-09-03 DIAGNOSIS — I50.810 RIGHT HEART FAILURE, NYHA CLASS 3 (H): ICD-10-CM

## 2021-09-03 DIAGNOSIS — R00.0 SINUS TACHYCARDIA: ICD-10-CM

## 2021-09-03 RX ORDER — METOPROLOL SUCCINATE 25 MG/1
TABLET, EXTENDED RELEASE ORAL
Qty: 30 TABLET | Refills: 0 | OUTPATIENT
Start: 2021-09-03

## 2021-09-05 ENCOUNTER — HEALTH MAINTENANCE LETTER (OUTPATIENT)
Age: 66
End: 2021-09-05

## 2021-09-07 RX ORDER — CETIRIZINE HYDROCHLORIDE 10 MG/1
TABLET ORAL
Qty: 30 TABLET | Refills: 0 | Status: SHIPPED | OUTPATIENT
Start: 2021-09-07 | End: 2021-10-06

## 2021-09-07 NOTE — TELEPHONE ENCOUNTER
Zyrtec      Last Written Prescription Date:  7.30.21  Last Fill Quantity: #30,   # refills: 0  Last Office Visit: 8.26.21  Future Office visit:    Next 5 appointments (look out 90 days)    Nov 29, 2021 10:40 AM  (Arrive by 10:25 AM)  SHORT with Amparo Alvares NP  [facility] ([facility] [place] ) [address]  [place] [address]  [phone]           Routing refill request to provider for review/approval because:  Drug not on the [facility], [facility] or [facility] refill protocol or controlled substance

## 2021-09-09 DIAGNOSIS — R00.0 SINUS TACHYCARDIA: ICD-10-CM

## 2021-09-09 DIAGNOSIS — I50.810 RIGHT HEART FAILURE, NYHA CLASS 3 (H): ICD-10-CM

## 2021-09-10 RX ORDER — METOPROLOL SUCCINATE 25 MG/1
TABLET, EXTENDED RELEASE ORAL
Qty: 90 TABLET | Refills: 3 | Status: SHIPPED | OUTPATIENT
Start: 2021-09-10 | End: 2023-01-01

## 2021-09-10 NOTE — TELEPHONE ENCOUNTER
Metoprolol      Last Written Prescription Date:  8/9/21  Last Fill Quantity: 90,   # refills: 0  Last Office Visit: 7/13/21  Future Office visit:    Next 5 appointments (look out 90 days)    Nov 29, 2021 10:40 AM  (Arrive by 10:25 AM)  SHORT with Amparo Alvares NP  [facility] [place] ([facility] [place] ) [address]  [place] [address]  [phone]           Routing refill request to provider for review/approval because:

## 2021-09-20 DIAGNOSIS — I51.7 CARDIOMEGALY: ICD-10-CM

## 2021-09-21 ENCOUNTER — LAB (OUTPATIENT)
Dept: LAB | Facility: OTHER | Age: 66
End: 2021-09-21
Payer: COMMERCIAL

## 2021-09-21 ENCOUNTER — PATIENT OUTREACH (OUTPATIENT)
Dept: CARE COORDINATION | Facility: OTHER | Age: 66
End: 2021-09-21

## 2021-09-21 ENCOUNTER — OFFICE VISIT (OUTPATIENT)
Dept: CARDIOLOGY | Facility: OTHER | Age: 66
End: 2021-09-21
Attending: NURSE PRACTITIONER
Payer: COMMERCIAL

## 2021-09-21 VITALS
OXYGEN SATURATION: 87 % | TEMPERATURE: 96.3 F | HEIGHT: 71 IN | WEIGHT: 228 LBS | BODY MASS INDEX: 31.92 KG/M2 | HEART RATE: 92 BPM | SYSTOLIC BLOOD PRESSURE: 106 MMHG | DIASTOLIC BLOOD PRESSURE: 72 MMHG

## 2021-09-21 DIAGNOSIS — E87.6 DRUG-INDUCED HYPOKALEMIA: ICD-10-CM

## 2021-09-21 DIAGNOSIS — R06.09 DOE (DYSPNEA ON EXERTION): ICD-10-CM

## 2021-09-21 DIAGNOSIS — T50.905A DRUG-INDUCED HYPOKALEMIA: ICD-10-CM

## 2021-09-21 DIAGNOSIS — Z98.890 STATUS POST CORONARY ANGIOGRAM: ICD-10-CM

## 2021-09-21 DIAGNOSIS — Z51.81 ENCOUNTER FOR MONITORING DIGOXIN THERAPY: ICD-10-CM

## 2021-09-21 DIAGNOSIS — J44.9 SEVERE CHRONIC OBSTRUCTIVE PULMONARY DISEASE (H): ICD-10-CM

## 2021-09-21 DIAGNOSIS — Z79.899 ENCOUNTER FOR MONITORING DIGOXIN THERAPY: ICD-10-CM

## 2021-09-21 DIAGNOSIS — Z98.890: ICD-10-CM

## 2021-09-21 DIAGNOSIS — I51.7 RIGHT VENTRICULAR DILATION: ICD-10-CM

## 2021-09-21 DIAGNOSIS — I50.810 RIGHT HEART FAILURE, NYHA CLASS 3 (H): ICD-10-CM

## 2021-09-21 DIAGNOSIS — I27.21 PULMONARY ARTERIAL HYPERTENSION (H): Primary | ICD-10-CM

## 2021-09-21 DIAGNOSIS — I27.21 PULMONARY ARTERIAL HYPERTENSION (H): ICD-10-CM

## 2021-09-21 LAB
ANION GAP SERPL CALCULATED.3IONS-SCNC: 5 MMOL/L (ref 3–14)
BUN SERPL-MCNC: 13 MG/DL (ref 7–30)
CALCIUM SERPL-MCNC: 9.2 MG/DL (ref 8.5–10.1)
CHLORIDE BLD-SCNC: 104 MMOL/L (ref 94–109)
CO2 SERPL-SCNC: 31 MMOL/L (ref 20–32)
CREAT SERPL-MCNC: 1.02 MG/DL (ref 0.66–1.25)
DIGOXIN SERPL-MCNC: 0.8 UG/L
ERYTHROCYTE [DISTWIDTH] IN BLOOD BY AUTOMATED COUNT: 13 % (ref 10–15)
GFR SERPL CREATININE-BSD FRML MDRD: 77 ML/MIN/1.73M2
GLUCOSE BLD-MCNC: 99 MG/DL (ref 70–99)
HCT VFR BLD AUTO: 48.9 % (ref 40–53)
HGB BLD-MCNC: 16.9 G/DL (ref 13.3–17.7)
MCH RBC QN AUTO: 30.8 PG (ref 26.5–33)
MCHC RBC AUTO-ENTMCNC: 34.6 G/DL (ref 31.5–36.5)
MCV RBC AUTO: 89 FL (ref 78–100)
PLATELET # BLD AUTO: 327 10E3/UL (ref 150–450)
POTASSIUM BLD-SCNC: 4.3 MMOL/L (ref 3.4–5.3)
RBC # BLD AUTO: 5.49 10E6/UL (ref 4.4–5.9)
SODIUM SERPL-SCNC: 140 MMOL/L (ref 133–144)
WBC # BLD AUTO: 8.4 10E3/UL (ref 4–11)

## 2021-09-21 PROCEDURE — 80048 BASIC METABOLIC PNL TOTAL CA: CPT | Mod: ZL

## 2021-09-21 PROCEDURE — 80162 ASSAY OF DIGOXIN TOTAL: CPT | Mod: ZL

## 2021-09-21 PROCEDURE — G0463 HOSPITAL OUTPT CLINIC VISIT: HCPCS

## 2021-09-21 PROCEDURE — 85027 COMPLETE CBC AUTOMATED: CPT | Mod: ZL

## 2021-09-21 PROCEDURE — 99214 OFFICE O/P EST MOD 30 MIN: CPT | Performed by: NURSE PRACTITIONER

## 2021-09-21 PROCEDURE — 36415 COLL VENOUS BLD VENIPUNCTURE: CPT | Mod: ZL

## 2021-09-21 RX ORDER — ASPIRIN 81 MG/1
TABLET, COATED ORAL
Qty: 30 TABLET | Refills: 11 | Status: SHIPPED | OUTPATIENT
Start: 2021-09-21 | End: 2022-10-14

## 2021-09-21 RX ORDER — TORSEMIDE 20 MG/1
TABLET ORAL
Qty: 270 TABLET | Refills: 0 | Status: SHIPPED | OUTPATIENT
Start: 2021-09-21 | End: 2022-01-07

## 2021-09-21 RX ORDER — POTASSIUM CHLORIDE 1500 MG/1
TABLET, EXTENDED RELEASE ORAL
Qty: 180 TABLET | Refills: 0 | Status: SHIPPED | OUTPATIENT
Start: 2021-09-21 | End: 2022-01-07

## 2021-09-21 ASSESSMENT — ANXIETY QUESTIONNAIRES
3. WORRYING TOO MUCH ABOUT DIFFERENT THINGS: NOT AT ALL
7. FEELING AFRAID AS IF SOMETHING AWFUL MIGHT HAPPEN: NOT AT ALL
IF YOU CHECKED OFF ANY PROBLEMS ON THIS QUESTIONNAIRE, HOW DIFFICULT HAVE THESE PROBLEMS MADE IT FOR YOU TO DO YOUR WORK, TAKE CARE OF THINGS AT HOME, OR GET ALONG WITH OTHER PEOPLE: NOT DIFFICULT AT ALL
2. NOT BEING ABLE TO STOP OR CONTROL WORRYING: NOT AT ALL
GAD7 TOTAL SCORE: 0
6. BECOMING EASILY ANNOYED OR IRRITABLE: NOT AT ALL
5. BEING SO RESTLESS THAT IT IS HARD TO SIT STILL: NOT AT ALL
1. FEELING NERVOUS, ANXIOUS, OR ON EDGE: NOT AT ALL
4. TROUBLE RELAXING: NOT AT ALL

## 2021-09-21 ASSESSMENT — MIFFLIN-ST. JEOR: SCORE: 1841.33

## 2021-09-21 ASSESSMENT — PAIN SCALES - GENERAL: PAINLEVEL: NO PAIN (0)

## 2021-09-21 ASSESSMENT — PATIENT HEALTH QUESTIONNAIRE - PHQ9: SUM OF ALL RESPONSES TO PHQ QUESTIONS 1-9: 0

## 2021-09-21 NOTE — PATIENT INSTRUCTIONS
Thank you for allowing Marleny Bauer and our team to participate in your care. Please call our office at [phone] with scheduling questions or if you need to cancel or change your appointment. With any other questions or concerns you may call Joaquina cardiology nurse at [phone].       If you experience chest pain, chest pressure, chest tightness, shortness of breath, fainting, lightheadedness, nausea, vomiting, or other concerning symptoms, please report to the Emergency Department or call 911. These symptoms may be emergent, and best treated in the Emergency Department.    Follow up in 6 months.     Toresmide 60 mg in the morning. (Stop afternoon dose)    Potassium 40 mEq in the morning. (Stop afternoon dose).          Labs today. We will notify you of the results.     Bharath MIRANDA RN  CHF Care Coordinator   [phone] Option #8  Ext. *[phone]

## 2021-09-21 NOTE — PROGRESS NOTES
Clinic Care Coordination Contact  Care Team Conversations    Met with patient today 09/21/21 after his appt with BERNARDINO Bautista CNP. Patient appears in good spirits. Reports that this is te best he has felt in a long time. Spouse here and confirms how patient feels.   Reports long standing cough but it nothing bothersome.   Denies any needs or questions at this time.  Medication changes per BERNARDINO Bautista CNP are:    Follow up in 6 months.      Toresmide 60 mg in the morning. (Stop afternoon dose)     Potassium 40 mEq in the morning. (Stop afternoon dose).         Given CC contact information and patient agrees to call with any questions or concerns.     Bharath MIRANDA RN  CHF Care Coordinator   [phone] Option #8  Ext. *[phone]

## 2021-09-21 NOTE — NURSING NOTE
"Chief Complaint   Patient presents with     RECHECK       Initial /72   Pulse 92   Temp (!) 96.3  F (35.7  C)   Ht 1.803 m (5' 11\")   Wt 103.4 kg (228 lb)   SpO2 (!) 87%   BMI 31.80 kg/m   Estimated body mass index is 31.8 kg/m  as calculated from the following:    Height as of this encounter: 1.803 m (5' 11\").    Weight as of this encounter: 103.4 kg (228 lb).  Medication Reconciliation: complete  Caden Orellana    "
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
## 2021-09-22 ASSESSMENT — ANXIETY QUESTIONNAIRES: GAD7 TOTAL SCORE: 0

## 2021-10-05 ENCOUNTER — HOSPITAL ENCOUNTER (OUTPATIENT)
Dept: CARDIAC REHAB | Facility: HOSPITAL | Age: 66
Setting detail: THERAPIES SERIES
End: 2021-10-05
Attending: FAMILY MEDICINE
Payer: COMMERCIAL

## 2021-10-05 VITALS — HEIGHT: 71 IN | BODY MASS INDEX: 31.92 KG/M2 | WEIGHT: 228 LBS

## 2021-10-05 DIAGNOSIS — I27.20 PULMONARY HYPERTENSION (H): ICD-10-CM

## 2021-10-05 PROCEDURE — G0237 THERAPEUTIC PROCD STRG ENDUR: HCPCS

## 2021-10-05 PROCEDURE — G0238 OTH RESP PROC, INDIV: HCPCS

## 2021-10-05 ASSESSMENT — 6 MINUTE WALK TEST (6MWT)
MALE CALC: 547.47
TOTAL DISTANCE WALKED: 420
GENDER SELECTION: MALE
FEMALE CALC: 434.49

## 2021-10-05 ASSESSMENT — MIFFLIN-ST. JEOR: SCORE: 1841.33

## 2021-10-05 ASSESSMENT — PULMONARY FUNCTION TESTS
FEV1 (%PREDICTED): 85
FVC_PERCENT_PREDICTED: 85
FEV1: 3
FVC: 3.9

## 2021-10-05 ASSESSMENT — DUKE ACTIVITY SCORE INDEX (DASI)
VO2_PEAK: 23.15
DASI METS SCORE: 6.61

## 2021-10-05 NOTE — PROGRESS NOTES
10/05/21 1300   Session   Session Initial Evaluation and Exercise Prescription   Certified through this date 11/03/21   Type Initial   General Information   Treatment Diagnosis Pulmonary Hypertension   Secondary Treatment Diagnosis Asthma   Classification of COPD Stage 1 (Mild)   Onset Date 09/01/21   Hospital Location Not hospitalized   Current Signs and Symptoms SOB;Anxiety;Dizziness   Comments Patient is having difficulty breathing today.   Outpatient Pulmonary Rehab Start Date 10/05/21   Primary Physician Amparo Alvares   Other Specialty Provider [place]   General Information Comments Cardiology recommended patient to attend rehab   Medical History/Comorbidities   Medical History/Comorbidities Anxiety;COPD;Depression;GERD;Pneumonia   Untoward Events/Exacerbations/Hospitalizations   Untoward Events/Exacerbations/Hospitalizations None   Sputum   Sputum Production Amount Moderate amount   Sputum Production Color White   Sputum Production Consistency Thick   Tobacco History   Tobacco Current smoker, everyday   Tobacco Habit Cigar   Years Smoked 51 years   Average Packs Per Day 1   Quit Date or Planned Quit Date 10/27/21   Interventions Planned Other  (Patient has nicotine patches)   Medications   Long-Acting Beta Agonist Prescribed, taking as prescribed   Short-Acting Beta Agonist Prescribed, taking as prescribed   Long-Acting Anticholinergic Prescribed, taking as prescribed   Short-Acting Anticholinergic Not prescribed   Inhaled Corticosteroid Prescribed, taking as prescribed   Oral Corticosteroid Not prescribed   Medications Reconciled By Patient;Medical record   Preventative Vaccinations   Influenza Vaccination Yes   Pneumonia Vaccination Yes   Pain   Patient Currently in Pain No   Fall Risk Screen   Fall screen completed by Pulmonary Rehab   Have you fallen 2 or more times in the past year? No   Have you fallen and had an injury in the past year? Yes   Is patient a fall risk? No   Fall screen comments Anupama  slipped on ice and received 7 stitches in his head.   Abuse Screen (yes response referral indicated)   Feels Unsafe at Home or Work/School no   Feels Threatened by Someone no   Does Anyone Try to Keep You From Having Contact with Others or Doing Things Outside Your Home? no   Physical Signs of Abuse Present no   Living and Work Status   Living Arrangements house   Support System Live with an adult   Environmental Factors Pets   ADL Limitations None   Initial Duke Activity Status Index (DASI) score. A measure of functional capacity. The goal is to have a pre-program raw score of 9.95 (~4 METs) or above 31.51   Initial DASI VO2 Peak (ml*kg-1*min-1) 23.15   Initial DASI MET Level 6.61   Occupation Construction   Return to Employment Retired;No   Physical Assessments   Incisions Not applicable   Edema None   Comments Physical assessment within normal limits   Pulmonary Function Test (PFTs)   PFT Results Available   Date Completed 03/25/21   FVC Actual 3.9   % Predicted FVC 85   FEV1 Actual 3   % Predicted FEV1 85   Inspiratory Capacity (IC) Actual (L) 2.82   % Predicted IC 83   Uncorrected DLCO 14.16   % Predicted Uncorrected DLCO 42   Pre/Post Bronchodilator Pre   Airway Obstruction Mild   Individualized Treatment Plan   Sessions Scheduled 18   Sessions Attended 1   Type Aerobic exercise;Resistance training;Flexibility training   Oxygen Use   Supplemental Oxygen Needed Yes   Delivery Device Nasal Cannula   Liter Flow at Rest 0   Liter Flow with ADLS 0   Liter Flow During Exercise 0   Liter Flow With Sleep 2   Evaluated on Home System? Continuous flow   Interventions Recommended Titrate O2 with exercise   Exercise Prescription   Mode Treadmill;Nustep;Arm Ergometer/UBE;Weights   Frequency 2 days/week   Duration/Time 15-30 min   THR (85% of age predicted max heart rate)  131.75   Effort Rating (0-10) 4-6/10   Progression of Exercise 0.5 METs as tolerated   Oxygen Titration with Exercise > 88% with exercise   Exercise  "Assessment   6 Minute Walk Predicted - Gender Selection Male   6 Minute Walk Predicted (Female) 434.49   6 Minute Walk Predicted (Male) 547.47   6 Minute Walk Distance (Initial) 420 Meters   Resting HR 99   Exercise    Resting /88   Exercise /86   SpO2 88  (on Room air)   Exercise SpO2 87  (on 2 liters)   Current MET level 1.6   Exercise Tolerance poor   Normal Limits Discussed Yes   Current Symptoms at Home Anxiety;Dyspnea   Current Symptoms in Rehab Anxiety;Dyspnea   Limitations Discussed   Nutrition Management   Age 65   Height 1.803 m (5' 11\")   Weight 103.4 kg (228 lb)   BMI (Calculated) 31.8   Assessment Overweight   Goal weight 90.7 kg (200 lb)   Interventions Planned Attend group nutrition class   Psychosocial   Initial Patient Health Questionnaire -9 (PHQ-9) for depression. To notify physician if pre-score >9. 4   Initial [facility] Survey score. A quality of life measure. To re evaluate if total score is > 25. Also consider PHQ-9 and DASI to determine if patient should be referred back to physician. 25   Initial COPD Assessment Test (CAT). A quality of life measure. Scores range from 0-40. Higher scores indicate greater levels of limitations. The goal is to reduce scores pre to post program. 26   Initial Shortness of Breath Questionnaire (SOBQ) score. The goal is to reduce the score pre to post program. 35   Intervention Planned Patient to identify 2-3 coping mechanisms to decrease stress and anxiety   Psychosocial Comments Patient lives in a house with his girlfriend, who gives him a good support system.   Stages of Change   Aerobic Exercise Contemplation   Physical Activity Contemplation   Recommended diet Contemplation   Stress Contemplation   Smoking Cessation Precontemplation   Oxygen Usage Action   Current Home Exercise   Type of Exercise Walking   Frequency (days per week) 4   Duration (minutes per session) 15 minutes   Recommended Home Exercise Prescription   Type of Exercise " Walking   Frequency (Days per week) 4 days   Duration (minutes per session) 15-30 min   Effort Rating Recommended (0-10) Scale  4-6/10   Recommended Exercise Heart Rate Range 10-15 beats above resting   Learning Assessment   Learner Patient   Primary Language English   Preferred Learning Style Listening;Demonstration   Barriers to Learning No barriers noted   Patient Education/Referrals   Education Recommended Activities of Daily Living;Advance Directives;Anatomy and Disease Process;Breathing Techniques;Emotional Aspects of CLD;Exercise Principles;Home Oxygen Equipment;Medication Overview;Nutrition;Panic and Anxiety   Follow-up/On-going Support   Provider follow-up needed on the following Increased Cough   Comments Patient has increased Cough the past 2 weeks. ENcouraged patient to call his doctor.   Pulmonary Rehab Goals   Pulmonary Rehab Goals 1   Goal 1   Goal Patient would like to be able to walk 10 blocks without stopping and becoming SOB..   Target Date 01/05/22   Assessment   Assessment 10/5/2021 Joey is a patient who has not attended Pulmonary Rehab in the past.  He is a current smoker hoping to quit 10/27/2021.  He currently smokes one cigar/day hoping to take nicotine patches as an aid to help him quit.  He states he currently walks 4 days per week.  Stopping to rest at 2 minute into the test, completely finishing at 3 minutes.  With this Respiratory Therapy he is hoping to increase his endurance for completing household chores without becoming so fatigued and SOB.  According to the patient he has oxygen at home but only at night.  Today at Respiratory therapy his SPO2 level was 88% on RA at rest.  Patient placed on 2 liters of oxygen for his 6MWT with sats remaining 87 to 91% on 2 liters. Instructed patient on Pursed Lip Breathing.  He did show good return but will need continued practice and instruction to do so more consistently.  Patient states he has no trouble sleeping.  He states he does have high  anxiety.  Also patient stated he has had an increased cough with higher than usual amount of white phlegm.  Staff encouraged patient to contact his doctor.  Patient will start Respiratory Therapy on Tuesday Oct. 12 to assist him with monitoring and assessment of O2 needs as well as additional education pertaining to living with CLD.   I have reviewed and agree with this patient s individual treatment plan and exercise prescription for respiratory therapy.  Please see  individual treatment plan for details of progress and plan.

## 2021-10-06 ENCOUNTER — NURSE TRIAGE (OUTPATIENT)
Dept: FAMILY MEDICINE | Facility: OTHER | Age: 66
End: 2021-10-06

## 2021-10-06 ENCOUNTER — OFFICE VISIT (OUTPATIENT)
Dept: FAMILY MEDICINE | Facility: OTHER | Age: 66
End: 2021-10-06
Attending: NURSE PRACTITIONER
Payer: COMMERCIAL

## 2021-10-06 ENCOUNTER — TELEPHONE (OUTPATIENT)
Dept: FAMILY MEDICINE | Facility: OTHER | Age: 66
End: 2021-10-06

## 2021-10-06 DIAGNOSIS — Z20.822 SUSPECTED 2019 NOVEL CORONAVIRUS INFECTION: Primary | ICD-10-CM

## 2021-10-06 DIAGNOSIS — Z20.822 SUSPECTED 2019 NOVEL CORONAVIRUS INFECTION: ICD-10-CM

## 2021-10-06 PROCEDURE — U0005 INFEC AGEN DETEC AMPLI PROBE: HCPCS | Mod: ZL

## 2021-10-06 NOTE — TELEPHONE ENCOUNTER
lvm for pt with Amparo Alvares's recommendation of going to UC as she is out of the office on 10/7

## 2021-10-06 NOTE — TELEPHONE ENCOUNTER
Cough and fatigue. Denies fever.     No known exposure     Patient has received the covid 19 vaccine.     Requesting covid 19 test:    Next 5 appointments (look out 90 days)    Oct 06, 2021  2:00 PM  (Arrive by 1:45 PM)  SHORT with HC COLLECTION  [facility] [place] ([facility] [place] ) [address]  [place] [address]  [phone]   Nov 29, 2021 10:40 AM  (Arrive by 10:25 AM)  SHORT with Amparo Alvares NP  [facility] [place] ([facility] [place] ) [address]  [place] [address]  [phone]        Patient also requesting appointment with Amparo Alvares to be evaluated. Please reach out to patient, no openings this week or next week on Amparo's schedule.     Patient can be reached at [phone]      Reason for Disposition    COVID-19 Home Isolation, questions about    Additional Information    Negative: [1] COVID-19 exposure AND [2] has not completed COVID-19 vaccine series AND [3] no symptoms    Negative: [1] COVID-19 exposure AND [2] completed COVID-19 vaccine series (fully vaccinated) AND [3] no symptoms    Negative: COVID-19 vaccine reaction suspected (e.g., fever, headache, muscle aches) occurring during days 1-3 after getting vaccine    Negative: COVID-19 vaccine, questions about    Negative: [1] COVID-19 vaccine series completed (fully vaccinated) in past 3 months AND [2] new-onset of COVID-19 symptoms BUT [3] no known exposure    Negative: [1] Had lab test confirmed COVID-19 infection within last 3 monthsAND [2] new-onset of COVID-19 symptoms BUT [3] no known exposure    Negative: [1] Lives with someone known to have influenza (flu test positive) AND [2] flu-like symptoms (e.g., cough, runny nose, sore throat, SOB; with or without fever)    Negative: [1] Adult with possible COVID-19 symptoms AND [2] triager concerned about severity of symptoms or other causes    Negative: COVID-19 and breastfeeding, questions about    Negative: SEVERE or constant  chest pain or pressure (Exception: mild central chest pain, present only when coughing)    Negative: MODERATE difficulty breathing (e.g., speaks in phrases, SOB even at rest, pulse 100-120)    Negative: [1] Headache AND [2] stiff neck (can't touch chin to chest)    Negative: MILD difficulty breathing (e.g., minimal/no SOB at rest, SOB with walking, pulse <100)    Negative: Chest pain or pressure    Negative: Patient sounds very sick or weak to the triager    Negative: Fever > 103 F (39.4 C)    Negative: [1] Fever > 101 F (38.3 C) AND [2] age > 60 years    Negative: [1] Fever > 100.0 F (37.8 C) AND [2] bedridden (e.g., nursing home patient, CVA, chronic illness, recovering from surgery)    Negative: [1] HIGH RISK patient (e.g., age > 64 years, diabetes, heart or lung disease, weak immune system) AND [2] new or worsening symptoms    Negative: [1] HIGH RISK patient AND [2] influenza is widespread in the community AND [3] ONE OR MORE respiratory symptoms: cough, sore throat, runny or stuffy nose    Negative: [1] HIGH RISK patient AND [2] influenza exposure within the last 7 days AND [3] ONE OR MORE respiratory symptoms: cough, sore throat, runny or stuffy nose    Negative: Fever present > 3 days (72 hours)    Negative: [1] Fever returns after gone for over 24 hours AND [2] symptoms worse or not improved    Negative: [1] Continuous (nonstop) coughing interferes with work or school AND [2] no improvement using cough treatment per protocol    Negative: [1] COVID-19 infection suspected by caller or triager AND [2] mild symptoms (cough, fever, or others) AND [3] no complications or SOB    Negative: Cough present > 3 weeks    Negative: [1] COVID-19 diagnosed by positive lab test AND [2] NO symptoms (e.g., cough, fever, others)    Negative: [1] COVID-19 diagnosed by positive lab test AND [2] mild symptoms (e.g., cough, fever, others) AND [3] no complications or SOB    Negative: [1] COVID-19 diagnosed by HCP (doctor, NP or PA)  "AND [2] mild symptoms (e.g., cough, fever, others) AND [3] no complications or SOB    Negative: [1] COVID-19 diagnosed AND [2] has mild nausea, vomiting or diarrhea    Answer Assessment - Initial Assessment Questions  1. COVID-19 DIAGNOSIS: \"Who made your Coronavirus (COVID-19) diagnosis?\" \"Was it confirmed by a positive lab test?\" If not diagnosed by a HCP, ask \"Are there lots of cases (community spread) where you live?\" (See public health department website, if unsure)      No     2. COVID-19 EXPOSURE: \"Was there any known exposure to COVID before the symptoms began?\" [facility] Definition of close contact: within 6 feet (2 meters) for a total of 15 minutes or more over a 24-hour period.       No     3. ONSET: \"When did the COVID-19 symptoms start?\"       X 1 week ago     4. WORST SYMPTOM: \"What is your worst symptom?\" (e.g., cough, fever, shortness of breath, muscle aches)      Cough     5. COUGH: \"Do you have a cough?\" If so, ask: \"How bad is the cough?\"        Yes, dry cough     6. FEVER: \"Do you have a fever?\" If so, ask: \"What is your temperature, how was it measured, and when did it start?\"      No     7. RESPIRATORY STATUS: \"Describe your breathing?\" (e.g., shortness of breath, wheezing, unable to speak)       No     8. BETTER-SAME-WORSE: \"Are you getting better, staying the same or getting worse compared to yesterday?\"  If getting worse, ask, \"In what way?\"      Better    9. HIGH RISK DISEASE: \"Do you have any chronic medical problems?\" (e.g., asthma, heart or lung disease, weak immune system, obesity, etc.)      Copd, CHF,     10. PREGNANCY: \"Is there any chance you are pregnant?\" \"When was your last menstrual period?\"        Na     11. OTHER SYMPTOMS: \"Do you have any other symptoms?\"  (e.g., chills, fatigue, headache, loss of smell or taste, muscle pain, sore throat; new loss of smell or taste especially support the diagnosis of COVID-19)        Cough and fatigue    Protocols used: CORONAVIRUS (COVID-19) " DIAGNOSED OR [id number] 3.25

## 2021-10-08 LAB — SARS-COV-2 RNA RESP QL NAA+PROBE: NEGATIVE

## 2021-10-12 ENCOUNTER — HOSPITAL ENCOUNTER (OUTPATIENT)
Dept: CARDIAC REHAB | Facility: HOSPITAL | Age: 66
Setting detail: THERAPIES SERIES
End: 2021-10-12
Attending: INTERNAL MEDICINE
Payer: COMMERCIAL

## 2021-10-12 VITALS — WEIGHT: 219 LBS | BODY MASS INDEX: 30.54 KG/M2

## 2021-10-12 PROCEDURE — G0238 OTH RESP PROC, INDIV: HCPCS

## 2021-10-14 ENCOUNTER — HOSPITAL ENCOUNTER (OUTPATIENT)
Dept: CARDIAC REHAB | Facility: HOSPITAL | Age: 66
Setting detail: THERAPIES SERIES
End: 2021-10-14
Attending: INTERNAL MEDICINE
Payer: COMMERCIAL

## 2021-10-14 VITALS — BODY MASS INDEX: 31.99 KG/M2 | WEIGHT: 229.4 LBS

## 2021-10-14 PROCEDURE — G0238 OTH RESP PROC, INDIV: HCPCS

## 2021-10-19 ENCOUNTER — HOSPITAL ENCOUNTER (OUTPATIENT)
Dept: CARDIAC REHAB | Facility: HOSPITAL | Age: 66
Setting detail: THERAPIES SERIES
End: 2021-10-19
Attending: INTERNAL MEDICINE
Payer: COMMERCIAL

## 2021-10-19 VITALS — WEIGHT: 228.4 LBS | BODY MASS INDEX: 31.86 KG/M2

## 2021-10-19 DIAGNOSIS — E78.5 HYPERLIPIDEMIA, UNSPECIFIED HYPERLIPIDEMIA TYPE: ICD-10-CM

## 2021-10-19 PROCEDURE — G0239 OTH RESP PROC, GROUP: HCPCS

## 2021-10-19 RX ORDER — ATORVASTATIN CALCIUM 20 MG/1
TABLET, FILM COATED ORAL
Qty: 30 TABLET | Refills: 0 | Status: SHIPPED | OUTPATIENT
Start: 2021-10-19 | End: 2021-11-16

## 2021-10-19 NOTE — TELEPHONE ENCOUNTER
atorvastatin (LIPITOR) 20 MG tablet      Last Written Prescription Date:  8-25-21  Last Fill Quantity: 30,   # refills: 1  Last Office Visit: 8-25-21  Future Office visit:    Next 5 appointments (look out 90 days)    Nov 29, 2021 10:40 AM  (Arrive by 10:25 AM)  SHORT with Amparo Alvares NP  [facility] ([facility] ) [address]  [place] [address]  [phone]           Routing refill request to provider for review/approval because:   LDL on file in past 12 months     LDL Cholesterol Calculated   Date Value Ref Range Status   08/25/2021   Final     Comment:     Cannot estimate LDL when triglyceride exceeds 400 mg/dL  Age 0-19 years:  Desirable: 0-110 mg/dL   Borderline high: 110-129 mg/dL   High: >= 130 mg/dL    Age 20 years and older:  Desirable: <100mg/dL  Above desirable: 100-129 mg/dL   Borderline high: 130-159 mg/dL   High: 160-189 mg/dL   Very high: >= 190 mg/dL   08/07/2020 34 <100 mg/dL Final     Comment:     Desirable:       <100 mg/dl

## 2021-10-21 ENCOUNTER — HOSPITAL ENCOUNTER (OUTPATIENT)
Dept: CARDIAC REHAB | Facility: HOSPITAL | Age: 66
Setting detail: THERAPIES SERIES
End: 2021-10-21
Attending: INTERNAL MEDICINE
Payer: COMMERCIAL

## 2021-10-21 VITALS — WEIGHT: 226 LBS | BODY MASS INDEX: 31.52 KG/M2

## 2021-10-21 PROCEDURE — G0239 OTH RESP PROC, GROUP: HCPCS

## 2021-10-28 ENCOUNTER — HOSPITAL ENCOUNTER (OUTPATIENT)
Dept: CARDIAC REHAB | Facility: HOSPITAL | Age: 66
Setting detail: THERAPIES SERIES
End: 2021-10-28
Attending: INTERNAL MEDICINE
Payer: COMMERCIAL

## 2021-10-28 VITALS — WEIGHT: 231 LBS | BODY MASS INDEX: 32.22 KG/M2

## 2021-10-28 PROCEDURE — G0238 OTH RESP PROC, INDIV: HCPCS

## 2021-11-02 ENCOUNTER — HOSPITAL ENCOUNTER (OUTPATIENT)
Dept: CARDIAC REHAB | Facility: HOSPITAL | Age: 66
Setting detail: THERAPIES SERIES
End: 2021-11-02
Attending: INTERNAL MEDICINE
Payer: COMMERCIAL

## 2021-11-02 VITALS — WEIGHT: 230.5 LBS | HEIGHT: 71 IN | BODY MASS INDEX: 32.27 KG/M2

## 2021-11-02 PROCEDURE — G0239 OTH RESP PROC, GROUP: HCPCS

## 2021-11-02 ASSESSMENT — 6 MINUTE WALK TEST (6MWT)
TOTAL DISTANCE WALKED: 420
MALE CALC: 545.17
GENDER SELECTION: MALE
FEMALE CALC: 431.8

## 2021-11-02 ASSESSMENT — DUKE ACTIVITY SCORE INDEX (DASI)
VO2_PEAK: 23.15
DASI METS SCORE: 6.61

## 2021-11-02 ASSESSMENT — PULMONARY FUNCTION TESTS
FEV1 (%PREDICTED): 85
FEV1: 3
FVC: 3.9
FVC_PERCENT_PREDICTED: 85

## 2021-11-02 ASSESSMENT — MIFFLIN-ST. JEOR: SCORE: 1852.41

## 2021-11-02 NOTE — PROGRESS NOTES
"   11/02/21 1400   Session   Session 30 Day Individualized Treatment Plan   Certified through this date 12/01/21   Type Reassessment   General Information   Treatment Diagnosis Pulmonary Hypertension   Secondary Treatment Diagnosis Asthma   Classification of COPD Stage 1 (Mild)   Onset Date 09/01/21   Hospital Location Not hospitalized   Current Signs and Symptoms SOB;Fatigue   Comments 11/2: Recently pt has been feeling fatigued due to increased work around the house.    Outpatient Pulmonary Rehab Start Date 10/05/21   Primary Physician Amparo Alvares   Other Specialty Provider Marleny Coppola   General Information Comments 11/2: Cardiology recommended pt to start pulmonary rehab.   Medical History/Comorbidities   Medical History/Comorbidities Anxiety;COPD;Depression;GERD;Pneumonia   Untoward Events/Exacerbations/Hospitalizations   Untoward Events/Exacerbations/Hospitalizations None   Sputum   Sputum Production Amount Moderate amount   Sputum Production Color White   Sputum Production Consistency Thick   Tobacco History   Tobacco Former smoker   Tobacco Habit Cigar   Years Smoked 51 years   Average Packs Per Day 1   Quit Date or Planned Quit Date 10/27/21   Interventions Planned Other  (Patient has nicotine patches)   Interventions Completed Other  (Pt quit \"cold turkey\" on 10/26.)   Medications   Long-Acting Beta Agonist Prescribed, taking as prescribed   Short-Acting Beta Agonist Prescribed, taking as prescribed   Long-Acting Anticholinergic Prescribed, taking as prescribed   Short-Acting Anticholinergic Not prescribed   Inhaled Corticosteroid Prescribed, taking as prescribed   Oral Corticosteroid Not prescribed   Medications Reconciled By Patient;Medical record   Pain   Patient Currently in Pain No   Fall Risk Screen   Fall screen completed by Pulmonary Rehab   Have you fallen 2 or more times in the past year? No   Have you fallen and had an injury in the past year? Yes   Timed Up and Go score (seconds) NA   Is " patient a fall risk? No   Fall screen comments 11/2: Pt has not had any recent falls and is currently not a fall risk. Staff will encourage pt to take extra precaution as winter weather approaches.   Abuse Screen (yes response referral indicated)   Feels Unsafe at Home or Work/School no   Feels Threatened by Someone no   Does Anyone Try to Keep You From Having Contact with Others or Doing Things Outside Your Home? no   Physical Signs of Abuse Present no   Living and Work Status   Living Arrangements house   Support System Live with an adult   Environmental Factors Pets   ADL Limitations None   Initial Duke Activity Status Index (DASI) score. A measure of functional capacity. The goal is to have a pre-program raw score of 9.95 (~4 METs) or above 31.51   Initial DASI VO2 Peak (ml*kg-1*min-1) 23.15   Initial DASI MET Level 6.61   Occupation Construction   Return to Employment Retired;No   Physical Assessments   Incisions Not applicable   Edema None   Left Lung Sounds not assessed   Right Lung Sounds not assessed   Comments 11/2: Pt does not have any limiations and physical assessments are within normal limits.   Pulmonary Function Test (PFTs)   PFT Results Available   Date Completed 03/25/21   FVC Actual 3.9   % Predicted FVC 85   FEV1 Actual 3   % Predicted FEV1 85   Inspiratory Capacity (IC) Actual (L) 2.82   % Predicted IC 83   Uncorrected DLCO 14.16   % Predicted Uncorrected DLCO 42   Pre/Post Bronchodilator Pre   Airway Obstruction Mild   Individualized Treatment Plan   Sessions Scheduled 18   Sessions Attended 7   Type Aerobic exercise;Resistance training   Oxygen Use   Supplemental Oxygen Needed Yes   Delivery Device Nasal Cannula   Liter Flow at Rest 0   Liter Flow with ADLS 0   Liter Flow During Exercise 2   Liter Flow With Sleep 2   Evaluated on Home System? Continuous flow   Interventions Recommended Titrate O2 with exercise;Continue to monitor SpO2 at rest and with exercise on current O2 settings  "  Interventions Completed Titrated O2 [facility] exercise   Exercise Prescription   Mode Treadmill;Nustep;Arm Ergometer/UBE;Weights   Frequency 2 days/week   Duration/Time 30-45 min;Intermittent bouts   THR (85% of age predicted max heart rate)  131.75   Effort Rating (0-10) 4-6/10   Progression of Exercise 11/2: Increase workload resistance and time as tolerated by pt.   Oxygen Titration with Exercise > 88% with exercise   Comments 11/2: Pt reaches greater than 3 METs while using recumbant bike with appropriate hemodynamic response.    Exercise Assessment   6 Minute Walk Predicted - Gender Selection Male   6 Minute Walk Predicted (Female) 431.8   6 Minute Walk Predicted (Male) 545.17   6 Minute Walk Distance (Initial) 420 Meters   Resting HR 98   Exercise    Resting /88   Exercise /88   SpO2 87  (room air)   Exercise SpO2 92  (2 Liters)   Current MET level 2.7   Exercise Tolerance fair   Normal Limits Discussed Yes   Current Symptoms at Home Anxiety;Dyspnea   Current Symptoms in Rehab Anxiety;Dyspnea   Limitations Discussed   Nutrition Management   Age 65   Height 1.803 m (5' 10.98\")   Weight 104.6 kg (230 lb 8 oz)   BMI (Calculated) 32.16   Assessment Overweight   Goal weight 90.7 kg (200 lb)   Interventions Planned Attend group nutrition class   Psychosocial   Initial Patient Health Questionnaire -9 (PHQ-9) for depression. To notify physician if pre-score >9. 4   Initial [facility] COOP Survey score. A quality of life measure. To re evaluate if total score is > 25. Also consider PHQ-9 and DASI to determine if patient should be referred back to physician. 25   Initial COPD Assessment Test (CAT). A quality of life measure. Scores range from 0-40. Higher scores indicate greater levels of limitations. The goal is to reduce scores pre to post program. 26   Initial Shortness of Breath Questionnaire (SOBQ) score. The goal is to reduce the score pre to post program. 35   Intervention Planned Patient to " identify 2-3 coping mechanisms to decrease stress and anxiety   Psychosocial Comments 11/2: Patient lives in a house with his girlfriend, who gives him a good support system.   Stages of Change   Aerobic Exercise Action   Physical Activity Action   Recommended diet Contemplation   Stress Contemplation   Smoking Cessation Action   Oxygen Usage Action   Current Home Exercise   Type of Exercise Walking   Frequency (days per week) 4-6   Duration (minutes per session) 15 minutes   Recommended Home Exercise Prescription   Type of Exercise Walking   Frequency (Days per week) 4-6   Duration (minutes per session) 15-30 min   Effort Rating Recommended (0-10) Scale  4-6/10   30 Day Exercise Plan 11/2: Pt is encouraged to continue walking as weather permits.   Learning Assessment   Learner Patient   Primary Language English   Preferred Learning Style Listening;Demonstration   Barriers to Learning No barriers noted   Patient Education/Referrals   Education Recommended Activities of Daily Living;Advance Directives;Anatomy and Disease Process;Breathing Techniques;Emotional Aspects of CLD;Exercise Principles;Home Oxygen Equipment;Medication Overview;Nutrition;Panic and Anxiety   Referral(s) Recommended None   Follow-up/On-going Support   Provider follow-up needed on the following No follow-up needed   Pulmonary Rehab Goals   Pulmonary Rehab Goals 1   Goal 1   Goal Patient would like to be able to walk 10 blocks without stopping and becoming SOB..   Target Date 01/05/22   Progress Towards Goal 11/2: Pt states he has been following his at home exercise plan. Staff will assess status of goal at next session.   Assessment   Assessment 11/2: Patient completes 7 sessions of pulmonary rehab. Overall, pt is progressing slowly. He reaches greater than 3 METs on full revolution bike and continues to work on strength training. Pt states he has been keeping with walking at home, weather permitting, as well as some resistance training. Pt felt  his shortness of breath was deirdre today. He had increased body fatigue due to performing more work around the house this weekend. Staff will continue to assess vitals and pt's oxygen remains around 92-94% on 2liters. Pt feels pulmonary rehab has been helping and looks forward to completing the program.    I have reviewed and agree with this patient s individual treatment plan and  Pexercise prescription for respiratory therapy.  Please see  individual treatment plan for details of progress and plan.

## 2021-11-11 ENCOUNTER — HOSPITAL ENCOUNTER (OUTPATIENT)
Dept: CARDIAC REHAB | Facility: HOSPITAL | Age: 66
Setting detail: THERAPIES SERIES
End: 2021-11-11
Attending: INTERNAL MEDICINE
Payer: COMMERCIAL

## 2021-11-11 VITALS — BODY MASS INDEX: 32.99 KG/M2 | WEIGHT: 236.4 LBS

## 2021-11-11 PROCEDURE — G0238 OTH RESP PROC, INDIV: HCPCS

## 2021-11-16 ENCOUNTER — HOSPITAL ENCOUNTER (OUTPATIENT)
Dept: CARDIAC REHAB | Facility: HOSPITAL | Age: 66
Setting detail: THERAPIES SERIES
End: 2021-11-16
Attending: INTERNAL MEDICINE
Payer: COMMERCIAL

## 2021-11-16 VITALS — WEIGHT: 230.5 LBS | BODY MASS INDEX: 32.16 KG/M2

## 2021-11-16 PROCEDURE — G0239 OTH RESP PROC, GROUP: HCPCS
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
## 2021-11-30 ENCOUNTER — OFFICE VISIT (OUTPATIENT)
Dept: FAMILY MEDICINE | Facility: OTHER | Age: 66
End: 2021-11-30
Attending: NURSE PRACTITIONER
Payer: COMMERCIAL

## 2021-11-30 ENCOUNTER — HOSPITAL ENCOUNTER (OUTPATIENT)
Dept: CARDIAC REHAB | Facility: HOSPITAL | Age: 66
Setting detail: THERAPIES SERIES
End: 2021-11-30
Attending: INTERNAL MEDICINE
Payer: COMMERCIAL

## 2021-11-30 VITALS
WEIGHT: 230 LBS | SYSTOLIC BLOOD PRESSURE: 100 MMHG | BODY MASS INDEX: 32.2 KG/M2 | HEIGHT: 71 IN | DIASTOLIC BLOOD PRESSURE: 70 MMHG | HEART RATE: 94 BPM | TEMPERATURE: 97 F | OXYGEN SATURATION: 90 %

## 2021-11-30 VITALS — WEIGHT: 228 LBS | HEIGHT: 71 IN | BODY MASS INDEX: 31.92 KG/M2

## 2021-11-30 DIAGNOSIS — R97.20 ELEVATED PROSTATE SPECIFIC ANTIGEN (PSA): ICD-10-CM

## 2021-11-30 DIAGNOSIS — E78.5 HYPERLIPIDEMIA, UNSPECIFIED HYPERLIPIDEMIA TYPE: ICD-10-CM

## 2021-11-30 DIAGNOSIS — J30.2 SEASONAL ALLERGIES: ICD-10-CM

## 2021-11-30 DIAGNOSIS — R07.81 RIB PAIN ON LEFT SIDE: Primary | ICD-10-CM

## 2021-11-30 DIAGNOSIS — Z12.5 SCREENING FOR PROSTATE CANCER: ICD-10-CM

## 2021-11-30 DIAGNOSIS — R25.1 TREMOR OF BOTH HANDS: ICD-10-CM

## 2021-11-30 DIAGNOSIS — Z13.29 SCREENING FOR THYROID DISORDER: ICD-10-CM

## 2021-11-30 DIAGNOSIS — Z23 NEED FOR PROPHYLACTIC VACCINATION AND INOCULATION AGAINST INFLUENZA: ICD-10-CM

## 2021-11-30 DIAGNOSIS — R73.01 IMPAIRED FASTING GLUCOSE: ICD-10-CM

## 2021-11-30 LAB
CHOLEST SERPL-MCNC: 159 MG/DL
EST. AVERAGE GLUCOSE BLD GHB EST-MCNC: 120 MG/DL
FASTING STATUS PATIENT QL REPORTED: YES
HBA1C MFR BLD: 5.8 % (ref 0–5.6)
HDLC SERPL-MCNC: 27 MG/DL
LDLC SERPL CALC-MCNC: 53 MG/DL
NONHDLC SERPL-MCNC: 132 MG/DL
PSA SERPL-MCNC: 6.52 UG/L (ref 0–4)
TRIGL SERPL-MCNC: 397 MG/DL
TSH SERPL DL<=0.005 MIU/L-ACNC: 2.29 MU/L (ref 0.4–4)

## 2021-11-30 PROCEDURE — 80061 LIPID PANEL: CPT | Mod: ZL | Performed by: NURSE PRACTITIONER

## 2021-11-30 PROCEDURE — 84443 ASSAY THYROID STIM HORMONE: CPT | Mod: ZL | Performed by: NURSE PRACTITIONER

## 2021-11-30 PROCEDURE — G0239 OTH RESP PROC, GROUP: HCPCS

## 2021-11-30 PROCEDURE — 99214 OFFICE O/P EST MOD 30 MIN: CPT | Performed by: NURSE PRACTITIONER

## 2021-11-30 PROCEDURE — 36415 COLL VENOUS BLD VENIPUNCTURE: CPT | Mod: ZL | Performed by: NURSE PRACTITIONER

## 2021-11-30 PROCEDURE — 83036 HEMOGLOBIN GLYCOSYLATED A1C: CPT | Mod: ZL | Performed by: NURSE PRACTITIONER

## 2021-11-30 PROCEDURE — 90662 IIV NO PRSV INCREASED AG IM: CPT

## 2021-11-30 PROCEDURE — 84153 ASSAY OF PSA TOTAL: CPT | Mod: ZL | Performed by: NURSE PRACTITIONER

## 2021-11-30 PROCEDURE — G0008 ADMIN INFLUENZA VIRUS VAC: HCPCS

## 2021-11-30 PROCEDURE — G0463 HOSPITAL OUTPT CLINIC VISIT: HCPCS | Mod: 25

## 2021-11-30 RX ORDER — TRAZODONE HYDROCHLORIDE 50 MG/1
TABLET, FILM COATED ORAL
COMMUNITY
Start: 2021-11-09 | End: 2022-01-18

## 2021-11-30 RX ORDER — DULOXETIN HYDROCHLORIDE 30 MG/1
CAPSULE, DELAYED RELEASE ORAL
COMMUNITY
Start: 2021-11-23 | End: 2022-05-30

## 2021-11-30 ASSESSMENT — PULMONARY FUNCTION TESTS
FVC_PERCENT_PREDICTED: 85
FEV1 (%PREDICTED): 85
FEV1: 3
FVC: 3.9

## 2021-11-30 ASSESSMENT — DUKE ACTIVITY SCORE INDEX (DASI)
VO2_PEAK: 23.15
DASI METS SCORE: 6.61

## 2021-11-30 ASSESSMENT — PAIN SCALES - GENERAL: PAINLEVEL: MODERATE PAIN (5)

## 2021-11-30 ASSESSMENT — MIFFLIN-ST. JEOR
SCORE: 1848.81
SCORE: 1839.74

## 2021-11-30 ASSESSMENT — 6 MINUTE WALK TEST (6MWT)
FEMALE CALC: 433.95
GENDER SELECTION: MALE
MALE CALC: 545.55
TOTAL DISTANCE WALKED: 420
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
## 2021-11-30 NOTE — NURSING NOTE
"Chief Complaint   Patient presents with     Lipids     Hypertension     COPD     Anxiety       Initial There were no vitals taken for this visit. Estimated body mass index is 32.16 kg/m  as calculated from the following:    Height as of 11/2/21: 1.803 m (5' 10.98\").    Weight as of 11/16/21: 104.6 kg (230 lb 8 oz).  Medication Reconciliation: complete  Rose Her LPN  "

## 2021-12-02 RX ORDER — MONTELUKAST SODIUM 10 MG/1
TABLET ORAL
Qty: 90 TABLET | Refills: 0 | Status: SHIPPED | OUTPATIENT
Start: 2021-12-02 | End: 2022-03-04

## 2021-12-02 NOTE — TELEPHONE ENCOUNTER
Singulair      Last Written Prescription Date:  12/4/20  Last Fill Quantity: 90,   # refills: 3  Last Office Visit: 11/30/21  Future Office visit:       Routing refill request to provider for review/approval because:

## 2021-12-06 DIAGNOSIS — I27.21 PULMONARY ARTERIAL HYPERTENSION (H): ICD-10-CM

## 2021-12-06 DIAGNOSIS — I50.810 RIGHT HEART FAILURE, NYHA CLASS 3 (H): ICD-10-CM

## 2021-12-06 DIAGNOSIS — I51.7 RIGHT VENTRICULAR DILATION: ICD-10-CM

## 2021-12-07 RX ORDER — DIGOXIN 125 MCG
TABLET ORAL
Qty: 90 TABLET | Refills: 0 | Status: SHIPPED | OUTPATIENT
Start: 2021-12-07 | End: 2022-03-04

## 2021-12-09 ENCOUNTER — TRANSFERRED RECORDS (OUTPATIENT)
Dept: HEALTH INFORMATION MANAGEMENT | Facility: HOSPITAL | Age: 66
End: 2021-12-09

## 2021-12-09 NOTE — PROGRESS NOTES
"   12/09/21 1300   Session   Session Discharge Note   Certified through this date 12/19/21   Type Discharge/Follow-up     Pulmonary Rehab Discharge Summary    Reason for discharge:    Pt was discharged from the program due to lack of attendance.    Progress towards goals:  Goals partially met. Due to limited attendance since October, pt did not achieve their goals as desired.    Recommendation(s):    Continued therapy is recommended.  Rationale/Recommendations:  Pt would benefit from continuing in [place] but at this time attendance was not sustainable. Staff discharged pt after continued \"No call, no show\".      SHON Samuels on 12/9/2021 at 1:35 PM      Patient attended partial program and discharged due to (reason for DC).  Outcome assessments upon initial evaluation were not able to be reassessed; therefore, no pre to post program assessment data is available. Patient was able to (comments specific to objective progress in exercise performance or self-reported progress of symptoms).          "

## 2022-01-01 ENCOUNTER — OFFICE VISIT (OUTPATIENT)
Dept: FAMILY MEDICINE | Facility: OTHER | Age: 67
End: 2022-01-01
Attending: STUDENT IN AN ORGANIZED HEALTH CARE EDUCATION/TRAINING PROGRAM
Payer: COMMERCIAL

## 2022-01-01 ENCOUNTER — TELEPHONE (OUTPATIENT)
Dept: FAMILY MEDICINE | Facility: OTHER | Age: 67
End: 2022-01-01

## 2022-01-01 VITALS
TEMPERATURE: 97.1 F | RESPIRATION RATE: 18 BRPM | HEIGHT: 71 IN | HEART RATE: 93 BPM | WEIGHT: 224 LBS | SYSTOLIC BLOOD PRESSURE: 130 MMHG | OXYGEN SATURATION: 90 % | DIASTOLIC BLOOD PRESSURE: 77 MMHG | BODY MASS INDEX: 31.36 KG/M2

## 2022-01-01 DIAGNOSIS — I27.21 PULMONARY ARTERIAL HYPERTENSION (H): ICD-10-CM

## 2022-01-01 DIAGNOSIS — I50.810 RIGHT HEART FAILURE, NYHA CLASS 3 (H): ICD-10-CM

## 2022-01-01 DIAGNOSIS — J18.9 COMMUNITY ACQUIRED PNEUMONIA, UNSPECIFIED LATERALITY: Primary | ICD-10-CM

## 2022-01-01 DIAGNOSIS — E66.01 MORBID OBESITY (H): ICD-10-CM

## 2022-01-01 DIAGNOSIS — E78.5 HYPERLIPIDEMIA, UNSPECIFIED HYPERLIPIDEMIA TYPE: ICD-10-CM

## 2022-01-01 DIAGNOSIS — I51.7 RIGHT VENTRICULAR DILATION: ICD-10-CM

## 2022-01-01 DIAGNOSIS — J44.9 SEVERE CHRONIC OBSTRUCTIVE PULMONARY DISEASE (H): ICD-10-CM

## 2022-01-01 LAB
BACTERIA BLD CULT: NO GROWTH
BACTERIA BLD CULT: NO GROWTH

## 2022-01-01 PROCEDURE — G0463 HOSPITAL OUTPT CLINIC VISIT: HCPCS

## 2022-01-01 PROCEDURE — 99213 OFFICE O/P EST LOW 20 MIN: CPT | Performed by: STUDENT IN AN ORGANIZED HEALTH CARE EDUCATION/TRAINING PROGRAM

## 2022-01-01 RX ORDER — ATORVASTATIN CALCIUM 20 MG/1
TABLET, FILM COATED ORAL
Qty: 90 TABLET | Refills: 0 | Status: SHIPPED | OUTPATIENT
Start: 2022-01-01 | End: 2023-01-01

## 2022-01-01 RX ORDER — DIGOXIN 125 MCG
TABLET ORAL
Qty: 90 TABLET | Refills: 0 | Status: SHIPPED | OUTPATIENT
Start: 2022-01-01 | End: 2023-01-01

## 2022-01-01 ASSESSMENT — ANXIETY QUESTIONNAIRES
3. WORRYING TOO MUCH ABOUT DIFFERENT THINGS: SEVERAL DAYS
2. NOT BEING ABLE TO STOP OR CONTROL WORRYING: NOT AT ALL
IF YOU CHECKED OFF ANY PROBLEMS ON THIS QUESTIONNAIRE, HOW DIFFICULT HAVE THESE PROBLEMS MADE IT FOR YOU TO DO YOUR WORK, TAKE CARE OF THINGS AT HOME, OR GET ALONG WITH OTHER PEOPLE: NOT DIFFICULT AT ALL
5. BEING SO RESTLESS THAT IT IS HARD TO SIT STILL: NOT AT ALL
1. FEELING NERVOUS, ANXIOUS, OR ON EDGE: SEVERAL DAYS
7. FEELING AFRAID AS IF SOMETHING AWFUL MIGHT HAPPEN: NOT AT ALL
GAD7 TOTAL SCORE: 3
6. BECOMING EASILY ANNOYED OR IRRITABLE: NOT AT ALL
4. TROUBLE RELAXING: SEVERAL DAYS
GAD7 TOTAL SCORE: 3

## 2022-01-01 ASSESSMENT — PAIN SCALES - GENERAL: PAINLEVEL: NO PAIN (0)

## 2022-01-01 ASSESSMENT — PATIENT HEALTH QUESTIONNAIRE - PHQ9: SUM OF ALL RESPONSES TO PHQ QUESTIONS 1-9: 0

## 2022-01-05 DIAGNOSIS — E87.6 DRUG-INDUCED HYPOKALEMIA: ICD-10-CM

## 2022-01-05 DIAGNOSIS — T50.905A DRUG-INDUCED HYPOKALEMIA: ICD-10-CM

## 2022-01-05 DIAGNOSIS — I27.21 PULMONARY ARTERIAL HYPERTENSION (H): ICD-10-CM

## 2022-01-05 DIAGNOSIS — R06.09 DOE (DYSPNEA ON EXERTION): ICD-10-CM

## 2022-01-06 ENCOUNTER — IMMUNIZATION (OUTPATIENT)
Dept: FAMILY MEDICINE | Facility: OTHER | Age: 67
End: 2022-01-06
Attending: NURSE PRACTITIONER
Payer: COMMERCIAL

## 2022-01-06 PROCEDURE — 0004A PR COVID VAC PFIZER DIL RECON 30 MCG/0.3 ML IM: CPT

## 2022-01-07 RX ORDER — POTASSIUM CHLORIDE 1500 MG/1
TABLET, EXTENDED RELEASE ORAL
Qty: 180 TABLET | Refills: 0 | Status: SHIPPED | OUTPATIENT
Start: 2022-01-07 | End: 2022-04-11

## 2022-01-07 RX ORDER — TORSEMIDE 20 MG/1
TABLET ORAL
Qty: 270 TABLET | Refills: 0 | Status: SHIPPED | OUTPATIENT
Start: 2022-01-07 | End: 2022-03-24

## 2022-01-11 ENCOUNTER — NURSE TRIAGE (OUTPATIENT)
Dept: FAMILY MEDICINE | Facility: OTHER | Age: 67
End: 2022-01-11
Payer: COMMERCIAL

## 2022-01-11 ENCOUNTER — OFFICE VISIT (OUTPATIENT)
Dept: FAMILY MEDICINE | Facility: OTHER | Age: 67
End: 2022-01-11
Attending: NURSE PRACTITIONER
Payer: COMMERCIAL

## 2022-01-11 DIAGNOSIS — Z20.822 SUSPECTED 2019 NOVEL CORONAVIRUS INFECTION: Primary | ICD-10-CM

## 2022-01-11 DIAGNOSIS — Z20.822 SUSPECTED 2019 NOVEL CORONAVIRUS INFECTION: ICD-10-CM

## 2022-01-11 PROCEDURE — U0003 INFECTIOUS AGENT DETECTION BY NUCLEIC ACID (DNA OR RNA); SEVERE ACUTE RESPIRATORY SYNDROME CORONAVIRUS 2 (SARS-COV-2) (CORONAVIRUS DISEASE [COVID-19]), AMPLIFIED PROBE TECHNIQUE, MAKING USE OF HIGH THROUGHPUT TECHNOLOGIES AS DESCRIBED BY CMS-2020-01-R: HCPCS | Mod: ZL

## 2022-01-11 NOTE — TELEPHONE ENCOUNTER
Sore throat, fatigue, headache, cough x 1 week.     Patient has been vaccinated for covid 19 and received booster.     Next 5 appointments (look out 90 days)    Jan 11, 2022  1:45 PM  (Arrive by 1:30 PM)  SHORT with HC COLLECTION  [facility] [place] ([facility] [place] ) [address]  [place] [address]  [phone]   Jan 18, 2022 10:20 AM  (Arrive by 10:05 AM)  Pre-Op physical with Amparo Alvares NP  [facility] [place] ([facility] [place] ) [address]  [place] [address]  [phone]   Jan 21, 2022 10:45 AM  (Arrive by 10:30 AM)  SHORT with HC COLLECTION  [facility] [place] ([facility] [place] ) [address]  [place] [address]  [phone]   Mar 22, 2022  1:15 PM  (Arrive by 1:00 PM)  Return Visit with BERNARDINO Cordero [facility] [place] ([facility] [place] ) [address]  [place] [address]  [phone]            Reason for Disposition    COVID-19 Home Isolation, questions about    Additional Information    Negative: SEVERE difficulty breathing (e.g., struggling for each breath, speaks in single words)    Negative: Difficult to awaken or acting confused (e.g., disoriented, slurred speech)    Negative: Bluish (or gray) lips or face now    Negative: Shock suspected (e.g., cold/pale/clammy skin, too weak to stand, low BP, rapid pulse)    Negative: Sounds like a life-threatening emergency to the triager    Negative: [1] COVID-19 exposure AND [2] no symptoms    Negative: COVID-19 vaccine reaction suspected (e.g., fever, headache, muscle aches) occurring 1 to 3 days after getting vaccine    Negative: COVID-19 vaccine, questions about    Negative: [1] Lives with someone known to have influenza (flu test positive) AND [2] flu-like symptoms (e.g., cough, runny nose, sore throat, SOB; with or without fever)    Negative: [1] Adult with possible COVID-19 symptoms AND [2] triager  concerned about severity of symptoms or other causes    Negative: COVID-19 and breastfeeding, questions about    Negative: SEVERE or constant chest pain or pressure (Exception: mild central chest pain, present only when coughing)    Negative: MODERATE difficulty breathing (e.g., speaks in phrases, SOB even at rest, pulse 100-120)    Negative: [1] Headache AND [2] stiff neck (can't touch chin to chest)    Negative: MILD difficulty breathing (e.g., minimal/no SOB at rest, SOB with walking, pulse <100)    Negative: Chest pain or pressure    Negative: Patient sounds very sick or weak to the triager    Negative: Fever > 103 F (39.4 C)    Negative: [1] Fever > 101 F (38.3 C) AND [2] age > 60 years    Negative: [1] Fever > 100.0 F (37.8 C) AND [2] bedridden (e.g., nursing home patient, CVA, chronic illness, recovering from surgery)    Negative: HIGH RISK for severe COVID complications (e.g., age > 64 years, obesity with BMI > 25, pregnant, chronic lung disease or other chronic medical condition)  (Exception: Already seen by PCP and no new or worsening symptoms.)    Negative: [1] HIGH RISK patient AND [2] influenza is widespread in the community AND [3] ONE OR MORE respiratory symptoms: cough, sore throat, runny or stuffy nose    Negative: [1] HIGH RISK patient AND [2] influenza exposure within the last 7 days AND [3] ONE OR MORE respiratory symptoms: cough, sore throat, runny or stuffy nose    Negative: [1] COVID-19 infection suspected by caller or triager AND [2] mild symptoms (cough, fever, or others) AND [3] negative COVID-19 rapid test    Negative: Fever present > 3 days (72 hours)    Negative: [1] Fever returns after gone for over 24 hours AND [2] symptoms worse or not improved    Negative: [1] Continuous (nonstop) coughing interferes with work or school AND [2] no improvement using cough treatment per protocol    Negative: Cough present > 3 weeks    Negative: [1] COVID-19 diagnosed by positive lab test AND [2] NO  "symptoms (e.g., cough, fever, others)    Negative: [1] COVID-19 diagnosed by positive lab test AND [2] mild symptoms (e.g., cough, fever, others) AND [3] no complications or SOB    Negative: [1] COVID-19 diagnosed by doctor (or NP/PA) AND [2] mild symptoms (e.g., cough, fever, others) AND [3] no complications or SOB    Negative: [1] COVID-19 diagnosed AND [2] has mild nausea, vomiting or diarrhea    Answer Assessment - Initial Assessment Questions  1. COVID-19 DIAGNOSIS: \"Who made your Coronavirus (COVID-19) diagnosis?\" \"Was it confirmed by a positive lab test?\" If not diagnosed by a HCP, ask \"Are there lots of cases (community spread) where you live?\" (See public health department website, if unsure)      No     2. COVID-19 EXPOSURE: \"Was there any known exposure to COVID before the symptoms began?\" CDC Definition of close contact: within 6 feet (2 meters) for a total of 15 minutes or more over a 24-hour period.       No     3. ONSET: \"When did the COVID-19 symptoms start?\"       X 1 week ago     4. WORST SYMPTOM: \"What is your worst symptom?\" (e.g., cough, fever, shortness of breath, muscle aches)      Sore throat       5. COUGH: \"Do you have a cough?\" If Yes, ask: \"How bad is the cough?\"        Yes    6. FEVER: \"Do you have a fever?\" If Yes, ask: \"What is your temperature, how was it measured, and when did it start?\"      No     7. RESPIRATORY STATUS: \"Describe your breathing?\" (e.g., shortness of breath, wheezing, unable to speak)       No     8. BETTER-SAME-WORSE: \"Are you getting better, staying the same or getting worse compared to yesterday?\"  If getting worse, ask, \"In what way?\"      better    9. HIGH RISK DISEASE: \"Do you have any chronic medical problems?\" (e.g., asthma, heart or lung disease, weak immune system, obesity, etc.)      COPD, CHF     10. PREGNANCY: \"Is there any chance you are pregnant?\" \"When was your last menstrual period?\"        Na    11. OTHER SYMPTOMS: \"Do you have any other symptoms?\" "  (e.g., chills, fatigue, headache, loss of smell or taste, muscle pain, sore throat; new loss of smell or taste especially support the diagnosis of COVID-19)        Sore throat, fatigue, headache and cough    Protocols used: CORONAVIRUS (COVID-19) DIAGNOSED OR [id number] 8.25.2021

## 2022-01-11 NOTE — PROGRESS NOTES
Anesthesia Start and Stop Event Times     Date Time Event    11/7/2019 1634 Ready for Procedure     1732 Anesthesia Start     1802 Anesthesia Stop        Responsible Staff  11/07/19    Name Role Begin End    Luis Trimble D.O. Anesth 1732 1802        Preop Diagnosis (Free Text):  Pre-op Diagnosis     RIGHT ANKLE RETAINED BROKEN SCREW        Preop Diagnosis (Codes):    Post op Diagnosis  Ankle fracture  retained broken screw    Premium Reason  K. Alert    Comments:                                                                        covid testing done   ADELIA ONTIVEROS LPN

## 2022-01-13 LAB — SARS-COV-2 RNA RESP QL NAA+PROBE: NEGATIVE

## 2022-01-16 NOTE — PROGRESS NOTES
[facility] [place]  [address]  [facility] [place] [address]  Phone: [phone]  Primary Provider: Mason Calvin  Pre-op Performing Provider: MASON CALVIN      PREOPERATIVE EVALUATION:  Today's date: 1/18/2022    Joey Irene is a 66 year old male who presents for a preoperative evaluation.    Surgical Information:  Surgery/Procedure: Bilateral Cataract Removal  Surgery Location: [facility]  Surgeon: Arthur  Surgery Date: 1/25/22 and 2/8/22  Where patient plans to recover: At home with family  Fax number for surgical facility: Note does not need to be faxed, will be available electronically in Epic.    Type of Anesthesia Anticipated: to be determined    Assessment & Plan     The proposed surgical procedure is considered LOW risk.    Preop general physical exam  Cataract of both eyes, unspecified cataract type  He is scheduled to have cataracts surgery on 1/25/22 and 2/8/22. We obtained a CBC and BMP which were unremarkable. An EKG was not required due to low risk nature of cataracts surgery.    PTSD (post-traumatic stress disorder)  Recurrent major depressive disorder, in partial remission (H)  Generalized anxiety disorder  Taking Cymbalta with relatively good control. He is following with psychology and psychiatry.     Severe pulmonary arterial systolic hypertension (H)  He has a follow up scheduled with Nataliya Coppola NP with cardiology in March. At that time she will review if the patient can be cleared to go under general anesthesia, in order to have a prostate biopsy completed.     Sleep apnea, unspecified type  Has known EMILEE but refuses CPAP.     Tobacco use disorder  He is willing to try to quit. He has patches at home and will re try this. He was encouraged of all the of the benefits that would following him quitting smoking.     Hyperglycemia  He has been dieting and trying to walk more to reduce his glucose levels. This was shown in his BMP today with normal glucose levels.      Pulmonary emphysema, unspecified emphysema type (H)  Has chronic COPD for which he has rescue and long acting inhalers. He is currently just using his albuterol as needed.     Diastolic heart failure, unspecified HF chronicity (H)  Will see cardiology in March.         Risks and Recommendations:  The patient has the following additional risks and recommendations for perioperative complications:  Pulmonary:    - Incentive spirometry post-op   - Active nicotine user, advised smoking cessation      Will hold all medications the morning of surgery except his inhalers, metoprolol, and digoxin.     Will stop his asa 7 days prior.    RECOMMENDATION:  APPROVAL GIVEN to proceed with proposed procedure, without further diagnostic evaluation.    Joey Irene with a functional capacity of greater than four MET's. There are no obvious contraindications to proceeding with surgery at this time. Recommend that the surgeon review risks and benefits of the procedure prior to proceeding.     Was recommended to avoid eating and drinking anything 12 hours prior to surgery unless his surgeon tells him otherwise.   [id number]}    Subjective     HPI related to upcoming procedure: Patient with blurry vision. Bilateral cataracts.       Preop Questions 1/18/2022   1. Have you ever had a heart attack or stroke? No   2. Have you ever had surgery on your heart or blood vessels, such as a stent placement, a coronary artery bypass, or surgery on an artery in your head, neck, heart, or legs? No   3. Do you have chest pain with activity? No   4. Do you have a history of  heart failure? Yes - CHF diastolic-stable   5. Do you currently have a cold, bronchitis or symptoms of other infection? No   6. Do you have a cough, shortness of breath, or wheezing? No   7. Do you or anyone in your family have previous history of blood clots? No   8. Do you or does anyone in your family have a serious bleeding problem such as prolonged bleeding following surgeries  or cuts? No   9. Have you ever had problems with anemia or been told to take iron pills? No   10. Have you had any abnormal blood loss such as black, tarry or bloody stools? No   11. Have you ever had a blood transfusion? No   12. Are you willing to have a blood transfusion if it is medically needed before, during, or after your surgery? Yes   13. Have you or any of your relatives ever had problems with anesthesia? No   14. Do you have sleep apnea, excessive snoring or daytime drowsiness? No   14a. Do you have a CPAP machine? -   15. Do you have any artifical heart valves or other implanted medical devices like a pacemaker, defibrillator, or continuous glucose monitor? YES - see below   15a. What type of device do you have? bone stimulator . Has not been active for 5 years   15b. Name of the clinic that manages your device:  [facility]   16. Do you have artificial joints? YES -left knee    17. Are you allergic to latex? No        Health Care Directive:  Patient does not have a Health Care Directive or Living Will: Discussed advance care planning with patient; however, patient declined at this time.    Preoperative Review of :   reviewed - controlled substances reflected in medication list.      PTSD (post-traumatic stress disorder)  Recurrent major depressive disorder, in partial remission (H)  Generalized anxiety disorder  Mental health stable. Continue current medications. Taking Cymbalta 90 mg. Sees psychology this week and psychiatrist in next week. Takes Vit D and C now.      Severe pulmonary arterial systolic hypertension (H)  Stable. Follows with cardiology.        Sleep apnea, unspecified type  Does not have CPAP. Declines.      Tobacco use disorder  Smoking 1-2 cigars per day. He is interested in quitting. Will try the patch which he has at home.      Impaired fasting glucose  Recent A1C 5.8. Reviewed pathogenesis of pre-diabetes. Stressed importance of cutting out sugars/carbs and engaging in  routine physical exercise for 30 minutes/5 days of work with the goal of gradual weight loss.     Hyperlipidemia   His lipids were last checked on 11/30/21 with elevated triglyercides and HDL. Taking atorvastatin 20 mg without side effects.      COPD  Diastolic heart failure (H)  Still has a chronic cough that is nonproductive. Uses his inhalers as directed.     Review of Systems  CONSTITUTIONAL: NEGATIVE for fever, chills, change in weight  INTEGUMENTARY/SKIN: NEGATIVE for worrisome rashes, moles or lesions  EYES: blurred vision of both eyes   ENT/MOUTH: NEGATIVE for ear, mouth and throat problems  RESP: SOB, nonproductive cough, MURRAY, hx of COPD   CV: intermittent chest pressure and Hx pulmonary HTN  GI: NEGATIVE for nausea, abdominal pain, heartburn, or change in bowel habits  : NEGATIVE for frequency, dysuria, or hematuria  MUSCULOSKELETAL: NEGATIVE for significant arthralgias or myalgia  NEURO: tremor of bilateral hands   ENDOCRINE: NEGATIVE for temperature intolerance, skin/hair changes  HEME: NEGATIVE for bleeding problems  PSYCHIATRIC: HX anxiety and HX depression      Patient Active Problem List    Diagnosis Date Noted     Encounter for smoking cessation counseling 04/19/2021     Priority: Medium     Diastolic heart failure, unspecified HF chronicity (H) 04/19/2021     Priority: Medium     Status post coronary angiogram 12/11/2020     Priority: Medium     Severe pulmonary arterial systolic hypertension (H) 11/20/2020     Priority: Medium     Right ventricular dilation 10/29/2020     Priority: Medium     Added automatically from request for surgery [id number]       Bee sting allergy 08/07/2020     Priority: Medium     Hyperglycemia 08/07/2020     Priority: Medium     Elevated prostate specific antigen (PSA) 08/07/2020     Priority: Medium     History of fusion of cervical spine 08/06/2020     Priority: Medium     Generalized anxiety disorder 08/06/2020     Priority: Medium     Tobacco use disorder 08/06/2020      Priority: Medium     Pulmonary emphysema, unspecified emphysema type (H) 08/05/2020     Priority: Medium     Cardiomegaly 08/05/2020     Priority: Medium     Enlarged prostate 08/05/2020     Priority: Medium     Diverticulosis 03/25/2020     Priority: Medium     Chronic bronchitis with emphysema (H) 02/03/2020     Priority: Medium     Class 1 obesity with body mass index (BMI) of 33.0 to 33.9 in adult 02/03/2020     Priority: Medium     Smoking greater than 40 pack years 02/03/2020     Priority: Medium     Pain due to total left knee replacement, sequela 01/27/2020     Priority: Medium     Cervical stenosis of spinal canal 01/13/2020     Priority: Medium     Cholesteatoma of left ear 03/04/2019     Priority: Medium     Nasal septal deviation 12/18/2017     Priority: Medium     Primary osteoarthritis of right knee 02/25/2017     Priority: Medium     Status post total left knee replacement 02/25/2017     Priority: Medium     Recurrent major depressive disorder, in partial remission (H) 11/21/2015     Priority: Medium     PTSD (post-traumatic stress disorder) 08/19/2015     Priority: Medium     Seasonal allergies 08/19/2015     Priority: Medium     Anxiety state 07/07/2011     Priority: Medium     Back pain, chronic 07/07/2011     Priority: Medium     Hearing loss 07/07/2011     Priority: Medium     Insomnia, unspecified 07/07/2011     Priority: Medium     Sleep apnea 07/07/2011     Priority: Medium      Past Medical History:   Diagnosis Date     COPD (chronic obstructive pulmonary disease) (H)      Hypertension      Uncomplicated asthma      Past Surgical History:   Procedure Laterality Date     CV CORONARY ANGIOGRAM N/A 12/11/2020    Procedure: Coronary Angiogram;  Surgeon: Aman Delgado MD;  Location:  [facility]     CV RIGHT HEART CATH MEASUREMENTS RECORDED N/A 12/11/2020    Procedure: CV RIGHT HEART CATH;  Surgeon: Aman Delgado MD;  [facility]     ENT  SURGERY      patient has had three left ear surgeries, unsure what they did     FUSION CERVICAL POSTERIOR THREE+ LEVELS       HERNIA REPAIR, INGUINAL RT/LT Right     done times 3     JOINT REPLACEMENT Left      REPAIR HAMMER TOE Right      Current Outpatient Medications   Medication Sig Dispense Refill     acetaminophen (TYLENOL) 500 MG tablet Take 1 tablet by mouth       albuterol (PROAIR HFA/PROVENTIL HFA/VENTOLIN HFA) 108 (90 Base) MCG/ACT inhaler Inhale 1-2 puffs into the lungs every 4 hours as needed for shortness of breath / dyspnea or wheezing 18 g 1     ARIPiprazole (ABILIFY) 10 MG tablet        ASPIRIN LOW DOSE 81 MG EC tablet TAKE 1 TABLET BY MOUTH DAILY 30 tablet 11     atorvastatin (LIPITOR) 20 MG tablet TAKE 1 TABLET BY MOUTH DAILY 90 tablet 3     cetirizine (ZYRTEC) 10 MG tablet TAKE 1 TABLET BY MOUTH DAILY 90 tablet 3     Cholecalciferol (D 1000) 25 MCG (1000 UT) CAPS        cyclobenzaprine (FLEXERIL) 10 MG tablet TAKE 1/2-1 TABLET BY MOUTH 3 TIMES A DAY AS NEEDED FOR MUSCLE SPASMS 15 tablet 0     digoxin (LANOXIN) 125 MCG tablet TAKE 1 TABLET BY MOUTH DAILY 90 tablet 0     DULoxetine (CYMBALTA) 30 MG capsule        DULoxetine (CYMBALTA) 60 MG capsule Take 1 capsule (60 mg) by mouth 2 times daily 60 capsule 1     fluticasone (FLONASE) 50 MCG/ACT nasal spray Spray 2 sprays in nostril       Fluticasone-Umeclidin-Vilanterol (TRELEGY ELLIPTA) 100-62.5-25 MCG/INH oral inhaler Inhale 1 puff into the lungs daily 28 each 1     gabapentin (NEURONTIN) 300 MG capsule        guaiFENesin (MUCINEX) 600 MG 12 hr tablet Take 1,200 mg by mouth       hydrOXYzine (VISTARIL) 50 MG capsule        metoprolol succinate ER (TOPROL-XL) 25 MG 24 hr tablet TAKE 1 TABLET BY MOUTH DAILY 90 tablet 3     montelukast (SINGULAIR) 10 MG tablet TAKE ONE TABLET BY MOUTH AT BEDTIME 90 tablet 0     naproxen (NAPROSYN) 500 MG tablet Take 1 tablet by mouth twice daily with food       omeprazole (PRILOSEC) 20 MG DR capsule TAKE ONE CAPSULE  "BY MOUTH ONCE DAILY BEFORE MEALS. DO NOT CRUSH. 90 capsule 3     potassium chloride ER (KLOR-CON M) 20 MEQ CR tablet TAKE 2 TABLETS (40MEQ) IN THE MORNING 180 tablet 0     prazosin (MINIPRESS) 1 MG capsule Take 1 mg by mouth At Bedtime       SYMBICORT 80-4.5 MCG/ACT Inhaler INHALE 2 PUFFS INTO THE LUNGS TWO TIMES A DAY       torsemide (DEMADEX) 20 MG tablet TAKE 3 TABLETS (60MG) BY MOUTH IN THE MORNING 270 tablet 0     vitamin C (ASCORBIC ACID) 500 MG tablet        zolpidem (AMBIEN) 10 MG tablet        EPINEPHrine (ANY BX GENERIC EQUIV) 0.3 MG/0.3ML injection 2-pack Inject 0.3 mLs (0.3 mg) into the muscle as needed for anaphylaxis (Patient not taking: Reported on 7/20/2021) 2 each 0       Allergies   Allergen Reactions     Seasonal Allergies Difficulty breathing and Cough     Bupropion Other (See Comments)     tremor     Tramadol Nausea and Swelling     Soddy Daisy Nite Time Dizziness and Nausea and Vomiting     Nyquil Cold &  [Night-Time Cold-Flu Relief] Dizziness and Nausea and Vomiting     Trichophyton Other (See Comments)        Social History     Tobacco Use     Smoking status: Current Every Day Smoker     Years: 50.00     Types: Cigars     Start date: 10/1/2021     Smokeless tobacco: Never Used   Substance Use Topics     Alcohol use: Never     Family History   Problem Relation Age of Onset     Lung Cancer Mother      Ovarian Cancer Sister      History   Drug Use Unknown         Objective     BP 96/62 (BP Location: Right arm, Patient Position: Chair, Cuff Size: Adult Large)   Pulse 82   Temp 97.3  F (36.3  C) (Tympanic)   Ht 1.778 m (5' 10\")   Wt 104.5 kg (230 lb 6.1 oz)   SpO2 90%   BMI 33.06 kg/m      Physical Exam    GENERAL APPEARANCE: healthy, alert and no distress     EYES: blurred vision in bilateral eyes      HENT: ear canals and TM's normal and nose and mouth without ulcers or lesions     NECK: no adenopathy, no asymmetry, masses, or scars and thyroid normal to palpation     RESP: lungs clear in all " fields, no wheezing currently heard      CV: regular rates and rhythm, normal S1 S2, no S3 or S4 and no murmur, click or rub     ABDOMEN:  soft, nontender, no HSM or masses and bowel sounds normal     MS: extremities normal- no gross deformities noted, no evidence of inflammation in joints, FROM in all extremities.     SKIN: no suspicious lesions or rashes     NEURO: tremor of bilateral hands      PSYCH: mentation appears normal and affect normal/bright    Recent Labs   Lab Test 11/30/21  0857 09/21/21  1319 07/01/21  1316 06/19/21  1732 05/20/21  1130 05/05/21  1027 05/05/21  0923 04/20/21  1017   HGB  --  16.9 18.4* 18.4*   < >  --    < > 18.7*   PLT  --  327 288 344   < >  --    < > 278   INR  --   --   --  1.05  --  1.01  --   --    NA  --  140 138 137   < >  --    < > 135   POTASSIUM  --  4.3 3.5 3.6   < >  --    < > 4.2   CR  --  1.02 0.96 1.06   < >  --    < > 1.10   A1C 5.8*  --   --   --   --   --   --  5.9*    < > = values in this interval not displayed.        Diagnostics:  Results for orders placed or performed in visit on 01/18/22   Basic metabolic panel     Status: Normal   Result Value Ref Range    Sodium 138 133 - 144 mmol/L    Potassium 3.9 3.4 - 5.3 mmol/L    Chloride 103 94 - 109 mmol/L    Carbon Dioxide (CO2) 32 20 - 32 mmol/L    Anion Gap 3 3 - 14 mmol/L    Urea Nitrogen 12 7 - 30 mg/dL    Creatinine 1.05 0.66 - 1.25 mg/dL    Calcium 9.0 8.5 - 10.1 mg/dL    Glucose 95 70 - 99 mg/dL    GFR Estimate 78 >60 mL/min/1.73m2   CBC with platelets and differential     Status: Abnormal   Result Value Ref Range    WBC Count 9.7 4.0 - 11.0 10e3/uL    RBC Count 5.62 4.40 - 5.90 10e6/uL    Hemoglobin 18.0 (H) 13.3 - 17.7 g/dL    Hematocrit 52.1 40.0 - 53.0 %    MCV 93 78 - 100 fL    MCH 32.0 26.5 - 33.0 pg    MCHC 34.5 31.5 - 36.5 g/dL    RDW 12.7 10.0 - 15.0 %    Platelet Count 315 150 - 450 10e3/uL    % Neutrophils 51 %    % Lymphocytes 37 %    % Monocytes 9 %    % Eosinophils 2 %    % Basophils 1 %    %  Immature Granulocytes 0 %    NRBCs per 100 WBC 0 <1 /100    Absolute Neutrophils 4.9 1.6 - 8.3 10e3/uL    Absolute Lymphocytes 3.6 0.8 - 5.3 10e3/uL    Absolute Monocytes 0.9 0.0 - 1.3 10e3/uL    Absolute Eosinophils 0.2 0.0 - 0.7 10e3/uL    Absolute Basophils 0.1 0.0 - 0.2 10e3/uL    Absolute Immature Granulocytes 0.0 <=0.4 10e3/uL    Absolute NRBCs 0.0 10e3/uL   CBC with platelets and differential     Status: Abnormal    Narrative    The following orders were created for panel order CBC with platelets and differential.  Procedure                               Abnormality         Status                     ---------                               -----------         ------                     CBC with platelets and d...[[id number]]  Abnormal            Final result                 Please view results for these tests on the individual orders.          Revised Cardiac Risk Index (RCRI):  The patient has the following serious cardiovascular risks for perioperative complications:   - No serious cardiac risks = 0 points     RCRI Interpretation: 0 points: Class I (very low risk - 0.4% complication rate)    VENANCIO Maldonado-S  [employer]     I was present with the nurse practitioner student who participated in the service and in the documentation of the note. I have verified the history and personally performed the physical exam and medical decision making. I agree with the assessment and plan of care as documented in the note.       Signed Electronically by: Amparo Alvares NP  Copy of this evaluation report is provided to requesting physician.

## 2022-01-16 NOTE — PATIENT INSTRUCTIONS
Will hold all medications the morning of surgery except his inhalers, metoprolol, and digoxin.   Preparing for Your Surgery  Getting started  A nurse will call you to review your health history and instructions. They will give you an arrival time based on your scheduled surgery time. Please be ready to share:    Your doctor's clinic name and phone number    Your medical, surgical and anesthesia history    A list of allergies and sensitivities    A list of medicines, including herbal treatments and over-the-counter drugs    Whether the patient has a legal guardian (ask how to send us the papers in advance)  Please tell us if you're pregnant--or if there's any chance you might be pregnant. Some surgeries may injure a fetus (unborn baby), so they require a pregnancy test. Surgeries that are safe for a fetus don't always need a test, and you can choose whether to have one.   If you have a child who's having surgery, please ask for a copy of Preparing for Your Child's Surgery.    Preparing for surgery    Within 30 days of surgery: Have a pre-op exam (sometimes called an H&P, or History and Physical). This can be done at a clinic or pre-operative center.  ? If you're having a , you may not need this exam. Talk to your care team.    At your pre-op exam, talk to your care team about all medicines you take. If you need to stop any medicines before surgery, ask when to start taking them again.  ? We do this for your safety. Many medicines can make you bleed too much during surgery. Some change how well surgery (anesthesia) drugs work.    Call your insurance company to let them know you're having surgery. (If you don't have insurance, call [phone].)    Call your clinic if there's any change in your health. This includes signs of a cold or flu (sore throat, runny nose, cough, rash, fever). It also includes a scrape or scratch near the surgery site.    If you have questions on the day of surgery, call your hospital  or surgery center.  COVID testing  You may need to be tested for COVID-19 before having surgery. If so, your surgical team will give you instructions for scheduling this test, separate from your preoperative history and physical.  Eating and drinking guidelines  For your safety: Unless your surgeon tells you otherwise, follow the guidelines below.    Eat and drink as usual until 8 hours before surgery. After that, no food or milk.    Drink clear liquids until 2 hours before surgery. These are liquids you can see through, like water, Gatorade and Propel Water. You may also have black coffee and tea (no cream or milk).    Nothing by mouth within 2 hours of surgery. This includes gum, candy and breath mints.    If you drink alcohol: Stop drinking it the night before surgery.    If your care team tells you to take medicine on the morning of surgery, it's okay to take it with a sip of water.  Preventing infection    Shower or bathe the night before and morning of your surgery. Follow the instructions your clinic gave you. (If no instructions, use regular soap.)    Don't shave or clip hair near your surgery site. We'll remove the hair if needed.    Don't smoke or vape the morning of surgery. You may chew nicotine gum up to 2 hours before surgery. A nicotine patch is okay.  ? Note: Some surgeries require you to completely quit smoking and nicotine. Check with your surgeon.    Your care team will make every effort to keep you safe from infection. We will:  ? Clean our hands often with soap and water (or an alcohol-based hand rub).  ? Clean the skin at your surgery site with a special soap that kills germs.  ? Give you a special gown to keep you warm. (Cold raises the risk of infection.)  ? Wear special hair covers, masks, gowns and gloves during surgery.  ? Give antibiotic medicine, if prescribed. Not all surgeries need antibiotics.  What to bring on the day of surgery    Photo ID and insurance card    Copy of your health  care directive, if you have one    Glasses and hearing aides (bring cases)  ? You can't wear contacts during surgery    Inhaler and eye drops, if you use them (tell us about these when you arrive)    CPAP machine or breathing device, if you use them    A few personal items, if spending the night    If you have . . .  ? A pacemaker, ICD (cardiac defibrillator) or other implant: Bring the ID card.  ? An implanted stimulator: Bring the remote control.  ? A legal guardian: Bring a copy of the certified (court-stamped) guardianship papers.  Please remove any jewelry, including body piercings. Leave jewelry and other valuables at home.  If you're going home the day of surgery    You must have a responsible adult drive you home. They should stay with you overnight as well.    If you don't have someone to stay with you, and you aren't safe to go home alone, we may keep you overnight. Insurance often won't pay for this.  After surgery  If it's hard to control your pain or you need more pain medicine, please call your surgeon's office.  Questions?   If you have any questions for your care team, list them here: _________________________________________________________________________________________________________________________________________________________________________ ____________________________________ ____________________________________ ____________________________________  For informational purposes only. Not to replace the advice of your health care provider. Copyright   2003, 2019 [facility]. All rights reserved. Clinically reviewed by Lexie Jay MD. Tuizzi [id number] - REV 07/21.

## 2022-01-16 NOTE — H&P (VIEW-ONLY)
[facility] [place]  [address]  [facility] [place] [address]  Phone: [phone]  Primary Provider: Mason Calvin  Pre-op Performing Provider: MASON CALVIN      PREOPERATIVE EVALUATION:  Today's date: 1/18/2022    Joey Irene is a 66 year old male who presents for a preoperative evaluation.    Surgical Information:  Surgery/Procedure: Bilateral Cataract Removal  Surgery Location: [facility]  Surgeon: Arthur  Surgery Date: 1/25/22 and 2/8/22  Where patient plans to recover: At home with family  Fax number for surgical facility: Note does not need to be faxed, will be available electronically in Epic.    Type of Anesthesia Anticipated: to be determined    Assessment & Plan     The proposed surgical procedure is considered LOW risk.    Preop general physical exam  Cataract of both eyes, unspecified cataract type  He is scheduled to have cataracts surgery on 1/25/22 and 2/8/22. We obtained a CBC and BMP which were unremarkable. An EKG was not required due to low risk nature of cataracts surgery.    PTSD (post-traumatic stress disorder)  Recurrent major depressive disorder, in partial remission (H)  Generalized anxiety disorder  Taking Cymbalta with relatively good control. He is following with psychology and psychiatry.     Severe pulmonary arterial systolic hypertension (H)  He has a follow up scheduled with Nataliya Coppola NP with cardiology in March. At that time she will review if the patient can be cleared to go under general anesthesia, in order to have a prostate biopsy completed.     Sleep apnea, unspecified type  Has known EMLIEE but refuses CPAP.     Tobacco use disorder  He is willing to try to quit. He has patches at home and will re try this. He was encouraged of all the of the benefits that would following him quitting smoking.     Hyperglycemia  He has been dieting and trying to walk more to reduce his glucose levels. This was shown in his BMP today with normal glucose levels.      Pulmonary emphysema, unspecified emphysema type (H)  Has chronic COPD for which he has rescue and long acting inhalers. He is currently just using his albuterol as needed.     Diastolic heart failure, unspecified HF chronicity (H)  Will see cardiology in March.         Risks and Recommendations:  The patient has the following additional risks and recommendations for perioperative complications:  Pulmonary:    - Incentive spirometry post-op   - Active nicotine user, advised smoking cessation      Will hold all medications the morning of surgery except his inhalers, metoprolol, and digoxin.     Will stop his asa 7 days prior.    RECOMMENDATION:  APPROVAL GIVEN to proceed with proposed procedure, without further diagnostic evaluation.    Joey Irene with a functional capacity of greater than four MET's. There are no obvious contraindications to proceeding with surgery at this time. Recommend that the surgeon review risks and benefits of the procedure prior to proceeding.     Was recommended to avoid eating and drinking anything 12 hours prior to surgery unless his surgeon tells him otherwise.   [id number]}    Subjective     HPI related to upcoming procedure: Patient with blurry vision. Bilateral cataracts.       Preop Questions 1/18/2022   1. Have you ever had a heart attack or stroke? No   2. Have you ever had surgery on your heart or blood vessels, such as a stent placement, a coronary artery bypass, or surgery on an artery in your head, neck, heart, or legs? No   3. Do you have chest pain with activity? No   4. Do you have a history of  heart failure? Yes - CHF diastolic-stable   5. Do you currently have a cold, bronchitis or symptoms of other infection? No   6. Do you have a cough, shortness of breath, or wheezing? No   7. Do you or anyone in your family have previous history of blood clots? No   8. Do you or does anyone in your family have a serious bleeding problem such as prolonged bleeding following surgeries  or cuts? No   9. Have you ever had problems with anemia or been told to take iron pills? No   10. Have you had any abnormal blood loss such as black, tarry or bloody stools? No   11. Have you ever had a blood transfusion? No   12. Are you willing to have a blood transfusion if it is medically needed before, during, or after your surgery? Yes   13. Have you or any of your relatives ever had problems with anesthesia? No   14. Do you have sleep apnea, excessive snoring or daytime drowsiness? No   14a. Do you have a CPAP machine? -   15. Do you have any artifical heart valves or other implanted medical devices like a pacemaker, defibrillator, or continuous glucose monitor? YES - see below   15a. What type of device do you have? bone stimulator . Has not been active for 5 years   15b. Name of the clinic that manages your device:  [facility]   16. Do you have artificial joints? YES -left knee    17. Are you allergic to latex? No        Health Care Directive:  Patient does not have a Health Care Directive or Living Will: Discussed advance care planning with patient; however, patient declined at this time.    Preoperative Review of :   reviewed - controlled substances reflected in medication list.      PTSD (post-traumatic stress disorder)  Recurrent major depressive disorder, in partial remission (H)  Generalized anxiety disorder  Mental health stable. Continue current medications. Taking Cymbalta 90 mg. Sees psychology this week and psychiatrist in next week. Takes Vit D and C now.      Severe pulmonary arterial systolic hypertension (H)  Stable. Follows with cardiology.        Sleep apnea, unspecified type  Does not have CPAP. Declines.      Tobacco use disorder  Smoking 1-2 cigars per day. He is interested in quitting. Will try the patch which he has at home.      Impaired fasting glucose  Recent A1C 5.8. Reviewed pathogenesis of pre-diabetes. Stressed importance of cutting out sugars/carbs and engaging in  routine physical exercise for 30 minutes/5 days of work with the goal of gradual weight loss.     Hyperlipidemia   His lipids were last checked on 11/30/21 with elevated triglyercides and HDL. Taking atorvastatin 20 mg without side effects.      COPD  Diastolic heart failure (H)  Still has a chronic cough that is nonproductive. Uses his inhalers as directed.     Review of Systems  CONSTITUTIONAL: NEGATIVE for fever, chills, change in weight  INTEGUMENTARY/SKIN: NEGATIVE for worrisome rashes, moles or lesions  EYES: blurred vision of both eyes   ENT/MOUTH: NEGATIVE for ear, mouth and throat problems  RESP: SOB, nonproductive cough, MURRAY, hx of COPD   CV: intermittent chest pressure and Hx pulmonary HTN  GI: NEGATIVE for nausea, abdominal pain, heartburn, or change in bowel habits  : NEGATIVE for frequency, dysuria, or hematuria  MUSCULOSKELETAL: NEGATIVE for significant arthralgias or myalgia  NEURO: tremor of bilateral hands   ENDOCRINE: NEGATIVE for temperature intolerance, skin/hair changes  HEME: NEGATIVE for bleeding problems  PSYCHIATRIC: HX anxiety and HX depression      Patient Active Problem List    Diagnosis Date Noted     Encounter for smoking cessation counseling 04/19/2021     Priority: Medium     Diastolic heart failure, unspecified HF chronicity (H) 04/19/2021     Priority: Medium     Status post coronary angiogram 12/11/2020     Priority: Medium     Severe pulmonary arterial systolic hypertension (H) 11/20/2020     Priority: Medium     Right ventricular dilation 10/29/2020     Priority: Medium     Added automatically from request for surgery [id number]       Bee sting allergy 08/07/2020     Priority: Medium     Hyperglycemia 08/07/2020     Priority: Medium     Elevated prostate specific antigen (PSA) 08/07/2020     Priority: Medium     History of fusion of cervical spine 08/06/2020     Priority: Medium     Generalized anxiety disorder 08/06/2020     Priority: Medium     Tobacco use disorder 08/06/2020      Priority: Medium     Pulmonary emphysema, unspecified emphysema type (H) 08/05/2020     Priority: Medium     Cardiomegaly 08/05/2020     Priority: Medium     Enlarged prostate 08/05/2020     Priority: Medium     Diverticulosis 03/25/2020     Priority: Medium     Chronic bronchitis with emphysema (H) 02/03/2020     Priority: Medium     Class 1 obesity with body mass index (BMI) of 33.0 to 33.9 in adult 02/03/2020     Priority: Medium     Smoking greater than 40 pack years 02/03/2020     Priority: Medium     Pain due to total left knee replacement, sequela 01/27/2020     Priority: Medium     Cervical stenosis of spinal canal 01/13/2020     Priority: Medium     Cholesteatoma of left ear 03/04/2019     Priority: Medium     Nasal septal deviation 12/18/2017     Priority: Medium     Primary osteoarthritis of right knee 02/25/2017     Priority: Medium     Status post total left knee replacement 02/25/2017     Priority: Medium     Recurrent major depressive disorder, in partial remission (H) 11/21/2015     Priority: Medium     PTSD (post-traumatic stress disorder) 08/19/2015     Priority: Medium     Seasonal allergies 08/19/2015     Priority: Medium     Anxiety state 07/07/2011     Priority: Medium     Back pain, chronic 07/07/2011     Priority: Medium     Hearing loss 07/07/2011     Priority: Medium     Insomnia, unspecified 07/07/2011     Priority: Medium     Sleep apnea 07/07/2011     Priority: Medium      Past Medical History:   Diagnosis Date     COPD (chronic obstructive pulmonary disease) (H)      Hypertension      Uncomplicated asthma      Past Surgical History:   Procedure Laterality Date     CV CORONARY ANGIOGRAM N/A 12/11/2020    Procedure: Coronary Angiogram;  Surgeon: Aman Delgado MD;  Location:  [facility]     CV RIGHT HEART CATH MEASUREMENTS RECORDED N/A 12/11/2020    Procedure: CV RIGHT HEART CATH;  Surgeon: Aman Delgado MD;  [facility]     ENT  SURGERY      patient has had three left ear surgeries, unsure what they did     FUSION CERVICAL POSTERIOR THREE+ LEVELS       HERNIA REPAIR, INGUINAL RT/LT Right     done times 3     JOINT REPLACEMENT Left      REPAIR HAMMER TOE Right      Current Outpatient Medications   Medication Sig Dispense Refill     acetaminophen (TYLENOL) 500 MG tablet Take 1 tablet by mouth       albuterol (PROAIR HFA/PROVENTIL HFA/VENTOLIN HFA) 108 (90 Base) MCG/ACT inhaler Inhale 1-2 puffs into the lungs every 4 hours as needed for shortness of breath / dyspnea or wheezing 18 g 1     ARIPiprazole (ABILIFY) 10 MG tablet        ASPIRIN LOW DOSE 81 MG EC tablet TAKE 1 TABLET BY MOUTH DAILY 30 tablet 11     atorvastatin (LIPITOR) 20 MG tablet TAKE 1 TABLET BY MOUTH DAILY 90 tablet 3     cetirizine (ZYRTEC) 10 MG tablet TAKE 1 TABLET BY MOUTH DAILY 90 tablet 3     Cholecalciferol (D 1000) 25 MCG (1000 UT) CAPS        cyclobenzaprine (FLEXERIL) 10 MG tablet TAKE 1/2-1 TABLET BY MOUTH 3 TIMES A DAY AS NEEDED FOR MUSCLE SPASMS 15 tablet 0     digoxin (LANOXIN) 125 MCG tablet TAKE 1 TABLET BY MOUTH DAILY 90 tablet 0     DULoxetine (CYMBALTA) 30 MG capsule        DULoxetine (CYMBALTA) 60 MG capsule Take 1 capsule (60 mg) by mouth 2 times daily 60 capsule 1     fluticasone (FLONASE) 50 MCG/ACT nasal spray Spray 2 sprays in nostril       Fluticasone-Umeclidin-Vilanterol (TRELEGY ELLIPTA) 100-62.5-25 MCG/INH oral inhaler Inhale 1 puff into the lungs daily 28 each 1     gabapentin (NEURONTIN) 300 MG capsule        guaiFENesin (MUCINEX) 600 MG 12 hr tablet Take 1,200 mg by mouth       hydrOXYzine (VISTARIL) 50 MG capsule        metoprolol succinate ER (TOPROL-XL) 25 MG 24 hr tablet TAKE 1 TABLET BY MOUTH DAILY 90 tablet 3     montelukast (SINGULAIR) 10 MG tablet TAKE ONE TABLET BY MOUTH AT BEDTIME 90 tablet 0     naproxen (NAPROSYN) 500 MG tablet Take 1 tablet by mouth twice daily with food       omeprazole (PRILOSEC) 20 MG DR capsule TAKE ONE CAPSULE  "BY MOUTH ONCE DAILY BEFORE MEALS. DO NOT CRUSH. 90 capsule 3     potassium chloride ER (KLOR-CON M) 20 MEQ CR tablet TAKE 2 TABLETS (40MEQ) IN THE MORNING 180 tablet 0     prazosin (MINIPRESS) 1 MG capsule Take 1 mg by mouth At Bedtime       SYMBICORT 80-4.5 MCG/ACT Inhaler INHALE 2 PUFFS INTO THE LUNGS TWO TIMES A DAY       torsemide (DEMADEX) 20 MG tablet TAKE 3 TABLETS (60MG) BY MOUTH IN THE MORNING 270 tablet 0     vitamin C (ASCORBIC ACID) 500 MG tablet        zolpidem (AMBIEN) 10 MG tablet        EPINEPHrine (ANY BX GENERIC EQUIV) 0.3 MG/0.3ML injection 2-pack Inject 0.3 mLs (0.3 mg) into the muscle as needed for anaphylaxis (Patient not taking: Reported on 7/20/2021) 2 each 0       Allergies   Allergen Reactions     Seasonal Allergies Difficulty breathing and Cough     Bupropion Other (See Comments)     tremor     Tramadol Nausea and Swelling     [place] Nite Time Dizziness and Nausea and Vomiting     Nyquil Cold &  [Night-Time Cold-Flu Relief] Dizziness and Nausea and Vomiting     Trichophyton Other (See Comments)        Social History     Tobacco Use     Smoking status: Current Every Day Smoker     Years: 50.00     Types: Cigars     Start date: 10/1/2021     Smokeless tobacco: Never Used   Substance Use Topics     Alcohol use: Never     Family History   Problem Relation Age of Onset     Lung Cancer Mother      Ovarian Cancer Sister      History   Drug Use Unknown         Objective     BP 96/62 (BP Location: Right arm, Patient Position: Chair, Cuff Size: Adult Large)   Pulse 82   Temp 97.3  F (36.3  C) (Tympanic)   Ht 1.778 m (5' 10\")   Wt 104.5 kg (230 lb 6.1 oz)   SpO2 90%   BMI 33.06 kg/m      Physical Exam    GENERAL APPEARANCE: healthy, alert and no distress     EYES: blurred vision in bilateral eyes      HENT: ear canals and TM's normal and nose and mouth without ulcers or lesions     NECK: no adenopathy, no asymmetry, masses, or scars and thyroid normal to palpation     RESP: lungs clear in all " fields, no wheezing currently heard      CV: regular rates and rhythm, normal S1 S2, no S3 or S4 and no murmur, click or rub     ABDOMEN:  soft, nontender, no HSM or masses and bowel sounds normal     MS: extremities normal- no gross deformities noted, no evidence of inflammation in joints, FROM in all extremities.     SKIN: no suspicious lesions or rashes     NEURO: tremor of bilateral hands      PSYCH: mentation appears normal and affect normal/bright    Recent Labs   Lab Test 11/30/21  0857 09/21/21  1319 07/01/21  1316 06/19/21  1732 05/20/21  1130 05/05/21  1027 05/05/21  0923 04/20/21  1017   HGB  --  16.9 18.4* 18.4*   < >  --    < > 18.7*   PLT  --  327 288 344   < >  --    < > 278   INR  --   --   --  1.05  --  1.01  --   --    NA  --  140 138 137   < >  --    < > 135   POTASSIUM  --  4.3 3.5 3.6   < >  --    < > 4.2   CR  --  1.02 0.96 1.06   < >  --    < > 1.10   A1C 5.8*  --   --   --   --   --   --  5.9*    < > = values in this interval not displayed.        Diagnostics:  Results for orders placed or performed in visit on 01/18/22   Basic metabolic panel     Status: Normal   Result Value Ref Range    Sodium 138 133 - 144 mmol/L    Potassium 3.9 3.4 - 5.3 mmol/L    Chloride 103 94 - 109 mmol/L    Carbon Dioxide (CO2) 32 20 - 32 mmol/L    Anion Gap 3 3 - 14 mmol/L    Urea Nitrogen 12 7 - 30 mg/dL    Creatinine 1.05 0.66 - 1.25 mg/dL    Calcium 9.0 8.5 - 10.1 mg/dL    Glucose 95 70 - 99 mg/dL    GFR Estimate 78 >60 mL/min/1.73m2   CBC with platelets and differential     Status: Abnormal   Result Value Ref Range    WBC Count 9.7 4.0 - 11.0 10e3/uL    RBC Count 5.62 4.40 - 5.90 10e6/uL    Hemoglobin 18.0 (H) 13.3 - 17.7 g/dL    Hematocrit 52.1 40.0 - 53.0 %    MCV 93 78 - 100 fL    MCH 32.0 26.5 - 33.0 pg    MCHC 34.5 31.5 - 36.5 g/dL    RDW 12.7 10.0 - 15.0 %    Platelet Count 315 150 - 450 10e3/uL    % Neutrophils 51 %    % Lymphocytes 37 %    % Monocytes 9 %    % Eosinophils 2 %    % Basophils 1 %    %  Immature Granulocytes 0 %    NRBCs per 100 WBC 0 <1 /100    Absolute Neutrophils 4.9 1.6 - 8.3 10e3/uL    Absolute Lymphocytes 3.6 0.8 - 5.3 10e3/uL    Absolute Monocytes 0.9 0.0 - 1.3 10e3/uL    Absolute Eosinophils 0.2 0.0 - 0.7 10e3/uL    Absolute Basophils 0.1 0.0 - 0.2 10e3/uL    Absolute Immature Granulocytes 0.0 <=0.4 10e3/uL    Absolute NRBCs 0.0 10e3/uL   CBC with platelets and differential     Status: Abnormal    Narrative    The following orders were created for panel order CBC with platelets and differential.  Procedure                               Abnormality         Status                     ---------                               -----------         ------                     CBC with platelets and d...[[id number]]  Abnormal            Final result                 Please view results for these tests on the individual orders.          Revised Cardiac Risk Index (RCRI):  The patient has the following serious cardiovascular risks for perioperative complications:   - No serious cardiac risks = 0 points     RCRI Interpretation: 0 points: Class I (very low risk - 0.4% complication rate)    VENANCIO Maldonado-S  [employer]     I was present with the nurse practitioner student who participated in the service and in the documentation of the note. I have verified the history and personally performed the physical exam and medical decision making. I agree with the assessment and plan of care as documented in the note.       Signed Electronically by: Amparo Alvares NP  Copy of this evaluation report is provided to requesting physician.

## 2022-01-16 NOTE — H&P (VIEW-ONLY)
[facility] [place]  [address]  [facility] [address]  Phone: [phone]  Primary Provider: Mason Calvin  Pre-op Performing Provider: MASON CALVIN      PREOPERATIVE EVALUATION:  Today's date: 1/18/2022    Joey Irene is a 66 year old male who presents for a preoperative evaluation.    Surgical Information:  Surgery/Procedure: Bilateral Cataract Removal  Surgery Location: [facility]  Surgeon: Arthur  Surgery Date: 1/25/22 and 2/8/22  Where patient plans to recover: At home with family  Fax number for surgical facility: Note does not need to be faxed, will be available electronically in Epic.    Type of Anesthesia Anticipated: to be determined    Assessment & Plan     The proposed surgical procedure is considered LOW risk.    Preop general physical exam  Cataract of both eyes, unspecified cataract type  He is scheduled to have cataracts surgery on 1/25/22 and 2/8/22. We obtained a CBC and BMP which were unremarkable. An EKG was not required due to low risk nature of cataracts surgery.    PTSD (post-traumatic stress disorder)  Recurrent major depressive disorder, in partial remission (H)  Generalized anxiety disorder  Taking Cymbalta with relatively good control. He is following with psychology and psychiatry.     Severe pulmonary arterial systolic hypertension (H)  He has a follow up scheduled with Nataliya Coppola NP with cardiology in March. At that time she will review if the patient can be cleared to go under general anesthesia, in order to have a prostate biopsy completed.     Sleep apnea, unspecified type  Has known EMILEE but refuses CPAP.     Tobacco use disorder  He is willing to try to quit. He has patches at home and will re try this. He was encouraged of all the of the benefits that would following him quitting smoking.     Hyperglycemia  He has been dieting and trying to walk more to reduce his glucose levels. This was shown in his BMP today with normal glucose levels.      Pulmonary emphysema, unspecified emphysema type (H)  Has chronic COPD for which he has rescue and long acting inhalers. He is currently just using his albuterol as needed.     Diastolic heart failure, unspecified HF chronicity (H)  Will see cardiology in March.         Risks and Recommendations:  The patient has the following additional risks and recommendations for perioperative complications:  Pulmonary:    - Incentive spirometry post-op   - Active nicotine user, advised smoking cessation      Will hold all medications the morning of surgery except his inhalers, metoprolol, and digoxin.     Will stop his asa 7 days prior.    RECOMMENDATION:  APPROVAL GIVEN to proceed with proposed procedure, without further diagnostic evaluation.    Joey Irene with a functional capacity of greater than four MET's. There are no obvious contraindications to proceeding with surgery at this time. Recommend that the surgeon review risks and benefits of the procedure prior to proceeding.     Was recommended to avoid eating and drinking anything 12 hours prior to surgery unless his surgeon tells him otherwise.   [id number]}    Subjective     HPI related to upcoming procedure: Patient with blurry vision. Bilateral cataracts.       Preop Questions 1/18/2022   1. Have you ever had a heart attack or stroke? No   2. Have you ever had surgery on your heart or blood vessels, such as a stent placement, a coronary artery bypass, or surgery on an artery in your head, neck, heart, or legs? No   3. Do you have chest pain with activity? No   4. Do you have a history of  heart failure? Yes - CHF diastolic-stable   5. Do you currently have a cold, bronchitis or symptoms of other infection? No   6. Do you have a cough, shortness of breath, or wheezing? No   7. Do you or anyone in your family have previous history of blood clots? No   8. Do you or does anyone in your family have a serious bleeding problem such as prolonged bleeding following surgeries  or cuts? No   9. Have you ever had problems with anemia or been told to take iron pills? No   10. Have you had any abnormal blood loss such as black, tarry or bloody stools? No   11. Have you ever had a blood transfusion? No   12. Are you willing to have a blood transfusion if it is medically needed before, during, or after your surgery? Yes   13. Have you or any of your relatives ever had problems with anesthesia? No   14. Do you have sleep apnea, excessive snoring or daytime drowsiness? No   14a. Do you have a CPAP machine? -   15. Do you have any artifical heart valves or other implanted medical devices like a pacemaker, defibrillator, or continuous glucose monitor? YES - see below   15a. What type of device do you have? bone stimulator . Has not been active for 5 years   15b. Name of the clinic that manages your device:  [facility]   16. Do you have artificial joints? YES -left knee    17. Are you allergic to latex? No        Health Care Directive:  Patient does not have a Health Care Directive or Living Will: Discussed advance care planning with patient; however, patient declined at this time.    Preoperative Review of :   reviewed - controlled substances reflected in medication list.      PTSD (post-traumatic stress disorder)  Recurrent major depressive disorder, in partial remission (H)  Generalized anxiety disorder  Mental health stable. Continue current medications. Taking Cymbalta 90 mg. Sees psychology this week and psychiatrist in next week. Takes Vit D and C now.      Severe pulmonary arterial systolic hypertension (H)  Stable. Follows with cardiology.        Sleep apnea, unspecified type  Does not have CPAP. Declines.      Tobacco use disorder  Smoking 1-2 cigars per day. He is interested in quitting. Will try the patch which he has at home.      Impaired fasting glucose  Recent A1C 5.8. Reviewed pathogenesis of pre-diabetes. Stressed importance of cutting out sugars/carbs and engaging in  routine physical exercise for 30 minutes/5 days of work with the goal of gradual weight loss.     Hyperlipidemia   His lipids were last checked on 11/30/21 with elevated triglyercides and HDL. Taking atorvastatin 20 mg without side effects.      COPD  Diastolic heart failure (H)  Still has a chronic cough that is nonproductive. Uses his inhalers as directed.     Review of Systems  CONSTITUTIONAL: NEGATIVE for fever, chills, change in weight  INTEGUMENTARY/SKIN: NEGATIVE for worrisome rashes, moles or lesions  EYES: blurred vision of both eyes   ENT/MOUTH: NEGATIVE for ear, mouth and throat problems  RESP: SOB, nonproductive cough, MURRAY, hx of COPD   CV: intermittent chest pressure and Hx pulmonary HTN  GI: NEGATIVE for nausea, abdominal pain, heartburn, or change in bowel habits  : NEGATIVE for frequency, dysuria, or hematuria  MUSCULOSKELETAL: NEGATIVE for significant arthralgias or myalgia  NEURO: tremor of bilateral hands   ENDOCRINE: NEGATIVE for temperature intolerance, skin/hair changes  HEME: NEGATIVE for bleeding problems  PSYCHIATRIC: HX anxiety and HX depression      Patient Active Problem List    Diagnosis Date Noted     Encounter for smoking cessation counseling 04/19/2021     Priority: Medium     Diastolic heart failure, unspecified HF chronicity (H) 04/19/2021     Priority: Medium     Status post coronary angiogram 12/11/2020     Priority: Medium     Severe pulmonary arterial systolic hypertension (H) 11/20/2020     Priority: Medium     Right ventricular dilation 10/29/2020     Priority: Medium     Added automatically from request for surgery [id number]       Bee sting allergy 08/07/2020     Priority: Medium     Hyperglycemia 08/07/2020     Priority: Medium     Elevated prostate specific antigen (PSA) 08/07/2020     Priority: Medium     History of fusion of cervical spine 08/06/2020     Priority: Medium     Generalized anxiety disorder 08/06/2020     Priority: Medium     Tobacco use disorder 08/06/2020      Priority: Medium     Pulmonary emphysema, unspecified emphysema type (H) 08/05/2020     Priority: Medium     Cardiomegaly 08/05/2020     Priority: Medium     Enlarged prostate 08/05/2020     Priority: Medium     Diverticulosis 03/25/2020     Priority: Medium     Chronic bronchitis with emphysema (H) 02/03/2020     Priority: Medium     Class 1 obesity with body mass index (BMI) of 33.0 to 33.9 in adult 02/03/2020     Priority: Medium     Smoking greater than 40 pack years 02/03/2020     Priority: Medium     Pain due to total left knee replacement, sequela 01/27/2020     Priority: Medium     Cervical stenosis of spinal canal 01/13/2020     Priority: Medium     Cholesteatoma of left ear 03/04/2019     Priority: Medium     Nasal septal deviation 12/18/2017     Priority: Medium     Primary osteoarthritis of right knee 02/25/2017     Priority: Medium     Status post total left knee replacement 02/25/2017     Priority: Medium     Recurrent major depressive disorder, in partial remission (H) 11/21/2015     Priority: Medium     PTSD (post-traumatic stress disorder) 08/19/2015     Priority: Medium     Seasonal allergies 08/19/2015     Priority: Medium     Anxiety state 07/07/2011     Priority: Medium     Back pain, chronic 07/07/2011     Priority: Medium     Hearing loss 07/07/2011     Priority: Medium     Insomnia, unspecified 07/07/2011     Priority: Medium     Sleep apnea 07/07/2011     Priority: Medium      Past Medical History:   Diagnosis Date     COPD (chronic obstructive pulmonary disease) (H)      Hypertension      Uncomplicated asthma      Past Surgical History:   Procedure Laterality Date     CV CORONARY ANGIOGRAM N/A 12/11/2020    Procedure: Coronary Angiogram;  Surgeon: Aman Delgado MD;  Location:  [facility]     CV RIGHT HEART CATH MEASUREMENTS RECORDED N/A 12/11/2020    Procedure: CV RIGHT HEART CATH;  Surgeon: Aman Delgado MD;  [facility]     ENT  SURGERY      patient has had three left ear surgeries, unsure what they did     FUSION CERVICAL POSTERIOR THREE+ LEVELS       HERNIA REPAIR, INGUINAL RT/LT Right     done times 3     JOINT REPLACEMENT Left      REPAIR HAMMER TOE Right      Current Outpatient Medications   Medication Sig Dispense Refill     acetaminophen (TYLENOL) 500 MG tablet Take 1 tablet by mouth       albuterol (PROAIR HFA/PROVENTIL HFA/VENTOLIN HFA) 108 (90 Base) MCG/ACT inhaler Inhale 1-2 puffs into the lungs every 4 hours as needed for shortness of breath / dyspnea or wheezing 18 g 1     ARIPiprazole (ABILIFY) 10 MG tablet        ASPIRIN LOW DOSE 81 MG EC tablet TAKE 1 TABLET BY MOUTH DAILY 30 tablet 11     atorvastatin (LIPITOR) 20 MG tablet TAKE 1 TABLET BY MOUTH DAILY 90 tablet 3     cetirizine (ZYRTEC) 10 MG tablet TAKE 1 TABLET BY MOUTH DAILY 90 tablet 3     Cholecalciferol (D 1000) 25 MCG (1000 UT) CAPS        cyclobenzaprine (FLEXERIL) 10 MG tablet TAKE 1/2-1 TABLET BY MOUTH 3 TIMES A DAY AS NEEDED FOR MUSCLE SPASMS 15 tablet 0     digoxin (LANOXIN) 125 MCG tablet TAKE 1 TABLET BY MOUTH DAILY 90 tablet 0     DULoxetine (CYMBALTA) 30 MG capsule        DULoxetine (CYMBALTA) 60 MG capsule Take 1 capsule (60 mg) by mouth 2 times daily 60 capsule 1     fluticasone (FLONASE) 50 MCG/ACT nasal spray Spray 2 sprays in nostril       Fluticasone-Umeclidin-Vilanterol (TRELEGY ELLIPTA) 100-62.5-25 MCG/INH oral inhaler Inhale 1 puff into the lungs daily 28 each 1     gabapentin (NEURONTIN) 300 MG capsule        guaiFENesin (MUCINEX) 600 MG 12 hr tablet Take 1,200 mg by mouth       hydrOXYzine (VISTARIL) 50 MG capsule        metoprolol succinate ER (TOPROL-XL) 25 MG 24 hr tablet TAKE 1 TABLET BY MOUTH DAILY 90 tablet 3     montelukast (SINGULAIR) 10 MG tablet TAKE ONE TABLET BY MOUTH AT BEDTIME 90 tablet 0     naproxen (NAPROSYN) 500 MG tablet Take 1 tablet by mouth twice daily with food       omeprazole (PRILOSEC) 20 MG DR capsule TAKE ONE CAPSULE  "BY MOUTH ONCE DAILY BEFORE MEALS. DO NOT CRUSH. 90 capsule 3     potassium chloride ER (KLOR-CON M) 20 MEQ CR tablet TAKE 2 TABLETS (40MEQ) IN THE MORNING 180 tablet 0     prazosin (MINIPRESS) 1 MG capsule Take 1 mg by mouth At Bedtime       SYMBICORT 80-4.5 MCG/ACT Inhaler INHALE 2 PUFFS INTO THE LUNGS TWO TIMES A DAY       torsemide (DEMADEX) 20 MG tablet TAKE 3 TABLETS (60MG) BY MOUTH IN THE MORNING 270 tablet 0     vitamin C (ASCORBIC ACID) 500 MG tablet        zolpidem (AMBIEN) 10 MG tablet        EPINEPHrine (ANY BX GENERIC EQUIV) 0.3 MG/0.3ML injection 2-pack Inject 0.3 mLs (0.3 mg) into the muscle as needed for anaphylaxis (Patient not taking: Reported on 7/20/2021) 2 each 0       Allergies   Allergen Reactions     Seasonal Allergies Difficulty breathing and Cough     Bupropion Other (See Comments)     tremor     Tramadol Nausea and Swelling     [place] Nite Time Dizziness and Nausea and Vomiting     Nyquil Cold &  [Night-Time Cold-Flu Relief] Dizziness and Nausea and Vomiting     Trichophyton Other (See Comments)        Social History     Tobacco Use     Smoking status: Current Every Day Smoker     Years: 50.00     Types: Cigars     Start date: 10/1/2021     Smokeless tobacco: Never Used   Substance Use Topics     Alcohol use: Never     Family History   Problem Relation Age of Onset     Lung Cancer Mother      Ovarian Cancer Sister      History   Drug Use Unknown         Objective     BP 96/62 (BP Location: Right arm, Patient Position: Chair, Cuff Size: Adult Large)   Pulse 82   Temp 97.3  F (36.3  C) (Tympanic)   Ht 1.778 m (5' 10\")   Wt 104.5 kg (230 lb 6.1 oz)   SpO2 90%   BMI 33.06 kg/m      Physical Exam    GENERAL APPEARANCE: healthy, alert and no distress     EYES: blurred vision in bilateral eyes      HENT: ear canals and TM's normal and nose and mouth without ulcers or lesions     NECK: no adenopathy, no asymmetry, masses, or scars and thyroid normal to palpation     RESP: lungs clear in all " fields, no wheezing currently heard      CV: regular rates and rhythm, normal S1 S2, no S3 or S4 and no murmur, click or rub     ABDOMEN:  soft, nontender, no HSM or masses and bowel sounds normal     MS: extremities normal- no gross deformities noted, no evidence of inflammation in joints, FROM in all extremities.     SKIN: no suspicious lesions or rashes     NEURO: tremor of bilateral hands      PSYCH: mentation appears normal and affect normal/bright    Recent Labs   Lab Test 11/30/21  0857 09/21/21  1319 07/01/21  1316 06/19/21  1732 05/20/21  1130 05/05/21  1027 05/05/21  0923 04/20/21  1017   HGB  --  16.9 18.4* 18.4*   < >  --    < > 18.7*   PLT  --  327 288 344   < >  --    < > 278   INR  --   --   --  1.05  --  1.01  --   --    NA  --  140 138 137   < >  --    < > 135   POTASSIUM  --  4.3 3.5 3.6   < >  --    < > 4.2   CR  --  1.02 0.96 1.06   < >  --    < > 1.10   A1C 5.8*  --   --   --   --   --   --  5.9*    < > = values in this interval not displayed.        Diagnostics:  Results for orders placed or performed in visit on 01/18/22   Basic metabolic panel     Status: Normal   Result Value Ref Range    Sodium 138 133 - 144 mmol/L    Potassium 3.9 3.4 - 5.3 mmol/L    Chloride 103 94 - 109 mmol/L    Carbon Dioxide (CO2) 32 20 - 32 mmol/L    Anion Gap 3 3 - 14 mmol/L    Urea Nitrogen 12 7 - 30 mg/dL    Creatinine 1.05 0.66 - 1.25 mg/dL    Calcium 9.0 8.5 - 10.1 mg/dL    Glucose 95 70 - 99 mg/dL    GFR Estimate 78 >60 mL/min/1.73m2   CBC with platelets and differential     Status: Abnormal   Result Value Ref Range    WBC Count 9.7 4.0 - 11.0 10e3/uL    RBC Count 5.62 4.40 - 5.90 10e6/uL    Hemoglobin 18.0 (H) 13.3 - 17.7 g/dL    Hematocrit 52.1 40.0 - 53.0 %    MCV 93 78 - 100 fL    MCH 32.0 26.5 - 33.0 pg    MCHC 34.5 31.5 - 36.5 g/dL    RDW 12.7 10.0 - 15.0 %    Platelet Count 315 150 - 450 10e3/uL    % Neutrophils 51 %    % Lymphocytes 37 %    % Monocytes 9 %    % Eosinophils 2 %    % Basophils 1 %    %  Immature Granulocytes 0 %    NRBCs per 100 WBC 0 <1 /100    Absolute Neutrophils 4.9 1.6 - 8.3 10e3/uL    Absolute Lymphocytes 3.6 0.8 - 5.3 10e3/uL    Absolute Monocytes 0.9 0.0 - 1.3 10e3/uL    Absolute Eosinophils 0.2 0.0 - 0.7 10e3/uL    Absolute Basophils 0.1 0.0 - 0.2 10e3/uL    Absolute Immature Granulocytes 0.0 <=0.4 10e3/uL    Absolute NRBCs 0.0 10e3/uL   CBC with platelets and differential     Status: Abnormal    Narrative    The following orders were created for panel order CBC with platelets and differential.  Procedure                               Abnormality         Status                     ---------                               -----------         ------                     CBC with platelets and d...[[id number]]  Abnormal            Final result                 Please view results for these tests on the individual orders.          Revised Cardiac Risk Index (RCRI):  The patient has the following serious cardiovascular risks for perioperative complications:   - No serious cardiac risks = 0 points     RCRI Interpretation: 0 points: Class I (very low risk - 0.4% complication rate)    VENANCIO Maldonado-S  [employer]     I was present with the nurse practitioner student who participated in the service and in the documentation of the note. I have verified the history and personally performed the physical exam and medical decision making. I agree with the assessment and plan of care as documented in the note.       Signed Electronically by: Amparo Alvares NP  Copy of this evaluation report is provided to requesting physician.

## 2022-01-18 ENCOUNTER — OFFICE VISIT (OUTPATIENT)
Dept: FAMILY MEDICINE | Facility: OTHER | Age: 67
End: 2022-01-18
Attending: NURSE PRACTITIONER
Payer: COMMERCIAL

## 2022-01-18 ENCOUNTER — ANESTHESIA EVENT (OUTPATIENT)
Dept: SURGERY | Facility: HOSPITAL | Age: 67
End: 2022-01-18
Payer: COMMERCIAL

## 2022-01-18 VITALS
WEIGHT: 230.38 LBS | HEART RATE: 82 BPM | BODY MASS INDEX: 32.98 KG/M2 | TEMPERATURE: 97.3 F | HEIGHT: 70 IN | DIASTOLIC BLOOD PRESSURE: 62 MMHG | SYSTOLIC BLOOD PRESSURE: 96 MMHG | OXYGEN SATURATION: 90 %

## 2022-01-18 DIAGNOSIS — G47.30 SLEEP APNEA, UNSPECIFIED TYPE: ICD-10-CM

## 2022-01-18 DIAGNOSIS — I50.30 DIASTOLIC HEART FAILURE, UNSPECIFIED HF CHRONICITY (H): ICD-10-CM

## 2022-01-18 DIAGNOSIS — J43.9 PULMONARY EMPHYSEMA, UNSPECIFIED EMPHYSEMA TYPE (H): ICD-10-CM

## 2022-01-18 DIAGNOSIS — I27.21 SEVERE PULMONARY ARTERIAL SYSTOLIC HYPERTENSION (H): ICD-10-CM

## 2022-01-18 DIAGNOSIS — F17.200 TOBACCO USE DISORDER: ICD-10-CM

## 2022-01-18 DIAGNOSIS — H26.9 CATARACT OF BOTH EYES, UNSPECIFIED CATARACT TYPE: ICD-10-CM

## 2022-01-18 DIAGNOSIS — Z01.818 PREOP GENERAL PHYSICAL EXAM: Primary | ICD-10-CM

## 2022-01-18 DIAGNOSIS — R73.9 HYPERGLYCEMIA: ICD-10-CM

## 2022-01-18 DIAGNOSIS — F43.10 PTSD (POST-TRAUMATIC STRESS DISORDER): ICD-10-CM

## 2022-01-18 DIAGNOSIS — F41.1 GENERALIZED ANXIETY DISORDER: ICD-10-CM

## 2022-01-18 DIAGNOSIS — F33.41 RECURRENT MAJOR DEPRESSIVE DISORDER, IN PARTIAL REMISSION (H): ICD-10-CM

## 2022-01-18 LAB
ANION GAP SERPL CALCULATED.3IONS-SCNC: 3 MMOL/L (ref 3–14)
BASOPHILS # BLD AUTO: 0.1 10E3/UL (ref 0–0.2)
BASOPHILS NFR BLD AUTO: 1 %
BUN SERPL-MCNC: 12 MG/DL (ref 7–30)
CALCIUM SERPL-MCNC: 9 MG/DL (ref 8.5–10.1)
CHLORIDE BLD-SCNC: 103 MMOL/L (ref 94–109)
CO2 SERPL-SCNC: 32 MMOL/L (ref 20–32)
CREAT SERPL-MCNC: 1.05 MG/DL (ref 0.66–1.25)
EOSINOPHIL # BLD AUTO: 0.2 10E3/UL (ref 0–0.7)
EOSINOPHIL NFR BLD AUTO: 2 %
ERYTHROCYTE [DISTWIDTH] IN BLOOD BY AUTOMATED COUNT: 12.7 % (ref 10–15)
GFR SERPL CREATININE-BSD FRML MDRD: 78 ML/MIN/1.73M2
GLUCOSE BLD-MCNC: 95 MG/DL (ref 70–99)
HCT VFR BLD AUTO: 52.1 % (ref 40–53)
HGB BLD-MCNC: 18 G/DL (ref 13.3–17.7)
IMM GRANULOCYTES # BLD: 0 10E3/UL
IMM GRANULOCYTES NFR BLD: 0 %
LYMPHOCYTES # BLD AUTO: 3.6 10E3/UL (ref 0.8–5.3)
LYMPHOCYTES NFR BLD AUTO: 37 %
MCH RBC QN AUTO: 32 PG (ref 26.5–33)
MCHC RBC AUTO-ENTMCNC: 34.5 G/DL (ref 31.5–36.5)
MCV RBC AUTO: 93 FL (ref 78–100)
MONOCYTES # BLD AUTO: 0.9 10E3/UL (ref 0–1.3)
MONOCYTES NFR BLD AUTO: 9 %
NEUTROPHILS # BLD AUTO: 4.9 10E3/UL (ref 1.6–8.3)
NEUTROPHILS NFR BLD AUTO: 51 %
NRBC # BLD AUTO: 0 10E3/UL
NRBC BLD AUTO-RTO: 0 /100
PLATELET # BLD AUTO: 315 10E3/UL (ref 150–450)
POTASSIUM BLD-SCNC: 3.9 MMOL/L (ref 3.4–5.3)
RBC # BLD AUTO: 5.62 10E6/UL (ref 4.4–5.9)
SODIUM SERPL-SCNC: 138 MMOL/L (ref 133–144)
WBC # BLD AUTO: 9.7 10E3/UL (ref 4–11)

## 2022-01-18 PROCEDURE — 80048 BASIC METABOLIC PNL TOTAL CA: CPT | Mod: ZL | Performed by: NURSE PRACTITIONER

## 2022-01-18 PROCEDURE — 85041 AUTOMATED RBC COUNT: CPT | Mod: ZL | Performed by: NURSE PRACTITIONER

## 2022-01-18 PROCEDURE — 99214 OFFICE O/P EST MOD 30 MIN: CPT | Performed by: NURSE PRACTITIONER

## 2022-01-18 PROCEDURE — G0463 HOSPITAL OUTPT CLINIC VISIT: HCPCS | Mod: 25

## 2022-01-18 PROCEDURE — 36415 COLL VENOUS BLD VENIPUNCTURE: CPT | Mod: ZL | Performed by: NURSE PRACTITIONER

## 2022-01-18 PROCEDURE — G0463 HOSPITAL OUTPT CLINIC VISIT: HCPCS

## 2022-01-18 ASSESSMENT — COPD QUESTIONNAIRES: COPD: 1

## 2022-01-18 ASSESSMENT — MIFFLIN-ST. JEOR: SCORE: 1831.25

## 2022-01-18 ASSESSMENT — PAIN SCALES - GENERAL: PAINLEVEL: NO PAIN (0)

## 2022-01-18 NOTE — NURSING NOTE
"Chief Complaint   Patient presents with     Pre-Op Exam     Dr. Gibson [facility]  right catartact 2-8-2022        Initial There were no vitals taken for this visit. Estimated body mass index is 31.89 kg/m  as calculated from the following:    Height as of 11/30/21: 1.801 m (5' 10.9\").    Weight as of 11/30/21: 103.4 kg (228 lb).  Medication Reconciliation: complete  Rose Her LPN  "

## 2022-01-18 NOTE — ANESTHESIA PREPROCEDURE EVALUATION
Anesthesia Pre-Procedure Evaluation    Patient: Joey Ireen   MRN: [id number] : 1955        Preoperative Diagnosis: Combined form of age-related cataract, right eye [H25.811]    Procedure : Procedure(s):  PHACOEMULSIFICATION CATARACT EXTRACTION POSTERIOR CHAMBER LENS RIGHT EYE          Past Medical History:   Diagnosis Date     COPD (chronic obstructive pulmonary disease) (H)      Hypertension      Uncomplicated asthma       Past Surgical History:   Procedure Laterality Date     CV CORONARY ANGIOGRAM N/A 2020    Procedure: Coronary Angiogram;  Surgeon: Aman Delgado MD;  Location:  [facility]ARD[facility] C[facility] LAB     CV RIGHT HEART CATH MEASUREMENTS RECORDED N/A 2020    Procedure: CV RIGHT HEART CATH;  Surgeon: Aman Delgado MD;  Location:  [facility]ARD[facility] CATH LAB     ENT SURGERY      patient has had three left ear surgeries, unsure what they did     FUSION CERVICAL POSTERIOR THREE+ LEVELS       HERNIA REPAIR, INGUINAL RT/LT Right     done times 3     JOINT REPLACEMENT Left      REPAIR HAMMER TOE Right       Allergies   Allergen Reactions     Seasonal Allergies Difficulty breathing and Cough     Bupropion Other (See Comments)     tremor     Tramadol Nausea and Swelling     [place] Nite Time Dizziness and Nausea and Vomiting     Nyquil Cold &  [Night-Time Cold-Flu Relief] Dizziness and Nausea and Vomiting     Trichophyton Other (See Comments)      Social History     Tobacco Use     Smoking status: Current Every Day Smoker     Years: 50.00     Types: Cigars     Start date: 10/1/2021     Smokeless tobacco: Never Used   Substance Use Topics     Alcohol use: Never      Wt Readings from Last 1 Encounters:   21 103.4 kg (228 lb)        Anesthesia Evaluation   Pt has had prior anesthetic. Type: MAC and General.    No history of anesthetic complications       ROS/MED HX  ENT/Pulmonary: Comment: Emphysema  Chronic cough    (+) sleep apnea, doesn't use CPAP, allergic rhinitis, tobacco  use (quit 3 weeks ago), Current use, 50  Pack-Year Hx,  COPD, O2 dependent, during Nighttime, 2 liters/min,     Neurologic: Comment: Hearing loss      Cardiovascular: Comment: cardiomegaly    (+) hypertension-range: took scheduled metoprolol/ ----CHF etiology: diastolic pulmonary hypertension,     METS/Exercise Tolerance: >4 METS    Hematologic:  - neg hematologic  ROS     Musculoskeletal: Comment: Cervical stenosis, s/p cervical fusion  (+) arthritis,     GI/Hepatic: Comment: diverticulitis    (+) GERD (hx aspirated during stimulator placement), Asymptomatic on medication,     Renal/Genitourinary:     (+) BPH,     Endo:     (+) Obesity,     Psychiatric/Substance Use:     (+) psychiatric history anxiety, depression and other (comment) (PTSD)     Infectious Disease:  - neg infectious disease ROS     Malignancy:  - neg malignancy ROS     Other:  - neg other ROS    (+) , H/O Chronic Pain,        Physical Exam    Airway        Mallampati: II   TM distance: > 3 FB   Neck ROM: full   Mouth opening: > 3 cm    Respiratory Devices and Support         Dental       (+) upper dentures and lower dentures      Cardiovascular   cardiovascular exam normal       Rhythm and rate: regular and normal     Pulmonary   pulmonary exam normal        breath sounds clear to auscultation   (+) rhonchi (upper right lobe, cleared with cough)           OUTSIDE LABS:  CBC:   Lab Results   Component Value Date    WBC 8.4 09/21/2021    WBC 8.4 07/01/2021    HGB 16.9 09/21/2021    HGB 18.4 (H) 07/01/2021    HCT 48.9 09/21/2021    HCT 53.8 (H) 07/01/2021     09/21/2021     07/01/2021     BMP:   Lab Results   Component Value Date     09/21/2021     07/01/2021    POTASSIUM 4.3 09/21/2021    POTASSIUM 3.5 07/01/2021    CHLORIDE 104 09/21/2021    CHLORIDE 105 07/01/2021    CO2 31 09/21/2021    CO2 26 07/01/2021    BUN 13 09/21/2021    BUN 15 07/01/2021    CR 1.02 09/21/2021    CR 0.96 07/01/2021    GLC 99 09/21/2021      (H) 07/01/2021     COAGS:   Lab Results   Component Value Date    PTT 41 (H) 06/19/2021    INR 1.05 06/19/2021     POC:   Lab Results   Component Value Date     (H) 02/23/2021     HEPATIC:   Lab Results   Component Value Date    ALBUMIN 3.4 07/01/2021    PROTTOTAL 7.3 07/01/2021    ALT 16 07/01/2021    AST 18 07/01/2021    ALKPHOS 74 07/01/2021    BILITOTAL 0.5 07/01/2021     OTHER:   Lab Results   Component Value Date    LACT 2.8 (H) 06/19/2021    A1C 5.8 (H) 11/30/2021    MARC 9.2 09/21/2021    PHOS 4.0 11/20/2020    MAG 2.1 11/20/2020    LIPASE 115 07/01/2021    TSH 2.29 11/30/2021    T4 0.97 08/07/2020    CRP 9.8 (H) 06/19/2021       Anesthesia Plan    ASA Status:  4   NPO Status:  NPO Appropriate    Anesthesia Type: MAC.     - Reason for MAC: chronic cardiopulmonary disease, straight local not clinically adequate, immobility needed              Consents    Anesthesia Plan(s) and associated risks, benefits, and realistic alternatives discussed. Questions answered and patient/representative(s) expressed understanding.     - Discussed: Risks, Benefits and Alternatives for BOTH SEDATION and the PROCEDURE were discussed     - Discussed with:  Patient      - Extended Intubation/Ventilatory Support Discussed: No.      - Patient is DNR/DNI Status: No    Use of blood products discussed: No .     Postoperative Care    Pain management: IV analgesics.   PONV prophylaxis: Ondansetron (or other 5HT-3)     Comments:    Other Comments:  hp 1/18/22, no interval changes    IV lidocaine for cough    Risks and benefits of MAC anesthetic discussed including dental damage, aspiration, loss of airway, conversion to general anesthetic, CV complications, MI, stroke, death. Pt wishes to proceed.             Darron Stevenson, BERNARDINO CRNA

## 2022-01-21 ENCOUNTER — OFFICE VISIT (OUTPATIENT)
Dept: FAMILY MEDICINE | Facility: OTHER | Age: 67
End: 2022-01-21
Attending: OPHTHALMOLOGY
Payer: COMMERCIAL

## 2022-01-21 DIAGNOSIS — Z01.818 PREOP TESTING: Primary | ICD-10-CM

## 2022-01-21 PROCEDURE — U0003 INFECTIOUS AGENT DETECTION BY NUCLEIC ACID (DNA OR RNA); SEVERE ACUTE RESPIRATORY SYNDROME CORONAVIRUS 2 (SARS-COV-2) (CORONAVIRUS DISEASE [COVID-19]), AMPLIFIED PROBE TECHNIQUE, MAKING USE OF HIGH THROUGHPUT TECHNOLOGIES AS DESCRIBED BY CMS-2020-01-R: HCPCS | Mod: ZL

## 2022-01-22 LAB — SARS-COV-2 RNA RESP QL NAA+PROBE: NEGATIVE

## 2022-01-25 ENCOUNTER — HOSPITAL ENCOUNTER (OUTPATIENT)
Facility: HOSPITAL | Age: 67
Discharge: HOME | End: 2022-01-25
Attending: OPHTHALMOLOGY | Admitting: OPHTHALMOLOGY
Payer: COMMERCIAL

## 2022-01-25 ENCOUNTER — ANESTHESIA (OUTPATIENT)
Dept: SURGERY | Facility: HOSPITAL | Age: 67
End: 2022-01-25
Payer: COMMERCIAL

## 2022-01-25 VITALS
RESPIRATION RATE: 18 BRPM | SYSTOLIC BLOOD PRESSURE: 92 MMHG | BODY MASS INDEX: 32.34 KG/M2 | HEART RATE: 67 BPM | WEIGHT: 231 LBS | DIASTOLIC BLOOD PRESSURE: 78 MMHG | TEMPERATURE: 98.6 F | HEIGHT: 71 IN | OXYGEN SATURATION: 93 %

## 2022-01-25 PROCEDURE — 370N000017 HC ANESTHESIA TECHNICAL FEE, PER MIN: Performed by: OPHTHALMOLOGY

## 2022-01-25 PROCEDURE — 250N000011 HC RX IP 250 OP 636: Performed by: OPHTHALMOLOGY

## 2022-01-25 PROCEDURE — 250N000011 HC RX IP 250 OP 636: Performed by: NURSE ANESTHETIST, CERTIFIED REGISTERED

## 2022-01-25 PROCEDURE — 710N000012 HC RECOVERY PHASE 2, PER MINUTE: Performed by: OPHTHALMOLOGY

## 2022-01-25 PROCEDURE — 250N000013 HC RX MED GY IP 250 OP 250 PS 637: Performed by: OPHTHALMOLOGY

## 2022-01-25 PROCEDURE — 272N000001 HC OR GENERAL SUPPLY STERILE: Performed by: OPHTHALMOLOGY

## 2022-01-25 PROCEDURE — 250N000009 HC RX 250: Performed by: OPHTHALMOLOGY

## 2022-01-25 PROCEDURE — 250N000009 HC RX 250: Performed by: NURSE ANESTHETIST, CERTIFIED REGISTERED

## 2022-01-25 PROCEDURE — 360N000076 HC SURGERY LEVEL 3, PER MIN: Performed by: OPHTHALMOLOGY

## 2022-01-25 PROCEDURE — 66984 XCAPSL CTRC RMVL W/O ECP: CPT | Performed by: NURSE ANESTHETIST, CERTIFIED REGISTERED

## 2022-01-25 PROCEDURE — V2632 POST CHMBR INTRAOCULAR LENS: HCPCS | Performed by: OPHTHALMOLOGY

## 2022-01-25 PROCEDURE — 999N000141 HC STATISTIC PRE-PROCEDURE NURSING ASSESSMENT: Performed by: OPHTHALMOLOGY

## 2022-01-25 DEVICE — LENS-SOFPORT 18.0: Type: IMPLANTABLE DEVICE | Site: EYE | Status: FUNCTIONAL

## 2022-01-25 RX ORDER — MOXIFLOXACIN 5 MG/ML
SOLUTION/ DROPS OPHTHALMIC PRN
Status: DISCONTINUED | OUTPATIENT
Start: 2022-01-25 | End: 2022-01-25 | Stop reason: HOSPADM

## 2022-01-25 RX ORDER — PHENYLEPHRINE HYDROCHLORIDE 100 MG/ML
1 SOLUTION/ DROPS OPHTHALMIC ONCE
Status: COMPLETED | OUTPATIENT
Start: 2022-01-25 | End: 2022-01-25

## 2022-01-25 RX ORDER — PILOCARPINE HYDROCHLORIDE 10 MG/ML
SOLUTION/ DROPS OPHTHALMIC PRN
Status: DISCONTINUED | OUTPATIENT
Start: 2022-01-25 | End: 2022-01-25 | Stop reason: HOSPADM

## 2022-01-25 RX ORDER — ONDANSETRON 4 MG/1
4 TABLET, ORALLY DISINTEGRATING ORAL EVERY 30 MIN PRN
Status: DISCONTINUED | OUTPATIENT
Start: 2022-01-25 | End: 2022-01-25 | Stop reason: HOSPADM

## 2022-01-25 RX ORDER — ACETAMINOPHEN 325 MG/1
650 TABLET ORAL ONCE
Status: COMPLETED | OUTPATIENT
Start: 2022-01-25 | End: 2022-01-25

## 2022-01-25 RX ORDER — SODIUM CHLORIDE, SODIUM LACTATE, POTASSIUM CHLORIDE, CALCIUM CHLORIDE 600; 310; 30; 20 MG/100ML; MG/100ML; MG/100ML; MG/100ML
INJECTION, SOLUTION INTRAVENOUS CONTINUOUS
Status: DISCONTINUED | OUTPATIENT
Start: 2022-01-25 | End: 2022-01-25 | Stop reason: HOSPADM

## 2022-01-25 RX ORDER — HYDRALAZINE HYDROCHLORIDE 20 MG/ML
2.5-5 INJECTION INTRAMUSCULAR; INTRAVENOUS EVERY 10 MIN PRN
Status: DISCONTINUED | OUTPATIENT
Start: 2022-01-25 | End: 2022-01-25 | Stop reason: HOSPADM

## 2022-01-25 RX ORDER — LIDOCAINE 40 MG/G
CREAM TOPICAL
Status: DISCONTINUED | OUTPATIENT
Start: 2022-01-25 | End: 2022-01-25 | Stop reason: HOSPADM

## 2022-01-25 RX ORDER — LEVOBUNOLOL HYDROCHLORIDE 5 MG/ML
SOLUTION/ DROPS OPHTHALMIC PRN
Status: DISCONTINUED | OUTPATIENT
Start: 2022-01-25 | End: 2022-01-25 | Stop reason: HOSPADM

## 2022-01-25 RX ORDER — LIDOCAINE HYDROCHLORIDE 10 MG/ML
INJECTION, SOLUTION EPIDURAL; INFILTRATION; INTRACAUDAL; PERINEURAL PRN
Status: DISCONTINUED | OUTPATIENT
Start: 2022-01-25 | End: 2022-01-25 | Stop reason: HOSPADM

## 2022-01-25 RX ORDER — PHENYLEPHRINE HYDROCHLORIDE 25 MG/ML
1 SOLUTION/ DROPS OPHTHALMIC ONCE
Status: COMPLETED | OUTPATIENT
Start: 2022-01-25 | End: 2022-01-25

## 2022-01-25 RX ORDER — ONDANSETRON 2 MG/ML
4 INJECTION INTRAMUSCULAR; INTRAVENOUS EVERY 30 MIN PRN
Status: DISCONTINUED | OUTPATIENT
Start: 2022-01-25 | End: 2022-01-25 | Stop reason: HOSPADM

## 2022-01-25 RX ORDER — PHENYLEPHRINE HYDROCHLORIDE 100 MG/ML
1 SOLUTION/ DROPS OPHTHALMIC
Status: COMPLETED | OUTPATIENT
Start: 2022-01-25 | End: 2022-01-25

## 2022-01-25 RX ORDER — TETRACAINE HYDROCHLORIDE 5 MG/ML
SOLUTION OPHTHALMIC PRN
Status: DISCONTINUED | OUTPATIENT
Start: 2022-01-25 | End: 2022-01-25 | Stop reason: HOSPADM

## 2022-01-25 RX ORDER — PROPARACAINE HYDROCHLORIDE 5 MG/ML
1 SOLUTION/ DROPS OPHTHALMIC ONCE
Status: COMPLETED | OUTPATIENT
Start: 2022-01-25 | End: 2022-01-25

## 2022-01-25 RX ORDER — CYCLOPENTOLATE HYDROCHLORIDE 20 MG/ML
1 SOLUTION/ DROPS OPHTHALMIC
Status: COMPLETED | OUTPATIENT
Start: 2022-01-25 | End: 2022-01-25

## 2022-01-25 RX ORDER — LABETALOL 20 MG/4 ML (5 MG/ML) INTRAVENOUS SYRINGE
10
Status: DISCONTINUED | OUTPATIENT
Start: 2022-01-25 | End: 2022-01-25 | Stop reason: HOSPADM

## 2022-01-25 RX ORDER — LIDOCAINE HYDROCHLORIDE 20 MG/ML
INJECTION, SOLUTION INFILTRATION; PERINEURAL PRN
Status: DISCONTINUED | OUTPATIENT
Start: 2022-01-25 | End: 2022-01-25

## 2022-01-25 RX ADMIN — PROPARACAINE HYDROCHLORIDE 1 DROP: 5 SOLUTION/ DROPS OPHTHALMIC at 11:54

## 2022-01-25 RX ADMIN — MIDAZOLAM 1 MG: 1 INJECTION INTRAMUSCULAR; INTRAVENOUS at 12:52

## 2022-01-25 RX ADMIN — ACETAMINOPHEN 650 MG: 325 TABLET, FILM COATED ORAL at 11:53

## 2022-01-25 RX ADMIN — CYCLOPENTOLATE HYDROCHLORIDE 1 DROP: 20 SOLUTION/ DROPS OPHTHALMIC at 12:07

## 2022-01-25 RX ADMIN — PHENYLEPHRINE HYDROCHLORIDE 1 DROP: 100 SOLUTION/ DROPS OPHTHALMIC at 11:55

## 2022-01-25 RX ADMIN — CYCLOPENTOLATE HYDROCHLORIDE 1 DROP: 20 SOLUTION/ DROPS OPHTHALMIC at 11:54

## 2022-01-25 RX ADMIN — PHENYLEPHRINE HYDROCHLORIDE 1 DROP: 100 SOLUTION/ DROPS OPHTHALMIC at 12:22

## 2022-01-25 RX ADMIN — FLURBIPROFEN SODIUM 0.5 ML: 0.3 SOLUTION/ DROPS OPHTHALMIC at 12:07

## 2022-01-25 RX ADMIN — LIDOCAINE HYDROCHLORIDE 80 MG: 20 INJECTION, SOLUTION INFILTRATION; PERINEURAL at 12:45

## 2022-01-25 RX ADMIN — PHENYLEPHRINE HYDROCHLORIDE 1 DROP: 25 SOLUTION/ DROPS OPHTHALMIC at 12:07

## 2022-01-25 RX ADMIN — MIDAZOLAM 1 MG: 1 INJECTION INTRAMUSCULAR; INTRAVENOUS at 12:49

## 2022-01-25 ASSESSMENT — LIFESTYLE VARIABLES: TOBACCO_USE: 1

## 2022-01-25 ASSESSMENT — MIFFLIN-ST. JEOR: SCORE: 1849.94

## 2022-01-25 NOTE — INTERVAL H&P NOTE
I have reviewed the surgical (or preoperative) H&P that is linked to this encounter, and examined the patient. There are no significant changes.  Efra Gibson MD

## 2022-01-25 NOTE — OR NURSING
Patient and responsible adult given discharge instructions with no questions regarding instructions. [place] score 20/20. Denies pain.  Discharged from unit via walking. Patient discharged to home with SO. Tolerating PO intake.

## 2022-01-25 NOTE — ANESTHESIA CARE TRANSFER NOTE
Patient: Joey Irene    Procedure: Procedure(s):  COMPLEX PHACOEMULSIFICATION CATARACT EXTRACTION POSTERIOR CHAMBER LENS RIGHT EYE: PUPIL STRETCHING RIGHT RIGHT       Diagnosis: Combined form of age-related cataract, right eye [H25.811]  Diagnosis Additional Information: No value filed.    Anesthesia Type:   MAC     Note:    Oropharynx: oropharynx clear of all foreign objects  Level of Consciousness: awake  Oxygen Supplementation: room air    Independent Airway: airway patency satisfactory and stable  Dentition: dentition unchanged  Vital Signs Stable: post-procedure vital signs reviewed and stable  Report to RN Given: handoff report given  Patient transferred to: Phase II    Handoff Report: Identifed the Patient, Identified the Reponsible Provider, Reviewed the pertinent medical history, Discussed the surgical course, Reviewed Intra-OP anesthesia mangement and issues during anesthesia, Set expectations for post-procedure period and Allowed opportunity for questions and acknowledgement of understanding      Vitals:  Vitals Value Taken Time   BP     Temp     Pulse     Resp     SpO2         Electronically Signed By: BERNARDINO Parmar CRNA  January 25, 2022  1:16 PM

## 2022-01-25 NOTE — ANESTHESIA POSTPROCEDURE EVALUATION
Patient: Joey Irene    Procedure: Procedure(s):  COMPLEX PHACOEMULSIFICATION CATARACT EXTRACTION POSTERIOR CHAMBER LENS RIGHT EYE: PUPIL STRETCHING RIGHT RIGHT       Diagnosis:Combined form of age-related cataract, right eye [H25.811]  Diagnosis Additional Information: No value filed.    Anesthesia Type:  MAC    Note:  Disposition: Outpatient   Postop Pain Control: Uneventful            Sign Out: Well controlled pain   PONV: No   Neuro/Psych: Uneventful            Sign Out: Acceptable/Baseline neuro status   Airway/Respiratory: Uneventful            Sign Out: Acceptable/Baseline resp. status   CV/Hemodynamics: Uneventful            Sign Out: Acceptable CV status; No obvious hypovolemia; No obvious fluid overload   Other NRE: NONE   DID A NON-ROUTINE EVENT OCCUR? No           Last vitals:  Vitals Value Taken Time   /72 01/25/22 1330   Temp     Pulse 74 01/25/22 1330   Resp     SpO2 88 % 01/25/22 1332   Vitals shown include unvalidated device data.    Electronically Signed By: BERNARDINO Marcus CRNA  January 25, 2022  1:33 PM

## 2022-01-25 NOTE — OP NOTE
[facility]  Ophthalmology Full Operative Note    Pre-operative diagnosis: Combined form of age-related cataract, right eye [H25.811], Pupil Miosis Right eye   Post-operative diagnosis Same   Procedure: Procedure(s):  COMPLEX PHACOEMULSIFICATION CATARACT EXTRACTION POSTERIOR CHAMBER LENS RIGHT EYE: PUPIL STRETCHING RIGHT RIGHT   Surgeon: Efra Gibson MD   Assistants(s):    Anesthesia: Combined MAC with Topical    Estimated blood loss: None    Total IV fluids: (See anesthesia record)   Specimens: None   Implants: 18 LI61AO   Findings:    Complications: None   Condition: Stable   Comments:       PROCEDURAL ANESTHESIA:     Topical/MAC with IV sedation.  Lidocaine 2% jelly topically and Lidocaine 1% preservative-free intracamerally.     PROCEDURE:  The patient was brought to the Operating Room and prepped and draped in a sterile manner.  A wire lid speculum was placed.  A paracentesis was created and 1% Lidocaine was instilled in the anterior chamber.  The anterior chamber was then filled with Amvisc viscoelastic.  A clear cornea temporal wound was created using a 2.8 mm keratome.  Poor dilation, floppy iris, so pupil stretched with a 6.25 mm Malyugin ring. A cystotome was used to initiate a flap in the anterior capsule and a Utrata forceps was used to create a continuous tear capsulorhexis.  Hydrodissection was performed.  The phacoemulsification tip was inserted into the eye and the nucleus and epinucleus were removed using a divide and conquer technique.  The residual cortex was removed using the I/A handpiece.  The anterior chamber was then refilled with viscoelastic and the wound was enlarged with the keratome.  The intraocular lens, 18 diopter, Model LI61AO, was placed into the injector and injected into the capsular bag. It was checked to make sure that it was central and stable.  The Malyugin ring was removed. Residual viscoelastic was removed using the I/A.  The anterior chamber was refilled with  BSS.  The wounds were hydrated with BSS and were noted to be watertight with no suture necessary.  Topical pilocarpine 1%, Betagan, and Vigamox was applied.  A hard shield was placed.     The patient tolerated the procedure well and was sent to the Recovery Room in satisfactory condition.

## 2022-01-26 DIAGNOSIS — R00.0 SINUS TACHYCARDIA: ICD-10-CM

## 2022-01-26 DIAGNOSIS — I50.810 RIGHT HEART FAILURE, NYHA CLASS 3 (H): ICD-10-CM

## 2022-01-26 RX ORDER — METOPROLOL SUCCINATE 25 MG/1
TABLET, EXTENDED RELEASE ORAL
Qty: 30 TABLET | Refills: 0 | OUTPATIENT
Start: 2022-01-26

## 2022-01-28 ENCOUNTER — TELEPHONE (OUTPATIENT)
Dept: FAMILY MEDICINE | Facility: OTHER | Age: 67
End: 2022-01-28
Payer: COMMERCIAL

## 2022-02-01 ENCOUNTER — ANESTHESIA EVENT (OUTPATIENT)
Dept: SURGERY | Facility: HOSPITAL | Age: 67
End: 2022-02-01
Payer: COMMERCIAL

## 2022-02-01 RX ORDER — ONDANSETRON 2 MG/ML
4 INJECTION INTRAMUSCULAR; INTRAVENOUS EVERY 30 MIN PRN
Status: CANCELLED | OUTPATIENT
Start: 2022-02-01

## 2022-02-01 RX ORDER — SODIUM CHLORIDE, SODIUM LACTATE, POTASSIUM CHLORIDE, CALCIUM CHLORIDE 600; 310; 30; 20 MG/100ML; MG/100ML; MG/100ML; MG/100ML
INJECTION, SOLUTION INTRAVENOUS CONTINUOUS
Status: CANCELLED | OUTPATIENT
Start: 2022-02-01

## 2022-02-01 RX ORDER — LABETALOL 20 MG/4 ML (5 MG/ML) INTRAVENOUS SYRINGE
10
Status: CANCELLED | OUTPATIENT
Start: 2022-02-01

## 2022-02-01 RX ORDER — ONDANSETRON 4 MG/1
4 TABLET, ORALLY DISINTEGRATING ORAL EVERY 30 MIN PRN
Status: CANCELLED | OUTPATIENT
Start: 2022-02-01

## 2022-02-01 RX ORDER — HYDRALAZINE HYDROCHLORIDE 20 MG/ML
2.5-5 INJECTION INTRAMUSCULAR; INTRAVENOUS EVERY 10 MIN PRN
Status: CANCELLED | OUTPATIENT
Start: 2022-02-01

## 2022-02-01 ASSESSMENT — COPD QUESTIONNAIRES: COPD: 1

## 2022-02-01 ASSESSMENT — LIFESTYLE VARIABLES: TOBACCO_USE: 1

## 2022-02-01 NOTE — ANESTHESIA PREPROCEDURE EVALUATION
Anesthesia Pre-Procedure Evaluation    Patient: Joey Irene   MRN: [id number] : 1955        Preoperative Diagnosis: Combined form of age-related cataract, left eye [H25.812]    Procedure : Procedure(s):  PHACOEMULSIFICATION CATARACT EXTRACTION POSTERIOR CHAMBER LENS LEFT EYE          Past Medical History:   Diagnosis Date     COPD (chronic obstructive pulmonary disease) (H)      Hypertension      Uncomplicated asthma       Past Surgical History:   Procedure Laterality Date     CV CORONARY ANGIOGRAM N/A 2020    Procedure: Coronary Angiogram;  Surgeon: Aman Delgado MD;  Location:  [facility]     CV RIGHT HEART CATH MEASUREMENTS RECORDED N/A 2020    Procedure: CV RIGHT HEART CATH;  Surgeon: Aman Delgado MD;  Location:  [facility]     ENT SURGERY      patient has had three left ear surgeries, unsure what they did     FUSION CERVICAL POSTERIOR THREE+ LEVELS       HERNIA REPAIR, INGUINAL RT/LT Right     done times 3     JOINT REPLACEMENT Left      PHACOEMULSIFICATION WITH STANDARD INTRAOCULAR LENS IMPLANT Right 2022    Procedure: COMPLEX PHACOEMULSIFICATION CATARACT EXTRACTION POSTERIOR CHAMBER LENS RIGHT EYE: PUPIL STRETCHING RIGHT RIGHT;  Surgeon: Efra Gibson MD;  Location: [facility]     REPAIR HAMMER TOE Right       Allergies   Allergen Reactions     Seasonal Allergies Difficulty breathing and Cough     Bupropion Other (See Comments)     tremor     Tramadol Nausea and Swelling     [place] Nite Time Dizziness and Nausea and Vomiting     Nyquil Cold &  [Night-Time Cold-Flu Relief] Dizziness and Nausea and Vomiting     Trichophyton Other (See Comments)      Social History     Tobacco Use     Smoking status: Current Every Day Smoker     Years: 50.00     Types: Cigars     Start date: 10/1/2021     Smokeless tobacco: Never Used   Substance Use Topics     Alcohol use: Never      Wt Readings from Last 1 Encounters:   22 104.8 kg (231 lb)         Anesthesia Evaluation   Pt has had prior anesthetic. Type: MAC and General.    No history of anesthetic complications       ROS/MED HX  ENT/Pulmonary: Comment: Emphysema  Chronic cough    (+) sleep apnea, doesn't use CPAP, allergic rhinitis, tobacco use (quit 3 weeks ago), Current use, 50  Pack-Year Hx,  COPD, O2 dependent, during Nighttime, 2 liters/min,     Neurologic: Comment: Hearing loss      Cardiovascular: Comment: cardiomegaly    (+) hypertension-range: took scheduled metoprolol/ ----CHF etiology: diastolic pulmonary hypertension,     METS/Exercise Tolerance: >4 METS    Hematologic:  - neg hematologic  ROS     Musculoskeletal: Comment: Cervical stenosis, s/p cervical fusion  (+) arthritis,     GI/Hepatic: Comment: diverticulitis    (+) GERD (hx aspirated during stimulator placement), Asymptomatic on medication,     Renal/Genitourinary:     (+) BPH,     Endo:     (+) Obesity,     Psychiatric/Substance Use:     (+) psychiatric history anxiety, depression and other (comment) (PTSD)     Infectious Disease:  - neg infectious disease ROS     Malignancy:  - neg malignancy ROS     Other:  - neg other ROS    (+) , H/O Chronic Pain,        Physical Exam    Airway        Mallampati: II   TM distance: > 3 FB   Neck ROM: full   Mouth opening: > 3 cm    Respiratory Devices and Support         Dental       (+) upper dentures and lower dentures      Cardiovascular   cardiovascular exam normal          Pulmonary   pulmonary exam normal                OUTSIDE LABS:  CBC:   Lab Results   Component Value Date    WBC 9.7 01/18/2022    WBC 8.4 09/21/2021    HGB 18.0 (H) 01/18/2022    HGB 16.9 09/21/2021    HCT 52.1 01/18/2022    HCT 48.9 09/21/2021     01/18/2022     09/21/2021     BMP:   Lab Results   Component Value Date     01/18/2022     09/21/2021    POTASSIUM 3.9 01/18/2022    POTASSIUM 4.3 09/21/2021    CHLORIDE 103 01/18/2022    CHLORIDE 104 09/21/2021    CO2 32 01/18/2022    CO2 31  09/21/2021    BUN 12 01/18/2022    BUN 13 09/21/2021    CR 1.05 01/18/2022    CR 1.02 09/21/2021    GLC 95 01/18/2022    GLC 99 09/21/2021     COAGS:   Lab Results   Component Value Date    PTT 41 (H) 06/19/2021    INR 1.05 06/19/2021     POC:   Lab Results   Component Value Date     (H) 02/23/2021     HEPATIC:   Lab Results   Component Value Date    ALBUMIN 3.4 07/01/2021    PROTTOTAL 7.3 07/01/2021    ALT 16 07/01/2021    AST 18 07/01/2021    ALKPHOS 74 07/01/2021    BILITOTAL 0.5 07/01/2021     OTHER:   Lab Results   Component Value Date    LACT 2.8 (H) 06/19/2021    A1C 5.8 (H) 11/30/2021    MARC 9.0 01/18/2022    PHOS 4.0 11/20/2020    MAG 2.1 11/20/2020    LIPASE 115 07/01/2021    TSH 2.29 11/30/2021    T4 0.97 08/07/2020    CRP 9.8 (H) 06/19/2021       Anesthesia Plan    ASA Status:  3   NPO Status:  NPO Appropriate    Anesthesia Type: MAC.     - Reason for MAC: straight local not clinically adequate   Induction: N/a.   Maintenance: N/A.        Consents    Anesthesia Plan(s) and associated risks, benefits, and realistic alternatives discussed. Questions answered and patient/representative(s) expressed understanding.     - Discussed: Risks, Benefits and Alternatives for BOTH SEDATION and the PROCEDURE were discussed     - Discussed with:  Patient      - Extended Intubation/Ventilatory Support Discussed: No.      - Patient is DNR/DNI Status: No    Use of blood products discussed: No .     Postoperative Care            Comments:    Other Comments: Hp 1/18/22            BERNARDINO Vazquez CRNA

## 2022-02-04 ENCOUNTER — OFFICE VISIT (OUTPATIENT)
Dept: FAMILY MEDICINE | Facility: OTHER | Age: 67
End: 2022-02-04
Attending: OPHTHALMOLOGY
Payer: COMMERCIAL

## 2022-02-04 DIAGNOSIS — Z01.818 PRE-OP EXAM: Primary | ICD-10-CM

## 2022-02-04 PROCEDURE — U0003 INFECTIOUS AGENT DETECTION BY NUCLEIC ACID (DNA OR RNA); SEVERE ACUTE RESPIRATORY SYNDROME CORONAVIRUS 2 (SARS-COV-2) (CORONAVIRUS DISEASE [COVID-19]), AMPLIFIED PROBE TECHNIQUE, MAKING USE OF HIGH THROUGHPUT TECHNOLOGIES AS DESCRIBED BY CMS-2020-01-R: HCPCS | Mod: ZL

## 2022-02-05 LAB — SARS-COV-2 RNA RESP QL NAA+PROBE: NEGATIVE

## 2022-02-08 ENCOUNTER — HOSPITAL ENCOUNTER (OUTPATIENT)
Facility: HOSPITAL | Age: 67
Discharge: HOME | End: 2022-02-08
Attending: OPHTHALMOLOGY | Admitting: OPHTHALMOLOGY
Payer: COMMERCIAL

## 2022-02-08 ENCOUNTER — ANESTHESIA (OUTPATIENT)
Dept: SURGERY | Facility: HOSPITAL | Age: 67
End: 2022-02-08
Payer: COMMERCIAL

## 2022-02-08 VITALS
HEIGHT: 71 IN | SYSTOLIC BLOOD PRESSURE: 114 MMHG | RESPIRATION RATE: 16 BRPM | WEIGHT: 231.2 LBS | DIASTOLIC BLOOD PRESSURE: 78 MMHG | OXYGEN SATURATION: 90 % | HEART RATE: 83 BPM | TEMPERATURE: 97.8 F | BODY MASS INDEX: 32.37 KG/M2

## 2022-02-08 PROCEDURE — 999N000141 HC STATISTIC PRE-PROCEDURE NURSING ASSESSMENT: Performed by: OPHTHALMOLOGY

## 2022-02-08 PROCEDURE — 66984 XCAPSL CTRC RMVL W/O ECP: CPT | Performed by: NURSE ANESTHETIST, CERTIFIED REGISTERED

## 2022-02-08 PROCEDURE — V2632 POST CHMBR INTRAOCULAR LENS: HCPCS | Performed by: OPHTHALMOLOGY

## 2022-02-08 PROCEDURE — 370N000017 HC ANESTHESIA TECHNICAL FEE, PER MIN: Performed by: OPHTHALMOLOGY

## 2022-02-08 PROCEDURE — 250N000011 HC RX IP 250 OP 636: Performed by: OPHTHALMOLOGY

## 2022-02-08 PROCEDURE — 250N000011 HC RX IP 250 OP 636: Performed by: NURSE ANESTHETIST, CERTIFIED REGISTERED

## 2022-02-08 PROCEDURE — 272N000001 HC OR GENERAL SUPPLY STERILE: Performed by: OPHTHALMOLOGY

## 2022-02-08 PROCEDURE — 250N000013 HC RX MED GY IP 250 OP 250 PS 637: Performed by: OPHTHALMOLOGY

## 2022-02-08 PROCEDURE — 360N000076 HC SURGERY LEVEL 3, PER MIN: Performed by: OPHTHALMOLOGY

## 2022-02-08 PROCEDURE — 250N000009 HC RX 250: Performed by: OPHTHALMOLOGY

## 2022-02-08 PROCEDURE — 250N000009 HC RX 250: Performed by: NURSE ANESTHETIST, CERTIFIED REGISTERED

## 2022-02-08 PROCEDURE — 710N000012 HC RECOVERY PHASE 2, PER MINUTE: Performed by: OPHTHALMOLOGY

## 2022-02-08 DEVICE — LENS-SOFPORT 17.5: Type: IMPLANTABLE DEVICE | Site: EYE | Status: FUNCTIONAL

## 2022-02-08 RX ORDER — PHENYLEPHRINE HYDROCHLORIDE 100 MG/ML
1 SOLUTION/ DROPS OPHTHALMIC
Status: COMPLETED | OUTPATIENT
Start: 2022-02-08 | End: 2022-02-08

## 2022-02-08 RX ORDER — LIDOCAINE HYDROCHLORIDE 20 MG/ML
INJECTION, SOLUTION INFILTRATION; PERINEURAL PRN
Status: DISCONTINUED | OUTPATIENT
Start: 2022-02-08 | End: 2022-02-08

## 2022-02-08 RX ORDER — PHENYLEPHRINE HYDROCHLORIDE 100 MG/ML
1 SOLUTION/ DROPS OPHTHALMIC ONCE
Status: COMPLETED | OUTPATIENT
Start: 2022-02-08 | End: 2022-02-08

## 2022-02-08 RX ORDER — CYCLOPENTOLATE HYDROCHLORIDE 20 MG/ML
1 SOLUTION/ DROPS OPHTHALMIC
Status: COMPLETED | OUTPATIENT
Start: 2022-02-08 | End: 2022-02-08

## 2022-02-08 RX ORDER — TETRACAINE HYDROCHLORIDE 5 MG/ML
SOLUTION OPHTHALMIC PRN
Status: DISCONTINUED | OUTPATIENT
Start: 2022-02-08 | End: 2022-02-08 | Stop reason: HOSPADM

## 2022-02-08 RX ORDER — FENTANYL CITRATE 50 UG/ML
INJECTION, SOLUTION INTRAMUSCULAR; INTRAVENOUS PRN
Status: DISCONTINUED | OUTPATIENT
Start: 2022-02-08 | End: 2022-02-08

## 2022-02-08 RX ORDER — LIDOCAINE 40 MG/G
CREAM TOPICAL
Status: DISCONTINUED | OUTPATIENT
Start: 2022-02-08 | End: 2022-02-08 | Stop reason: HOSPADM

## 2022-02-08 RX ORDER — MULTIPLE VITAMINS W/ MINERALS TAB 9MG-400MCG
1 TAB ORAL DAILY
COMMUNITY

## 2022-02-08 RX ORDER — NEOMYCIN SULFATE, POLYMYXIN B SULFATE, AND DEXAMETHASONE 3.5; 10000; 1 MG/G; [USP'U]/G; MG/G
OINTMENT OPHTHALMIC PRN
Status: DISCONTINUED | OUTPATIENT
Start: 2022-02-08 | End: 2022-02-08 | Stop reason: HOSPADM

## 2022-02-08 RX ORDER — PILOCARPINE HYDROCHLORIDE 10 MG/ML
SOLUTION/ DROPS OPHTHALMIC PRN
Status: DISCONTINUED | OUTPATIENT
Start: 2022-02-08 | End: 2022-02-08 | Stop reason: HOSPADM

## 2022-02-08 RX ORDER — NICOTINE 21 MG/24HR
1 PATCH, TRANSDERMAL 24 HOURS TRANSDERMAL EVERY 24 HOURS
COMMUNITY
End: 2022-11-11

## 2022-02-08 RX ORDER — PHENYLEPHRINE HYDROCHLORIDE 25 MG/ML
1 SOLUTION/ DROPS OPHTHALMIC ONCE
Status: COMPLETED | OUTPATIENT
Start: 2022-02-08 | End: 2022-02-08

## 2022-02-08 RX ORDER — LEVOBUNOLOL HYDROCHLORIDE 5 MG/ML
SOLUTION/ DROPS OPHTHALMIC PRN
Status: DISCONTINUED | OUTPATIENT
Start: 2022-02-08 | End: 2022-02-08 | Stop reason: HOSPADM

## 2022-02-08 RX ORDER — PROPARACAINE HYDROCHLORIDE 5 MG/ML
1 SOLUTION/ DROPS OPHTHALMIC ONCE
Status: COMPLETED | OUTPATIENT
Start: 2022-02-08 | End: 2022-02-08

## 2022-02-08 RX ORDER — MOXIFLOXACIN 5 MG/ML
SOLUTION/ DROPS OPHTHALMIC PRN
Status: DISCONTINUED | OUTPATIENT
Start: 2022-02-08 | End: 2022-02-08 | Stop reason: HOSPADM

## 2022-02-08 RX ORDER — ACETAMINOPHEN 325 MG/1
650 TABLET ORAL ONCE
Status: COMPLETED | OUTPATIENT
Start: 2022-02-08 | End: 2022-02-08

## 2022-02-08 RX ADMIN — MIDAZOLAM 1 MG: 1 INJECTION INTRAMUSCULAR; INTRAVENOUS at 12:05

## 2022-02-08 RX ADMIN — FENTANYL CITRATE 50 MCG: 50 INJECTION, SOLUTION INTRAMUSCULAR; INTRAVENOUS at 12:11

## 2022-02-08 RX ADMIN — FENTANYL CITRATE 50 MCG: 50 INJECTION, SOLUTION INTRAMUSCULAR; INTRAVENOUS at 12:02

## 2022-02-08 RX ADMIN — PHENYLEPHRINE HYDROCHLORIDE 1 DROP: 25 SOLUTION/ DROPS OPHTHALMIC at 10:38

## 2022-02-08 RX ADMIN — FLURBIPROFEN SODIUM 0.5 ML: 0.3 SOLUTION/ DROPS OPHTHALMIC at 10:38

## 2022-02-08 RX ADMIN — PROPARACAINE HYDROCHLORIDE 1 DROP: 5 SOLUTION/ DROPS OPHTHALMIC at 10:32

## 2022-02-08 RX ADMIN — LIDOCAINE HYDROCHLORIDE 80 MG: 20 INJECTION, SOLUTION INFILTRATION; PERINEURAL at 12:13

## 2022-02-08 RX ADMIN — CYCLOPENTOLATE HYDROCHLORIDE 1 DROP: 20 SOLUTION/ DROPS OPHTHALMIC at 10:38

## 2022-02-08 RX ADMIN — FENTANYL CITRATE 50 MCG: 50 INJECTION, SOLUTION INTRAMUSCULAR; INTRAVENOUS at 12:05

## 2022-02-08 RX ADMIN — MIDAZOLAM 1 MG: 1 INJECTION INTRAMUSCULAR; INTRAVENOUS at 12:11

## 2022-02-08 RX ADMIN — ACETAMINOPHEN 650 MG: 325 TABLET, FILM COATED ORAL at 10:26

## 2022-02-08 RX ADMIN — MIDAZOLAM 1 MG: 1 INJECTION INTRAMUSCULAR; INTRAVENOUS at 12:02

## 2022-02-08 RX ADMIN — CYCLOPENTOLATE HYDROCHLORIDE 1 DROP: 20 SOLUTION/ DROPS OPHTHALMIC at 10:32

## 2022-02-08 RX ADMIN — PHENYLEPHRINE HYDROCHLORIDE 1 DROP: 100 SOLUTION/ DROPS OPHTHALMIC at 10:33

## 2022-02-08 RX ADMIN — PHENYLEPHRINE HYDROCHLORIDE 1 DROP: 100 SOLUTION/ DROPS OPHTHALMIC at 10:58

## 2022-02-08 ASSESSMENT — MIFFLIN-ST. JEOR: SCORE: 1850.85

## 2022-02-08 NOTE — OR NURSING
Patient and responsible adult given discharge instructions with no questions regarding instructions. [place] score 20/20. Pain level 0/10.  Discharged from unit via walking. Patient discharged to home with significant other to home..

## 2022-02-08 NOTE — ANESTHESIA CARE TRANSFER NOTE
Patient: Joey Irene    Procedure: Procedure(s):  PHACOEMULSIFICATION CATARACT EXTRACTION POSTERIOR CHAMBER LENS LEFT EYE       Diagnosis: Combined form of age-related cataract, left eye [H25.812]  Diagnosis Additional Information: No value filed.    Anesthesia Type:   MAC     Note:    Oropharynx: oropharynx clear of all foreign objects and spontaneously breathing  Level of Consciousness: awake  Oxygen Supplementation: room air  Level of Supplemental Oxygen (L/min / FiO2): 3  Independent Airway: airway patency satisfactory and stable  Dentition: dentition unchanged  Vital Signs Stable: post-procedure vital signs reviewed and stable  Report to RN Given: handoff report given  Patient transferred to: Phase II    Handoff Report: Identifed the Patient, Identified the Reponsible Provider, Reviewed the pertinent medical history, Discussed the surgical course, Reviewed Intra-OP anesthesia mangement and issues during anesthesia, Set expectations for post-procedure period and Allowed opportunity for questions and acknowledgement of understanding      Vitals:  Vitals Value Taken Time   BP     Temp     Pulse     Resp     SpO2         Electronically Signed By: BERNARDINO Magdaleno CRNA  February 8, 2022  1:11 PM

## 2022-02-08 NOTE — OR NURSING
Dr. Gibson into talk with pt and pt's significant other regarding surgery, pt instructed to go to [facility] after discharge to get further instructions about his follow up care with retina specialist.

## 2022-02-08 NOTE — ANESTHESIA POSTPROCEDURE EVALUATION
Patient: Joey Irene    Procedure: Procedure(s):  PHACOEMULSIFICATION CATARACT EXTRACTION POSTERIOR CHAMBER LENS LEFT EYE       Diagnosis:Combined form of age-related cataract, left eye [H25.812]  Diagnosis Additional Information: No value filed.    Anesthesia Type:  MAC    Note:  Disposition: Outpatient   Postop Pain Control: Uneventful            Sign Out: Well controlled pain   PONV: No   Neuro/Psych: Uneventful            Sign Out: Acceptable/Baseline neuro status   Airway/Respiratory: Uneventful            Sign Out: Acceptable/Baseline resp. status   CV/Hemodynamics: Uneventful            Sign Out: Acceptable CV status; No obvious hypovolemia; No obvious fluid overload   Other NRE: NONE   DID A NON-ROUTINE EVENT OCCUR? No           Last vitals:  Vitals Value Taken Time   /79 02/08/22 1315   Temp     Pulse 70 02/08/22 1315   Resp     SpO2 89 % 02/08/22 1319   Vitals shown include unvalidated device data.    Electronically Signed By: BERNARDINO Vazquez CRNA  February 8, 2022  1:20 PM

## 2022-02-08 NOTE — INTERVAL H&P NOTE
I have reviewed the surgical (or preoperative) H&P that is linked to this encounter, and examined the patient. There are no significant changes. No problems with the normal cataract surgery medicines and drops for the 1st eye surgery.  Efra Gibson MD

## 2022-02-08 NOTE — OP NOTE
[facility]  Ophthalmology Full Operative Note    Pre-operative diagnosis: Combined form of age-related cataract, left eye [H25.812]   Post-operative diagnosis Same   Procedure: Procedure(s):  ANTERIOR VITRECTOMY AND POSTERIOR CHAMBER LENS IMPLANT   Surgeon: Efra Gibson MD   Assistants(s):    Anesthesia: Combined MAC with Topical    Estimated blood loss: None    Total IV fluids: (See anesthesia record)   Specimens: None   Implants: 17.5 LI61AO   Findings:    Complications: None   Condition: Stable   Comments:       PROCEDURAL ANESTHESIA:     Topical/MAC with IV sedation.  Lidocaine 2% jelly topically and Lidocaine 1% preservative-free intracamerally.     PROCEDURE:  The patient was brought to the Operating Room and prepped and draped in a sterile manner.  A wire lid speculum was placed.  A paracentesis was created and 1% Lidocaine was instilled in the anterior chamber.  Poor iris dilation noted. The anterior chamber was then filled with Healon 5 viscoelastic in order to stretch open the pupil.  A clear cornea temporal wound was created using a 2.8 mm keratome.  A cystotome was used to initiate a flap in the anterior capsule and a Utrata forceps was used to create a continuous tear capsulorhexis.  During this step, there was noted to be wrinkling of the capsule.  After making space in the Healon with BSS, hydrodissection was attempted.  After the initial injection, the entire nucleus dropped through a posterior capsule opening. Viscoat was then instilled to keep the vitreous back.  Anterior vitrectomy was performed.  The anterior chamber was checked for vitreous using a Barraquer spatula.  Cortex was carefully removed using the I/A handpiece. No further vitreous presentation was noted. The anterior chamber was then refilled with viscoelastic and the wound was enlarged with the keratome. The intraocular lens, 17.5 diopter, Model LI61AO, was placed into the injector and injected into the sulcus with  optic capture in the capsulorrhexis opening. It was checked to make sure it was central and stable. Residual viscoelastic was removed using the I/A and the anterior chamber was again swept with a Baraquer spatula.  Miochol was instilled and a round pupil was noted. The anterior chamber was refilled with BSS.  The wounds were hydrated with BSS and were noted to be watertight.  The temporal wound was secured with one 10-0 nylon suture.  Topical pilocarpine 1%, Betagan, and Vigamox was applied. Generic Maxitrol ointment was applied and a hard shield placed.  The patient tolerated the procedure well and was sent to the Recovery Room in satisfactory condition.

## 2022-02-09 ENCOUNTER — TRANSFERRED RECORDS (OUTPATIENT)
Dept: HEALTH INFORMATION MANAGEMENT | Facility: HOSPITAL | Age: 67
End: 2022-02-09
Payer: COMMERCIAL

## 2022-02-15 ENCOUNTER — TRANSFERRED RECORDS (OUTPATIENT)
Dept: HEALTH INFORMATION MANAGEMENT | Facility: CLINIC | Age: 67
End: 2022-02-15
Payer: COMMERCIAL

## 2022-02-20 ENCOUNTER — HEALTH MAINTENANCE LETTER (OUTPATIENT)
Age: 67
End: 2022-02-20

## 2022-02-25 ENCOUNTER — MEDICAL CORRESPONDENCE (OUTPATIENT)
Dept: HEALTH INFORMATION MANAGEMENT | Facility: CLINIC | Age: 67
End: 2022-02-25
Payer: COMMERCIAL

## 2022-03-04 DIAGNOSIS — I51.7 RIGHT VENTRICULAR DILATION: ICD-10-CM

## 2022-03-04 DIAGNOSIS — I27.21 PULMONARY ARTERIAL HYPERTENSION (H): ICD-10-CM

## 2022-03-04 DIAGNOSIS — I50.810 RIGHT HEART FAILURE, NYHA CLASS 3 (H): ICD-10-CM

## 2022-03-04 DIAGNOSIS — J30.2 SEASONAL ALLERGIES: ICD-10-CM

## 2022-03-04 RX ORDER — DIGOXIN 125 MCG
TABLET ORAL
Qty: 90 TABLET | Refills: 0 | Status: SHIPPED | OUTPATIENT
Start: 2022-03-04 | End: 2022-06-06

## 2022-03-04 RX ORDER — MONTELUKAST SODIUM 10 MG/1
TABLET ORAL
Qty: 90 TABLET | Refills: 0 | Status: SHIPPED | OUTPATIENT
Start: 2022-03-04 | End: 2022-06-07

## 2022-03-04 NOTE — TELEPHONE ENCOUNTER
Montelukast 10 mg      Last Written Prescription Date:  12-2-21  Last Fill Quantity: 90,   # refills: 0  Last Office Visit: 1-18-22  Future Office visit:    Next 5 appointments (look out 90 days)    Mar 22, 2022  1:15 PM  (Arrive by 1:00 PM)  Return Visit with BERNARDINO Cordero CNP  [facility] [place] ([facility] [place] ) [address]  [place] [address]  [phone]   May 31, 2022  9:00 AM  (Arrive by 8:45 AM)  SHORT with Amparo Alvares NP  [facility] [place] ([facility] [place] ) [address]  [place] [address]  [phone]

## 2022-03-18 DIAGNOSIS — I27.21 SEVERE PULMONARY ARTERIAL SYSTOLIC HYPERTENSION (H): ICD-10-CM

## 2022-03-18 DIAGNOSIS — I51.7 RIGHT VENTRICULAR DILATION: Primary | ICD-10-CM

## 2022-03-18 DIAGNOSIS — I50.30 DIASTOLIC HEART FAILURE, UNSPECIFIED HF CHRONICITY (H): ICD-10-CM

## 2022-03-24 DIAGNOSIS — R06.09 DOE (DYSPNEA ON EXERTION): ICD-10-CM

## 2022-03-24 DIAGNOSIS — I27.21 PULMONARY ARTERIAL HYPERTENSION (H): ICD-10-CM

## 2022-03-24 RX ORDER — TORSEMIDE 20 MG/1
TABLET ORAL
Qty: 270 TABLET | Refills: 1 | Status: SHIPPED | OUTPATIENT
Start: 2022-03-24 | End: 2022-09-14

## 2022-04-08 DIAGNOSIS — T50.905A DRUG-INDUCED HYPOKALEMIA: ICD-10-CM

## 2022-04-08 DIAGNOSIS — E87.6 DRUG-INDUCED HYPOKALEMIA: ICD-10-CM

## 2022-04-11 RX ORDER — POTASSIUM CHLORIDE 1500 MG/1
TABLET, EXTENDED RELEASE ORAL
Qty: 180 TABLET | Refills: 1 | Status: SHIPPED | OUTPATIENT
Start: 2022-04-11 | End: 2022-10-28

## 2022-05-29 ASSESSMENT — PATIENT HEALTH QUESTIONNAIRE - PHQ9
10. IF YOU CHECKED OFF ANY PROBLEMS, HOW DIFFICULT HAVE THESE PROBLEMS MADE IT FOR YOU TO DO YOUR WORK, TAKE CARE OF THINGS AT HOME, OR GET ALONG WITH OTHER PEOPLE: NOT DIFFICULT AT ALL
SUM OF ALL RESPONSES TO PHQ QUESTIONS 1-9: 1
SUM OF ALL RESPONSES TO PHQ QUESTIONS 1-9: 1
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
## 2022-05-31 ENCOUNTER — OFFICE VISIT (OUTPATIENT)
Dept: FAMILY MEDICINE | Facility: OTHER | Age: 67
End: 2022-05-31
Attending: NURSE PRACTITIONER
Payer: COMMERCIAL

## 2022-05-31 VITALS
HEART RATE: 94 BPM | WEIGHT: 231.4 LBS | OXYGEN SATURATION: 92 % | HEIGHT: 71 IN | DIASTOLIC BLOOD PRESSURE: 70 MMHG | BODY MASS INDEX: 32.4 KG/M2 | SYSTOLIC BLOOD PRESSURE: 102 MMHG | TEMPERATURE: 97.4 F

## 2022-05-31 DIAGNOSIS — Z12.5 SCREENING FOR PROSTATE CANCER: ICD-10-CM

## 2022-05-31 DIAGNOSIS — J30.2 SEASONAL ALLERGIES: ICD-10-CM

## 2022-05-31 DIAGNOSIS — F33.41 RECURRENT MAJOR DEPRESSIVE DISORDER, IN PARTIAL REMISSION (H): ICD-10-CM

## 2022-05-31 DIAGNOSIS — F17.200 TOBACCO USE DISORDER: ICD-10-CM

## 2022-05-31 DIAGNOSIS — I50.30 DIASTOLIC HEART FAILURE, UNSPECIFIED HF CHRONICITY (H): ICD-10-CM

## 2022-05-31 DIAGNOSIS — F43.10 PTSD (POST-TRAUMATIC STRESS DISORDER): ICD-10-CM

## 2022-05-31 DIAGNOSIS — R97.20 ELEVATED PROSTATE SPECIFIC ANTIGEN (PSA): ICD-10-CM

## 2022-05-31 DIAGNOSIS — J43.9 PULMONARY EMPHYSEMA, UNSPECIFIED EMPHYSEMA TYPE (H): ICD-10-CM

## 2022-05-31 DIAGNOSIS — I27.21 SEVERE PULMONARY ARTERIAL SYSTOLIC HYPERTENSION (H): ICD-10-CM

## 2022-05-31 DIAGNOSIS — E78.5 HYPERLIPIDEMIA, UNSPECIFIED HYPERLIPIDEMIA TYPE: ICD-10-CM

## 2022-05-31 DIAGNOSIS — F41.1 GENERALIZED ANXIETY DISORDER: ICD-10-CM

## 2022-05-31 DIAGNOSIS — R73.9 HYPERGLYCEMIA: Primary | ICD-10-CM

## 2022-05-31 LAB
ALBUMIN SERPL-MCNC: 3.7 G/DL (ref 3.4–5)
ALP SERPL-CCNC: 80 U/L (ref 40–150)
ALT SERPL W P-5'-P-CCNC: 22 U/L (ref 0–70)
ANION GAP SERPL CALCULATED.3IONS-SCNC: 4 MMOL/L (ref 3–14)
AST SERPL W P-5'-P-CCNC: 15 U/L (ref 0–45)
BILIRUB SERPL-MCNC: 0.6 MG/DL (ref 0.2–1.3)
BUN SERPL-MCNC: 15 MG/DL (ref 7–30)
CALCIUM SERPL-MCNC: 8.7 MG/DL (ref 8.5–10.1)
CHLORIDE BLD-SCNC: 107 MMOL/L (ref 94–109)
CHOLEST SERPL-MCNC: 137 MG/DL
CO2 SERPL-SCNC: 27 MMOL/L (ref 20–32)
CREAT SERPL-MCNC: 1.03 MG/DL (ref 0.66–1.25)
EST. AVERAGE GLUCOSE BLD GHB EST-MCNC: 120 MG/DL
FASTING STATUS PATIENT QL REPORTED: YES
GFR SERPL CREATININE-BSD FRML MDRD: 80 ML/MIN/1.73M2
GLUCOSE BLD-MCNC: 96 MG/DL (ref 70–99)
HBA1C MFR BLD: 5.8 % (ref 0–5.6)
HDLC SERPL-MCNC: 29 MG/DL
LDLC SERPL CALC-MCNC: 53 MG/DL
NONHDLC SERPL-MCNC: 108 MG/DL
POTASSIUM BLD-SCNC: 3.9 MMOL/L (ref 3.4–5.3)
PROT SERPL-MCNC: 7.5 G/DL (ref 6.8–8.8)
PSA SERPL-MCNC: 6.89 UG/L (ref 0–4)
SODIUM SERPL-SCNC: 138 MMOL/L (ref 133–144)
TRIGL SERPL-MCNC: 277 MG/DL
TSH SERPL DL<=0.005 MIU/L-ACNC: 3.02 MU/L (ref 0.4–4)

## 2022-05-31 PROCEDURE — 84153 ASSAY OF PSA TOTAL: CPT | Mod: ZL | Performed by: NURSE PRACTITIONER

## 2022-05-31 PROCEDURE — 83036 HEMOGLOBIN GLYCOSYLATED A1C: CPT | Mod: ZL | Performed by: NURSE PRACTITIONER

## 2022-05-31 PROCEDURE — 80053 COMPREHEN METABOLIC PANEL: CPT | Mod: ZL | Performed by: NURSE PRACTITIONER

## 2022-05-31 PROCEDURE — 80061 LIPID PANEL: CPT | Mod: ZL | Performed by: NURSE PRACTITIONER

## 2022-05-31 PROCEDURE — G0463 HOSPITAL OUTPT CLINIC VISIT: HCPCS

## 2022-05-31 PROCEDURE — G0463 HOSPITAL OUTPT CLINIC VISIT: HCPCS | Mod: 25

## 2022-05-31 PROCEDURE — 99215 OFFICE O/P EST HI 40 MIN: CPT | Performed by: NURSE PRACTITIONER

## 2022-05-31 PROCEDURE — 84443 ASSAY THYROID STIM HORMONE: CPT | Mod: ZL | Performed by: NURSE PRACTITIONER

## 2022-05-31 PROCEDURE — 36415 COLL VENOUS BLD VENIPUNCTURE: CPT | Mod: ZL | Performed by: NURSE PRACTITIONER

## 2022-05-31 ASSESSMENT — ANXIETY QUESTIONNAIRES
GAD7 TOTAL SCORE: 3
IF YOU CHECKED OFF ANY PROBLEMS ON THIS QUESTIONNAIRE, HOW DIFFICULT HAVE THESE PROBLEMS MADE IT FOR YOU TO DO YOUR WORK, TAKE CARE OF THINGS AT HOME, OR GET ALONG WITH OTHER PEOPLE: NOT DIFFICULT AT ALL
7. FEELING AFRAID AS IF SOMETHING AWFUL MIGHT HAPPEN: SEVERAL DAYS
4. TROUBLE RELAXING: NOT AT ALL
5. BEING SO RESTLESS THAT IT IS HARD TO SIT STILL: SEVERAL DAYS
2. NOT BEING ABLE TO STOP OR CONTROL WORRYING: NOT AT ALL
GAD7 TOTAL SCORE: 3
3. WORRYING TOO MUCH ABOUT DIFFERENT THINGS: NOT AT ALL
6. BECOMING EASILY ANNOYED OR IRRITABLE: NOT AT ALL
1. FEELING NERVOUS, ANXIOUS, OR ON EDGE: SEVERAL DAYS

## 2022-05-31 ASSESSMENT — PAIN SCALES - GENERAL: PAINLEVEL: NO PAIN (0)

## 2022-05-31 NOTE — NURSING NOTE
"Chief Complaint   Patient presents with     COPD       Initial /70 (BP Location: Left arm, Patient Position: Chair, Cuff Size: Adult Large)   Pulse 94   Temp 97.4  F (36.3  C) (Tympanic)   Ht 1.803 m (5' 11\")   Wt 105 kg (231 lb 6.4 oz)   SpO2 (!) 88%   BMI 32.27 kg/m   Estimated body mass index is 32.27 kg/m  as calculated from the following:    Height as of this encounter: 1.803 m (5' 11\").    Weight as of this encounter: 105 kg (231 lb 6.4 oz).  Medication Reconciliation: complete  Rose Her LPN  "

## 2022-06-06 DIAGNOSIS — I51.7 RIGHT VENTRICULAR DILATION: ICD-10-CM

## 2022-06-06 DIAGNOSIS — I27.21 PULMONARY ARTERIAL HYPERTENSION (H): ICD-10-CM

## 2022-06-06 DIAGNOSIS — J30.2 SEASONAL ALLERGIES: ICD-10-CM

## 2022-06-06 DIAGNOSIS — I50.810 RIGHT HEART FAILURE, NYHA CLASS 3 (H): ICD-10-CM

## 2022-06-06 RX ORDER — DIGOXIN 125 MCG
TABLET ORAL
Qty: 90 TABLET | Refills: 0 | Status: SHIPPED | OUTPATIENT
Start: 2022-06-06 | End: 2022-09-07

## 2022-06-06 NOTE — TELEPHONE ENCOUNTER
digoxin (LANOXIN) 125 MCG tablet      Last Written Prescription Date:  3-4-22  Last Fill Quantity: 90,   # refills: 0  Last Office Visit: 9-21-21  Future Office visit:    Next 5 appointments (look out 90 days)    Aug 02, 2022  2:15 PM  (Arrive by 2:00 PM)  Return Visit with BERNARDINO Cordero CNP  [facility] ([facility] ) [address]  [place] [address]  [phone]           Routing refill request to provider for review/approval because:   Recent (6 mo) or future (30 days) visit within the authorizing provider's specialty

## 2022-06-07 RX ORDER — MONTELUKAST SODIUM 10 MG/1
TABLET ORAL
Qty: 90 TABLET | Refills: 3 | Status: SHIPPED | OUTPATIENT
Start: 2022-06-07 | End: 2023-01-01

## 2022-06-07 NOTE — TELEPHONE ENCOUNTER
singulair       Last Written Prescription Date:  3-4-22  Last Fill Quantity: 90,   # refills: 0  Last Office Visit: 5-31-22  Future Office visit:    Next 5 appointments (look out 90 days)    Aug 02, 2022  2:15 PM  (Arrive by 2:00 PM)  Return Visit with BERNARDINO Cordero CNP  [facility] [place] ([facility] [place] ) [address]  [place] [address]  [phone]

## 2022-06-10 DIAGNOSIS — Z91.030 BEE STING ALLERGY: ICD-10-CM

## 2022-06-13 RX ORDER — EPINEPHRINE 0.3 MG/.3ML
INJECTION SUBCUTANEOUS
Qty: 2 EACH | Refills: 1 | Status: SHIPPED | OUTPATIENT
Start: 2022-06-13 | End: 2023-01-01

## 2022-06-26 ENCOUNTER — TRANSFERRED RECORDS (OUTPATIENT)
Dept: HEALTH INFORMATION MANAGEMENT | Facility: HOSPITAL | Age: 67
End: 2022-06-26
Payer: COMMERCIAL

## 2022-08-02 ENCOUNTER — OFFICE VISIT (OUTPATIENT)
Dept: CARDIOLOGY | Facility: OTHER | Age: 67
End: 2022-08-02
Attending: NURSE PRACTITIONER
Payer: COMMERCIAL

## 2022-08-02 VITALS
DIASTOLIC BLOOD PRESSURE: 83 MMHG | HEIGHT: 71 IN | SYSTOLIC BLOOD PRESSURE: 134 MMHG | OXYGEN SATURATION: 94 % | WEIGHT: 228 LBS | TEMPERATURE: 97.3 F | BODY MASS INDEX: 31.92 KG/M2 | HEART RATE: 83 BPM

## 2022-08-02 DIAGNOSIS — Z51.81 ENCOUNTER FOR MONITORING DIGOXIN THERAPY: ICD-10-CM

## 2022-08-02 DIAGNOSIS — I50.810 RIGHT HEART FAILURE, NYHA CLASS 3 (H): ICD-10-CM

## 2022-08-02 DIAGNOSIS — I51.89 DIASTOLIC DYSFUNCTION: ICD-10-CM

## 2022-08-02 DIAGNOSIS — I51.7 RIGHT VENTRICULAR DILATION: ICD-10-CM

## 2022-08-02 DIAGNOSIS — M95.4 CHEST WALL DEFORMITY: ICD-10-CM

## 2022-08-02 DIAGNOSIS — J44.9 SEVERE CHRONIC OBSTRUCTIVE PULMONARY DISEASE (H): ICD-10-CM

## 2022-08-02 DIAGNOSIS — Z98.890: ICD-10-CM

## 2022-08-02 DIAGNOSIS — Z98.890 STATUS POST CORONARY ANGIOGRAM: ICD-10-CM

## 2022-08-02 DIAGNOSIS — F17.200 TOBACCO USE DISORDER: ICD-10-CM

## 2022-08-02 DIAGNOSIS — E78.2 MIXED HYPERLIPIDEMIA: ICD-10-CM

## 2022-08-02 DIAGNOSIS — I27.21 PULMONARY ARTERIAL HYPERTENSION (H): Primary | ICD-10-CM

## 2022-08-02 DIAGNOSIS — Z79.899 ENCOUNTER FOR MONITORING DIGOXIN THERAPY: ICD-10-CM

## 2022-08-02 DIAGNOSIS — R73.03 PREDIABETES: ICD-10-CM

## 2022-08-02 LAB
ANION GAP SERPL CALCULATED.3IONS-SCNC: 5 MMOL/L (ref 3–14)
BUN SERPL-MCNC: 17 MG/DL (ref 7–30)
CALCIUM SERPL-MCNC: 9.3 MG/DL (ref 8.5–10.1)
CHLORIDE BLD-SCNC: 102 MMOL/L (ref 94–109)
CO2 SERPL-SCNC: 30 MMOL/L (ref 20–32)
CREAT SERPL-MCNC: 1.01 MG/DL (ref 0.66–1.25)
DIGOXIN SERPL-MCNC: 0.7 UG/L
GFR SERPL CREATININE-BSD FRML MDRD: 82 ML/MIN/1.73M2
GLUCOSE BLD-MCNC: 79 MG/DL (ref 70–99)
POTASSIUM BLD-SCNC: 4.6 MMOL/L (ref 3.4–5.3)
SODIUM SERPL-SCNC: 137 MMOL/L (ref 133–144)

## 2022-08-02 PROCEDURE — 80048 BASIC METABOLIC PNL TOTAL CA: CPT | Mod: ZL | Performed by: NURSE PRACTITIONER

## 2022-08-02 PROCEDURE — 36415 COLL VENOUS BLD VENIPUNCTURE: CPT | Mod: ZL | Performed by: NURSE PRACTITIONER

## 2022-08-02 PROCEDURE — G0463 HOSPITAL OUTPT CLINIC VISIT: HCPCS

## 2022-08-02 PROCEDURE — 99214 OFFICE O/P EST MOD 30 MIN: CPT | Performed by: NURSE PRACTITIONER

## 2022-08-02 PROCEDURE — 80162 ASSAY OF DIGOXIN TOTAL: CPT | Mod: ZL | Performed by: NURSE PRACTITIONER

## 2022-08-02 RX ORDER — HYDROXYZINE PAMOATE 25 MG/1
25 CAPSULE ORAL 2 TIMES DAILY PRN
COMMUNITY
Start: 2022-07-25

## 2022-08-02 ASSESSMENT — PAIN SCALES - GENERAL: PAINLEVEL: NO PAIN (0)

## 2022-08-02 NOTE — PATIENT INSTRUCTIONS
Thank you for allowing our team to participate in your care. Please call our office at [phone] with scheduling questions or if you need to cancel or change your appointment. With any other questions or concerns you may call Joaquina cardiology nurse at [phone].       If you experience chest pain, chest pressure, chest tightness, shortness of breath, fainting, lightheadedness, nausea, vomiting, or other concerning symptoms, please report to the Emergency Department or call 911. These symptoms may be emergent, and best treated in the Emergency Department.            Blood pressure is controlled  Hyperlipidemia is controlled  COPD- chronic, managed by PCP.   Strongly encourage smoking cessation  Trial of decreasing torsemide from 60 mg (3 tablets) once daily to 40 mg (2 tablets) once daily  Monitor for signs of increased shortness of breath, increased lower extremity edema, waking up during the night short of breath, weight gain of 3 pounds in one day or 5 pounds in one week.   Continue monitoring sodium intake and fluid restriction of 2.5 L (2500 mL)  Echocardiogram   CT chest for ongoing tobacco abuse, left sided chest wall deformity  Labs today- BMP and digoxin level  Follow-up in 4-6 weeks.     Patricia Lake, CNP

## 2022-08-02 NOTE — NURSING NOTE
"Chief Complaint   Patient presents with     Follow Up       Initial /83 (BP Location: Right arm)   Pulse 83   Temp 97.3  F (36.3  C) (Tympanic)   Ht 1.803 m (5' 11\")   Wt 103.4 kg (228 lb)   SpO2 94%   BMI 31.80 kg/m   Estimated body mass index is 31.8 kg/m  as calculated from the following:    Height as of this encounter: 1.803 m (5' 11\").    Weight as of this encounter: 103.4 kg (228 lb).  Medication Reconciliation: complete  Joaquina Jonas LPN    "
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
## 2022-08-03 PROBLEM — E78.2 MIXED HYPERLIPIDEMIA: Status: ACTIVE | Noted: 2022-08-03

## 2022-08-03 PROBLEM — I51.89 DIASTOLIC DYSFUNCTION: Status: ACTIVE | Noted: 2021-04-19

## 2022-08-03 PROBLEM — Z79.899 ENCOUNTER FOR MONITORING DIGOXIN THERAPY: Status: ACTIVE | Noted: 2022-08-03

## 2022-08-03 PROBLEM — Z51.81 ENCOUNTER FOR MONITORING DIGOXIN THERAPY: Status: ACTIVE | Noted: 2022-08-03

## 2022-08-03 PROBLEM — I50.810: Status: ACTIVE | Noted: 2022-08-03

## 2022-08-03 PROBLEM — J44.9 SEVERE CHRONIC OBSTRUCTIVE PULMONARY DISEASE (H): Status: ACTIVE | Noted: 2022-08-03

## 2022-08-03 PROBLEM — R73.03 PREDIABETES: Status: ACTIVE | Noted: 2022-08-03

## 2022-08-03 PROBLEM — I27.21 PULMONARY ARTERIAL HYPERTENSION (H): Status: ACTIVE | Noted: 2020-11-20

## 2022-09-06 DIAGNOSIS — I50.810 RIGHT HEART FAILURE, NYHA CLASS 3 (H): ICD-10-CM

## 2022-09-06 DIAGNOSIS — I51.7 RIGHT VENTRICULAR DILATION: ICD-10-CM

## 2022-09-06 DIAGNOSIS — I27.21 PULMONARY ARTERIAL HYPERTENSION (H): ICD-10-CM

## 2022-09-07 RX ORDER — DIGOXIN 125 MCG
TABLET ORAL
Qty: 90 TABLET | Refills: 0 | Status: SHIPPED | OUTPATIENT
Start: 2022-09-07 | End: 2022-01-01

## 2022-09-14 ENCOUNTER — OFFICE VISIT (OUTPATIENT)
Dept: CARDIOLOGY | Facility: OTHER | Age: 67
End: 2022-09-14
Attending: NURSE PRACTITIONER
Payer: COMMERCIAL

## 2022-09-14 ENCOUNTER — ANCILLARY PROCEDURE (OUTPATIENT)
Dept: GENERAL RADIOLOGY | Facility: OTHER | Age: 67
End: 2022-09-14
Attending: NURSE PRACTITIONER
Payer: COMMERCIAL

## 2022-09-14 VITALS
BODY MASS INDEX: 31.9 KG/M2 | HEIGHT: 71 IN | HEART RATE: 88 BPM | TEMPERATURE: 97.2 F | DIASTOLIC BLOOD PRESSURE: 70 MMHG | SYSTOLIC BLOOD PRESSURE: 104 MMHG | OXYGEN SATURATION: 92 % | WEIGHT: 227.9 LBS | RESPIRATION RATE: 20 BRPM

## 2022-09-14 DIAGNOSIS — I27.21 PULMONARY ARTERIAL HYPERTENSION (H): Primary | ICD-10-CM

## 2022-09-14 DIAGNOSIS — I50.810 RIGHT HEART FAILURE, NYHA CLASS 3 (H): ICD-10-CM

## 2022-09-14 DIAGNOSIS — R06.09 DOE (DYSPNEA ON EXERTION): ICD-10-CM

## 2022-09-14 DIAGNOSIS — I51.89 DIASTOLIC DYSFUNCTION: ICD-10-CM

## 2022-09-14 DIAGNOSIS — I51.7 RIGHT VENTRICULAR DILATION: ICD-10-CM

## 2022-09-14 DIAGNOSIS — J44.1 COPD EXACERBATION (H): ICD-10-CM

## 2022-09-14 DIAGNOSIS — I27.21 PULMONARY ARTERIAL HYPERTENSION (H): ICD-10-CM

## 2022-09-14 DIAGNOSIS — R05.9 COUGH: ICD-10-CM

## 2022-09-14 DIAGNOSIS — J44.9 SEVERE CHRONIC OBSTRUCTIVE PULMONARY DISEASE (H): ICD-10-CM

## 2022-09-14 DIAGNOSIS — R73.03 PREDIABETES: ICD-10-CM

## 2022-09-14 DIAGNOSIS — F17.210 SMOKING GREATER THAN 40 PACK YEARS: ICD-10-CM

## 2022-09-14 DIAGNOSIS — E78.2 MIXED HYPERLIPIDEMIA: ICD-10-CM

## 2022-09-14 PROCEDURE — 71046 X-RAY EXAM CHEST 2 VIEWS: CPT | Mod: TC

## 2022-09-14 PROCEDURE — G0463 HOSPITAL OUTPT CLINIC VISIT: HCPCS | Mod: 25

## 2022-09-14 PROCEDURE — 99214 OFFICE O/P EST MOD 30 MIN: CPT | Performed by: NURSE PRACTITIONER

## 2022-09-14 RX ORDER — PREDNISONE 20 MG/1
40 TABLET ORAL DAILY
Qty: 10 TABLET | Refills: 0 | Status: SHIPPED | OUTPATIENT
Start: 2022-09-14 | End: 2022-09-19

## 2022-09-14 RX ORDER — TORSEMIDE 20 MG/1
20 TABLET ORAL DAILY
Qty: 270 TABLET | Refills: 1 | COMMUNITY
Start: 2022-09-14 | End: 2023-01-01

## 2022-09-14 RX ORDER — DOXYCYCLINE 100 MG/1
100 CAPSULE ORAL 2 TIMES DAILY
Qty: 14 CAPSULE | Refills: 0 | Status: SHIPPED | OUTPATIENT
Start: 2022-09-14 | End: 2022-09-21

## 2022-09-14 ASSESSMENT — PAIN SCALES - GENERAL: PAINLEVEL: NO PAIN (0)

## 2022-09-14 NOTE — PATIENT INSTRUCTIONS
CXR today for new cough. Euvolemic today.   Continue with current medications.  Keep appointments for CT chest and echocardiogram on the 29ths  Follow-up with cardiology in 6 months, certainly sooner with acute concerns (or abnormalities on upcoming testing we will schedule you sooner).    Patricia Lake, CNP

## 2022-09-14 NOTE — NURSING NOTE
"Chief Complaint   Patient presents with     Follow Up     Follow up-last visit 8/2/22       Initial /70 (BP Location: Right arm, Patient Position: Sitting, Cuff Size: Adult Regular)   Pulse 88   Temp 97.2  F (36.2  C) (Tympanic)   Resp 20   Ht 1.803 m (5' 11\")   Wt 103.4 kg (227 lb 14.4 oz)   SpO2 92%   BMI 31.79 kg/m   Estimated body mass index is 31.79 kg/m  as calculated from the following:    Height as of this encounter: 1.803 m (5' 11\").    Weight as of this encounter: 103.4 kg (227 lb 14.4 oz).  Medication Reconciliation: complete  MAREN MCADAMS LPN    "
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
## 2022-09-28 PROBLEM — E78.2 MIXED HYPERLIPIDEMIA: Status: RESOLVED | Noted: 2022-08-03 | Resolved: 2022-09-28

## 2022-09-29 ENCOUNTER — HOSPITAL ENCOUNTER (OUTPATIENT)
Dept: CARDIOLOGY | Facility: HOSPITAL | Age: 67
Discharge: HOME | End: 2022-09-29
Attending: NURSE PRACTITIONER
Payer: COMMERCIAL

## 2022-09-29 ENCOUNTER — HOSPITAL ENCOUNTER (OUTPATIENT)
Dept: CT IMAGING | Facility: HOSPITAL | Age: 67
Discharge: HOME | End: 2022-09-29
Attending: NURSE PRACTITIONER
Payer: COMMERCIAL

## 2022-09-29 DIAGNOSIS — I27.21 PULMONARY ARTERIAL HYPERTENSION (H): ICD-10-CM

## 2022-09-29 DIAGNOSIS — J44.9 SEVERE CHRONIC OBSTRUCTIVE PULMONARY DISEASE (H): ICD-10-CM

## 2022-09-29 DIAGNOSIS — I50.810 RIGHT HEART FAILURE, NYHA CLASS 3 (H): ICD-10-CM

## 2022-09-29 DIAGNOSIS — I51.7 RIGHT VENTRICULAR DILATION: ICD-10-CM

## 2022-09-29 DIAGNOSIS — M95.4 CHEST WALL DEFORMITY: ICD-10-CM

## 2022-09-29 LAB — LVEF ECHO: NORMAL

## 2022-09-29 PROCEDURE — 250N000011 HC RX IP 250 OP 636: Performed by: RADIOLOGY

## 2022-09-29 PROCEDURE — 71260 CT THORAX DX C+: CPT

## 2022-09-29 PROCEDURE — 93306 TTE W/DOPPLER COMPLETE: CPT

## 2022-09-29 RX ORDER — IOPAMIDOL 755 MG/ML
63 INJECTION, SOLUTION INTRAVASCULAR ONCE
Status: COMPLETED | OUTPATIENT
Start: 2022-09-29 | End: 2022-09-29

## 2022-09-29 RX ADMIN — IOPAMIDOL 63 ML: 755 INJECTION, SOLUTION INTRAVENOUS at 10:15

## 2022-10-14 DIAGNOSIS — I51.7 CARDIOMEGALY: ICD-10-CM

## 2022-10-14 DIAGNOSIS — J30.2 SEASONAL ALLERGIES: ICD-10-CM

## 2022-10-14 RX ORDER — CETIRIZINE HYDROCHLORIDE 10 MG/1
TABLET ORAL
Qty: 30 TABLET | Refills: 0 | Status: SHIPPED | OUTPATIENT
Start: 2022-10-14 | End: 2022-11-11

## 2022-10-14 RX ORDER — ASPIRIN 81 MG/1
TABLET, COATED ORAL
Qty: 30 TABLET | Refills: 0 | Status: SHIPPED | OUTPATIENT
Start: 2022-10-14 | End: 2022-01-01

## 2022-10-14 NOTE — TELEPHONE ENCOUNTER
ASPIRIN LOW DOSE      Last Written Prescription Date:  9/21/21  Last Fill Quantity: 30,   # refills: 1  Last Office Visit: 9/30/22  Future Office visit:       Routing refill request to provider for review/approval because:      cetirizine      Last Written Prescription Date:  10/6/21  Last Fill Quantity: 90,   # refills: 3  Last Office Visit: 9/30/22  Future Office visit:       Routing refill request to provider for review/approval because:

## 2022-10-28 DIAGNOSIS — T50.905A DRUG-INDUCED HYPOKALEMIA: ICD-10-CM

## 2022-10-28 DIAGNOSIS — E87.6 DRUG-INDUCED HYPOKALEMIA: ICD-10-CM

## 2022-10-28 RX ORDER — POTASSIUM CHLORIDE 1500 MG/1
TABLET, EXTENDED RELEASE ORAL
Qty: 180 TABLET | Refills: 0 | Status: SHIPPED | OUTPATIENT
Start: 2022-10-28 | End: 2023-01-01

## 2022-10-29 ENCOUNTER — HEALTH MAINTENANCE LETTER (OUTPATIENT)
Age: 67
End: 2022-10-29

## 2022-11-11 ENCOUNTER — APPOINTMENT (OUTPATIENT)
Dept: GENERAL RADIOLOGY | Facility: HOSPITAL | Age: 67
End: 2022-11-11
Attending: EMERGENCY MEDICINE
Payer: COMMERCIAL

## 2022-11-11 ENCOUNTER — HOSPITAL ENCOUNTER (EMERGENCY)
Facility: HOSPITAL | Age: 67
Discharge: HOME | End: 2022-11-12
Attending: EMERGENCY MEDICINE | Admitting: EMERGENCY MEDICINE
Payer: COMMERCIAL

## 2022-11-11 ENCOUNTER — APPOINTMENT (OUTPATIENT)
Dept: ULTRASOUND IMAGING | Facility: HOSPITAL | Age: 67
End: 2022-11-11
Attending: EMERGENCY MEDICINE
Payer: COMMERCIAL

## 2022-11-11 DIAGNOSIS — J18.9 COMMUNITY ACQUIRED PNEUMONIA: ICD-10-CM

## 2022-11-11 LAB
ALBUMIN SERPL BCG-MCNC: 4 G/DL (ref 3.5–5.2)
ALP SERPL-CCNC: 90 U/L (ref 40–129)
ALT SERPL W P-5'-P-CCNC: 13 U/L (ref 10–50)
ANION GAP SERPL CALCULATED.3IONS-SCNC: 11 MMOL/L (ref 7–15)
AST SERPL W P-5'-P-CCNC: 20 U/L (ref 10–50)
BASOPHILS # BLD AUTO: 0.1 10E3/UL (ref 0–0.2)
BASOPHILS NFR BLD AUTO: 1 %
BILIRUB SERPL-MCNC: 0.3 MG/DL
BUN SERPL-MCNC: 16.1 MG/DL (ref 8–23)
CALCIUM SERPL-MCNC: 9.1 MG/DL (ref 8.8–10.2)
CHLORIDE SERPL-SCNC: 99 MMOL/L (ref 98–107)
CREAT SERPL-MCNC: 1.01 MG/DL (ref 0.67–1.17)
DEPRECATED HCO3 PLAS-SCNC: 27 MMOL/L (ref 22–29)
EOSINOPHIL # BLD AUTO: 0.3 10E3/UL (ref 0–0.7)
EOSINOPHIL NFR BLD AUTO: 3 %
ERYTHROCYTE [DISTWIDTH] IN BLOOD BY AUTOMATED COUNT: 12.8 % (ref 10–15)
FLUAV RNA SPEC QL NAA+PROBE: NEGATIVE
FLUBV RNA RESP QL NAA+PROBE: NEGATIVE
GFR SERPL CREATININE-BSD FRML MDRD: 82 ML/MIN/1.73M2
GLUCOSE SERPL-MCNC: 117 MG/DL (ref 70–99)
HCT VFR BLD AUTO: 54.8 % (ref 40–53)
HGB BLD-MCNC: 18.6 G/DL (ref 13.3–17.7)
HOLD SPECIMEN: NORMAL
IMM GRANULOCYTES # BLD: 0 10E3/UL
IMM GRANULOCYTES NFR BLD: 0 %
INR PPP: 1.05 (ref 0.85–1.15)
LACTATE SERPL-SCNC: 2 MMOL/L (ref 0.7–2)
LIPASE SERPL-CCNC: 20 U/L (ref 13–60)
LYMPHOCYTES # BLD AUTO: 4.4 10E3/UL (ref 0.8–5.3)
LYMPHOCYTES NFR BLD AUTO: 38 %
MCH RBC QN AUTO: 31.6 PG (ref 26.5–33)
MCHC RBC AUTO-ENTMCNC: 33.9 G/DL (ref 31.5–36.5)
MCV RBC AUTO: 93 FL (ref 78–100)
MONOCYTES # BLD AUTO: 1.1 10E3/UL (ref 0–1.3)
MONOCYTES NFR BLD AUTO: 9 %
NEUTROPHILS # BLD AUTO: 5.8 10E3/UL (ref 1.6–8.3)
NEUTROPHILS NFR BLD AUTO: 49 %
NRBC # BLD AUTO: 0 10E3/UL
NRBC BLD AUTO-RTO: 0 /100
NT-PROBNP SERPL-MCNC: 2901 PG/ML (ref 0–900)
PLATELET # BLD AUTO: 264 10E3/UL (ref 150–450)
POTASSIUM SERPL-SCNC: 4.5 MMOL/L (ref 3.4–5.3)
PROT SERPL-MCNC: 7 G/DL (ref 6.4–8.3)
RBC # BLD AUTO: 5.88 10E6/UL (ref 4.4–5.9)
RSV RNA SPEC NAA+PROBE: NEGATIVE
SARS-COV-2 RNA RESP QL NAA+PROBE: NEGATIVE
SODIUM SERPL-SCNC: 137 MMOL/L (ref 136–145)
TROPONIN T SERPL HS-MCNC: 23 NG/L
WBC # BLD AUTO: 11.7 10E3/UL (ref 4–11)

## 2022-11-11 PROCEDURE — 85014 HEMATOCRIT: CPT | Performed by: EMERGENCY MEDICINE

## 2022-11-11 PROCEDURE — 80053 COMPREHEN METABOLIC PANEL: CPT | Performed by: EMERGENCY MEDICINE

## 2022-11-11 PROCEDURE — 84484 ASSAY OF TROPONIN QUANT: CPT | Performed by: EMERGENCY MEDICINE

## 2022-11-11 PROCEDURE — 99284 EMERGENCY DEPT VISIT MOD MDM: CPT | Mod: 25 | Performed by: EMERGENCY MEDICINE

## 2022-11-11 PROCEDURE — 93308 TTE F-UP OR LMTD: CPT | Mod: 26 | Performed by: EMERGENCY MEDICINE

## 2022-11-11 PROCEDURE — 250N000011 HC RX IP 250 OP 636: Performed by: EMERGENCY MEDICINE

## 2022-11-11 PROCEDURE — 85610 PROTHROMBIN TIME: CPT | Performed by: EMERGENCY MEDICINE

## 2022-11-11 PROCEDURE — 93005 ELECTROCARDIOGRAM TRACING: CPT

## 2022-11-11 PROCEDURE — 36415 COLL VENOUS BLD VENIPUNCTURE: CPT | Performed by: EMERGENCY MEDICINE

## 2022-11-11 PROCEDURE — 250N000013 HC RX MED GY IP 250 OP 250 PS 637: Performed by: EMERGENCY MEDICINE

## 2022-11-11 PROCEDURE — 84145 PROCALCITONIN (PCT): CPT | Performed by: EMERGENCY MEDICINE

## 2022-11-11 PROCEDURE — 87637 SARSCOV2&INF A&B&RSV AMP PRB: CPT | Performed by: EMERGENCY MEDICINE

## 2022-11-11 PROCEDURE — 87040 BLOOD CULTURE FOR BACTERIA: CPT | Performed by: EMERGENCY MEDICINE

## 2022-11-11 PROCEDURE — 93010 ELECTROCARDIOGRAM REPORT: CPT | Performed by: INTERNAL MEDICINE

## 2022-11-11 PROCEDURE — 71045 X-RAY EXAM CHEST 1 VIEW: CPT

## 2022-11-11 PROCEDURE — 93308 TTE F-UP OR LMTD: CPT | Mod: TC

## 2022-11-11 PROCEDURE — 250N000009 HC RX 250: Performed by: EMERGENCY MEDICINE

## 2022-11-11 PROCEDURE — C9803 HOPD COVID-19 SPEC COLLECT: HCPCS

## 2022-11-11 PROCEDURE — 83880 ASSAY OF NATRIURETIC PEPTIDE: CPT | Performed by: EMERGENCY MEDICINE

## 2022-11-11 PROCEDURE — 83605 ASSAY OF LACTIC ACID: CPT | Performed by: EMERGENCY MEDICINE

## 2022-11-11 PROCEDURE — 83690 ASSAY OF LIPASE: CPT | Performed by: EMERGENCY MEDICINE

## 2022-11-11 PROCEDURE — 258N000003 HC RX IP 258 OP 636: Performed by: EMERGENCY MEDICINE

## 2022-11-11 PROCEDURE — 99285 EMERGENCY DEPT VISIT HI MDM: CPT | Mod: 25,CS

## 2022-11-11 RX ORDER — ONDANSETRON 2 MG/ML
4 INJECTION INTRAMUSCULAR; INTRAVENOUS EVERY 30 MIN PRN
Status: DISCONTINUED | OUTPATIENT
Start: 2022-11-11 | End: 2022-11-12 | Stop reason: HOSPADM

## 2022-11-11 RX ORDER — AZITHROMYCIN 500 MG/5ML
500 INJECTION, POWDER, LYOPHILIZED, FOR SOLUTION INTRAVENOUS ONCE
Status: COMPLETED | OUTPATIENT
Start: 2022-11-11 | End: 2022-11-12

## 2022-11-11 RX ORDER — CEFTRIAXONE SODIUM 1 G/50ML
1 INJECTION, SOLUTION INTRAVENOUS ONCE
Status: COMPLETED | OUTPATIENT
Start: 2022-11-11 | End: 2022-11-12

## 2022-11-11 RX ADMIN — LIDOCAINE HYDROCHLORIDE 30 ML: 20 SOLUTION ORAL; TOPICAL at 22:30

## 2022-11-11 RX ADMIN — ONDANSETRON 4 MG: 2 INJECTION INTRAMUSCULAR; INTRAVENOUS at 22:31

## 2022-11-11 RX ADMIN — AZITHROMYCIN 500 MG: 500 INJECTION, POWDER, LYOPHILIZED, FOR SOLUTION INTRAVENOUS at 23:57

## 2022-11-11 RX ADMIN — CEFTRIAXONE SODIUM 1 G: 1 INJECTION, SOLUTION INTRAVENOUS at 23:57

## 2022-11-11 ASSESSMENT — ACTIVITIES OF DAILY LIVING (ADL): ADLS_ACUITY_SCORE: 35

## 2022-11-12 VITALS
TEMPERATURE: 98.6 F | RESPIRATION RATE: 22 BRPM | SYSTOLIC BLOOD PRESSURE: 115 MMHG | HEART RATE: 72 BPM | OXYGEN SATURATION: 93 % | DIASTOLIC BLOOD PRESSURE: 86 MMHG

## 2022-11-12 LAB
PROCALCITONIN SERPL IA-MCNC: 0.04 NG/ML
TROPONIN T SERPL HS-MCNC: 25 NG/L

## 2022-11-12 PROCEDURE — 250N000011 HC RX IP 250 OP 636: Performed by: EMERGENCY MEDICINE

## 2022-11-12 RX ORDER — AZITHROMYCIN 250 MG/1
250 TABLET, FILM COATED ORAL DAILY
Qty: 4 TABLET | Refills: 0 | Status: SHIPPED | OUTPATIENT
Start: 2022-11-12 | End: 2022-01-01

## 2022-11-12 RX ADMIN — FAMOTIDINE 20 MG: 10 INJECTION, SOLUTION INTRAVENOUS at 00:32

## 2022-11-12 ASSESSMENT — ENCOUNTER SYMPTOMS
VOMITING: 1
DIARRHEA: 0
ARTHRALGIAS: 0
DIFFICULTY URINATING: 0
HEADACHES: 0
ABDOMINAL DISTENTION: 1
BLOOD IN STOOL: 0
NECK STIFFNESS: 0
CONFUSION: 0
COLOR CHANGE: 0
SHORTNESS OF BREATH: 1
FEVER: 0
EYE REDNESS: 0
ABDOMINAL PAIN: 1

## 2022-11-12 ASSESSMENT — ACTIVITIES OF DAILY LIVING (ADL): ADLS_ACUITY_SCORE: 35

## 2022-11-12 NOTE — ED NOTES
Pt reports pain under left lower rib due to rib fracture over a year ago. States that it increased during coughing fits. Vomited up dark red blood earlier today. Has been increasingly short of breath. Currently on oxygen 2L/min via nasal cannula. Remaining vitals stable.

## 2022-11-12 NOTE — ED TRIAGE NOTES
C/o abdominal pain rated 7/10 that started today. Also states he was eating rice for dinner and it felt like it got stuck and he started coughing and vomited up bright red blood. C/o increasing SOB. Reports has CHF and COPD and uses oxygen as needed at home at 2 LPM via [place] that he uses only when he needs it. States has not used home oxygen in the past 2 days.      Triage Assessment     Row Name 11/11/22 [id number]       Triage Assessment (Adult)    Airway WDL WDL       Respiratory WDL    Respiratory WDL X;cough;rhythm/pattern    Rhythm/Pattern, Respiratory shortness of breath;dyspnea on exertion    Cough Frequency frequent    Cough Type good;productive

## 2022-11-12 NOTE — ED PROVIDER NOTES
"  History     Chief Complaint   Patient presents with     Vomiting     Shortness of Breath     HPI  Joey Irene is a 66 year old male who presents via triage for concerns of abdominal pain and shortness of breath. PMX significant for COPD and CHF. Pt tells me his upper abdominal pain started this morning and he was eating some rice this afternoon and started to choke on it. Pt reports vomiting after this and he noticed dark red blood in his emesis. Vomited once more and noticed is was bright red. Pt has not had any emesis since. Pt also feels \"gassy and bloated\". Pt does also endorse some shortness of breath and uses oxygen at home 2 liters as needed.      Allergies:  Allergies   Allergen Reactions     Bees Anaphylaxis     Seasonal Allergies Difficulty breathing and Cough     Bupropion Other (See Comments)     tremor     Tramadol Nausea and Swelling     Nunnelly Nite Time Dizziness and Nausea and Vomiting     Nyquil Cold &  [Night-Time Cold-Flu Relief] Dizziness and Nausea and Vomiting     Trichophyton Other (See Comments)       Problem List:    Patient Active Problem List    Diagnosis Date Noted     Encounter for monitoring digoxin therapy 08/03/2022     Priority: Medium     Prediabetes 08/03/2022     Priority: Medium     Severe chronic obstructive pulmonary disease (H) 08/03/2022     Priority: Medium     Right heart failure, NYHA class 3 (H) 08/03/2022     Priority: Medium     Encounter for smoking cessation counseling 04/19/2021     Priority: Medium     Diastolic dysfunction 04/19/2021     Priority: Medium     Status post coronary angiogram 12/11/2020     Priority: Medium     Pulmonary arterial hypertension (H) 11/20/2020     Priority: Medium     Right ventricular dilation 10/29/2020     Priority: Medium     Added automatically from request for surgery [id number]       Bee sting allergy 08/07/2020     Priority: Medium     Hyperglycemia 08/07/2020     Priority: Medium     Elevated prostate specific antigen (PSA) " 08/07/2020     Priority: Medium     History of fusion of cervical spine 08/06/2020     Priority: Medium     Generalized anxiety disorder 08/06/2020     Priority: Medium     Tobacco use disorder 08/06/2020     Priority: Medium     Pulmonary emphysema, unspecified emphysema type (H) 08/05/2020     Priority: Medium     Hyperlipidemia, unspecified hyperlipidemia type 08/05/2020     Priority: Medium     Cardiomegaly 08/05/2020     Priority: Medium     Enlarged prostate 08/05/2020     Priority: Medium     Diverticulosis 03/25/2020     Priority: Medium     Chronic bronchitis with emphysema (H) 02/03/2020     Priority: Medium     Class 1 obesity with body mass index (BMI) of 33.0 to 33.9 in adult 02/03/2020     Priority: Medium     Smoking greater than 40 pack years 02/03/2020     Priority: Medium     Pain due to total left knee replacement, sequela 01/27/2020     Priority: Medium     Cervical stenosis of spinal canal 01/13/2020     Priority: Medium     Cholesteatoma of left ear 03/04/2019     Priority: Medium     Nasal septal deviation 12/18/2017     Priority: Medium     Primary osteoarthritis of right knee 02/25/2017     Priority: Medium     Status post total left knee replacement 02/25/2017     Priority: Medium     Recurrent major depressive disorder, in partial remission (H) 11/21/2015     Priority: Medium     PTSD (post-traumatic stress disorder) 08/19/2015     Priority: Medium     Seasonal allergies 08/19/2015     Priority: Medium     Anxiety state 07/07/2011     Priority: Medium     Back pain, chronic 07/07/2011     Priority: Medium     Hearing loss 07/07/2011     Priority: Medium     Insomnia, unspecified type 07/07/2011     Priority: Medium     Sleep apnea 07/07/2011     Priority: Medium        Past Medical History:    Past Medical History:   Diagnosis Date     COPD (chronic obstructive pulmonary disease) (H)      Hypertension      Uncomplicated asthma        Past Surgical History:    Past Surgical History:    Procedure Laterality Date     CV CORONARY ANGIOGRAM N/A 12/11/2020    Procedure: Coronary Angiogram;  Surgeon: Aman Delgado MD;  Location: [facility]     CV RIGHT HEART CATH MEASUREMENTS RECORDED N/A 12/11/2020    Procedure: CV RIGHT HEART CATH;  Surgeon: Aman Delgado MD;  Location: [facility]     ENT SURGERY      patient has had three left ear surgeries, unsure what they did     FUSION CERVICAL POSTERIOR THREE+ LEVELS       HERNIA REPAIR, INGUINAL RT/LT Right     done times 3     JOINT REPLACEMENT Left      PHACOEMULSIFICATION WITH STANDARD INTRAOCULAR LENS IMPLANT Right 1/25/2022    Procedure: COMPLEX PHACOEMULSIFICATION CATARACT EXTRACTION POSTERIOR CHAMBER LENS RIGHT EYE: PUPIL STRETCHING RIGHT RIGHT;  Surgeon: Efra Gibson MD;  Location: [facility]     PHACOEMULSIFICATION WITH STANDARD INTRAOCULAR LENS IMPLANT Left 2/8/2022    Procedure: ANTERIOR VITRECTOMY AND POSTERIOR CHAMBER LENS IMPLANT;  Surgeon: Efra Gibson MD;  Location: [facility]     REPAIR HAMMER TOE Right        Family History:    Family History   Problem Relation Age of Onset     Lung Cancer Mother      Ovarian Cancer Sister        Social History:  Marital Status:   [4]  Social History     Tobacco Use     Smoking status: Every Day     Types: Cigars     Start date: 10/1/2021     Smokeless tobacco: Never   Vaping Use     Vaping Use: Never used   Substance Use Topics     Alcohol use: Never     Drug use: Never        Medications:    acetaminophen (TYLENOL) 500 MG tablet  albuterol (PROAIR HFA/PROVENTIL HFA/VENTOLIN HFA) 108 (90 Base) MCG/ACT inhaler  amoxicillin-clavulanate (AUGMENTIN) 875-125 MG tablet  ARIPiprazole (ABILIFY) 10 MG tablet  ASPIRIN LOW DOSE 81 MG EC tablet  atorvastatin (LIPITOR) 20 MG tablet  azithromycin (ZITHROMAX) 250 MG tablet  benztropine (COGENTIN) 0.5 MG tablet  Cholecalciferol (D 1000) 25 MCG (1000 UT) CAPS  digoxin (LANOXIN) 125 MCG tablet  DULoxetine (CYMBALTA) 60 MG  capsule  EPINEPHrine (ANY BX GENERIC EQUIV) 0.3 MG/0.3ML injection 2-pack  Fluticasone-Umeclidin-Vilanterol (TRELEGY ELLIPTA) 100-62.5-25 MCG/INH oral inhaler  hydrOXYzine (VISTARIL) 25 MG capsule  metoprolol succinate ER (TOPROL-XL) 25 MG 24 hr tablet  montelukast (SINGULAIR) 10 MG tablet  multivitamin w/minerals (THERA-VIT-M) tablet  potassium chloride ER (KLOR-CON M) 20 MEQ CR tablet  prazosin (MINIPRESS) 1 MG capsule  torsemide (DEMADEX) 20 MG tablet  vitamin C (ASCORBIC ACID) 500 MG tablet  zolpidem (AMBIEN) 10 MG tablet          Review of Systems   Constitutional: Negative for fever.   HENT: Negative for congestion.    Eyes: Negative for redness.   Respiratory: Positive for shortness of breath.    Cardiovascular: Negative for chest pain.   Gastrointestinal: Positive for abdominal distention, abdominal pain and vomiting. Negative for blood in stool and diarrhea.   Genitourinary: Negative for difficulty urinating.   Musculoskeletal: Negative for arthralgias and neck stiffness.   Skin: Negative for color change.   Neurological: Negative for headaches.   Psychiatric/Behavioral: Negative for confusion.       Physical Exam   BP: 141/87  Pulse: 73  Temp: 96.9  F (36.1  C)  Resp: 22  SpO2: (!) 88 %      Physical Exam  Constitutional:       General: He is not in acute distress.     Appearance: He is well-developed. He is obese. He is not toxic-appearing.   HENT:      Head: Normocephalic.      Mouth/Throat:      Mouth: Mucous membranes are moist.      Pharynx: Oropharynx is clear.   Neck:      Vascular: No JVD.      Trachea: No tracheal deviation.   Cardiovascular:      Rate and Rhythm: Normal rate and regular rhythm.      Pulses: Normal pulses.      Heart sounds: Normal heart sounds.   Pulmonary:      Breath sounds: Examination of the right-lower field reveals decreased breath sounds. Examination of the left-lower field reveals decreased breath sounds. Decreased breath sounds present.   Abdominal:      Palpations:  Abdomen is soft.      Tenderness: There is no abdominal tenderness.   Musculoskeletal:         General: Normal range of motion.      Cervical back: Normal range of motion and neck supple.      Right lower leg: No tenderness. No edema.      Left lower leg: No tenderness. No edema.   Skin:     General: Skin is warm and dry.      Capillary Refill: Capillary refill takes less than 2 seconds.   Neurological:      General: No focal deficit present.      Mental Status: He is alert and oriented to person, place, and time.   Psychiatric:         Mood and Affect: Mood normal.         Behavior: Behavior normal.         ED Course      Pt feeling better on reassessment, chest xray concerning for community acquired pneumonia. IV antibiotics started and will be discharged home with oral antibiotics. Also spoke with pt and encouraged him to follow up with PCP in a week.              Procedures    POCUS is ordered, but not resulted ***        Critical Care time:  none          Results for orders placed or performed during the hospital encounter of 11/11/22 (from the past 24 hour(s))   [place] Draw    Narrative    The following orders were created for panel order [place] Draw.  Procedure                               Abnormality         Status                     ---------                               -----------         ------                     Extra Blue Top Tube[[id number]]                              Final result               Extra Red Top Tube[[id number]]                               Final result               Extra Green Top (Lithium...[[id number]]                      Final result               Extra Purple Top Tube[[id number]]                            Final result               Extra Blood Bank Purple ...[[id number]]                                                 Extra Green Top (Lithium...[[id number]]                      Final result                 Please view results for these tests on the individual orders.   Extra  Blue Top Tube   Result Value Ref Range    Hold Specimen JIC    Extra Red Top Tube   Result Value Ref Range    Hold Specimen JIC    Extra Green Top (Lithium Heparin) Tube   Result Value Ref Range    Hold Specimen JIC    Extra Purple Top Tube   Result Value Ref Range    Hold Specimen JIC    Extra Green Top (Lithium Heparin) ON ICE   Result Value Ref Range    Hold Specimen JIC    CBC with platelets differential    Narrative    The following orders were created for panel order CBC with platelets differential.  Procedure                               Abnormality         Status                     ---------                               -----------         ------                     CBC with platelets and d...[[id number]]  Abnormal            Final result                 Please view results for these tests on the individual orders.   Comprehensive metabolic panel   Result Value Ref Range    Sodium 137 136 - 145 mmol/L    Potassium 4.5 3.4 - 5.3 mmol/L    Chloride 99 98 - 107 mmol/L    Carbon Dioxide (CO2) 27 22 - 29 mmol/L    Anion Gap 11 7 - 15 mmol/L    Urea Nitrogen 16.1 8.0 - 23.0 mg/dL    Creatinine 1.01 0.67 - 1.17 mg/dL    Calcium 9.1 8.8 - 10.2 mg/dL    Glucose 117 (H) 70 - 99 mg/dL    Alkaline Phosphatase 90 40 - 129 U/L    AST 20 10 - 50 U/L    ALT 13 10 - 50 U/L    Protein Total 7.0 6.4 - 8.3 g/dL    Albumin 4.0 3.5 - 5.2 g/dL    Bilirubin Total 0.3 <=1.2 mg/dL    GFR Estimate 82 >60 mL/min/1.73m2   Lipase   Result Value Ref Range    Lipase 20 13 - 60 U/L   Lactic acid whole blood   Result Value Ref Range    Lactic Acid 2.0 0.7 - 2.0 mmol/L   Nt probnp inpatient (BNP)   Result Value Ref Range    N terminal Pro BNP Inpatient 2,901 (H) 0 - 900 pg/mL   INR   Result Value Ref Range    INR 1.05 0.85 - 1.15   CBC with platelets and differential   Result Value Ref Range    WBC Count 11.7 (H) 4.0 - 11.0 10e3/uL    RBC Count 5.88 4.40 - 5.90 10e6/uL    Hemoglobin 18.6 (H) 13.3 - 17.7 g/dL    Hematocrit 54.8 (H) 40.0 -  53.0 %    MCV 93 78 - 100 fL    MCH 31.6 26.5 - 33.0 pg    MCHC 33.9 31.5 - 36.5 g/dL    RDW 12.8 10.0 - 15.0 %    Platelet Count 264 150 - 450 10e3/uL    % Neutrophils 49 %    % Lymphocytes 38 %    % Monocytes 9 %    % Eosinophils 3 %    % Basophils 1 %    % Immature Granulocytes 0 %    NRBCs per 100 WBC 0 <1 /100    Absolute Neutrophils 5.8 1.6 - 8.3 10e3/uL    Absolute Lymphocytes 4.4 0.8 - 5.3 10e3/uL    Absolute Monocytes 1.1 0.0 - 1.3 10e3/uL    Absolute Eosinophils 0.3 0.0 - 0.7 10e3/uL    Absolute Basophils 0.1 0.0 - 0.2 10e3/uL    Absolute Immature Granulocytes 0.0 <=0.4 10e3/uL    Absolute NRBCs 0.0 10e3/uL   Troponin T, High Sensitivity   Result Value Ref Range    Troponin T, High Sensitivity 23 (H) <=22 ng/L   Procalcitonin   Result Value Ref Range    Procalcitonin 0.04 <0.05 ng/mL   Symptomatic; Unknown Influenza A/B & SARS-CoV2 (COVID-19) Virus PCR Multiplex Nasopharyngeal    Specimen: Nasopharyngeal; Swab   Result Value Ref Range    Influenza A PCR Negative Negative    Influenza B PCR Negative Negative    RSV PCR Negative Negative    SARS CoV2 PCR Negative Negative    Narrative    Testing was performed using the Xpert Xpress CoV2/Flu/RSV Assay on the [employer] GeneXpert Instrument. This test should be ordered for the detection of SARS-CoV-2 and influenza viruses in individuals who meet clinical and/or epidemiological criteria. Test performance is unknown in asymptomatic patients. This test is for in vitro diagnostic use under the FDA EUA for laboratories certified under CLIA to perform high or moderate complexity testing. This test has not been FDA cleared or approved. A negative result does not rule out the presence of PCR inhibitors in the specimen or target RNA in concentration below the limit of detection for the assay. If only one viral target is positive but coinfection with multiple targets is suspected, the sample should be re-tested with another FDA cleared, approved, or authorized test, if  "coinfection would change clinical management. This test was validated by the [facility]. These laboratories are certified under the Clinical Laboratory Improvement Amendments of 1988 (CLIA-88) as qualified to perform high complexity laboratory testing.   Troponin T, High Sensitivity   Result Value Ref Range    Troponin T, High Sensitivity 25 (H) <=22 ng/L       Medications   ondansetron (ZOFRAN) injection 4 mg (4 mg Intravenous Given 11/11/22 2231)   azithromycin 500 mg (ZITHROMAX) in 0.9% NaCl 250 mL intermittent infusion 500 mg (500 mg Intravenous New Bag 11/11/22 2357)   lidocaine (viscous) (XYLOCAINE) 2 % 15 mL, alum & mag hydroxide-simethicone (MAALOX) 15 mL GI Cocktail (30 mLs Oral Given 11/11/22 2230)   cefTRIAXone in d5w (ROCEPHIN) intermittent infusion 1 g (1 g Intravenous New Bag 11/11/22 2357)   famotidine (PEPCID) injection 20 mg (20 mg Intravenous Given 11/12/22 0032)       Assessments & Plan (with Medical Decision Making)     I have reviewed the nursing notes.    I have reviewed the findings, diagnosis, plan and need for follow up with the patient.  {ED Addendum:682292::\" \"}    New Prescriptions    AMOXICILLIN-CLAVULANATE (AUGMENTIN) 875-125 MG TABLET    Take 1 tablet by mouth 2 times daily for 5 days    AZITHROMYCIN (ZITHROMAX) 250 MG TABLET    Take 1 tablet (250 mg) by mouth daily for 4 days       Final diagnoses:   Community acquired pneumonia       11/11/2022   [facility]  "

## 2022-11-13 ASSESSMENT — ENCOUNTER SYMPTOMS
SHORTNESS OF BREATH: 1
COUGH: 1
CHILLS: 0
FEVER: 0

## 2022-11-13 NOTE — ED PROVIDER NOTES
History     Chief Complaint   Patient presents with     Vomiting     Shortness of Breath     HPI  Joey Irene is a 66 year old male who is here with inability to breathe.  Was at home, eating, choked on some rice, then states he could not breathe at all.  Later vomited, saw some redness in the emesis.  No dark stools.  States he is recently felt little bit bloated.  Uses oxygen as needed for his COPD and CHF.   Has been coughing for the past month.  Sputum production noted.    Allergies:  Allergies   Allergen Reactions     Bees Anaphylaxis     Seasonal Allergies Difficulty breathing and Cough     Bupropion Other (See Comments)     tremor     Tramadol Nausea and Swelling     Preston Nite Time Dizziness and Nausea and Vomiting     Nyquil Cold &  [Night-Time Cold-Flu Relief] Dizziness and Nausea and Vomiting     Trichophyton Other (See Comments)       Problem List:    Patient Active Problem List    Diagnosis Date Noted     Encounter for monitoring digoxin therapy 08/03/2022     Priority: Medium     Prediabetes 08/03/2022     Priority: Medium     Severe chronic obstructive pulmonary disease (H) 08/03/2022     Priority: Medium     Right heart failure, NYHA class 3 (H) 08/03/2022     Priority: Medium     Encounter for smoking cessation counseling 04/19/2021     Priority: Medium     Diastolic dysfunction 04/19/2021     Priority: Medium     Status post coronary angiogram 12/11/2020     Priority: Medium     Pulmonary arterial hypertension (H) 11/20/2020     Priority: Medium     Right ventricular dilation 10/29/2020     Priority: Medium     Added automatically from request for surgery [id number]       Bee sting allergy 08/07/2020     Priority: Medium     Hyperglycemia 08/07/2020     Priority: Medium     Elevated prostate specific antigen (PSA) 08/07/2020     Priority: Medium     History of fusion of cervical spine 08/06/2020     Priority: Medium     Generalized anxiety disorder 08/06/2020     Priority: Medium     Tobacco use  disorder 08/06/2020     Priority: Medium     Pulmonary emphysema, unspecified emphysema type (H) 08/05/2020     Priority: Medium     Hyperlipidemia, unspecified hyperlipidemia type 08/05/2020     Priority: Medium     Cardiomegaly 08/05/2020     Priority: Medium     Enlarged prostate 08/05/2020     Priority: Medium     Diverticulosis 03/25/2020     Priority: Medium     Chronic bronchitis with emphysema (H) 02/03/2020     Priority: Medium     Class 1 obesity with body mass index (BMI) of 33.0 to 33.9 in adult 02/03/2020     Priority: Medium     Smoking greater than 40 pack years 02/03/2020     Priority: Medium     Pain due to total left knee replacement, sequela 01/27/2020     Priority: Medium     Cervical stenosis of spinal canal 01/13/2020     Priority: Medium     Cholesteatoma of left ear 03/04/2019     Priority: Medium     Nasal septal deviation 12/18/2017     Priority: Medium     Primary osteoarthritis of right knee 02/25/2017     Priority: Medium     Status post total left knee replacement 02/25/2017     Priority: Medium     Recurrent major depressive disorder, in partial remission (H) 11/21/2015     Priority: Medium     PTSD (post-traumatic stress disorder) 08/19/2015     Priority: Medium     Seasonal allergies 08/19/2015     Priority: Medium     Anxiety state 07/07/2011     Priority: Medium     Back pain, chronic 07/07/2011     Priority: Medium     Hearing loss 07/07/2011     Priority: Medium     Insomnia, unspecified type 07/07/2011     Priority: Medium     Sleep apnea 07/07/2011     Priority: Medium        Past Medical History:    Past Medical History:   Diagnosis Date     COPD (chronic obstructive pulmonary disease) (H)      Hypertension      Uncomplicated asthma        Past Surgical History:    Past Surgical History:   Procedure Laterality Date     CV CORONARY ANGIOGRAM N/A 12/11/2020    Procedure: Coronary Angiogram;  Surgeon: Aman Delgado MD;  Location:  [facility]     CV RIGHT  HEART CATH MEASUREMENTS RECORDED N/A 12/11/2020    Procedure: CV RIGHT HEART CATH;  Surgeon: Aman Delgado MD;  Location:  [facility]     ENT SURGERY      patient has had three left ear surgeries, unsure what they did     FUSION CERVICAL POSTERIOR THREE+ LEVELS       HERNIA REPAIR, INGUINAL RT/LT Right     done times 3     JOINT REPLACEMENT Left      PHACOEMULSIFICATION WITH STANDARD INTRAOCULAR LENS IMPLANT Right 1/25/2022    Procedure: COMPLEX PHACOEMULSIFICATION CATARACT EXTRACTION POSTERIOR CHAMBER LENS RIGHT EYE: PUPIL STRETCHING RIGHT RIGHT;  Surgeon: Efra Gibson MD;  Location: [facility]     PHACOEMULSIFICATION WITH STANDARD INTRAOCULAR LENS IMPLANT Left 2/8/2022    Procedure: ANTERIOR VITRECTOMY AND POSTERIOR CHAMBER LENS IMPLANT;  Surgeon: Efra Gibson MD;  Location: [facility]     REPAIR HAMMER TOE Right        Family History:    Family History   Problem Relation Age of Onset     Lung Cancer Mother      Ovarian Cancer Sister        Social History:  Marital Status:   [4]  Social History     Tobacco Use     Smoking status: Every Day     Types: Cigars     Start date: 10/1/2021     Smokeless tobacco: Never   Vaping Use     Vaping Use: Never used   Substance Use Topics     Alcohol use: Never     Drug use: Never        Medications:    acetaminophen (TYLENOL) 500 MG tablet  albuterol (PROAIR HFA/PROVENTIL HFA/VENTOLIN HFA) 108 (90 Base) MCG/ACT inhaler  amoxicillin-clavulanate (AUGMENTIN) 875-125 MG tablet  ARIPiprazole (ABILIFY) 10 MG tablet  ASPIRIN LOW DOSE 81 MG EC tablet  atorvastatin (LIPITOR) 20 MG tablet  azithromycin (ZITHROMAX) 250 MG tablet  benztropine (COGENTIN) 0.5 MG tablet  Cholecalciferol (D 1000) 25 MCG (1000 UT) CAPS  digoxin (LANOXIN) 125 MCG tablet  DULoxetine (CYMBALTA) 60 MG capsule  EPINEPHrine (ANY BX GENERIC EQUIV) 0.3 MG/0.3ML injection 2-pack  Fluticasone-Umeclidin-Vilanterol (TRELEGY ELLIPTA) 100-62.5-25 MCG/INH oral inhaler  hydrOXYzine (VISTARIL) 25 MG  capsule  metoprolol succinate ER (TOPROL-XL) 25 MG 24 hr tablet  montelukast (SINGULAIR) 10 MG tablet  multivitamin w/minerals (THERA-VIT-M) tablet  potassium chloride ER (KLOR-CON M) 20 MEQ CR tablet  prazosin (MINIPRESS) 1 MG capsule  torsemide (DEMADEX) 20 MG tablet  vitamin C (ASCORBIC ACID) 500 MG tablet  zolpidem (AMBIEN) 10 MG tablet          Review of Systems   Constitutional: Negative for chills and fever.   Respiratory: Positive for cough and shortness of breath.    Cardiovascular: Negative for chest pain.   All other systems reviewed and are negative.      Physical Exam   BP: 141/87  Pulse: 73  Temp: 96.9  F (36.1  C)  Resp: 22  SpO2: (!) 88 %      Physical Exam  Constitutional:       General: He is not in acute distress.     Appearance: Normal appearance.   HENT:      Head: Normocephalic and atraumatic.      Right Ear: External ear normal.      Left Ear: External ear normal.      Nose: Nose normal. No rhinorrhea.   Eyes:      Conjunctiva/sclera: Conjunctivae normal.   Cardiovascular:      Rate and Rhythm: Normal rate and regular rhythm.      Pulses: Normal pulses.   Pulmonary:      Effort: Pulmonary effort is normal. No respiratory distress.      Breath sounds: Normal breath sounds.   Abdominal:      General: There is no distension.      Palpations: Abdomen is soft.      Tenderness: There is no abdominal tenderness.   Musculoskeletal:         General: No deformity or signs of injury.   Skin:     General: Skin is warm and dry.      Capillary Refill: Capillary refill takes less than 2 seconds.   Neurological:      General: No focal deficit present.      Mental Status: He is alert. Mental status is at baseline.   Psychiatric:         Mood and Affect: Mood normal.         Behavior: Behavior normal.         ED Course                 Procedures    Results for orders placed during the hospital encounter of 11/11/22    POC US ECHO LIMITED    [facility] Procedure Note    Limited Bedside ED  Cardiac Ultrasound:    PROCEDURE: PERFORMED BY: Dr. Malik Gimenez MD  INDICATIONS/SYMPTOM:  Shortness of Breath  PROBE: Cardiac phased array probe  BODY LOCATION: Chest  FINDINGS:  The ultrasound was performed utilizing the subcostal, parasternal long axis, parasternal short axis and apical 4 chamber views.  Cardiac contractility:  Present  Gross estimation of cardiac kinesis: depressed  Pericardial Effusion:  None  RV:LV ratio: RV > LV  INTERPRETATION:    RV dilation c/w h/o COPD No pericardial effusion was found.  IVC visualized and findings indicate normovolemia.  IMAGE DOCUMENTATION: Images were archived to PACs system.           Critical Care time:               No results found for this or any previous visit (from the past 24 hour(s)).    Medications   lidocaine (viscous) (XYLOCAINE) 2 % 15 mL, alum & mag hydroxide-simethicone (MAALOX) 15 mL GI Cocktail (30 mLs Oral Given 11/11/22 2230)   cefTRIAXone in d5w (ROCEPHIN) intermittent infusion 1 g (0 g Intravenous Stopped 11/12/22 0027)   azithromycin 500 mg (ZITHROMAX) in 0.9% NaCl 250 mL intermittent infusion 500 mg (0 mg Intravenous Stopped 11/12/22 0057)   famotidine (PEPCID) injection 20 mg (20 mg Intravenous Given 11/12/22 0032)       Assessments & Plan (with Medical Decision Making)     I have reviewed the nursing notes.    I have reviewed the findings, diagnosis, plan and need for follow up with the patient.  66-year-old male here with dyspnea.  I do not suspect he gently choked on rice, but he is able to tolerate water and has had no emesis in the department.  X-ray does show infiltrates, will treat for community-acquired pneumonia.  No indication for hospitalization at this time.  Bedside ultrasound did not show markedly decreased EF.    Discharge Medication List as of 11/12/2022  1:07 AM      START taking these medications    Details   amoxicillin-clavulanate (AUGMENTIN) 875-125 MG tablet Take 1 tablet by mouth 2 times daily for 5 days, Disp-10 tablet,  R-0, E-Prescribe      azithromycin (ZITHROMAX) 250 MG tablet Take 1 tablet (250 mg) by mouth daily for 4 days, Disp-4 tablet, R-0, E-Prescribe             Final diagnoses:   Community acquired pneumonia       11/11/2022   [facility]     Malik Gimenez MD  11/13/22 0668

## 2022-11-15 NOTE — TELEPHONE ENCOUNTER
Emergency Department and Urgent Care Follow-up      Reason for ER/UC visit: pneumonia  o Date seen: 11/11      New or Worsening symptoms:  No        Prescription Received/Picked up from Pharmacy?: yes   o Medications started? Yes   o Any questions or issues regarding your prescription?: no       Follow-up Results or Labs that are pending: no       Questions or concerns?: no       ER Recommends Follow-up by: Follow up with PCP within one week      RN Recommendations: follow up with PCP or covering provider  Appointment scheduled:   Next 5 appointments (look out 90 days)    Nov 21, 2022  9:20 AM  (Arrive by 9:05 AM)  Office Visit with Ruthann Carter MD  [facility] ([facility] [place] ) [address]  [place] [address] [phone]      o   o     If you start feeling worse, or have any further questions, please feel free to contact Nurse Triage at [phone].  If needing immediate medical attention at any time please call 911/Go to the ER.

## 2022-11-21 NOTE — PROGRESS NOTES
"  Assessment & Plan     Community acquired pneumonia, unspecified laterality  Feels clinically improved  His lungs are clear today, his O2 sat is borderline.  He says he can breathe comfortably.  Denies any other symptoms.  He has recently completely his course of antibiotics.  Interestingly, his chest XR is more consistent with heart failure and his BNP was high.  He appears to be well compensated.    He does have severe COPD at baseline.  On 2 L of oxygen overnight.  He continues to smoke 1 cigars daily.  Clear/white sputum production that is per his usual baseline with COPD.     Right heart failure, NYHA class 3 (H)  Clinically stable.  He denies any heart failure symptoms at this time and he appears euvolemic.  He does not have any lower extremity edema    Severe chronic obstructive pulmonary disease (H)  Subjectively feeling well, at his baseline right now.  Significantly improved since his ER visit.      Morbid obesity  He has been working on dietary and lifestyle interventions to lose weight. His goal weight is 200 pounds.  Discussed ways that this can be achieved through healthy eating (Mediterranean diet) and increasing his physical activity    Nicotine/Tobacco Cessation:  He reports that he has been smoking cigars. He started smoking about 13 months ago. He has never used smokeless tobacco.  Nicotine/Tobacco Cessation Plan:   Self help information given to patient      BMI:   Estimated body mass index is 31.24 kg/m  as calculated from the following:    Height as of this encounter: 1.803 m (5' 11\").    Weight as of this encounter: 101.6 kg (224 lb).   Weight management plan: Discussed healthy diet and exercise guidelines    Follow-up with PCP PRN.     No follow-ups on file.    Ruthann Carter MD  [facility] - BERNIE Cook is a 66 year old male, presenting for the following health issues:  ER F/U      HPI         ED/UC Followup:    Facility:  [place]   Date of visit: " "11/11/22  Reason for visit: Pneumonia   Current Status: Clinically improved.  Back to his baseline respiratory status per patient report.      Patient here for ED follow-up.  Diagnosed with pneumonia and Rxed Z-zoe and Augmentin.  States he is feeling much better.   No fevers or chills, no SOB or CP.  Endorsing good appetite.  No diarrhea.    Has history of COPD.  Breathing is back to his baseline prior to acute illness.  Does have phlegm productive at baseline- feels that this is close to his normal baseline   Still smoking- 1 cigar per day.  Biggest habit is in the morning.  The rest of the day he does not have the cravings.    Using 2 liters at night.  Does not sleep with CPAP.   History of heart failure.  Follows with cardiology.  He is on a water pill.  One pill a a day.        Review of Systems   Constitutional, HEENT, cardiovascular, pulmonary, GI, , musculoskeletal, neuro, skin, endocrine and psych systems are negative, except as otherwise noted.      Objective    /77 (BP Location: Right arm, Patient Position: Chair)   Pulse 93   Temp 97.1  F (36.2  C)   Resp 18   Ht 1.803 m (5' 11\")   Wt 101.6 kg (224 lb)   SpO2 90%   BMI 31.24 kg/m    Body mass index is 31.24 kg/m .  Physical Exam   GENERAL: healthy, alert and no distress  EYES: Eyes grossly normal to inspection, PERRL and conjunctivae and sclerae normal  HENT: Perforated ear drum on the right side, nose and mouth without ulcers or lesions  NECK: no adenopathy, no asymmetry, masses, or scars and thyroid normal to palpation  RESP: lungs clear to auscultation - no rales, rhonchi or wheezes  CV: regular rate and rhythm, normal S1 S2, no S3 or S4, no murmur, click or rub, no peripheral edema and peripheral pulses strong  ABDOMEN: soft, nontender, no hepatosplenomegaly, no masses and bowel sounds normal  MS: no gross musculoskeletal defects noted, no edema, resting hand tremor.    SKIN: no suspicious lesions or rashes  NEURO: Normal strength and " tone, mentation intact and speech normal, resting tremor right hand  PSYCH: mentation appears normal, affect normal/bright

## 2023-01-01 ENCOUNTER — HOSPITAL ENCOUNTER (EMERGENCY)
Facility: HOSPITAL | Age: 68
End: 2023-11-15
Attending: STUDENT IN AN ORGANIZED HEALTH CARE EDUCATION/TRAINING PROGRAM | Admitting: STUDENT IN AN ORGANIZED HEALTH CARE EDUCATION/TRAINING PROGRAM
Payer: COMMERCIAL

## 2023-01-01 ENCOUNTER — OFFICE VISIT (OUTPATIENT)
Dept: FAMILY MEDICINE | Facility: OTHER | Age: 68
End: 2023-01-01
Attending: NURSE PRACTITIONER
Payer: COMMERCIAL

## 2023-01-01 ENCOUNTER — MEDICAL CORRESPONDENCE (OUTPATIENT)
Dept: HEALTH INFORMATION MANAGEMENT | Facility: CLINIC | Age: 68
End: 2023-01-01

## 2023-01-01 ENCOUNTER — OFFICE VISIT (OUTPATIENT)
Dept: CARDIOLOGY | Facility: OTHER | Age: 68
End: 2023-01-01
Attending: NURSE PRACTITIONER
Payer: COMMERCIAL

## 2023-01-01 ENCOUNTER — HOSPITAL ENCOUNTER (EMERGENCY)
Facility: HOSPITAL | Age: 68
Discharge: HOME | End: 2023-01-09
Attending: NURSE PRACTITIONER | Admitting: NURSE PRACTITIONER
Payer: COMMERCIAL

## 2023-01-01 ENCOUNTER — TELEPHONE (OUTPATIENT)
Dept: FAMILY MEDICINE | Facility: OTHER | Age: 68
End: 2023-01-01

## 2023-01-01 ENCOUNTER — HOSPITAL ENCOUNTER (OUTPATIENT)
Dept: PHYSICAL THERAPY | Facility: HOSPITAL | Age: 68
Setting detail: THERAPIES SERIES
Discharge: HOME | End: 2023-02-06
Attending: NURSE PRACTITIONER
Payer: COMMERCIAL

## 2023-01-01 ENCOUNTER — HOSPITAL ENCOUNTER (OUTPATIENT)
Dept: PHYSICAL THERAPY | Facility: HOSPITAL | Age: 68
Setting detail: THERAPIES SERIES
Discharge: HOME | End: 2023-02-16
Attending: NURSE PRACTITIONER
Payer: COMMERCIAL

## 2023-01-01 ENCOUNTER — TELEPHONE (OUTPATIENT)
Dept: INTERVENTIONAL RADIOLOGY/VASCULAR | Facility: HOSPITAL | Age: 68
End: 2023-01-01

## 2023-01-01 ENCOUNTER — LAB (OUTPATIENT)
Dept: FAMILY MEDICINE | Facility: OTHER | Age: 68
End: 2023-01-01

## 2023-01-01 ENCOUNTER — HOSPITAL ENCOUNTER (OUTPATIENT)
Dept: CT IMAGING | Facility: HOSPITAL | Age: 68
Discharge: HOME | End: 2023-09-28
Attending: INTERNAL MEDICINE | Admitting: INTERNAL MEDICINE
Payer: COMMERCIAL

## 2023-01-01 ENCOUNTER — HOSPITAL ENCOUNTER (OUTPATIENT)
Facility: HOSPITAL | Age: 68
Discharge: HOME | End: 2023-02-09
Attending: RADIOLOGY | Admitting: RADIOLOGY
Payer: COMMERCIAL

## 2023-01-01 ENCOUNTER — HOSPITAL ENCOUNTER (INPATIENT)
Facility: HOSPITAL | Age: 68
LOS: 1 days | Discharge: HOME | DRG: 314 | End: 2023-08-16
Attending: STUDENT IN AN ORGANIZED HEALTH CARE EDUCATION/TRAINING PROGRAM | Admitting: INTERNAL MEDICINE
Payer: COMMERCIAL

## 2023-01-01 ENCOUNTER — TELEPHONE (OUTPATIENT)
Dept: PULMONOLOGY | Facility: OTHER | Age: 68
End: 2023-01-01

## 2023-01-01 ENCOUNTER — TELEPHONE (OUTPATIENT)
Dept: SLEEP MEDICINE | Facility: HOSPITAL | Age: 68
End: 2023-01-01

## 2023-01-01 ENCOUNTER — APPOINTMENT (OUTPATIENT)
Dept: GENERAL RADIOLOGY | Facility: HOSPITAL | Age: 68
DRG: 314 | End: 2023-01-01
Attending: STUDENT IN AN ORGANIZED HEALTH CARE EDUCATION/TRAINING PROGRAM
Payer: COMMERCIAL

## 2023-01-01 ENCOUNTER — HOSPITAL ENCOUNTER (OUTPATIENT)
Dept: PHYSICAL THERAPY | Facility: HOSPITAL | Age: 68
Setting detail: THERAPIES SERIES
Discharge: HOME | End: 2023-02-14
Attending: NURSE PRACTITIONER
Payer: COMMERCIAL

## 2023-01-01 ENCOUNTER — PATIENT OUTREACH (OUTPATIENT)
Dept: CARE COORDINATION | Facility: OTHER | Age: 68
End: 2023-01-01

## 2023-01-01 ENCOUNTER — HOSPITAL ENCOUNTER (OUTPATIENT)
Dept: CT IMAGING | Facility: HOSPITAL | Age: 68
Discharge: HOME | End: 2023-02-22
Attending: NURSE PRACTITIONER | Admitting: NURSE PRACTITIONER
Payer: COMMERCIAL

## 2023-01-01 ENCOUNTER — HOSPITAL ENCOUNTER (OUTPATIENT)
Dept: INTERVENTIONAL RADIOLOGY/VASCULAR | Facility: HOSPITAL | Age: 68
Discharge: HOME | End: 2023-01-19
Attending: NURSE PRACTITIONER
Payer: COMMERCIAL

## 2023-01-01 ENCOUNTER — ANCILLARY PROCEDURE (OUTPATIENT)
Dept: GENERAL RADIOLOGY | Facility: OTHER | Age: 68
End: 2023-01-01
Attending: NURSE PRACTITIONER
Payer: COMMERCIAL

## 2023-01-01 ENCOUNTER — ONCOLOGY VISIT (OUTPATIENT)
Dept: ONCOLOGY | Facility: OTHER | Age: 68
End: 2023-01-01
Attending: INTERNAL MEDICINE
Payer: COMMERCIAL

## 2023-01-01 ENCOUNTER — HOSPITAL ENCOUNTER (OUTPATIENT)
Facility: HOSPITAL | Age: 68
End: 2023-01-01
Attending: RADIOLOGY | Admitting: RADIOLOGY
Payer: COMMERCIAL

## 2023-01-01 ENCOUNTER — HOSPITAL ENCOUNTER (OUTPATIENT)
Dept: PHYSICAL THERAPY | Facility: HOSPITAL | Age: 68
Setting detail: THERAPIES SERIES
Discharge: HOME | End: 2023-01-27
Attending: NURSE PRACTITIONER
Payer: COMMERCIAL

## 2023-01-01 ENCOUNTER — HEALTH MAINTENANCE LETTER (OUTPATIENT)
Age: 68
End: 2023-01-01

## 2023-01-01 ENCOUNTER — LAB (OUTPATIENT)
Dept: LAB | Facility: OTHER | Age: 68
End: 2023-01-01
Attending: NURSE PRACTITIONER
Payer: COMMERCIAL

## 2023-01-01 ENCOUNTER — DOCUMENTATION ONLY (OUTPATIENT)
Dept: SLEEP MEDICINE | Facility: HOSPITAL | Age: 68
End: 2023-01-01

## 2023-01-01 ENCOUNTER — HOSPITAL ENCOUNTER (EMERGENCY)
Facility: HOSPITAL | Age: 68
Discharge: HOME | End: 2023-01-13
Attending: PHYSICIAN ASSISTANT | Admitting: PHYSICIAN ASSISTANT
Payer: COMMERCIAL

## 2023-01-01 ENCOUNTER — HOSPITAL ENCOUNTER (OUTPATIENT)
Dept: INTERVENTIONAL RADIOLOGY/VASCULAR | Facility: HOSPITAL | Age: 68
Discharge: HOME | End: 2023-02-09
Attending: NURSE PRACTITIONER | Admitting: RADIOLOGY
Payer: COMMERCIAL

## 2023-01-01 ENCOUNTER — HOSPITAL ENCOUNTER (OUTPATIENT)
Dept: CT IMAGING | Facility: HOSPITAL | Age: 68
Discharge: HOME | End: 2023-01-19
Attending: NURSE PRACTITIONER
Payer: COMMERCIAL

## 2023-01-01 VITALS
OXYGEN SATURATION: 95 % | DIASTOLIC BLOOD PRESSURE: 68 MMHG | HEART RATE: 84 BPM | HEIGHT: 71 IN | WEIGHT: 217.3 LBS | SYSTOLIC BLOOD PRESSURE: 110 MMHG | BODY MASS INDEX: 30.42 KG/M2 | RESPIRATION RATE: 16 BRPM | TEMPERATURE: 96.2 F

## 2023-01-01 VITALS
HEART RATE: 79 BPM | TEMPERATURE: 97.1 F | SYSTOLIC BLOOD PRESSURE: 102 MMHG | OXYGEN SATURATION: 93 % | BODY MASS INDEX: 31.92 KG/M2 | DIASTOLIC BLOOD PRESSURE: 75 MMHG | HEIGHT: 71 IN | WEIGHT: 228 LBS

## 2023-01-01 VITALS
DIASTOLIC BLOOD PRESSURE: 80 MMHG | OXYGEN SATURATION: 91 % | RESPIRATION RATE: 16 BRPM | HEIGHT: 71 IN | SYSTOLIC BLOOD PRESSURE: 121 MMHG | HEART RATE: 91 BPM | WEIGHT: 229 LBS | BODY MASS INDEX: 32.06 KG/M2 | TEMPERATURE: 98 F

## 2023-01-01 VITALS
TEMPERATURE: 97.8 F | OXYGEN SATURATION: 91 % | HEART RATE: 99 BPM | HEIGHT: 71 IN | BODY MASS INDEX: 31.19 KG/M2 | WEIGHT: 222.8 LBS | DIASTOLIC BLOOD PRESSURE: 78 MMHG | SYSTOLIC BLOOD PRESSURE: 123 MMHG | RESPIRATION RATE: 17 BRPM

## 2023-01-01 VITALS
HEART RATE: 88 BPM | OXYGEN SATURATION: 93 % | TEMPERATURE: 97.9 F | RESPIRATION RATE: 18 BRPM | SYSTOLIC BLOOD PRESSURE: 109 MMHG | WEIGHT: 218.6 LBS | BODY MASS INDEX: 30.49 KG/M2 | DIASTOLIC BLOOD PRESSURE: 70 MMHG

## 2023-01-01 VITALS
TEMPERATURE: 97.4 F | DIASTOLIC BLOOD PRESSURE: 70 MMHG | HEART RATE: 89 BPM | SYSTOLIC BLOOD PRESSURE: 100 MMHG | WEIGHT: 222.8 LBS | BODY MASS INDEX: 31.19 KG/M2 | OXYGEN SATURATION: 91 % | HEIGHT: 71 IN

## 2023-01-01 VITALS
WEIGHT: 217 LBS | BODY MASS INDEX: 30.38 KG/M2 | DIASTOLIC BLOOD PRESSURE: 72 MMHG | HEIGHT: 71 IN | SYSTOLIC BLOOD PRESSURE: 100 MMHG | TEMPERATURE: 98.7 F | OXYGEN SATURATION: 91 % | RESPIRATION RATE: 18 BRPM | HEART RATE: 98 BPM

## 2023-01-01 VITALS
DIASTOLIC BLOOD PRESSURE: 58 MMHG | OXYGEN SATURATION: 89 % | TEMPERATURE: 97.4 F | HEIGHT: 71 IN | SYSTOLIC BLOOD PRESSURE: 122 MMHG | HEART RATE: 54 BPM | WEIGHT: 218 LBS | BODY MASS INDEX: 30.52 KG/M2

## 2023-01-01 VITALS
OXYGEN SATURATION: 90 % | RESPIRATION RATE: 20 BRPM | HEART RATE: 83 BPM | BODY MASS INDEX: 30.28 KG/M2 | SYSTOLIC BLOOD PRESSURE: 99 MMHG | WEIGHT: 216.27 LBS | DIASTOLIC BLOOD PRESSURE: 70 MMHG | HEIGHT: 71 IN | TEMPERATURE: 97.8 F

## 2023-01-01 VITALS
OXYGEN SATURATION: 91 % | RESPIRATION RATE: 18 BRPM | DIASTOLIC BLOOD PRESSURE: 80 MMHG | SYSTOLIC BLOOD PRESSURE: 137 MMHG | TEMPERATURE: 97.8 F | HEART RATE: 93 BPM

## 2023-01-01 VITALS
OXYGEN SATURATION: 94 % | SYSTOLIC BLOOD PRESSURE: 132 MMHG | TEMPERATURE: 97.5 F | DIASTOLIC BLOOD PRESSURE: 72 MMHG | WEIGHT: 228.5 LBS | HEART RATE: 92 BPM | HEIGHT: 71 IN | BODY MASS INDEX: 31.99 KG/M2

## 2023-01-01 VITALS
HEIGHT: 71 IN | TEMPERATURE: 97.9 F | HEART RATE: 106 BPM | BODY MASS INDEX: 31.07 KG/M2 | DIASTOLIC BLOOD PRESSURE: 80 MMHG | OXYGEN SATURATION: 100 % | SYSTOLIC BLOOD PRESSURE: 124 MMHG

## 2023-01-01 VITALS
SYSTOLIC BLOOD PRESSURE: 105 MMHG | BODY MASS INDEX: 31.1 KG/M2 | TEMPERATURE: 98.5 F | HEART RATE: 84 BPM | DIASTOLIC BLOOD PRESSURE: 74 MMHG | OXYGEN SATURATION: 95 % | WEIGHT: 223 LBS

## 2023-01-01 VITALS
HEART RATE: 105 BPM | TEMPERATURE: 98.2 F | DIASTOLIC BLOOD PRESSURE: 62 MMHG | OXYGEN SATURATION: 90 % | HEIGHT: 71 IN | SYSTOLIC BLOOD PRESSURE: 126 MMHG | WEIGHT: 215 LBS | BODY MASS INDEX: 30.1 KG/M2

## 2023-01-01 DIAGNOSIS — I50.812 CHRONIC RIGHT HEART FAILURE (H): ICD-10-CM

## 2023-01-01 DIAGNOSIS — M54.9 EXACERBATION OF CHRONIC BACK PAIN: ICD-10-CM

## 2023-01-01 DIAGNOSIS — J30.2 SEASONAL ALLERGIC RHINITIS, UNSPECIFIED TRIGGER: ICD-10-CM

## 2023-01-01 DIAGNOSIS — M51.26 LUMBAR DISC HERNIATION: ICD-10-CM

## 2023-01-01 DIAGNOSIS — Z23 NEED FOR PROPHYLACTIC VACCINATION AND INOCULATION AGAINST INFLUENZA: ICD-10-CM

## 2023-01-01 DIAGNOSIS — Z87.891 PERSONAL HISTORY OF TOBACCO USE: ICD-10-CM

## 2023-01-01 DIAGNOSIS — G89.29 EXACERBATION OF CHRONIC BACK PAIN: ICD-10-CM

## 2023-01-01 DIAGNOSIS — I50.33 ACUTE ON CHRONIC DIASTOLIC CONGESTIVE HEART FAILURE (H): ICD-10-CM

## 2023-01-01 DIAGNOSIS — R06.02 SHORTNESS OF BREATH: ICD-10-CM

## 2023-01-01 DIAGNOSIS — R51.9 ACUTE NONINTRACTABLE HEADACHE, UNSPECIFIED HEADACHE TYPE: ICD-10-CM

## 2023-01-01 DIAGNOSIS — I50.810 RIGHT HEART FAILURE, NYHA CLASS 3 (H): ICD-10-CM

## 2023-01-01 DIAGNOSIS — J44.9 CHRONIC OBSTRUCTIVE PULMONARY DISEASE, UNSPECIFIED COPD TYPE (H): ICD-10-CM

## 2023-01-01 DIAGNOSIS — J18.9 PNEUMONIA OF BOTH LOWER LOBES DUE TO INFECTIOUS ORGANISM: Primary | ICD-10-CM

## 2023-01-01 DIAGNOSIS — I51.7 RIGHT VENTRICULAR DILATION: ICD-10-CM

## 2023-01-01 DIAGNOSIS — M54.50 ACUTE LEFT-SIDED LOW BACK PAIN WITHOUT SCIATICA: Primary | ICD-10-CM

## 2023-01-01 DIAGNOSIS — J96.21 ACUTE ON CHRONIC RESPIRATORY FAILURE WITH HYPOXIA AND HYPERCAPNIA (H): ICD-10-CM

## 2023-01-01 DIAGNOSIS — I50.9 CONGESTIVE HEART FAILURE, UNSPECIFIED HF CHRONICITY, UNSPECIFIED HEART FAILURE TYPE (H): ICD-10-CM

## 2023-01-01 DIAGNOSIS — J96.21 ACUTE ON CHRONIC RESPIRATORY FAILURE WITH HYPOXIA (H): ICD-10-CM

## 2023-01-01 DIAGNOSIS — J18.9 PNEUMONIA OF BOTH LOWER LOBES DUE TO INFECTIOUS ORGANISM: ICD-10-CM

## 2023-01-01 DIAGNOSIS — T50.905A DRUG-INDUCED HYPOKALEMIA: Primary | ICD-10-CM

## 2023-01-01 DIAGNOSIS — I27.21 PULMONARY ARTERIAL HYPERTENSION (H): ICD-10-CM

## 2023-01-01 DIAGNOSIS — R73.01 IFG (IMPAIRED FASTING GLUCOSE): ICD-10-CM

## 2023-01-01 DIAGNOSIS — J30.2 SEASONAL ALLERGIES: ICD-10-CM

## 2023-01-01 DIAGNOSIS — R09.02 HYPOXIA: ICD-10-CM

## 2023-01-01 DIAGNOSIS — Z72.0 TOBACCO ABUSE: ICD-10-CM

## 2023-01-01 DIAGNOSIS — D58.2 ELEVATED HEMOGLOBIN (H): ICD-10-CM

## 2023-01-01 DIAGNOSIS — E87.6 DRUG-INDUCED HYPOKALEMIA: ICD-10-CM

## 2023-01-01 DIAGNOSIS — I46.9 CARDIAC ARREST (H): ICD-10-CM

## 2023-01-01 DIAGNOSIS — D75.1 POLYCYTHEMIA: Primary | ICD-10-CM

## 2023-01-01 DIAGNOSIS — R73.01 IFG (IMPAIRED FASTING GLUCOSE): Primary | ICD-10-CM

## 2023-01-01 DIAGNOSIS — R97.20 ELEVATED PROSTATE SPECIFIC ANTIGEN (PSA): Primary | ICD-10-CM

## 2023-01-01 DIAGNOSIS — M54.50 ACUTE BILATERAL LOW BACK PAIN WITHOUT SCIATICA: ICD-10-CM

## 2023-01-01 DIAGNOSIS — Z91.030 BEE STING ALLERGY: ICD-10-CM

## 2023-01-01 DIAGNOSIS — L98.9 SKIN LESION: ICD-10-CM

## 2023-01-01 DIAGNOSIS — I27.21 PAH (PULMONARY ARTERIAL HYPERTENSION) WITH PORTAL HYPERTENSION (H): ICD-10-CM

## 2023-01-01 DIAGNOSIS — I50.813 ACUTE ON CHRONIC RIGHT-SIDED HEART FAILURE (H): ICD-10-CM

## 2023-01-01 DIAGNOSIS — R06.09 DOE (DYSPNEA ON EXERTION): ICD-10-CM

## 2023-01-01 DIAGNOSIS — J43.9 PULMONARY EMPHYSEMA, UNSPECIFIED EMPHYSEMA TYPE (H): Primary | ICD-10-CM

## 2023-01-01 DIAGNOSIS — F33.41 RECURRENT MAJOR DEPRESSIVE DISORDER, IN PARTIAL REMISSION (H): ICD-10-CM

## 2023-01-01 DIAGNOSIS — K76.6 PAH (PULMONARY ARTERIAL HYPERTENSION) WITH PORTAL HYPERTENSION (H): ICD-10-CM

## 2023-01-01 DIAGNOSIS — T50.905A DRUG-INDUCED HYPOKALEMIA: ICD-10-CM

## 2023-01-01 DIAGNOSIS — M99.33 OSSEOUS STENOSIS OF NEURAL CANAL OF LUMBAR REGION: ICD-10-CM

## 2023-01-01 DIAGNOSIS — D75.1 POLYCYTHEMIA: ICD-10-CM

## 2023-01-01 DIAGNOSIS — E78.5 HYPERLIPIDEMIA, UNSPECIFIED HYPERLIPIDEMIA TYPE: ICD-10-CM

## 2023-01-01 DIAGNOSIS — I50.32 CHRONIC DIASTOLIC HEART FAILURE (H): ICD-10-CM

## 2023-01-01 DIAGNOSIS — R97.20 ELEVATED PROSTATE SPECIFIC ANTIGEN (PSA): ICD-10-CM

## 2023-01-01 DIAGNOSIS — Z72.0 TOBACCO USE: ICD-10-CM

## 2023-01-01 DIAGNOSIS — M47.816 LUMBAR SPONDYLOSIS: ICD-10-CM

## 2023-01-01 DIAGNOSIS — J84.9 ILD (INTERSTITIAL LUNG DISEASE) (H): ICD-10-CM

## 2023-01-01 DIAGNOSIS — I51.89 DIASTOLIC DYSFUNCTION: ICD-10-CM

## 2023-01-01 DIAGNOSIS — E11.9 TYPE 2 DIABETES MELLITUS WITHOUT COMPLICATION, WITHOUT LONG-TERM CURRENT USE OF INSULIN (H): Primary | ICD-10-CM

## 2023-01-01 DIAGNOSIS — R00.0 SINUS TACHYCARDIA: ICD-10-CM

## 2023-01-01 DIAGNOSIS — J43.9 PULMONARY EMPHYSEMA, UNSPECIFIED EMPHYSEMA TYPE (H): ICD-10-CM

## 2023-01-01 DIAGNOSIS — I27.21 SEVERE PULMONARY ARTERIAL SYSTOLIC HYPERTENSION (H): ICD-10-CM

## 2023-01-01 DIAGNOSIS — E87.6 DRUG-INDUCED HYPOKALEMIA: Primary | ICD-10-CM

## 2023-01-01 DIAGNOSIS — E11.9 TYPE 2 DIABETES MELLITUS WITHOUT COMPLICATION, WITHOUT LONG-TERM CURRENT USE OF INSULIN (H): ICD-10-CM

## 2023-01-01 DIAGNOSIS — R25.1 TREMOR: ICD-10-CM

## 2023-01-01 DIAGNOSIS — I10 ESSENTIAL HYPERTENSION: ICD-10-CM

## 2023-01-01 DIAGNOSIS — E78.5 HYPERLIPIDEMIA, UNSPECIFIED HYPERLIPIDEMIA TYPE: Primary | ICD-10-CM

## 2023-01-01 DIAGNOSIS — Z09 HOSPITAL DISCHARGE FOLLOW-UP: ICD-10-CM

## 2023-01-01 DIAGNOSIS — R73.9 HYPERGLYCEMIA: ICD-10-CM

## 2023-01-01 DIAGNOSIS — M54.9 EXACERBATION OF CHRONIC BACK PAIN: Primary | ICD-10-CM

## 2023-01-01 DIAGNOSIS — Z79.899 ENCOUNTER FOR MONITORING DIGOXIN THERAPY: ICD-10-CM

## 2023-01-01 DIAGNOSIS — J96.21 ACUTE AND CHRONIC RESPIRATORY FAILURE WITH HYPOXIA (H): ICD-10-CM

## 2023-01-01 DIAGNOSIS — D75.1 SECONDARY POLYCYTHEMIA: ICD-10-CM

## 2023-01-01 DIAGNOSIS — E11.9 NEWLY DIAGNOSED DIABETES (H): ICD-10-CM

## 2023-01-01 DIAGNOSIS — R39.11 URINARY HESITANCY: ICD-10-CM

## 2023-01-01 DIAGNOSIS — Z12.5 ENCOUNTER FOR SCREENING FOR MALIGNANT NEOPLASM OF PROSTATE: ICD-10-CM

## 2023-01-01 DIAGNOSIS — F17.210 SMOKING GREATER THAN 40 PACK YEARS: ICD-10-CM

## 2023-01-01 DIAGNOSIS — J44.1 COPD EXACERBATION (H): ICD-10-CM

## 2023-01-01 DIAGNOSIS — Z12.11 SPECIAL SCREENING FOR MALIGNANT NEOPLASMS, COLON: ICD-10-CM

## 2023-01-01 DIAGNOSIS — F41.1 GENERALIZED ANXIETY DISORDER: ICD-10-CM

## 2023-01-01 DIAGNOSIS — R07.81 RIB PAIN ON LEFT SIDE: ICD-10-CM

## 2023-01-01 DIAGNOSIS — J96.21 ACUTE ON CHRONIC RESPIRATORY FAILURE WITH HYPOXIA (H): Primary | ICD-10-CM

## 2023-01-01 DIAGNOSIS — G47.00 INSOMNIA, UNSPECIFIED TYPE: ICD-10-CM

## 2023-01-01 DIAGNOSIS — G89.29 EXACERBATION OF CHRONIC BACK PAIN: Primary | ICD-10-CM

## 2023-01-01 DIAGNOSIS — E66.811 OBESITY, CLASS I, BMI 30-34.9: ICD-10-CM

## 2023-01-01 DIAGNOSIS — J40 BRONCHITIS: ICD-10-CM

## 2023-01-01 DIAGNOSIS — J84.112 IPF (IDIOPATHIC PULMONARY FIBROSIS) (H): Primary | ICD-10-CM

## 2023-01-01 DIAGNOSIS — F17.200 TOBACCO USE DISORDER: ICD-10-CM

## 2023-01-01 DIAGNOSIS — Z51.81 ENCOUNTER FOR MONITORING DIGOXIN THERAPY: ICD-10-CM

## 2023-01-01 DIAGNOSIS — M47.816 LUMBAR SPONDYLOSIS: Primary | ICD-10-CM

## 2023-01-01 DIAGNOSIS — I27.20 PULMONARY HYPERTENSION (H): ICD-10-CM

## 2023-01-01 DIAGNOSIS — I27.21 PAH (PULMONARY ARTERY HYPERTENSION) (H): Primary | ICD-10-CM

## 2023-01-01 DIAGNOSIS — R09.02 HYPOXEMIA: ICD-10-CM

## 2023-01-01 DIAGNOSIS — I51.7 CARDIOMEGALY: ICD-10-CM

## 2023-01-01 DIAGNOSIS — I50.9 ACUTE ON CHRONIC CONGESTIVE HEART FAILURE, UNSPECIFIED HEART FAILURE TYPE (H): ICD-10-CM

## 2023-01-01 DIAGNOSIS — J96.22 ACUTE ON CHRONIC RESPIRATORY FAILURE WITH HYPOXIA AND HYPERCAPNIA (H): ICD-10-CM

## 2023-01-01 LAB
ALBUMIN SERPL BCG-MCNC: 3.9 G/DL (ref 3.5–5.2)
ALLEN'S TEST: YES
ALP SERPL-CCNC: 92 U/L (ref 40–129)
ALT SERPL W P-5'-P-CCNC: 15 U/L (ref 10–50)
ANION GAP SERPL CALCULATED.3IONS-SCNC: 10 MMOL/L (ref 7–15)
ANION GAP SERPL CALCULATED.3IONS-SCNC: 10 MMOL/L (ref 7–15)
ANION GAP SERPL CALCULATED.3IONS-SCNC: 11 MMOL/L (ref 7–15)
ANION GAP SERPL CALCULATED.3IONS-SCNC: 12 MMOL/L (ref 7–15)
ANION GAP SERPL CALCULATED.3IONS-SCNC: 14 MMOL/L (ref 7–15)
ANION GAP SERPL CALCULATED.3IONS-SCNC: 14 MMOL/L (ref 7–15)
ANION GAP SERPL CALCULATED.3IONS-SCNC: 20 MMOL/L (ref 7–15)
ANION GAP SERPL CALCULATED.3IONS-SCNC: 7 MMOL/L (ref 7–15)
AST SERPL W P-5'-P-CCNC: 17 U/L (ref 10–50)
BACTERIA BLD CULT: NO GROWTH
BASE EXCESS BLDA CALC-SCNC: 5.7 MMOL/L (ref -9–1.8)
BASE EXCESS BLDV CALC-SCNC: -12.2 MMOL/L (ref -7.7–1.9)
BASOPHILS # BLD AUTO: 0.1 10E3/UL (ref 0–0.2)
BASOPHILS NFR BLD AUTO: 1 %
BILIRUB SERPL-MCNC: 0.5 MG/DL
BUN SERPL-MCNC: 15.2 MG/DL (ref 8–23)
BUN SERPL-MCNC: 15.5 MG/DL (ref 8–23)
BUN SERPL-MCNC: 16.6 MG/DL (ref 8–23)
BUN SERPL-MCNC: 19.1 MG/DL (ref 8–23)
BUN SERPL-MCNC: 19.8 MG/DL (ref 8–23)
BUN SERPL-MCNC: 21 MG/DL (ref 8–23)
BUN SERPL-MCNC: 22.7 MG/DL (ref 8–23)
BUN SERPL-MCNC: 23.3 MG/DL (ref 8–23)
CALCIUM SERPL-MCNC: 10 MG/DL (ref 8.8–10.2)
CALCIUM SERPL-MCNC: 8.7 MG/DL (ref 8.8–10.2)
CALCIUM SERPL-MCNC: 8.8 MG/DL (ref 8.8–10.2)
CALCIUM SERPL-MCNC: 9 MG/DL (ref 8.8–10.2)
CALCIUM SERPL-MCNC: 9.3 MG/DL (ref 8.8–10.2)
CALCIUM SERPL-MCNC: 9.5 MG/DL (ref 8.8–10.2)
CALCIUM SERPL-MCNC: 9.8 MG/DL (ref 8.8–10.2)
CALCIUM SERPL-MCNC: 9.9 MG/DL (ref 8.8–10.2)
CHLORIDE SERPL-SCNC: 100 MMOL/L (ref 98–107)
CHLORIDE SERPL-SCNC: 100 MMOL/L (ref 98–107)
CHLORIDE SERPL-SCNC: 101 MMOL/L (ref 98–107)
CHLORIDE SERPL-SCNC: 95 MMOL/L (ref 98–107)
CHLORIDE SERPL-SCNC: 96 MMOL/L (ref 98–107)
CHLORIDE SERPL-SCNC: 98 MMOL/L (ref 98–107)
CHLORIDE SERPL-SCNC: 99 MMOL/L (ref 98–107)
CHLORIDE SERPL-SCNC: 99 MMOL/L (ref 98–107)
CHOLEST SERPL-MCNC: 147 MG/DL
CK SERPL-CCNC: 71 U/L (ref 39–308)
CREAT BLD-MCNC: 1.2 MG/DL (ref 0.7–1.3)
CREAT SERPL-MCNC: 0.93 MG/DL (ref 0.67–1.17)
CREAT SERPL-MCNC: 0.96 MG/DL (ref 0.67–1.17)
CREAT SERPL-MCNC: 1.08 MG/DL (ref 0.67–1.17)
CREAT SERPL-MCNC: 1.14 MG/DL (ref 0.67–1.17)
CREAT SERPL-MCNC: 1.19 MG/DL (ref 0.67–1.17)
CREAT SERPL-MCNC: 1.23 MG/DL (ref 0.67–1.17)
CREAT SERPL-MCNC: 1.27 MG/DL (ref 0.67–1.17)
CREAT SERPL-MCNC: 1.42 MG/DL (ref 0.67–1.17)
CREAT UR-MCNC: 37.2 MG/DL
D DIMER PPP FEU-MCNC: 0.44 UG/ML FEU (ref 0–0.5)
DEPRECATED HCO3 PLAS-SCNC: 21 MMOL/L (ref 22–29)
DEPRECATED HCO3 PLAS-SCNC: 26 MMOL/L (ref 22–29)
DEPRECATED HCO3 PLAS-SCNC: 27 MMOL/L (ref 22–29)
DEPRECATED HCO3 PLAS-SCNC: 28 MMOL/L (ref 22–29)
DEPRECATED HCO3 PLAS-SCNC: 30 MMOL/L (ref 22–29)
DEPRECATED HCO3 PLAS-SCNC: 30 MMOL/L (ref 22–29)
DIGOXIN SERPL-MCNC: 0.5 NG/ML (ref 0.6–2)
DIGOXIN SERPL-MCNC: 0.9 NG/ML (ref 0.6–2)
EGFRCR SERPLBLD CKD-EPI 2021: 54 ML/MIN/1.73M2
EGFRCR SERPLBLD CKD-EPI 2021: >60 ML/MIN/1.73M2
EOSINOPHIL # BLD AUTO: 0.1 10E3/UL (ref 0–0.7)
EOSINOPHIL # BLD AUTO: 0.2 10E3/UL (ref 0–0.7)
EOSINOPHIL # BLD AUTO: 0.2 10E3/UL (ref 0–0.7)
EOSINOPHIL NFR BLD AUTO: 1 %
EOSINOPHIL NFR BLD AUTO: 2 %
EOSINOPHIL NFR BLD AUTO: 2 %
EPO SERPL-ACNC: 15 MU/ML
EPO SERPL-ACNC: 22 MU/ML
ERYTHROCYTE [DISTWIDTH] IN BLOOD BY AUTOMATED COUNT: 12.2 % (ref 10–15)
ERYTHROCYTE [DISTWIDTH] IN BLOOD BY AUTOMATED COUNT: 12.3 % (ref 10–15)
ERYTHROCYTE [DISTWIDTH] IN BLOOD BY AUTOMATED COUNT: 12.5 % (ref 10–15)
ERYTHROCYTE [DISTWIDTH] IN BLOOD BY AUTOMATED COUNT: 12.6 % (ref 10–15)
ERYTHROCYTE [DISTWIDTH] IN BLOOD BY AUTOMATED COUNT: 12.7 % (ref 10–15)
ERYTHROCYTE [DISTWIDTH] IN BLOOD BY AUTOMATED COUNT: 12.8 % (ref 10–15)
EST. AVERAGE GLUCOSE BLD GHB EST-MCNC: 137 MG/DL
EST. AVERAGE GLUCOSE BLD GHB EST-MCNC: 140 MG/DL
GFR SERPL CREATININE-BSD FRML MDRD: 62 ML/MIN/1.73M2
GFR SERPL CREATININE-BSD FRML MDRD: 64 ML/MIN/1.73M2
GFR SERPL CREATININE-BSD FRML MDRD: 67 ML/MIN/1.73M2
GFR SERPL CREATININE-BSD FRML MDRD: 70 ML/MIN/1.73M2
GFR SERPL CREATININE-BSD FRML MDRD: 75 ML/MIN/1.73M2
GFR SERPL CREATININE-BSD FRML MDRD: 87 ML/MIN/1.73M2
GFR SERPL CREATININE-BSD FRML MDRD: 90 ML/MIN/1.73M2
GLUCOSE BLDC GLUCOMTR-MCNC: 107 MG/DL (ref 70–99)
GLUCOSE BLDC GLUCOMTR-MCNC: 88 MG/DL (ref 70–99)
GLUCOSE BLDC GLUCOMTR-MCNC: 95 MG/DL (ref 70–99)
GLUCOSE SERPL-MCNC: 121 MG/DL (ref 70–99)
GLUCOSE SERPL-MCNC: 381 MG/DL (ref 70–99)
GLUCOSE SERPL-MCNC: 89 MG/DL (ref 70–99)
GLUCOSE SERPL-MCNC: 91 MG/DL (ref 70–99)
GLUCOSE SERPL-MCNC: 94 MG/DL (ref 70–99)
GLUCOSE SERPL-MCNC: 96 MG/DL (ref 70–99)
GLUCOSE SERPL-MCNC: 97 MG/DL (ref 70–99)
GLUCOSE SERPL-MCNC: 99 MG/DL (ref 70–99)
HBA1C MFR BLD: 6.4 %
HBA1C MFR BLD: 6.5 %
HCO3 BLD-SCNC: 30 MMOL/L (ref 21–28)
HCO3 BLDV-SCNC: 20 MMOL/L (ref 21–28)
HCT VFR BLD AUTO: 49.8 % (ref 40–53)
HCT VFR BLD AUTO: 50.7 % (ref 40–53)
HCT VFR BLD AUTO: 52 % (ref 40–53)
HCT VFR BLD AUTO: 54.6 % (ref 40–53)
HCT VFR BLD AUTO: 54.8 % (ref 40–53)
HCT VFR BLD AUTO: 55.5 % (ref 40–53)
HDLC SERPL-MCNC: 28 MG/DL
HGB BLD-MCNC: 16.9 G/DL (ref 13.3–17.7)
HGB BLD-MCNC: 17 G/DL (ref 13.3–17.7)
HGB BLD-MCNC: 17.1 G/DL (ref 13.3–17.7)
HGB BLD-MCNC: 18.7 G/DL (ref 13.3–17.7)
HGB BLD-MCNC: 19.1 G/DL (ref 13.3–17.7)
HGB BLD-MCNC: 19.3 G/DL (ref 13.3–17.7)
HOLD SPECIMEN: NORMAL
HOLD SPECIMEN: NORMAL
IMM GRANULOCYTES # BLD: 0 10E3/UL
IMM GRANULOCYTES # BLD: 0 10E3/UL
IMM GRANULOCYTES # BLD: 0.2 10E3/UL
IMM GRANULOCYTES NFR BLD: 0 %
IMM GRANULOCYTES NFR BLD: 0 %
IMM GRANULOCYTES NFR BLD: 2 %
INTERPRETATION: NORMAL
LACTATE SERPL-SCNC: 1.6 MMOL/L (ref 0.7–2)
LACTATE SERPL-SCNC: 1.7 MMOL/L (ref 0.7–2)
LACTATE SERPL-SCNC: 9.6 MMOL/L (ref 0.7–2)
LDLC SERPL CALC-MCNC: 64 MG/DL
LYMPHOCYTES # BLD AUTO: 3.1 10E3/UL (ref 0.8–5.3)
LYMPHOCYTES # BLD AUTO: 4.1 10E3/UL (ref 0.8–5.3)
LYMPHOCYTES # BLD AUTO: 6.8 10E3/UL (ref 0.8–5.3)
LYMPHOCYTES NFR BLD AUTO: 31 %
LYMPHOCYTES NFR BLD AUTO: 35 %
LYMPHOCYTES NFR BLD AUTO: 51 %
MCH RBC QN AUTO: 31 PG (ref 26.5–33)
MCH RBC QN AUTO: 31.1 PG (ref 26.5–33)
MCH RBC QN AUTO: 31.5 PG (ref 26.5–33)
MCH RBC QN AUTO: 31.8 PG (ref 26.5–33)
MCH RBC QN AUTO: 31.9 PG (ref 26.5–33)
MCH RBC QN AUTO: 32.2 PG (ref 26.5–33)
MCHC RBC AUTO-ENTMCNC: 32.5 G/DL (ref 31.5–36.5)
MCHC RBC AUTO-ENTMCNC: 33.7 G/DL (ref 31.5–36.5)
MCHC RBC AUTO-ENTMCNC: 34.1 G/DL (ref 31.5–36.5)
MCHC RBC AUTO-ENTMCNC: 34.1 G/DL (ref 31.5–36.5)
MCHC RBC AUTO-ENTMCNC: 34.8 G/DL (ref 31.5–36.5)
MCHC RBC AUTO-ENTMCNC: 35 G/DL (ref 31.5–36.5)
MCV RBC AUTO: 91 FL (ref 78–100)
MCV RBC AUTO: 91 FL (ref 78–100)
MCV RBC AUTO: 92 FL (ref 78–100)
MCV RBC AUTO: 99 FL (ref 78–100)
MICROALBUMIN UR-MCNC: <12 MG/L
MICROALBUMIN/CREAT UR: NORMAL MG/G{CREAT}
MONOCYTES # BLD AUTO: 0.8 10E3/UL (ref 0–1.3)
MONOCYTES # BLD AUTO: 0.9 10E3/UL (ref 0–1.3)
MONOCYTES # BLD AUTO: 0.9 10E3/UL (ref 0–1.3)
MONOCYTES NFR BLD AUTO: 7 %
MONOCYTES NFR BLD AUTO: 7 %
MONOCYTES NFR BLD AUTO: 9 %
NEUTROPHILS # BLD AUTO: 4.8 10E3/UL (ref 1.6–8.3)
NEUTROPHILS # BLD AUTO: 5.8 10E3/UL (ref 1.6–8.3)
NEUTROPHILS # BLD AUTO: 6.5 10E3/UL (ref 1.6–8.3)
NEUTROPHILS NFR BLD AUTO: 37 %
NEUTROPHILS NFR BLD AUTO: 55 %
NEUTROPHILS NFR BLD AUTO: 58 %
NONHDLC SERPL-MCNC: 119 MG/DL
NRBC # BLD AUTO: 0 10E3/UL
NRBC BLD AUTO-RTO: 0 /100
NT-PROBNP SERPL-MCNC: 2718 PG/ML (ref 0–900)
NT-PROBNP SERPL-MCNC: 4099 PG/ML (ref 0–900)
NT-PROBNP SERPL-MCNC: 4284 PG/ML (ref 0–900)
NT-PROBNP SERPL-MCNC: 4399 PG/ML (ref 0–900)
NT-PROBNP SERPL-MCNC: 5041 PG/ML (ref 0–900)
O2/TOTAL GAS SETTING VFR VENT: 100 %
O2/TOTAL GAS SETTING VFR VENT: 44 %
OXYHGB MFR BLDV: 18 % (ref 70–75)
PCO2 BLD: 40 MM HG (ref 35–45)
PCO2 BLDV: 75 MM HG (ref 40–50)
PH BLD: 7.48 [PH] (ref 7.35–7.45)
PH BLDV: 7.04 [PH] (ref 7.32–7.43)
PLATELET # BLD AUTO: 224 10E3/UL (ref 150–450)
PLATELET # BLD AUTO: 228 10E3/UL (ref 150–450)
PLATELET # BLD AUTO: 243 10E3/UL (ref 150–450)
PLATELET # BLD AUTO: 260 10E3/UL (ref 150–450)
PLATELET # BLD AUTO: 266 10E3/UL (ref 150–450)
PLATELET # BLD AUTO: 278 10E3/UL (ref 150–450)
PO2 BLD: 105 MM HG (ref 80–105)
PO2 BLDV: 22 MM HG (ref 25–47)
POTASSIUM SERPL-SCNC: 3.5 MMOL/L (ref 3.4–5.3)
POTASSIUM SERPL-SCNC: 3.8 MMOL/L (ref 3.4–5.3)
POTASSIUM SERPL-SCNC: 4 MMOL/L (ref 3.4–5.3)
POTASSIUM SERPL-SCNC: 4.1 MMOL/L (ref 3.4–5.3)
POTASSIUM SERPL-SCNC: 4.2 MMOL/L (ref 3.4–5.3)
POTASSIUM SERPL-SCNC: 4.2 MMOL/L (ref 3.4–5.3)
POTASSIUM SERPL-SCNC: 4.4 MMOL/L (ref 3.4–5.3)
POTASSIUM SERPL-SCNC: 4.8 MMOL/L (ref 3.4–5.3)
PROCALCITONIN SERPL IA-MCNC: 0.05 NG/ML
PROT SERPL-MCNC: 6.8 G/DL (ref 6.4–8.3)
PSA SERPL DL<=0.01 NG/ML-MCNC: 9.86 NG/ML (ref 0–4.5)
PSA SERPL-MCNC: 7.26 NG/ML (ref 0–4.5)
RBC # BLD AUTO: 5.25 10E6/UL (ref 4.4–5.9)
RBC # BLD AUTO: 5.4 10E6/UL (ref 4.4–5.9)
RBC # BLD AUTO: 5.49 10E6/UL (ref 4.4–5.9)
RBC # BLD AUTO: 5.98 10E6/UL (ref 4.4–5.9)
RBC # BLD AUTO: 6.03 10E6/UL (ref 4.4–5.9)
RBC # BLD AUTO: 6.06 10E6/UL (ref 4.4–5.9)
SARS-COV-2 RNA RESP QL NAA+PROBE: NEGATIVE
SIGNIFICANT RESULTS: NORMAL
SODIUM SERPL-SCNC: 135 MMOL/L (ref 136–145)
SODIUM SERPL-SCNC: 136 MMOL/L (ref 136–145)
SODIUM SERPL-SCNC: 137 MMOL/L (ref 135–145)
SODIUM SERPL-SCNC: 138 MMOL/L (ref 136–145)
SODIUM SERPL-SCNC: 138 MMOL/L (ref 136–145)
SODIUM SERPL-SCNC: 139 MMOL/L (ref 136–145)
SODIUM SERPL-SCNC: 139 MMOL/L (ref 136–145)
SODIUM SERPL-SCNC: 140 MMOL/L (ref 136–145)
SPECIMEN DESCRIPTION: NORMAL
TEST DETAILS, MDL: NORMAL
TRIGL SERPL-MCNC: 275 MG/DL
TROPONIN T SERPL HS-MCNC: 35 NG/L
TROPONIN T SERPL HS-MCNC: 63 NG/L
TSH SERPL DL<=0.005 MIU/L-ACNC: 2.28 UIU/ML (ref 0.3–4.2)
TSH SERPL DL<=0.005 MIU/L-ACNC: 3.25 UIU/ML (ref 0.3–4.2)
WBC # BLD AUTO: 10 10E3/UL (ref 4–11)
WBC # BLD AUTO: 11.5 10E3/UL (ref 4–11)
WBC # BLD AUTO: 11.8 10E3/UL (ref 4–11)
WBC # BLD AUTO: 13 10E3/UL (ref 4–11)
WBC # BLD AUTO: 8.1 10E3/UL (ref 4–11)
WBC # BLD AUTO: 8.4 10E3/UL (ref 4–11)

## 2023-01-01 PROCEDURE — G0103 PSA SCREENING: HCPCS | Mod: ZL | Performed by: NURSE PRACTITIONER

## 2023-01-01 PROCEDURE — 87635 SARS-COV-2 COVID-19 AMP PRB: CPT | Performed by: INTERNAL MEDICINE

## 2023-01-01 PROCEDURE — 36415 COLL VENOUS BLD VENIPUNCTURE: CPT | Performed by: STUDENT IN AN ORGANIZED HEALTH CARE EDUCATION/TRAINING PROGRAM

## 2023-01-01 PROCEDURE — 84443 ASSAY THYROID STIM HORMONE: CPT | Mod: ZL | Performed by: NURSE PRACTITIONER

## 2023-01-01 PROCEDURE — 36415 COLL VENOUS BLD VENIPUNCTURE: CPT | Performed by: INTERNAL MEDICINE

## 2023-01-01 PROCEDURE — 80162 ASSAY OF DIGOXIN TOTAL: CPT | Mod: ZL | Performed by: NURSE PRACTITIONER

## 2023-01-01 PROCEDURE — 250N000009 HC RX 250

## 2023-01-01 PROCEDURE — 97140 MANUAL THERAPY 1/> REGIONS: CPT | Mod: GP

## 2023-01-01 PROCEDURE — 99214 OFFICE O/P EST MOD 30 MIN: CPT | Performed by: NURSE PRACTITIONER

## 2023-01-01 PROCEDURE — 91312 COVID-19 VACCINE BIVALENT BOOSTER 12+ (PFIZER): CPT

## 2023-01-01 PROCEDURE — G0463 HOSPITAL OUTPT CLINIC VISIT: HCPCS

## 2023-01-01 PROCEDURE — 82962 GLUCOSE BLOOD TEST: CPT

## 2023-01-01 PROCEDURE — 82310 ASSAY OF CALCIUM: CPT | Mod: ZL

## 2023-01-01 PROCEDURE — 250N000011 HC RX IP 250 OP 636: Performed by: NURSE PRACTITIONER

## 2023-01-01 PROCEDURE — 36415 COLL VENOUS BLD VENIPUNCTURE: CPT | Mod: ZL | Performed by: NURSE PRACTITIONER

## 2023-01-01 PROCEDURE — 99285 EMERGENCY DEPT VISIT HI MDM: CPT | Mod: 25

## 2023-01-01 PROCEDURE — 99214 OFFICE O/P EST MOD 30 MIN: CPT | Mod: 25 | Performed by: NURSE PRACTITIONER

## 2023-01-01 PROCEDURE — 99205 OFFICE O/P NEW HI 60 MIN: CPT | Performed by: INTERNAL MEDICINE

## 2023-01-01 PROCEDURE — 71046 X-RAY EXAM CHEST 2 VIEWS: CPT

## 2023-01-01 PROCEDURE — 90662 IIV NO PRSV INCREASED AG IM: CPT

## 2023-01-01 PROCEDURE — 83605 ASSAY OF LACTIC ACID: CPT | Performed by: STUDENT IN AN ORGANIZED HEALTH CARE EDUCATION/TRAINING PROGRAM

## 2023-01-01 PROCEDURE — 250N000013 HC RX MED GY IP 250 OP 250 PS 637: Performed by: PHYSICIAN ASSISTANT

## 2023-01-01 PROCEDURE — 74177 CT ABD & PELVIS W/CONTRAST: CPT

## 2023-01-01 PROCEDURE — 85027 COMPLETE CBC AUTOMATED: CPT | Performed by: INTERNAL MEDICINE

## 2023-01-01 PROCEDURE — 84145 PROCALCITONIN (PCT): CPT | Performed by: STUDENT IN AN ORGANIZED HEALTH CARE EDUCATION/TRAINING PROGRAM

## 2023-01-01 PROCEDURE — G0378 HOSPITAL OBSERVATION PER HR: HCPCS

## 2023-01-01 PROCEDURE — 82374 ASSAY BLOOD CARBON DIOXIDE: CPT | Performed by: STUDENT IN AN ORGANIZED HEALTH CARE EDUCATION/TRAINING PROGRAM

## 2023-01-01 PROCEDURE — 82565 ASSAY OF CREATININE: CPT

## 2023-01-01 PROCEDURE — 82310 ASSAY OF CALCIUM: CPT | Mod: ZL | Performed by: NURSE PRACTITIONER

## 2023-01-01 PROCEDURE — 96374 THER/PROPH/DIAG INJ IV PUSH: CPT | Mod: XU

## 2023-01-01 PROCEDURE — G0463 HOSPITAL OUTPT CLINIC VISIT: HCPCS | Performed by: NURSE PRACTITIONER

## 2023-01-01 PROCEDURE — 91320 SARSCV2 VAC 30MCG TRS-SUC IM: CPT

## 2023-01-01 PROCEDURE — 83880 ASSAY OF NATRIURETIC PEPTIDE: CPT | Mod: ZL

## 2023-01-01 PROCEDURE — 999N000157 HC STATISTIC RCP TIME EA 10 MIN

## 2023-01-01 PROCEDURE — 84484 ASSAY OF TROPONIN QUANT: CPT | Performed by: STUDENT IN AN ORGANIZED HEALTH CARE EDUCATION/TRAINING PROGRAM

## 2023-01-01 PROCEDURE — 250N000011 HC RX IP 250 OP 636: Performed by: RADIOLOGY

## 2023-01-01 PROCEDURE — 80053 COMPREHEN METABOLIC PANEL: CPT | Mod: ZL | Performed by: NURSE PRACTITIONER

## 2023-01-01 PROCEDURE — 83880 ASSAY OF NATRIURETIC PEPTIDE: CPT | Performed by: STUDENT IN AN ORGANIZED HEALTH CARE EDUCATION/TRAINING PROGRAM

## 2023-01-01 PROCEDURE — 99239 HOSP IP/OBS DSCHRG MGMT >30: CPT | Performed by: INTERNAL MEDICINE

## 2023-01-01 PROCEDURE — 81219 CALR GENE COM VARIANTS: CPT | Mod: ZL | Performed by: NURSE PRACTITIONER

## 2023-01-01 PROCEDURE — 92950 HEART/LUNG RESUSCITATION CPR: CPT

## 2023-01-01 PROCEDURE — 99213 OFFICE O/P EST LOW 20 MIN: CPT | Performed by: PHYSICIAN ASSISTANT

## 2023-01-01 PROCEDURE — 99291 CRITICAL CARE FIRST HOUR: CPT | Mod: 25

## 2023-01-01 PROCEDURE — 97110 THERAPEUTIC EXERCISES: CPT | Mod: GP

## 2023-01-01 PROCEDURE — 250N000011 HC RX IP 250 OP 636

## 2023-01-01 PROCEDURE — 71271 CT THORAX LUNG CANCER SCR C-: CPT

## 2023-01-01 PROCEDURE — 250N000011 HC RX IP 250 OP 636: Performed by: INTERNAL MEDICINE

## 2023-01-01 PROCEDURE — 85025 COMPLETE CBC W/AUTO DIFF WBC: CPT | Mod: ZL | Performed by: NURSE PRACTITIONER

## 2023-01-01 PROCEDURE — 85014 HEMATOCRIT: CPT | Mod: ZL | Performed by: NURSE PRACTITIONER

## 2023-01-01 PROCEDURE — 96374 THER/PROPH/DIAG INJ IV PUSH: CPT

## 2023-01-01 PROCEDURE — 83036 HEMOGLOBIN GLYCOSYLATED A1C: CPT | Mod: ZL | Performed by: NURSE PRACTITIONER

## 2023-01-01 PROCEDURE — 250N000013 HC RX MED GY IP 250 OP 250 PS 637: Performed by: INTERNAL MEDICINE

## 2023-01-01 PROCEDURE — 250N000013 HC RX MED GY IP 250 OP 250 PS 637: Performed by: STUDENT IN AN ORGANIZED HEALTH CARE EDUCATION/TRAINING PROGRAM

## 2023-01-01 PROCEDURE — 99213 OFFICE O/P EST LOW 20 MIN: CPT | Performed by: NURSE PRACTITIONER

## 2023-01-01 PROCEDURE — G0463 HOSPITAL OUTPT CLINIC VISIT: HCPCS | Mod: 25

## 2023-01-01 PROCEDURE — 99291 CRITICAL CARE FIRST HOUR: CPT

## 2023-01-01 PROCEDURE — 96375 TX/PRO/DX INJ NEW DRUG ADDON: CPT | Mod: XU

## 2023-01-01 PROCEDURE — 81339 MPL GENE SEQ ALYS EXON 10: CPT | Mod: ZL | Performed by: INTERNAL MEDICINE

## 2023-01-01 PROCEDURE — 97161 PT EVAL LOW COMPLEX 20 MIN: CPT | Mod: GP

## 2023-01-01 PROCEDURE — 87040 BLOOD CULTURE FOR BACTERIA: CPT | Performed by: STUDENT IN AN ORGANIZED HEALTH CARE EDUCATION/TRAINING PROGRAM

## 2023-01-01 PROCEDURE — 250N000013 HC RX MED GY IP 250 OP 250 PS 637

## 2023-01-01 PROCEDURE — 82310 ASSAY OF CALCIUM: CPT | Performed by: STUDENT IN AN ORGANIZED HEALTH CARE EDUCATION/TRAINING PROGRAM

## 2023-01-01 PROCEDURE — 81403 MOPATH PROCEDURE LEVEL 4: CPT | Mod: ZL | Performed by: NURSE PRACTITIONER

## 2023-01-01 PROCEDURE — 36415 COLL VENOUS BLD VENIPUNCTURE: CPT | Mod: ZL

## 2023-01-01 PROCEDURE — 71100 X-RAY EXAM RIBS UNI 2 VIEWS: CPT | Mod: TC,LT

## 2023-01-01 PROCEDURE — 85379 FIBRIN DEGRADATION QUANT: CPT | Performed by: STUDENT IN AN ORGANIZED HEALTH CARE EDUCATION/TRAINING PROGRAM

## 2023-01-01 PROCEDURE — 81270 JAK2 GENE: CPT | Mod: ZL | Performed by: INTERNAL MEDICINE

## 2023-01-01 PROCEDURE — 36600 WITHDRAWAL OF ARTERIAL BLOOD: CPT

## 2023-01-01 PROCEDURE — 99496 TRANSJ CARE MGMT HIGH F2F 7D: CPT | Performed by: NURSE PRACTITIONER

## 2023-01-01 PROCEDURE — G0008 ADMIN INFLUENZA VIRUS VAC: HCPCS

## 2023-01-01 PROCEDURE — 82570 ASSAY OF URINE CREATININE: CPT | Mod: ZL

## 2023-01-01 PROCEDURE — 82803 BLOOD GASES ANY COMBINATION: CPT | Performed by: INTERNAL MEDICINE

## 2023-01-01 PROCEDURE — 250N000013 HC RX MED GY IP 250 OP 250 PS 637: Performed by: NURSE PRACTITIONER

## 2023-01-01 PROCEDURE — 83880 ASSAY OF NATRIURETIC PEPTIDE: CPT | Mod: ZL | Performed by: NURSE PRACTITIONER

## 2023-01-01 PROCEDURE — 250N000011 HC RX IP 250 OP 636: Mod: JZ | Performed by: INTERNAL MEDICINE

## 2023-01-01 PROCEDURE — 85025 COMPLETE CBC W/AUTO DIFF WBC: CPT | Performed by: STUDENT IN AN ORGANIZED HEALTH CARE EDUCATION/TRAINING PROGRAM

## 2023-01-01 PROCEDURE — 72131 CT LUMBAR SPINE W/O DYE: CPT

## 2023-01-01 PROCEDURE — 85027 COMPLETE CBC AUTOMATED: CPT | Performed by: STUDENT IN AN ORGANIZED HEALTH CARE EDUCATION/TRAINING PROGRAM

## 2023-01-01 PROCEDURE — 71046 X-RAY EXAM CHEST 2 VIEWS: CPT | Mod: TC

## 2023-01-01 PROCEDURE — 120N000001 HC R&B MED SURG/OB

## 2023-01-01 PROCEDURE — 80061 LIPID PANEL: CPT | Mod: ZL | Performed by: NURSE PRACTITIONER

## 2023-01-01 PROCEDURE — 96372 THER/PROPH/DIAG INJ SC/IM: CPT | Performed by: INTERNAL MEDICINE

## 2023-01-01 PROCEDURE — 80048 BASIC METABOLIC PNL TOTAL CA: CPT | Mod: ZL | Performed by: NURSE PRACTITIONER

## 2023-01-01 PROCEDURE — 99232 SBSQ HOSP IP/OBS MODERATE 35: CPT | Performed by: INTERNAL MEDICINE

## 2023-01-01 PROCEDURE — 83605 ASSAY OF LACTIC ACID: CPT | Performed by: INTERNAL MEDICINE

## 2023-01-01 PROCEDURE — 82668 ASSAY OF ERYTHROPOIETIN: CPT | Mod: ZL | Performed by: INTERNAL MEDICINE

## 2023-01-01 PROCEDURE — 93005 ELECTROCARDIOGRAM TRACING: CPT

## 2023-01-01 PROCEDURE — 92950 HEART/LUNG RESUSCITATION CPR: CPT | Performed by: STUDENT IN AN ORGANIZED HEALTH CARE EDUCATION/TRAINING PROGRAM

## 2023-01-01 PROCEDURE — 82668 ASSAY OF ERYTHROPOIETIN: CPT | Mod: ZL | Performed by: NURSE PRACTITIONER

## 2023-01-01 PROCEDURE — G0452 MOLECULAR PATHOLOGY INTERPR: HCPCS | Mod: 26 | Performed by: PATHOLOGY

## 2023-01-01 PROCEDURE — 250N000011 HC RX IP 250 OP 636: Mod: JZ | Performed by: STUDENT IN AN ORGANIZED HEALTH CARE EDUCATION/TRAINING PROGRAM

## 2023-01-01 PROCEDURE — 80048 BASIC METABOLIC PNL TOTAL CA: CPT | Performed by: INTERNAL MEDICINE

## 2023-01-01 PROCEDURE — 99215 OFFICE O/P EST HI 40 MIN: CPT | Performed by: NURSE PRACTITIONER

## 2023-01-01 PROCEDURE — 80162 ASSAY OF DIGOXIN TOTAL: CPT | Performed by: INTERNAL MEDICINE

## 2023-01-01 PROCEDURE — 82550 ASSAY OF CK (CPK): CPT | Performed by: STUDENT IN AN ORGANIZED HEALTH CARE EDUCATION/TRAINING PROGRAM

## 2023-01-01 PROCEDURE — 80048 BASIC METABOLIC PNL TOTAL CA: CPT | Performed by: STUDENT IN AN ORGANIZED HEALTH CARE EDUCATION/TRAINING PROGRAM

## 2023-01-01 PROCEDURE — G0463 HOSPITAL OUTPT CLINIC VISIT: HCPCS | Mod: 25 | Performed by: NURSE PRACTITIONER

## 2023-01-01 PROCEDURE — 99285 EMERGENCY DEPT VISIT HI MDM: CPT | Performed by: STUDENT IN AN ORGANIZED HEALTH CARE EDUCATION/TRAINING PROGRAM

## 2023-01-01 PROCEDURE — 99291 CRITICAL CARE FIRST HOUR: CPT | Mod: 25 | Performed by: STUDENT IN AN ORGANIZED HEALTH CARE EDUCATION/TRAINING PROGRAM

## 2023-01-01 PROCEDURE — 258N000003 HC RX IP 258 OP 636: Performed by: INTERNAL MEDICINE

## 2023-01-01 PROCEDURE — 96372 THER/PROPH/DIAG INJ SC/IM: CPT | Performed by: NURSE PRACTITIONER

## 2023-01-01 PROCEDURE — 99222 1ST HOSP IP/OBS MODERATE 55: CPT | Mod: AI | Performed by: INTERNAL MEDICINE

## 2023-01-01 PROCEDURE — 31500 INSERT EMERGENCY AIRWAY: CPT

## 2023-01-01 PROCEDURE — 99214 OFFICE O/P EST MOD 30 MIN: CPT | Performed by: INTERNAL MEDICINE

## 2023-01-01 PROCEDURE — 36415 COLL VENOUS BLD VENIPUNCTURE: CPT | Mod: ZL | Performed by: INTERNAL MEDICINE

## 2023-01-01 PROCEDURE — G0452 MOLECULAR PATHOLOGY INTERPR: HCPCS | Mod: 26 | Performed by: STUDENT IN AN ORGANIZED HEALTH CARE EDUCATION/TRAINING PROGRAM

## 2023-01-01 PROCEDURE — G0296 VISIT TO DETERM LDCT ELIG: HCPCS | Performed by: NURSE PRACTITIONER

## 2023-01-01 PROCEDURE — 31500 INSERT EMERGENCY AIRWAY: CPT | Performed by: STUDENT IN AN ORGANIZED HEALTH CARE EDUCATION/TRAINING PROGRAM

## 2023-01-01 PROCEDURE — 62323 NJX INTERLAMINAR LMBR/SAC: CPT

## 2023-01-01 PROCEDURE — 82805 BLOOD GASES W/O2 SATURATION: CPT | Performed by: STUDENT IN AN ORGANIZED HEALTH CARE EDUCATION/TRAINING PROGRAM

## 2023-01-01 RX ORDER — ACETAMINOPHEN 325 MG/1
650 TABLET ORAL ONCE
Status: COMPLETED | OUTPATIENT
Start: 2023-01-01 | End: 2023-01-01

## 2023-01-01 RX ORDER — CHLORAL HYDRATE 500 MG
2 CAPSULE ORAL DAILY
COMMUNITY

## 2023-01-01 RX ORDER — NICOTINE 21 MG/24HR
1 PATCH, TRANSDERMAL 24 HOURS TRANSDERMAL DAILY
Status: DISCONTINUED | OUTPATIENT
Start: 2023-01-01 | End: 2023-01-01 | Stop reason: HOSPADM

## 2023-01-01 RX ORDER — LIDOCAINE 50 MG/G
1 PATCH TOPICAL EVERY 24 HOURS
Qty: 1 PATCH | Refills: 0 | Status: SHIPPED | OUTPATIENT
Start: 2023-01-01 | End: 2023-01-01

## 2023-01-01 RX ORDER — METOPROLOL SUCCINATE 25 MG/1
25 TABLET, EXTENDED RELEASE ORAL DAILY
Qty: 90 TABLET | Refills: 3 | Status: SHIPPED | OUTPATIENT
Start: 2023-01-01

## 2023-01-01 RX ORDER — DIGOXIN 125 MCG
125 TABLET ORAL DAILY
Status: DISCONTINUED | OUTPATIENT
Start: 2023-01-01 | End: 2023-01-01 | Stop reason: HOSPADM

## 2023-01-01 RX ORDER — NICOTINE 21 MG/24HR
1 PATCH, TRANSDERMAL 24 HOURS TRANSDERMAL EVERY 24 HOURS
Qty: 30 PATCH | Refills: 3 | Status: SHIPPED | OUTPATIENT
Start: 2023-01-01

## 2023-01-01 RX ORDER — CEFDINIR 300 MG/1
300 CAPSULE ORAL 2 TIMES DAILY
Qty: 10 CAPSULE | Refills: 0 | Status: SHIPPED | OUTPATIENT
Start: 2023-01-01 | End: 2023-01-01

## 2023-01-01 RX ORDER — HYDROCODONE BITARTRATE AND ACETAMINOPHEN 5; 325 MG/1; MG/1
1 TABLET ORAL EVERY 12 HOURS PRN
Qty: 20 TABLET | Refills: 0 | Status: SHIPPED | OUTPATIENT
Start: 2023-01-01 | End: 2023-01-01

## 2023-01-01 RX ORDER — IOPAMIDOL 612 MG/ML
15 INJECTION, SOLUTION INTRATHECAL ONCE
Status: COMPLETED | OUTPATIENT
Start: 2023-01-01 | End: 2023-01-01

## 2023-01-01 RX ORDER — ATORVASTATIN CALCIUM 20 MG/1
20 TABLET, FILM COATED ORAL DAILY
Qty: 90 TABLET | Refills: 2 | Status: SHIPPED | OUTPATIENT
Start: 2023-01-01

## 2023-01-01 RX ORDER — TAMSULOSIN HYDROCHLORIDE 0.4 MG/1
0.4 CAPSULE ORAL DAILY
Status: DISCONTINUED | OUTPATIENT
Start: 2023-01-01 | End: 2023-01-01 | Stop reason: HOSPADM

## 2023-01-01 RX ORDER — DOXYCYCLINE 100 MG/10ML
100 INJECTION, POWDER, LYOPHILIZED, FOR SOLUTION INTRAVENOUS EVERY 12 HOURS
Status: DISCONTINUED | OUTPATIENT
Start: 2023-01-01 | End: 2023-01-01

## 2023-01-01 RX ORDER — KETOROLAC TROMETHAMINE 15 MG/ML
15 INJECTION, SOLUTION INTRAMUSCULAR; INTRAVENOUS ONCE
Status: COMPLETED | OUTPATIENT
Start: 2023-01-01 | End: 2023-01-01

## 2023-01-01 RX ORDER — TAMSULOSIN HYDROCHLORIDE 0.4 MG/1
0.4 CAPSULE ORAL DAILY
Qty: 30 CAPSULE | Refills: 10 | Status: SHIPPED | OUTPATIENT
Start: 2023-01-01

## 2023-01-01 RX ORDER — IOPAMIDOL 755 MG/ML
106 INJECTION, SOLUTION INTRAVASCULAR ONCE
Status: COMPLETED | OUTPATIENT
Start: 2023-01-01 | End: 2023-01-01

## 2023-01-01 RX ORDER — PRAZOSIN HYDROCHLORIDE 1 MG/1
CAPSULE ORAL
Status: COMPLETED
Start: 2023-01-01 | End: 2023-01-01

## 2023-01-01 RX ORDER — METOLAZONE 2.5 MG/1
2.5 TABLET ORAL DAILY
Qty: 5 TABLET | Refills: 0 | Status: SHIPPED | OUTPATIENT
Start: 2023-01-01 | End: 2023-01-01

## 2023-01-01 RX ORDER — CLOTRIMAZOLE 1 %
CREAM (GRAM) TOPICAL 2 TIMES DAILY
Qty: 60 G | Refills: 0 | Status: SHIPPED | OUTPATIENT
Start: 2023-01-01 | End: 2023-01-01

## 2023-01-01 RX ORDER — DULOXETIN HYDROCHLORIDE 30 MG/1
CAPSULE, DELAYED RELEASE ORAL
COMMUNITY
Start: 2022-06-06 | End: 2023-01-01 | Stop reason: ALTCHOICE

## 2023-01-01 RX ORDER — CEFTRIAXONE SODIUM 2 G/50ML
2 INJECTION, SOLUTION INTRAVENOUS EVERY 24 HOURS
Status: DISCONTINUED | OUTPATIENT
Start: 2023-01-01 | End: 2023-01-01 | Stop reason: HOSPADM

## 2023-01-01 RX ORDER — ONDANSETRON 2 MG/ML
4 INJECTION INTRAMUSCULAR; INTRAVENOUS EVERY 6 HOURS PRN
Status: DISCONTINUED | OUTPATIENT
Start: 2023-01-01 | End: 2023-01-01 | Stop reason: HOSPADM

## 2023-01-01 RX ORDER — POTASSIUM CHLORIDE 1500 MG/1
TABLET, EXTENDED RELEASE ORAL
Qty: 180 TABLET | Refills: 0 | Status: SHIPPED | OUTPATIENT
Start: 2023-01-01 | End: 2023-01-01

## 2023-01-01 RX ORDER — ZOLPIDEM TARTRATE 5 MG/1
5 TABLET ORAL
Status: DISCONTINUED | OUTPATIENT
Start: 2023-01-01 | End: 2023-01-01 | Stop reason: HOSPADM

## 2023-01-01 RX ORDER — ALPRAZOLAM 0.25 MG
0.5 TABLET ORAL 3 TIMES DAILY PRN
Status: DISCONTINUED | OUTPATIENT
Start: 2023-01-01 | End: 2023-01-01 | Stop reason: HOSPADM

## 2023-01-01 RX ORDER — TORSEMIDE 20 MG/1
20 TABLET ORAL DAILY
Qty: 270 TABLET | Refills: 3 | COMMUNITY
Start: 2023-01-01

## 2023-01-01 RX ORDER — CYCLOBENZAPRINE HCL 10 MG
10 TABLET ORAL ONCE
Status: COMPLETED | OUTPATIENT
Start: 2023-01-01 | End: 2023-01-01

## 2023-01-01 RX ORDER — METOPROLOL SUCCINATE 25 MG/1
25 TABLET, EXTENDED RELEASE ORAL DAILY
Status: DISCONTINUED | OUTPATIENT
Start: 2023-01-01 | End: 2023-01-01

## 2023-01-01 RX ORDER — ATORVASTATIN CALCIUM 10 MG/1
20 TABLET, FILM COATED ORAL DAILY
Status: DISCONTINUED | OUTPATIENT
Start: 2023-01-01 | End: 2023-01-01 | Stop reason: HOSPADM

## 2023-01-01 RX ORDER — BENZONATATE 200 MG/1
200 CAPSULE ORAL 3 TIMES DAILY PRN
Qty: 30 CAPSULE | Refills: 0 | Status: SHIPPED | OUTPATIENT
Start: 2023-01-01

## 2023-01-01 RX ORDER — ONDANSETRON 4 MG/1
4 TABLET, ORALLY DISINTEGRATING ORAL EVERY 6 HOURS PRN
Status: DISCONTINUED | OUTPATIENT
Start: 2023-01-01 | End: 2023-01-01 | Stop reason: HOSPADM

## 2023-01-01 RX ORDER — ENOXAPARIN SODIUM 100 MG/ML
40 INJECTION SUBCUTANEOUS
Status: DISCONTINUED | OUTPATIENT
Start: 2023-01-01 | End: 2023-01-01 | Stop reason: HOSPADM

## 2023-01-01 RX ORDER — ARIPIPRAZOLE 5 MG/1
5 TABLET ORAL DAILY
COMMUNITY
Start: 2023-01-01

## 2023-01-01 RX ORDER — LIDOCAINE 4 G/G
1 PATCH TOPICAL ONCE
Status: DISCONTINUED | OUTPATIENT
Start: 2023-01-01 | End: 2023-01-01 | Stop reason: HOSPADM

## 2023-01-01 RX ORDER — BENZTROPINE MESYLATE 0.5 MG/1
0.5 TABLET ORAL 2 TIMES DAILY
Status: DISCONTINUED | OUTPATIENT
Start: 2023-01-01 | End: 2023-01-01 | Stop reason: HOSPADM

## 2023-01-01 RX ORDER — ARIPIPRAZOLE 5 MG/1
5 TABLET ORAL DAILY
Status: DISCONTINUED | OUTPATIENT
Start: 2023-01-01 | End: 2023-01-01 | Stop reason: HOSPADM

## 2023-01-01 RX ORDER — TORSEMIDE 20 MG/1
TABLET ORAL
Qty: 270 TABLET | Refills: 3 | Status: SHIPPED | OUTPATIENT
Start: 2023-01-01 | End: 2023-01-01

## 2023-01-01 RX ORDER — FUROSEMIDE 10 MG/ML
40 INJECTION INTRAMUSCULAR; INTRAVENOUS ONCE
Status: COMPLETED | OUTPATIENT
Start: 2023-01-01 | End: 2023-01-01

## 2023-01-01 RX ORDER — CETIRIZINE HYDROCHLORIDE 10 MG/1
TABLET ORAL
Qty: 30 TABLET | Refills: 0 | Status: SHIPPED | OUTPATIENT
Start: 2023-01-01 | End: 2023-01-01

## 2023-01-01 RX ORDER — METHYLPREDNISOLONE 4 MG
TABLET, DOSE PACK ORAL
Qty: 21 TABLET | Refills: 0 | Status: SHIPPED | OUTPATIENT
Start: 2023-01-01 | End: 2023-01-01

## 2023-01-01 RX ORDER — CEFTRIAXONE SODIUM 1 G/50ML
1 INJECTION, SOLUTION INTRAVENOUS
Status: DISCONTINUED | OUTPATIENT
Start: 2023-01-01 | End: 2023-01-01

## 2023-01-01 RX ORDER — ASPIRIN 81 MG/1
TABLET, COATED ORAL
Qty: 30 TABLET | Refills: 4 | Status: SHIPPED | OUTPATIENT
Start: 2023-01-01

## 2023-01-01 RX ORDER — ALBUTEROL SULFATE 90 UG/1
2 AEROSOL, METERED RESPIRATORY (INHALATION) EVERY 4 HOURS PRN
Status: DISCONTINUED | OUTPATIENT
Start: 2023-01-01 | End: 2023-01-01 | Stop reason: HOSPADM

## 2023-01-01 RX ORDER — MONTELUKAST SODIUM 10 MG/1
10 TABLET ORAL AT BEDTIME
Status: DISCONTINUED | OUTPATIENT
Start: 2023-01-01 | End: 2023-01-01

## 2023-01-01 RX ORDER — ACETAMINOPHEN 325 MG/1
650 TABLET ORAL EVERY 6 HOURS PRN
Status: DISCONTINUED | OUTPATIENT
Start: 2023-01-01 | End: 2023-01-01 | Stop reason: HOSPADM

## 2023-01-01 RX ORDER — LIDOCAINE 40 MG/G
CREAM TOPICAL
Status: DISCONTINUED | OUTPATIENT
Start: 2023-01-01 | End: 2023-01-01 | Stop reason: HOSPADM

## 2023-01-01 RX ORDER — IOPAMIDOL 755 MG/ML
17 INJECTION, SOLUTION INTRAVASCULAR ONCE
Status: COMPLETED | OUTPATIENT
Start: 2023-01-01 | End: 2023-01-01

## 2023-01-01 RX ORDER — DULOXETIN HYDROCHLORIDE 30 MG/1
60 CAPSULE, DELAYED RELEASE ORAL 2 TIMES DAILY
Status: DISCONTINUED | OUTPATIENT
Start: 2023-01-01 | End: 2023-01-01 | Stop reason: HOSPADM

## 2023-01-01 RX ORDER — FLUTICASONE FUROATE AND VILANTEROL 100; 25 UG/1; UG/1
1 POWDER RESPIRATORY (INHALATION) DAILY
Status: DISCONTINUED | OUTPATIENT
Start: 2023-01-01 | End: 2023-01-01 | Stop reason: HOSPADM

## 2023-01-01 RX ORDER — ALBUTEROL SULFATE 90 UG/1
AEROSOL, METERED RESPIRATORY (INHALATION)
Qty: 8.5 G | Refills: 0 | Status: SHIPPED | OUTPATIENT
Start: 2023-01-01

## 2023-01-01 RX ORDER — DOXYCYCLINE 100 MG/1
100 CAPSULE ORAL 2 TIMES DAILY
Status: DISCONTINUED | OUTPATIENT
Start: 2023-01-01 | End: 2023-01-01 | Stop reason: HOSPADM

## 2023-01-01 RX ORDER — CYCLOBENZAPRINE HCL 10 MG
10 TABLET ORAL 3 TIMES DAILY PRN
Qty: 20 TABLET | Refills: 0 | Status: SHIPPED | OUTPATIENT
Start: 2023-01-01 | End: 2023-01-01

## 2023-01-01 RX ORDER — CALCIUM CHLORIDE 100 MG/ML
INJECTION INTRAVENOUS; INTRAVENTRICULAR
Status: DISCONTINUED
Start: 2023-01-01 | End: 2023-01-01 | Stop reason: HOSPADM

## 2023-01-01 RX ORDER — GABAPENTIN 100 MG/1
100 CAPSULE ORAL 2 TIMES DAILY
Qty: 60 CAPSULE | Refills: 0 | Status: SHIPPED | OUTPATIENT
Start: 2023-01-01 | End: 2023-01-01

## 2023-01-01 RX ORDER — CETIRIZINE HYDROCHLORIDE 10 MG/1
TABLET ORAL
Qty: 30 TABLET | Refills: 4 | Status: SHIPPED | OUTPATIENT
Start: 2023-01-01

## 2023-01-01 RX ORDER — MONTELUKAST SODIUM 10 MG/1
10 TABLET ORAL AT BEDTIME
Qty: 90 TABLET | Refills: 1 | Status: SHIPPED | OUTPATIENT
Start: 2023-01-01

## 2023-01-01 RX ORDER — ACETAMINOPHEN 325 MG/1
325 TABLET ORAL ONCE
Status: DISCONTINUED | OUTPATIENT
Start: 2023-01-01 | End: 2023-01-01

## 2023-01-01 RX ORDER — CEFTRIAXONE SODIUM 1 G/50ML
INJECTION, SOLUTION INTRAVENOUS
Status: COMPLETED
Start: 2023-01-01 | End: 2023-01-01

## 2023-01-01 RX ORDER — PREDNISONE 20 MG/1
40 TABLET ORAL DAILY
Qty: 10 TABLET | Refills: 0 | Status: SHIPPED | OUTPATIENT
Start: 2023-01-01 | End: 2023-01-01

## 2023-01-01 RX ORDER — PRAZOSIN HYDROCHLORIDE 1 MG/1
1 CAPSULE ORAL AT BEDTIME
Status: DISCONTINUED | OUTPATIENT
Start: 2023-01-01 | End: 2023-01-01 | Stop reason: HOSPADM

## 2023-01-01 RX ORDER — TAMSULOSIN HYDROCHLORIDE 0.4 MG/1
0.4 CAPSULE ORAL DAILY
Qty: 30 CAPSULE | Refills: 1 | Status: SHIPPED | OUTPATIENT
Start: 2023-01-01 | End: 2023-01-01

## 2023-01-01 RX ORDER — DIGOXIN 125 MCG
125 TABLET ORAL DAILY
Qty: 90 TABLET | Refills: 0 | Status: SHIPPED | OUTPATIENT
Start: 2023-01-01

## 2023-01-01 RX ORDER — DIGOXIN 125 MCG
TABLET ORAL
Qty: 90 TABLET | Refills: 1 | Status: SHIPPED | OUTPATIENT
Start: 2023-01-01 | End: 2023-01-01

## 2023-01-01 RX ORDER — DOXYCYCLINE 100 MG/1
100 CAPSULE ORAL 2 TIMES DAILY
Qty: 20 CAPSULE | Refills: 0 | Status: SHIPPED | OUTPATIENT
Start: 2023-01-01 | End: 2023-01-01

## 2023-01-01 RX ORDER — ATORVASTATIN CALCIUM 20 MG/1
TABLET, FILM COATED ORAL
Qty: 90 TABLET | Refills: 2 | Status: SHIPPED | OUTPATIENT
Start: 2023-01-01 | End: 2023-01-01

## 2023-01-01 RX ORDER — HYDROCODONE BITARTRATE AND ACETAMINOPHEN 5; 325 MG/1; MG/1
TABLET ORAL
Qty: 14 TABLET | Refills: 0 | Status: SHIPPED | OUTPATIENT
Start: 2023-01-01 | End: 2023-01-01

## 2023-01-01 RX ORDER — ASPIRIN 81 MG/1
81 TABLET ORAL DAILY
Status: DISCONTINUED | OUTPATIENT
Start: 2023-01-01 | End: 2023-01-01 | Stop reason: HOSPADM

## 2023-01-01 RX ORDER — POTASSIUM CHLORIDE 1500 MG/1
TABLET, EXTENDED RELEASE ORAL
Qty: 180 TABLET | Refills: 2 | Status: SHIPPED | OUTPATIENT
Start: 2023-01-01

## 2023-01-01 RX ORDER — METHOCARBAMOL 500 MG/1
500 TABLET, FILM COATED ORAL 4 TIMES DAILY PRN
Qty: 30 TABLET | Refills: 0 | Status: SHIPPED | OUTPATIENT
Start: 2023-01-01 | End: 2023-01-01

## 2023-01-01 RX ORDER — FLUTICASONE FUROATE, UMECLIDINIUM BROMIDE AND VILANTEROL TRIFENATATE 100; 62.5; 25 UG/1; UG/1; UG/1
1 POWDER RESPIRATORY (INHALATION) DAILY
Qty: 60 EACH | Refills: 1 | Status: SHIPPED | OUTPATIENT
Start: 2023-01-01

## 2023-01-01 RX ORDER — TORSEMIDE 20 MG/1
60 TABLET ORAL DAILY
Status: DISCONTINUED | OUTPATIENT
Start: 2023-01-01 | End: 2023-01-01 | Stop reason: HOSPADM

## 2023-01-01 RX ORDER — DEXAMETHASONE SODIUM PHOSPHATE 10 MG/ML
10 INJECTION, SOLUTION INTRAMUSCULAR; INTRAVENOUS ONCE
Status: COMPLETED | OUTPATIENT
Start: 2023-01-01 | End: 2023-01-01

## 2023-01-01 RX ORDER — EPINEPHRINE 0.3 MG/.3ML
INJECTION SUBCUTANEOUS
Qty: 2 EACH | Refills: 1 | Status: SHIPPED | OUTPATIENT
Start: 2023-01-01

## 2023-01-01 RX ORDER — MORPHINE SULFATE 2 MG/ML
2 INJECTION, SOLUTION INTRAMUSCULAR; INTRAVENOUS ONCE
Status: COMPLETED | OUTPATIENT
Start: 2023-01-01 | End: 2023-01-01

## 2023-01-01 RX ADMIN — ACETAMINOPHEN 650 MG: 325 TABLET, FILM COATED ORAL at 22:09

## 2023-01-01 RX ADMIN — CEFTRIAXONE SODIUM 1 G: 1 INJECTION, SOLUTION INTRAVENOUS at 01:13

## 2023-01-01 RX ADMIN — PRAZOSIN HYDROCHLORIDE 1 MG: 1 CAPSULE ORAL at 01:13

## 2023-01-01 RX ADMIN — BENZTROPINE MESYLATE 0.5 MG: 0.5 TABLET ORAL at 21:09

## 2023-01-01 RX ADMIN — DOXYCYCLINE 100 MG: 100 INJECTION, POWDER, LYOPHILIZED, FOR SOLUTION INTRAVENOUS at 22:09

## 2023-01-01 RX ADMIN — FUROSEMIDE 40 MG: 10 INJECTION, SOLUTION INTRAVENOUS at 20:39

## 2023-01-01 RX ADMIN — LIDOCAINE 1 PATCH: 560 PATCH PERCUTANEOUS; TOPICAL; TRANSDERMAL at 20:58

## 2023-01-01 RX ADMIN — SODIUM CHLORIDE 250 ML: 9 INJECTION, SOLUTION INTRAVENOUS at 06:37

## 2023-01-01 RX ADMIN — ALPRAZOLAM 0.5 MG: 0.25 TABLET ORAL at 23:01

## 2023-01-01 RX ADMIN — DULOXETINE HYDROCHLORIDE 60 MG: 30 CAPSULE, DELAYED RELEASE ORAL at 21:22

## 2023-01-01 RX ADMIN — ATORVASTATIN CALCIUM 20 MG: 10 TABLET, FILM COATED ORAL at 08:15

## 2023-01-01 RX ADMIN — DOXYCYCLINE HYCLATE 100 MG: 100 CAPSULE ORAL at 21:09

## 2023-01-01 RX ADMIN — DULOXETINE HYDROCHLORIDE 60 MG: 30 CAPSULE, DELAYED RELEASE ORAL at 00:01

## 2023-01-01 RX ADMIN — DOXYCYCLINE HYCLATE 100 MG: 100 CAPSULE ORAL at 10:00

## 2023-01-01 RX ADMIN — ARIPIPRAZOLE 5 MG: 5 TABLET ORAL at 10:00

## 2023-01-01 RX ADMIN — BENZTROPINE MESYLATE 0.5 MG: 0.5 TABLET ORAL at 08:15

## 2023-01-01 RX ADMIN — METFORMIN HYDROCHLORIDE 1000 MG: 500 TABLET, FILM COATED ORAL at 17:58

## 2023-01-01 RX ADMIN — DIGOXIN 125 MCG: 125 TABLET ORAL at 10:15

## 2023-01-01 RX ADMIN — ZOLPIDEM TARTRATE 5 MG: 5 TABLET, FILM COATED ORAL at 00:01

## 2023-01-01 RX ADMIN — MORPHINE SULFATE 2 MG: 2 INJECTION, SOLUTION INTRAMUSCULAR; INTRAVENOUS at 01:46

## 2023-01-01 RX ADMIN — DULOXETINE HYDROCHLORIDE 60 MG: 30 CAPSULE, DELAYED RELEASE ORAL at 10:00

## 2023-01-01 RX ADMIN — PRAZOSIN HYDROCHLORIDE 1 MG: 1 CAPSULE ORAL at 21:09

## 2023-01-01 RX ADMIN — CYCLOBENZAPRINE HYDROCHLORIDE 10 MG: 10 TABLET, FILM COATED ORAL at 19:03

## 2023-01-01 RX ADMIN — CEFTRIAXONE SODIUM 2 G: 2 INJECTION, SOLUTION INTRAVENOUS at 21:08

## 2023-01-01 RX ADMIN — TAMSULOSIN HYDROCHLORIDE 0.4 MG: 0.4 CAPSULE ORAL at 10:00

## 2023-01-01 RX ADMIN — NICOTINE 1 PATCH: 21 PATCH, EXTENDED RELEASE TRANSDERMAL at 10:09

## 2023-01-01 RX ADMIN — KETOROLAC TROMETHAMINE 15 MG: 15 INJECTION, SOLUTION INTRAMUSCULAR; INTRAVENOUS at 20:58

## 2023-01-01 RX ADMIN — TAMSULOSIN HYDROCHLORIDE 0.4 MG: 0.4 CAPSULE ORAL at 08:15

## 2023-01-01 RX ADMIN — METFORMIN HYDROCHLORIDE 1000 MG: 500 TABLET, FILM COATED ORAL at 08:15

## 2023-01-01 RX ADMIN — ATORVASTATIN CALCIUM 20 MG: 10 TABLET, FILM COATED ORAL at 10:00

## 2023-01-01 RX ADMIN — DOXYCYCLINE HYCLATE 100 MG: 100 CAPSULE ORAL at 08:14

## 2023-01-01 RX ADMIN — DULOXETINE HYDROCHLORIDE 60 MG: 30 CAPSULE, DELAYED RELEASE ORAL at 08:14

## 2023-01-01 RX ADMIN — IOPAMIDOL 106 ML: 755 INJECTION, SOLUTION INTRAVENOUS at 11:21

## 2023-01-01 RX ADMIN — BENZTROPINE MESYLATE 0.5 MG: 0.5 TABLET ORAL at 11:17

## 2023-01-01 RX ADMIN — ASPIRIN 81 MG: 81 TABLET, COATED ORAL at 10:00

## 2023-01-01 RX ADMIN — ENOXAPARIN SODIUM 40 MG: 40 INJECTION SUBCUTANEOUS at 21:09

## 2023-01-01 RX ADMIN — NICOTINE 1 PATCH: 21 PATCH, EXTENDED RELEASE TRANSDERMAL at 08:15

## 2023-01-01 RX ADMIN — DEXAMETHASONE SODIUM PHOSPHATE 10 MG: 10 INJECTION, SOLUTION INTRAMUSCULAR; INTRAVENOUS at 09:40

## 2023-01-01 RX ADMIN — SODIUM CHLORIDE 250 ML: 9 INJECTION, SOLUTION INTRAVENOUS at 05:29

## 2023-01-01 RX ADMIN — ZOLPIDEM TARTRATE 5 MG: 5 TABLET, FILM COATED ORAL at 21:22

## 2023-01-01 RX ADMIN — ASPIRIN 81 MG: 81 TABLET, COATED ORAL at 08:15

## 2023-01-01 RX ADMIN — ARIPIPRAZOLE 5 MG: 5 TABLET ORAL at 08:15

## 2023-01-01 RX ADMIN — METFORMIN HYDROCHLORIDE 1000 MG: 500 TABLET, FILM COATED ORAL at 07:43

## 2023-01-01 RX ADMIN — IOPAMIDOL 6 ML: 612 INJECTION, SOLUTION INTRATHECAL at 09:40

## 2023-01-01 RX ADMIN — IOPAMIDOL 17 ML: 755 INJECTION, SOLUTION INTRAVENOUS at 11:21

## 2023-01-01 RX ADMIN — DIGOXIN 125 MCG: 125 TABLET ORAL at 08:15

## 2023-01-01 RX ADMIN — ENOXAPARIN SODIUM 40 MG: 40 INJECTION SUBCUTANEOUS at 01:13

## 2023-01-01 ASSESSMENT — PAIN SCALES - GENERAL
PAINLEVEL: MODERATE PAIN (5)
PAINLEVEL: NO PAIN (0)
PAINLEVEL: EXTREME PAIN (9)
PAINLEVEL: NO PAIN (0)
PAINLEVEL: NO PAIN (0)
PAINLEVEL: MODERATE PAIN (4)
PAINLEVEL: NO PAIN (0)
PAINLEVEL: EXTREME PAIN (9)
PAINLEVEL: NO PAIN (0)
PAINLEVEL: NO PAIN (0)

## 2023-01-01 ASSESSMENT — ACTIVITIES OF DAILY LIVING (ADL)
ADLS_ACUITY_SCORE: 31
ADLS_ACUITY_SCORE: 35
ADLS_ACUITY_SCORE: 31
ADLS_ACUITY_SCORE: 31
ADLS_ACUITY_SCORE: 35
ADLS_ACUITY_SCORE: 31
ADLS_ACUITY_SCORE: 31
ADLS_ACUITY_SCORE: 35
ADLS_ACUITY_SCORE: 31
ADLS_ACUITY_SCORE: 35
ADLS_ACUITY_SCORE: 35
ADLS_ACUITY_SCORE: 31
ADLS_ACUITY_SCORE: 31
ADLS_ACUITY_SCORE: 35
ADLS_ACUITY_SCORE: 31
ADLS_ACUITY_SCORE: 32
ADLS_ACUITY_SCORE: 35
CURRENT_FUNCTION: NO ASSISTANCE NEEDED
ADLS_ACUITY_SCORE: 31
ADLS_ACUITY_SCORE: 31
ADLS_ACUITY_SCORE: 33

## 2023-01-01 ASSESSMENT — ENCOUNTER SYMPTOMS
HEMATURIA: 0
MYALGIAS: 0
DIARRHEA: 0
FEVER: 0
JOINT SWELLING: 0
NAUSEA: 0
FEVER: 0
FREQUENCY: 0
WEAKNESS: 0
DIZZINESS: 0
COUGH: 1
CONSTIPATION: 0
EYE PAIN: 0
CHILLS: 0
PARESTHESIAS: 0
ARTHRALGIAS: 0
FREQUENCY: 0
DYSURIA: 0
PALPITATIONS: 0
HEMATOCHEZIA: 0
HEADACHES: 1
NERVOUS/ANXIOUS: 0
SHORTNESS OF BREATH: 0
BACK PAIN: 1
HEARTBURN: 0
ABDOMINAL PAIN: 0
DYSURIA: 0
SORE THROAT: 0
HEMATURIA: 0

## 2023-01-01 ASSESSMENT — PATIENT HEALTH QUESTIONNAIRE - PHQ9
10. IF YOU CHECKED OFF ANY PROBLEMS, HOW DIFFICULT HAVE THESE PROBLEMS MADE IT FOR YOU TO DO YOUR WORK, TAKE CARE OF THINGS AT HOME, OR GET ALONG WITH OTHER PEOPLE: NOT DIFFICULT AT ALL
SUM OF ALL RESPONSES TO PHQ QUESTIONS 1-9: 5
SUM OF ALL RESPONSES TO PHQ QUESTIONS 1-9: 0
SUM OF ALL RESPONSES TO PHQ QUESTIONS 1-9: 3
SUM OF ALL RESPONSES TO PHQ QUESTIONS 1-9: 0

## 2023-01-01 ASSESSMENT — ANXIETY QUESTIONNAIRES
GAD7 TOTAL SCORE: 0
1. FEELING NERVOUS, ANXIOUS, OR ON EDGE: MORE THAN HALF THE DAYS
GAD7 TOTAL SCORE: 0
6. BECOMING EASILY ANNOYED OR IRRITABLE: SEVERAL DAYS
5. BEING SO RESTLESS THAT IT IS HARD TO SIT STILL: SEVERAL DAYS
GAD7 TOTAL SCORE: 8
GAD7 TOTAL SCORE: 8
3. WORRYING TOO MUCH ABOUT DIFFERENT THINGS: SEVERAL DAYS
6. BECOMING EASILY ANNOYED OR IRRITABLE: NOT AT ALL
4. TROUBLE RELAXING: NOT AT ALL
5. BEING SO RESTLESS THAT IT IS HARD TO SIT STILL: NOT AT ALL
7. FEELING AFRAID AS IF SOMETHING AWFUL MIGHT HAPPEN: NOT AT ALL
1. FEELING NERVOUS, ANXIOUS, OR ON EDGE: NOT AT ALL
IF YOU CHECKED OFF ANY PROBLEMS ON THIS QUESTIONNAIRE, HOW DIFFICULT HAVE THESE PROBLEMS MADE IT FOR YOU TO DO YOUR WORK, TAKE CARE OF THINGS AT HOME, OR GET ALONG WITH OTHER PEOPLE: SOMEWHAT DIFFICULT
4. TROUBLE RELAXING: MORE THAN HALF THE DAYS
3. WORRYING TOO MUCH ABOUT DIFFERENT THINGS: NOT AT ALL
7. FEELING AFRAID AS IF SOMETHING AWFUL MIGHT HAPPEN: NOT AT ALL
IF YOU CHECKED OFF ANY PROBLEMS ON THIS QUESTIONNAIRE, HOW DIFFICULT HAVE THESE PROBLEMS MADE IT FOR YOU TO DO YOUR WORK, TAKE CARE OF THINGS AT HOME, OR GET ALONG WITH OTHER PEOPLE: NOT DIFFICULT AT ALL
2. NOT BEING ABLE TO STOP OR CONTROL WORRYING: NOT AT ALL
2. NOT BEING ABLE TO STOP OR CONTROL WORRYING: SEVERAL DAYS

## 2023-01-10 NOTE — ED TRIAGE NOTES
Patient presents to urgent care with his fiance for low back pain that has been getting worse the last 6-7 days. Patient has been using heat/ice, ibuprofen/tylenol. Patient had tylenol at 4pm. Pain 8/10.

## 2023-01-10 NOTE — DISCHARGE INSTRUCTIONS
Continue taking Tylenol or ibuprofen as needed for pain.    Follow-up with your doctor if no improvement in symptoms.    Return to urgent care or emergency department for any worsening or concerning symptoms.

## 2023-01-10 NOTE — ED PROVIDER NOTES
History     Chief Complaint   Patient presents with     Back Pain     HPI  Joey Irene is a 67 year old male who presents ambulatory with fiancé to urgent care for evaluation of low back pain that started about a week ago.  He denies any trauma or injury to his back.  No saddle paresthesias, bowel or bladder incontinence or urinary retention.  Notes that he has a [employer] battery pack to his right lower back and is unsure if part of his pain is due to that.  He has never had issues with this in the past.  He is still ambulating but slowly.  No fevers or chills.  He tells me that he is also concerned that it may be his kidneys.    Allergies:  Allergies   Allergen Reactions     Bees Anaphylaxis     Seasonal Allergies Difficulty breathing and Cough     Bupropion Other (See Comments)     tremor     Tramadol Nausea and Swelling     [place] Nite Time Dizziness and Nausea and Vomiting     Nyquil Cold &  [Night-Time Cold-Flu Relief] Dizziness and Nausea and Vomiting     Trichophyton Other (See Comments)       Problem List:    Patient Active Problem List    Diagnosis Date Noted     Encounter for monitoring digoxin therapy 08/03/2022     Priority: Medium     Prediabetes 08/03/2022     Priority: Medium     Severe chronic obstructive pulmonary disease (H) 08/03/2022     Priority: Medium     Right heart failure, NYHA class 3 (H) 08/03/2022     Priority: Medium     Encounter for smoking cessation counseling 04/19/2021     Priority: Medium     Diastolic dysfunction 04/19/2021     Priority: Medium     Status post coronary angiogram 12/11/2020     Priority: Medium     Pulmonary arterial hypertension (H) 11/20/2020     Priority: Medium     Right ventricular dilation 10/29/2020     Priority: Medium     Added automatically from request for surgery [id number]       Bee sting allergy 08/07/2020     Priority: Medium     Hyperglycemia 08/07/2020     Priority: Medium     Elevated prostate specific antigen (PSA) 08/07/2020     Priority:  Medium     History of fusion of cervical spine 08/06/2020     Priority: Medium     Generalized anxiety disorder 08/06/2020     Priority: Medium     Tobacco use disorder 08/06/2020     Priority: Medium     Pulmonary emphysema, unspecified emphysema type (H) 08/05/2020     Priority: Medium     Hyperlipidemia, unspecified hyperlipidemia type 08/05/2020     Priority: Medium     Cardiomegaly 08/05/2020     Priority: Medium     Enlarged prostate 08/05/2020     Priority: Medium     Diverticulosis 03/25/2020     Priority: Medium     Chronic bronchitis with emphysema (H) 02/03/2020     Priority: Medium     Class 1 obesity with body mass index (BMI) of 33.0 to 33.9 in adult 02/03/2020     Priority: Medium     Smoking greater than 40 pack years 02/03/2020     Priority: Medium     Pain due to total left knee replacement, sequela 01/27/2020     Priority: Medium     Cervical stenosis of spinal canal 01/13/2020     Priority: Medium     Cholesteatoma of left ear 03/04/2019     Priority: Medium     Nasal septal deviation 12/18/2017     Priority: Medium     Primary osteoarthritis of right knee 02/25/2017     Priority: Medium     Status post total left knee replacement 02/25/2017     Priority: Medium     Recurrent major depressive disorder, in partial remission (H) 11/21/2015     Priority: Medium     PTSD (post-traumatic stress disorder) 08/19/2015     Priority: Medium     Seasonal allergies 08/19/2015     Priority: Medium     Anxiety state 07/07/2011     Priority: Medium     Back pain, chronic 07/07/2011     Priority: Medium     Hearing loss 07/07/2011     Priority: Medium     Insomnia, unspecified type 07/07/2011     Priority: Medium     Sleep apnea 07/07/2011     Priority: Medium        Past Medical History:    Past Medical History:   Diagnosis Date     COPD (chronic obstructive pulmonary disease) (H)      Hypertension      Uncomplicated asthma        Past Surgical History:    Past Surgical History:   Procedure Laterality Date      CV CORONARY ANGIOGRAM N/A 12/11/2020    Procedure: Coronary Angiogram;  Surgeon: Aman Delgado MD;  [facility]     CV RIGHT HEART CATH MEASUREMENTS RECORDED N/A 12/11/2020    Procedure: CV RIGHT HEART CATH;  Surgeon: Aman Delgado MD;  [facility]     ENT SURGERY      patient has had three left ear surgeries, unsure what they did     FUSION CERVICAL POSTERIOR THREE+ LEVELS       HERNIA REPAIR, INGUINAL RT/LT Right     done times 3     JOINT REPLACEMENT Left      PHACOEMULSIFICATION WITH STANDARD INTRAOCULAR LENS IMPLANT Right 1/25/2022    Procedure: COMPLEX PHACOEMULSIFICATION CATARACT EXTRACTION POSTERIOR CHAMBER LENS RIGHT EYE: PUPIL STRETCHING RIGHT RIGHT;  Surgeon: Efra Gibson MD;  Location: [facility]     PHACOEMULSIFICATION WITH STANDARD INTRAOCULAR LENS IMPLANT Left 2/8/2022    Procedure: ANTERIOR VITRECTOMY AND POSTERIOR CHAMBER LENS IMPLANT;  Surgeon: Efra Gibson MD;  Location: [facility]     REPAIR HAMMER TOE Right        Family History:    Family History   Problem Relation Age of Onset     Lung Cancer Mother      Ovarian Cancer Sister        Social History:  Marital Status:   [4]  Social History     Tobacco Use     Smoking status: Every Day     Types: Cigars     Start date: 10/1/2021     Smokeless tobacco: Never   Vaping Use     Vaping Use: Never used   Substance Use Topics     Alcohol use: Never     Drug use: Never        Medications:    acetaminophen (TYLENOL) 500 MG tablet  albuterol (PROAIR HFA/PROVENTIL HFA/VENTOLIN HFA) 108 (90 Base) MCG/ACT inhaler  ARIPiprazole (ABILIFY) 10 MG tablet  ASPIRIN LOW DOSE 81 MG EC tablet  atorvastatin (LIPITOR) 20 MG tablet  benztropine (COGENTIN) 0.5 MG tablet  Cholecalciferol (D 1000) 25 MCG (1000 UT) CAPS  digoxin (LANOXIN) 125 MCG tablet  DULoxetine (CYMBALTA) 60 MG capsule  Fluticasone-Umeclidin-Vilanterol (TRELEGY ELLIPTA) 100-62.5-25 MCG/INH oral inhaler  hydrOXYzine (VISTARIL) 25 MG  "capsule  metoprolol succinate ER (TOPROL-XL) 25 MG 24 hr tablet  montelukast (SINGULAIR) 10 MG tablet  multivitamin w/minerals (THERA-VIT-M) tablet  potassium chloride ER (KLOR-CON M) 20 MEQ CR tablet  prazosin (MINIPRESS) 1 MG capsule  torsemide (DEMADEX) 20 MG tablet  vitamin C (ASCORBIC ACID) 500 MG tablet  zolpidem (AMBIEN) 10 MG tablet  cetirizine (ZYRTEC) 10 MG tablet  EPINEPHrine (ANY BX GENERIC EQUIV) 0.3 MG/0.3ML injection 2-pack          Review of Systems   Constitutional: Negative for fever.   Genitourinary: Negative for dysuria, frequency, hematuria and urgency.   Musculoskeletal: Positive for back pain and gait problem.   All other systems reviewed and are negative.      Physical Exam   BP: 121/80  Pulse: 91  Temp: 98  F (36.7  C)  Resp: 16  Height: 180.3 cm (5' 11\")  Weight: 103.9 kg (229 lb)  SpO2: (!) 91 %      Physical Exam  Vitals and nursing note reviewed.   Constitutional:       Appearance: Normal appearance. He is not ill-appearing or toxic-appearing.   HENT:      Head: Atraumatic.   Eyes:      Pupils: Pupils are equal, round, and reactive to light.   Cardiovascular:      Rate and Rhythm: Normal rate and regular rhythm.      Heart sounds: Normal heart sounds.   Pulmonary:      Effort: Pulmonary effort is normal. No respiratory distress.      Breath sounds: Normal breath sounds. No wheezing or rhonchi.   Abdominal:      General: Bowel sounds are normal.      Palpations: Abdomen is soft.      Tenderness: There is no abdominal tenderness. There is no right CVA tenderness, left CVA tenderness, guarding or rebound.   Musculoskeletal:         General: Normal range of motion.      Cervical back: Normal and neck supple. No tenderness.      Thoracic back: Normal. No tenderness.      Lumbar back: Tenderness present. No swelling, edema or deformity. Normal range of motion. Negative right straight leg raise test and negative left straight leg raise test.      Comments: Left-sided low back tenderness to " palpation.  No step-offs or edema.  Negative straight leg raise test.  No significant swelling, bruising.  No tenderness over his battery pack which is to the right low back.   Skin:     General: Skin is warm and dry.      Capillary Refill: Capillary refill takes less than 2 seconds.   Neurological:      Mental Status: He is alert and oriented to person, place, and time.         ED Course                 Procedures         No results found for this or any previous visit (from the past 24 hour(s)).    Medications   Lidocaine (LIDOCARE) 4 % Patch 1 patch (1 patch Transdermal Patch/Med Applied 1/9/23 2058)   ketorolac (TORADOL) injection 15 mg (15 mg Intramuscular Given 1/9/23 2058)       Assessments & Plan (with Medical Decision Making)     I have reviewed the nursing notes.    This is a 67-year-old male that presented for evaluation of left low back pain that started about 1 week ago.  No known injury.  He does have left-sided paraspinal tenderness to palpation.  No step-offs or edema.  He is able to get from sitting to standing position on his own.  He is able to ambulate but slowly.  No significant CVA tenderness appreciated.  Discussed all findings with patient.  Patient was given Toradol IM along with a lidocaine patch.  He declined any further testing or evaluation at this time which is reasonable.  He tells me that he has had steroid injections in the past.  I recommended that he should contact his primary doctor to ask about getting set up for these.  Continue Tylenol ibuprofen as needed for pain.  Return to urgent care emergency department for any worsening or concerning symptoms.    I have reviewed the findings, diagnosis, plan and need for follow up with the patient.    This document was prepared using a combination of typing and voice generated software.  While every attempt was made for accuracy, spelling and grammatical errors may exist.      New Prescriptions    No medications on file       Final  diagnoses:   Acute left-sided low back pain without sciatica       1/9/2023   [facility]     Mpofu, Prudence, CNP  01/09/23 3202

## 2023-01-10 NOTE — ED TRIAGE NOTES
Pt reports mid and lower back pain that has been worsening over the past 6-7 days. Pt reports heat and ice to try to help with the pain but it continues to get worse. Pt denies any injuries that he remembers. Pt reports Tylenol around 4pm today.

## 2023-01-13 NOTE — ED TRIAGE NOTES
Patient presents with complaints of back pain that he states he's had for the last 2.5 weeks.

## 2023-01-13 NOTE — TELEPHONE ENCOUNTER
Patient calling and requesting medication for pain.     Patient seen in ER on 1/9/23.    Reports pain is worse when standing and ok while sitting.   Patient would like Rx for pain as tylenol, ibuprofen not helping.     [facility] for pharmacy.   Please advise, thank you.    Emergency Department and Urgent Care Follow-up      Reason for ER/UC visit: ;ow back pain in the center  o Date seen: 1/9/23      New or Worsening symptoms:  Pain when standing, but ok when sitting. Reports pain level 8/10.        Prescription Received/Picked up from Pharmacy?: no   o Medications started? NA  o Any questions or issues regarding your prescription?: NA      Follow-up Results or Labs that are pending: no       Questions or concerns?: patient requesting medication for the pain       ER Recommends Follow-up by: follow up with PCP      RN Recommendations: follow up with PCP or covering provider or return to ER/UC  o Appointment scheduled: no     If you start feeling worse, or have any further questions, please feel free to contact Nurse Triage at [phone].  If needing immediate medical attention at any time please call 911/Go to the ER.

## 2023-01-14 NOTE — ED PROVIDER NOTES
History     Chief Complaint   Patient presents with     Back Pain     HPI  Joey Irene is a 67 year old male with h/o chronic low back pain, nerve stimulator implant to low back, who presents with worsening low back pain x 3 weeks. Denies injury. He has been taking tylenol and ibuprofen at home without relief. Denies saddle paresthesias, loss/retention of bowel/bladder or weakness.     Allergies:  Allergies   Allergen Reactions     Bees Anaphylaxis     Seasonal Allergies Difficulty breathing and Cough     Bupropion Other (See Comments)     tremor     Tramadol Nausea and Swelling     [place] Nite Time Dizziness and Nausea and Vomiting     Nyquil Cold &  [Night-Time Cold-Flu Relief] Dizziness and Nausea and Vomiting     Trichophyton Other (See Comments)       Problem List:    Patient Active Problem List    Diagnosis Date Noted     Encounter for monitoring digoxin therapy 08/03/2022     Priority: Medium     Prediabetes 08/03/2022     Priority: Medium     Severe chronic obstructive pulmonary disease (H) 08/03/2022     Priority: Medium     Right heart failure, NYHA class 3 (H) 08/03/2022     Priority: Medium     Encounter for smoking cessation counseling 04/19/2021     Priority: Medium     Diastolic dysfunction 04/19/2021     Priority: Medium     Status post coronary angiogram 12/11/2020     Priority: Medium     Pulmonary arterial hypertension (H) 11/20/2020     Priority: Medium     Right ventricular dilation 10/29/2020     Priority: Medium     Added automatically from request for surgery [id number]       Bee sting allergy 08/07/2020     Priority: Medium     Hyperglycemia 08/07/2020     Priority: Medium     Elevated prostate specific antigen (PSA) 08/07/2020     Priority: Medium     History of fusion of cervical spine 08/06/2020     Priority: Medium     Generalized anxiety disorder 08/06/2020     Priority: Medium     Tobacco use disorder 08/06/2020     Priority: Medium     Pulmonary emphysema, unspecified emphysema type  (H) 08/05/2020     Priority: Medium     Hyperlipidemia, unspecified hyperlipidemia type 08/05/2020     Priority: Medium     Cardiomegaly 08/05/2020     Priority: Medium     Enlarged prostate 08/05/2020     Priority: Medium     Diverticulosis 03/25/2020     Priority: Medium     Chronic bronchitis with emphysema (H) 02/03/2020     Priority: Medium     Class 1 obesity with body mass index (BMI) of 33.0 to 33.9 in adult 02/03/2020     Priority: Medium     Smoking greater than 40 pack years 02/03/2020     Priority: Medium     Pain due to total left knee replacement, sequela 01/27/2020     Priority: Medium     Cervical stenosis of spinal canal 01/13/2020     Priority: Medium     Cholesteatoma of left ear 03/04/2019     Priority: Medium     Nasal septal deviation 12/18/2017     Priority: Medium     Primary osteoarthritis of right knee 02/25/2017     Priority: Medium     Status post total left knee replacement 02/25/2017     Priority: Medium     Recurrent major depressive disorder, in partial remission (H) 11/21/2015     Priority: Medium     PTSD (post-traumatic stress disorder) 08/19/2015     Priority: Medium     Seasonal allergies 08/19/2015     Priority: Medium     Anxiety state 07/07/2011     Priority: Medium     Back pain, chronic 07/07/2011     Priority: Medium     Hearing loss 07/07/2011     Priority: Medium     Insomnia, unspecified type 07/07/2011     Priority: Medium     Sleep apnea 07/07/2011     Priority: Medium        Past Medical History:    Past Medical History:   Diagnosis Date     COPD (chronic obstructive pulmonary disease) (H)      Hypertension      Uncomplicated asthma        Past Surgical History:    Past Surgical History:   Procedure Laterality Date     CV CORONARY ANGIOGRAM N/A 12/11/2020    Procedure: Coronary Angiogram;  Surgeon: Aman Delgado MD;  [facility]     CV RIGHT HEART CATH MEASUREMENTS RECORDED N/A 12/11/2020    Procedure: CV RIGHT HEART CATH;  Surgeon:  Aman Delgado MD;  Location:  [facility]     ENT SURGERY      patient has had three left ear surgeries, unsure what they did     FUSION CERVICAL POSTERIOR THREE+ LEVELS       HERNIA REPAIR, INGUINAL RT/LT Right     done times 3     JOINT REPLACEMENT Left      PHACOEMULSIFICATION WITH STANDARD INTRAOCULAR LENS IMPLANT Right 1/25/2022    Procedure: COMPLEX PHACOEMULSIFICATION CATARACT EXTRACTION POSTERIOR CHAMBER LENS RIGHT EYE: PUPIL STRETCHING RIGHT RIGHT;  Surgeon: Efra Gibson MD;  Location: [facility]     PHACOEMULSIFICATION WITH STANDARD INTRAOCULAR LENS IMPLANT Left 2/8/2022    Procedure: ANTERIOR VITRECTOMY AND POSTERIOR CHAMBER LENS IMPLANT;  Surgeon: Efra Gibson MD;  Location: [facility]     REPAIR HAMMER TOE Right        Family History:    Family History   Problem Relation Age of Onset     Lung Cancer Mother      Ovarian Cancer Sister        Social History:  Marital Status:   [4]  Social History     Tobacco Use     Smoking status: Every Day     Types: Cigars     Start date: 10/1/2021     Smokeless tobacco: Never   Vaping Use     Vaping Use: Never used   Substance Use Topics     Alcohol use: Never     Drug use: Never        Medications:    cyclobenzaprine (FLEXERIL) 10 MG tablet  lidocaine (LIDODERM) 5 % patch  acetaminophen (TYLENOL) 500 MG tablet  albuterol (PROAIR HFA/PROVENTIL HFA/VENTOLIN HFA) 108 (90 Base) MCG/ACT inhaler  ARIPiprazole (ABILIFY) 10 MG tablet  ASPIRIN LOW DOSE 81 MG EC tablet  atorvastatin (LIPITOR) 20 MG tablet  benztropine (COGENTIN) 0.5 MG tablet  cetirizine (ZYRTEC) 10 MG tablet  Cholecalciferol (D 1000) 25 MCG (1000 UT) CAPS  digoxin (LANOXIN) 125 MCG tablet  DULoxetine (CYMBALTA) 60 MG capsule  EPINEPHrine (ANY BX GENERIC EQUIV) 0.3 MG/0.3ML injection 2-pack  Fluticasone-Umeclidin-Vilanterol (TRELEGY ELLIPTA) 100-62.5-25 MCG/INH oral inhaler  hydrOXYzine (VISTARIL) 25 MG capsule  metoprolol succinate ER (TOPROL-XL) 25 MG 24 hr tablet  montelukast  (SINGULAIR) 10 MG tablet  multivitamin w/minerals (THERA-VIT-M) tablet  potassium chloride ER (KLOR-CON M) 20 MEQ CR tablet  prazosin (MINIPRESS) 1 MG capsule  torsemide (DEMADEX) 20 MG tablet  vitamin C (ASCORBIC ACID) 500 MG tablet  zolpidem (AMBIEN) 10 MG tablet          Review of Systems   All other systems reviewed and are negative.      Physical Exam   BP: 137/80  Pulse: 93  Temp: 97.8  F (36.6  C)  Resp: 18  SpO2: (!) 91 %      Physical Exam  Vitals and nursing note reviewed.   Constitutional:       General: He is not in acute distress.     Appearance: He is well-developed. He is not diaphoretic.   HENT:      Head: Normocephalic and atraumatic.      Nose: Nose normal.   Neck:      Vascular: No JVD.   Cardiovascular:      Rate and Rhythm: Normal rate and regular rhythm.      Heart sounds: Normal heart sounds. No murmur heard.    No friction rub. No gallop.   Pulmonary:      Effort: Pulmonary effort is normal. No respiratory distress.      Breath sounds: Normal breath sounds. No wheezing or rales.   Chest:      Chest wall: No tenderness.   Abdominal:      Tenderness: There is no abdominal tenderness.   Musculoskeletal:      Cervical back: Normal range of motion and neck supple.      Thoracic back: Normal.      Lumbar back: Spasms and tenderness present. No bony tenderness. Decreased range of motion. Negative right straight leg raise test and negative left straight leg raise test.   Skin:     General: Skin is warm and dry.      Capillary Refill: Capillary refill takes less than 2 seconds.      Coloration: Skin is not pale.      Findings: No erythema or rash.   Neurological:      Mental Status: He is alert and oriented to person, place, and time.      Cranial Nerves: No cranial nerve deficit.      Coordination: Coordination normal.   Psychiatric:         Behavior: Behavior normal.         Thought Content: Thought content normal.         Judgment: Judgment normal.         ED Course                 Procedures            No results found for this or any previous visit (from the past 24 hour(s)).    Medications   cyclobenzaprine (FLEXERIL) tablet 10 mg (has no administration in time range)       Assessments & Plan (with Medical Decision Making)   Pt with spasms to lumbar back muscles on exam. No injury. No red flags to suggest cauda equina. RX for Flexeril and lido patches were provided. First dose of Flexeril given in UC. He was discharged home following in good condition with wife.     Plan: Take the Flexeril as prescribed for your back pain/spasms.  Use the lidocaine patches as prescribed for pain.  Continue taking tylenol as directed for pain.  Follow up with primary care in 1 week for re-check.  Return here sooner with any new or worsening symptoms.     I have reviewed the nursing notes.    I have reviewed the findings, diagnosis, plan and need for follow up with the patient.    New Prescriptions    CYCLOBENZAPRINE (FLEXERIL) 10 MG TABLET    Take 1 tablet (10 mg) by mouth 3 times daily as needed for muscle spasms    LIDOCAINE (LIDODERM) 5 % PATCH    Place 1 patch onto the skin every 24 hours To prevent lidocaine toxicity, patient should be patch free for 12 hrs daily.       Final diagnoses:   Acute bilateral low back pain without sciatica       1/13/2023   [facility]

## 2023-01-14 NOTE — DISCHARGE INSTRUCTIONS
Take the Flexeril as prescribed for your back pain/spasms.  Use the lidocaine patches as prescribed for pain.  Continue taking tylenol as directed for pain.  Follow up with primary care in 1 week for re-check.  Return here sooner with any new or worsening symptoms.

## 2023-01-14 NOTE — ED TRIAGE NOTES
Pt presents with c/o lower midline back pain  States that he has had it for the last 2.5 weeks  Was in on 1/9 got Toradol and lidocaine patches.   No injury, no sciatica pain.  Took tyl and ibu

## 2023-01-16 NOTE — TELEPHONE ENCOUNTER
Amparo Alvares, NP  You 3 days ago     BH  I would prefer he make a follow-up and we can discuss. Amparo

## 2023-01-16 NOTE — TELEPHONE ENCOUNTER
Patient is scheduled next week with PCP.   Next 5 appointments (look out 90 days)    Jan 24, 2023  1:30 PM  (Arrive by 1:15 PM)  SHORT with Amparo Alvares NP  [facility] ([facility] [address] [place] [address]  [phone]   Feb 21, 2023 10:00 AM  (Arrive by 9:45 AM)  PHYSICAL with Amparo Alvares NP  [facility] ([facility] ) [address] [place] [address]  [phone]   Mar 15, 2023  8:30 AM  (Arrive by 8:15 AM)  Return Visit with Patricia Lake CNP  [facility] ([facility] ) [address] [place] [address]  [phone]

## 2023-01-16 NOTE — TELEPHONE ENCOUNTER
Patient is requesting an overbook with provider Thursday or Friday or he will see anyone if provider is not available.      ER F/U  1/13/23    Last attending   Treatment team  Acute bilateral low back pain without sciatica  Clinical impression  Back Pain  Chief complaint     Assessments & Plan (with Medical Decision Making)   Pt with spasms to lumbar back muscles on exam. No injury. No red flags to suggest cauda equina. RX for Flexeril and lido patches were provided. First dose of Flexeril given in UC. He was discharged home following in good condition with wife.      Plan: Take the Flexeril as prescribed for your back pain/spasms.  Use the lidocaine patches as prescribed for pain.  Continue taking tylenol as directed for pain.  Follow up with primary care in 1 week for re-check.  Return here sooner with any new or worsening symptoms.      I have reviewed the nursing notes.     I have reviewed the findings, diagnosis, plan and need for follow up with the patient.          New Prescriptions     CYCLOBENZAPRINE (FLEXERIL) 10 MG TABLET    Take 1 tablet (10 mg) by mouth 3 times daily as needed for muscle spasms     LIDOCAINE (LIDODERM) 5 % PATCH    Place 1 patch onto the skin every 24 hours To prevent lidocaine toxicity, patient should be patch free for 12 hrs daily.

## 2023-01-17 NOTE — PROGRESS NOTES
Assessment & Plan     Exacerbation of chronic back pain  Patient with exacerbation of chronic low back pain, but denies any red flags such as saddle anesthesia, fevers, or bowel or bladder incontinence. Does not feel the Flexeril is helping. Will stop this and try Robaxin. Will also treat with medrol dose zoe. He was made aware of the side effects. I think he would also benefit from an injection, but he needs an updated MRI. He has a spinal stimulator, however. I did call our MRI team who graciously noted that they would research if he can get an MRI. If able, will order. If not, will call radiology to see if I can do a CT if he wants an injection.     Patient agrees to return with new or worsening symptoms.     - methocarbamol (ROBAXIN) 500 MG tablet; Take 1 tablet (500 mg) by mouth 4 times daily as needed for muscle spasms  - methylPREDNISolone (MEDROL DOSEPAK) 4 MG tablet therapy pack; Follow Package Directions    Severe pulmonary arterial systolic hypertension (H)  Needing refill. Ordered.     - Fluticasone-Umeclidin-Vilant (TRELEGY ELLIPTA) 100-62.5-25 MCG/ACT oral inhaler; Inhale 1 puff into the lungs daily    Due for chronic disease follow-up. Will schedule.     Amparo Alvares NP  [facility] - [place] Cook is a 67 year old accompanied by his spouse, presenting for the following health issues:  ER F/U      [facility]     ED/UC Followup:    Facility:  [facility]  Date of visit: 1/9/23 and 1/13/2023  Reason for visit: low back pain        Patient presented to the ER on 1/9/22 and then again on 1/13/22 with back pain. Both notes were reviewed. He was initially given IM Toradol and then prescribed Flexeril and Lidocaine patches at the second visit.   Current Status: Today patient notes that he continues to have low back pain. Pain does not radiate. Position changes increase his pain. Denies bowel or bladder incontinence, fevers, or saddle anesthesia. He did have a lumbar XR done on  "2/26/2021, multilevel arthritic changes were seen. Taking Flexeril, ibuprofen, and Tylenol without relief in his symptoms.     No recent injury. He tells me that he did have to have injections into his low back in the past.     Not sleeping well due to the pain.     He does have a spine stimulator in low back.     Due for the influenza vaccine and Covid booster. Willing to get.         Review of Systems   Constitutional, HEENT, cardiovascular, pulmonary, gi and gu systems are negative, except as otherwise noted.      Objective    /72 (BP Location: Left arm, Patient Position: Sitting, Cuff Size: Adult Large)   Pulse 92   Temp 97.5  F (36.4  C) (Tympanic)   Ht 1.803 m (5' 11\")   Wt 103.6 kg (228 lb 8 oz)   SpO2 94%   BMI 31.87 kg/m    Body mass index is 31.87 kg/m .  Physical Exam   GENERAL: alert and complains of back pain with every position change  NEURO: Normal strength and tone, mentation intact and speech normal  PSYCH: mentation appears normal, affect normal/bright  Musculoskeletal: Lumbar spine without gross deformity, rash, erythema, or ecchymosis. Some point tenderness over L4 and L5. Some Paraspinous muscle tenderness bilaterally. Moderate pain with extension. Modrate pain with flexion. No pain with lateral flexion. No palpable spasm. Negative straight leg raises bilaterally. Patellar reflexes 2+, LE strength 5/5 bilaterally. Sensation intact to light touch. Posterior tib pulses intact.                       "

## 2023-01-18 NOTE — TELEPHONE ENCOUNTER
Joey may not have MRI lumbar spine with the neurostimulator he has implanted.  With his type of stimulator it is possible for him to have some head imaging only under certain conditions.  As far as lumbar MRI this cannot be done.

## 2023-01-20 NOTE — TELEPHONE ENCOUNTER
Pt updated on CT of lumbar results from yesterday.    Per PCP below:  Nerve impingement seen. Has already met with IR and injection scheduled.     Transferred to IR department for scheduling.Advised he view Mychart and mychart PCP with questions or concerns.He verbalized understanding.    Erlinda Deutsch RN

## 2023-01-24 NOTE — PROGRESS NOTES
Assessment & Plan     Lumbar spondylosis  Lumbar disc herniation  Exacerbation of chronic back pain  Patient with severe low back pain. No red flags. Injection scheduled. Does not feel the Robaxin is helping. Will start gabapentin 100 mg BID and also give a few Norco to take sparingly for severe pain. He was told that I will not continue these long term. Rest, ice, and NSAIDs recommended. Will also place PT referral. Will return with new or worsening symptoms. Otherwise reassess 4 weeks.     - gabapentin (NEURONTIN) 100 MG capsule; Take 1 capsule (100 mg) by mouth 2 times daily  - HYDROcodone-acetaminophen (NORCO) 5-325 MG tablet; Take 1 tablet by mouth every 12 hours as needed for severe pain (7-10)  - Physical Therapy Referral; Future      Amparo Alvares NP  [facility] - [place] Cook is a 67 year old, presenting for the following health issues:  Pain      HPI     Chronic/Recurring Back Pain Follow Up    Last seen on 1/18/23. Flexeril was stopped and Robaxin was started. Was also treated with medrol dose zoe. Today he notes that nothing has helped. Continues to have severe low back pain. Does not radiate. Position changes increase his pain.       Where is your back pain located? (Select all that apply) low back bilateral, mainly left side    How would you describe your back pain?  sharp    Where does your back pain spread? the left buttock    Since your last clinic visit for back pain, how has your pain changed? rapidly worsening    Does your back pain interfere with your job? Not applicable    Since your last visit, have you tried any new treatment? No     Injection scheduled for 2/20/23.     No fevers. No numbness or tingling. No bowel or bladder incontinence.     Unable to have MRI due to spinal stimulator. CT was done on 1/19/23    IMPRESSION:   8 x 8 x 5 mm gas-filled cephalad directed right parasagittal disc  herniation at L3-4 narrows the right lateral recess with possible  "mass  effect upon the exiting right L4 nerve roots. Correlate for right L4  radiculopathy.      Large posterior central disc ossify complex at L1 to narrows the  central canal and lateral recesses with mass effect upon the thecal  sac.     Disc and endplate changes narrow the neural foramina at L5-S1 with  bilateral L5 ganglionic impingement, worse on the left.     Moderate left foraminal narrowing at L4-5 secondary to degenerative  change with left L4 impingement.     Mild to moderately severe bilateral facet joint degeneration at number  of levels of the lumbar spine, most evident on the left at L4-5.     CT myelography may be helpful in better characterizing the bony and  soft tissue structures.        This facility minimizes radiation dose by adjusting the mA and/or kV  according to each patient size.        This CT scan was performed using one or more the following dose  reduction techniques:     -Automated exposure control,  -Adjustment of the mA and/or kV according to patient's size, and/or,  -Use of iterative reconstruction technique.     SOPHIA BUTLER MD        Review of Systems   Constitutional, HEENT, cardiovascular, pulmonary, gi and gu systems are negative, except as otherwise noted.      Objective    /75 (BP Location: Right arm, Patient Position: Sitting, Cuff Size: Adult Large)   Pulse 79   Temp 97.1  F (36.2  C) (Tympanic)   Ht 1.803 m (5' 11\")   Wt 103.4 kg (228 lb)   SpO2 93%   BMI 31.80 kg/m    Body mass index is 31.8 kg/m .  Physical Exam   GENERAL: healthy, alert and no distress  NEURO: Normal strength and tone, mentation intact and speech normal  PSYCH: mentation appears normal, affect normal/bright  Musculoskeletal: Lumbar spine without gross deformity, rash, erythema, or ecchymosis. No point tenderness. Some Paraspinous muscle tenderness bilaterally. No pain with extension. Moderate pain with flexion. No pain with lateral flexion. No palpable spasm. Positive straight leg raises " on the left. Patellar reflexes 2+, LE strength 5/5 bilaterally. Sensation intact to light touch. Posterior tib pulses intact.

## 2023-01-27 NOTE — TELEPHONE ENCOUNTER
Patient called to verify medications as his injection was rescheduled from 2/20/23 to 1/31/23. Patient was on a Methylprednisolone pack starting 1/18/23 and last dose was Tuesday 1/24/23. Per protocol, patient needs to wait 14 days to have injection after taking steroids unless chronic. I did let patient know scheduling will be calling him to reschedule injection for any time after 2/8/2023. Patient verbalized understanding.

## 2023-01-27 NOTE — PROGRESS NOTES
01/27/23 0600   General Information   Type of Visit Initial OP Ortho PT Evaluation   Start of Care Date 01/27/23   Referring Physician Amparo Alvares NP   Patient/Family Goals Statement Pt. would like to decrease his pain and restore his capacity for walking. He was doing 1-2 miles prior.   Orders Evaluate and Treat   Date of Order 01/24/23   Certification Required? Yes   Medical Diagnosis Lumbar spondylosis  Lumbar disc herniation  Exacerbation of chronic back pain   Surgical/Medical history reviewed Yes   Precautions/Limitations no known precautions/limitations       Present No   Body Part(s)   Body Part(s) Lumbar Spine/SI   Presentation and Etiology   Pertinent history of current problem (include personal factors and/or comorbidities that impact the POC) Joey presents to therapy today with low back pain of a chronic and episodic nature. He reports his last episode began about a month ago when he woke up with pain in his L1-L5 region. He reports the pain is dull, shooting, and cramping but denies the referral of pain or neurologic changes in his LE. He has had several procedures in the past to try and remedy his back pain including a spinal stimulator which is still in place. He no longer uses the stimulator, as he feels it never offered any significant relief. Past therapy was successful in conjunction with injections giving him a reported 5 years of relief. Pt. denies any symptoms of cauda equina today and notes he is scheduled for injections next Tuesday.   Impairments A. Pain;D. Decreased ROM;E. Decreased flexibility;H. Impaired gait;L. Tingling   Functional Limitations perform activities of daily living;perform desired leisure / sports activities   Symptom Location Pt. reports centralized pain in his lumbar spine.   How/Where did it occur From insidious onset   Onset date of current episode/exacerbation 01/20/23  (Pt. reports a month onset but put the above date on intake form.)    Chronicity Chronic   Pain rating (0-10 point scale) Best (/10);Worst (/10)   Best (/10) 10   Worst (/10) 5   Pain quality B. Dull;E. Shooting;G. Cramping   Frequency of pain/symptoms C. With activity   Pain/symptoms are: Worse during the night   Pain/symptoms exacerbated by B. Walking;C. Lifting;D. Carrying   Pain/symptoms eased by A. Sitting;C. Rest   Progression of symptoms since onset: Unchanged   Current / Previous Interventions   Diagnostic Tests: CT scan   CT Results Results   CT results 8 x 8 x 5 mm gas-filled cephalad directed right parasagittal disc  herniation at L3-4 narrows the right lateral recess with possible mass  effect upon the exiting right L4 nerve roots. Correlate for right L4  radiculopathy.      Large posterior central disc ossify complex at L1 to narrows the  central canal and lateral recesses with mass effect upon the thecal  sac.     Disc and endplate changes narrow the neural foramina at L5-S1 with  bilateral L5 ganglionic impingement, worse on the left.     Moderate left foraminal narrowing at L4-5 secondary to degenerative  change with left L4 impingement.     Mild to moderately severe bilateral facet joint degeneration at number  of levels of the lumbar spine, most evident on the left at L4-5.     CT myelography may be helpful in better characterizing the bony and  soft tissue structures.        This facility minimizes radiation dose by adjusting the mA and/or kV  according to each patient size.        This CT scan was performed using one or more the following dose  reduction techniques:     -Automated exposure control,  -Adjustment of the mA and/or kV according to patient's size, and/or,  -Use of iterative reconstruction technique   Prior Level of Function   Prior Level of Function-Mobility Independent   Prior Level of Function-ADLs Independent   Current Level of Function   Current Community Support Family/friend caregiver   Patient role/employment history F. Retired   Living  environment [facility]   Current equipment-Gait/Locomotion None   Current equipment-ADL None   Fall Risk Screen   Fall screen completed by PT   Have you fallen 2 or more times in the past year? No   Have you fallen and had an injury in the past year? No   Timed Up and Go score (seconds) NT   Is patient a fall risk? No   Abuse Screen (yes response referral indicated)   Feels Unsafe at Home or Work/School no   Feels Threatened by Someone no   Does Anyone Try to Keep You From Having Contact with Others or Doing Things Outside Your Home? no   Physical Signs of Abuse Present no   Presenting problem Back pain   Patient needs abuse support services and resources No   Lumbar Spine/SI Objective Findings   Gait/Locomotion Pt. ambulating with antalgic gait. Per his report he walks with a limp or slow old man walk due to pain.   Hamstring Flexibility limited bilateral   Hip Flexor Flexibility Limited due to pain   Piriformis Flexibility Limited   Flexion ROM WNL pain at end range Ed[place]'s sign upon return to neutral.   Extension ROM 75% limited due to pain   Right Side Bending ROM WNL pain   Left Side Bending ROM Limited due to pain in left lumbar spine   Repeated Extension-Standing ROM Worsened   Repeated Flexion-Standing ROM No change   Hip Screen Hip ROM grossly WNL pain with end range flexion   Hip Flexion (L2) Strength 4/5 bilateral with pain   Hip Abduction Strength 4/5 B   Hip Extension Strength 3/5 B with pain   Knee Extension (L3) Strength 5/5   Ankle Dorsiflexion (L4) Strength 5/5   Great Toe Extension (L5) Strength 5/5 trouble initiating position on left but able to hold against max resistance when placed.   Ankle Plantar Flexion (S1) Strength 5/5   SLR negative   Crossover SLR negative   Segmental Mobility Hypomobile to lower thoracic to lumbar spine with localized pain referral.   Palpation Palpable pain and tension in bilateral paraspinals, piriformis, and gluteals.   Slump Test negative   Observation Pt. has  considerable difficulty and discomfort with rising from a chair or changing position from supine to sidelying or sit. Resting tremor of right hand. Pt. has talked with PCP about this and is getting treatment.   Integumentary Noted spinal stimulator in place lower thoracic level on right side of spine.   Sensation Testing intact bilateral   Patellar Tendon Reflexes  2+ right unable to note on left   Planned Therapy Interventions   Planned Therapy Interventions balance training;joint mobilization;manual therapy;neuromuscular re-education;ROM;strengthening;stretching   Planned Modality Interventions   Planned Modality Interventions Cryotherapy;Electrical stimulation;Hot packs;TENS;Traction;Ultrasound   Clinical Impression   Criteria for Skilled Therapeutic Interventions Met yes, treatment indicated   PT Diagnosis Lumbar spondylosis and discogenic presentation.   Influenced by the following impairments pain, ROM, strength, flexibility, functional activity tolerance, accessory motion.   Functional limitations due to impairments Pt. is experiencing difficulty performing his activities of daily living and recreational activities.   Clinical Presentation Stable/Uncomplicated   Clinical Presentation Rationale Therapist discretion   Clinical Decision Making (Complexity) Low complexity   Therapy Frequency 2 times/Week   Predicted Duration of Therapy Intervention (days/wks) 8 weeks   Risk & Benefits of therapy have been explained Yes   Patient, Family & other staff in agreement with plan of care Yes   Clinical Impression Comments Lumbar spondylosis and visualized disc herniation causing pain and discomfort with daily activities. Skilled physical therapy is warranted with the use of manual therapy and modalities for management of pain and tension. Therapeutic exercise and activity will be utilized to improve LE strength, ROM, and return pt. to prior level of functional activity.   Education Assessment   Preferred Learning Style  Listening;Reading;Demonstration;Pictures/video   Barriers to Learning No barriers   ORTHO GOALS   PT Ortho Eval Goals 1;2;3;4   Ortho Goal 1   Goal Identifier LTG #1   Goal Description Pt. to be independent with correct performance of HEP to progress to independent management of his condition and provide him tools should his pain return.   Target Date 03/24/23   Ortho Goal 2   Goal Identifier LTG #2   Goal Description Pt. to report increase in walking distance from 1-2 blocks to a mile or greater to restore his PLOF of recreational activity.   Target Date 03/24/23   Ortho Goal 3   Goal Identifier LTG #3   Goal Description Pt. to report pain levels of 2 or less with daily activity to restore PLOF.   Target Date 03/24/23   Ortho Goal 4   Goal Identifier STG #1   Goal Description Pt. to report decrease in level of pain at worst to 5/10 or better.   Target Date 02/24/23   Total Evaluation Time   PT Eval, Low Complexity Minutes (55508) 30   Therapy Certification   Certification date from 01/27/23   Certification date to 03/24/23   Medical Diagnosis Lumbar spondylosis  Lumbar disc herniation  Exacerbation of chronic back pain   I certify the need for these services furnished under this plan of treatment and while under my care. (Physician co-signature of this document indicates review and certification of the therapy plan).

## 2023-01-30 NOTE — TELEPHONE ENCOUNTER
Albuterol 108 mcg/act      Last Written Prescription Date:  4/20/21  Last Fill Quantity: 18g,   # refills: 1  Last Office Visit: 1/24/23  Future Office visit:    Next 5 appointments (look out 90 days)    Feb 21, 2023 10:00 AM  (Arrive by 9:45 AM)  PHYSICAL with Amparo Alvares NP  [facility] [place] ([facility] [place] ) [address]  [place] [address]  [phone]   Mar 15, 2023  8:30 AM  (Arrive by 8:15 AM)  Return Visit with Patricia Lake CNP  [facility] [place] ([facility] [place] ) [address]  [place] [address]  [phone]           Routing refill request to provider for review/approval because:

## 2023-02-03 NOTE — TELEPHONE ENCOUNTER
HYDROcodone-acetaminophen 5-325mg      Last Written Prescription Date:  unknown  Last Fill Quantity: unknown,   # refills: unknown  Last Office Visit: 1/24/23  Future Office visit:    Next 5 appointments (look out 90 days)    Feb 21, 2023 10:00 AM  (Arrive by 9:45 AM)  PHYSICAL with Amparo Alvares NP  [facility] [place] ([facility] [place] ) [address]  [place] [address]  [phone]   Mar 15, 2023  8:30 AM  (Arrive by 8:15 AM)  Return Visit with Patricia Lake CNP  [facility] [place] ([facility] [place] ) [address]  [place] [address]  [phone]           Routing refill request to provider for review/approval because:

## 2023-02-07 NOTE — TELEPHONE ENCOUNTER
Left a message to remind patient of their olesya on 2/9. Also reminded patient to not take any antibiotics, steroids, or immunizations two weeks before and after this appt. And they need a .     Flor Guillaume

## 2023-02-09 NOTE — DISCHARGE INSTRUCTIONS
Cell number on file:    Telephone Information:   Mobile [phone]     Is it ok to leave a message at this number(s)? Yes    Dr. Mckeon completed your procedure on 2/9/2023.    Current Pain Level (0-10 Scale): 8/10  Post Pain Level (0-10):  3/10    Radiology Discharge instructions for Steroid Injection    Activity Level:     Do not do any heavy activity or exercise for 24 hours.   Do not drive for 4 hours after your injection.  Diet:   Return to your normal diet.  Medications:   If you have stopped taking your Aspirin, Coumadin/Warfarin, Ibuprofen, or any   other blood thinner for this procedure you may resume in the morning unless   your primary care provider has given you other instructions.    Diabetics may see an increase in blood sugar after steroid injections. If you are concerned about your blood sugar, please contact your family doctor.    Site Care:  Remove the bandage and bathe or shower the morning after the procedure.      This is a Pain Management procedure.  You will be contacted in two weeks for follow up.    Call your Primary Care Provider if you have the following (if your primary care provider is not available please seek emergency care):   Nausea with vomiting   Severe headache   Drowsiness or confusion   Redness or drainage at the injection or puncture site   Temperature over 101 degrees F   Other concerns   Worsening back pain   Stiff neck

## 2023-02-10 NOTE — TELEPHONE ENCOUNTER
Potassium Chloride ER (KLOR-CON M) 20 MEQ CR tablet     Last Written Prescription Date:  10/28/2022  Last Fill Quantity: 180,   # refills: 0  Last Office Visit: 01/24/2023  Future Office visit:    Next 5 appointments (look out 90 days)    Feb 21, 2023 10:00 AM  (Arrive by 9:45 AM)  PHYSICAL with Amparo Alvares NP  [facility] [place] ([facility] [place] ) [address]  [place] [address]  [phone]   Mar 15, 2023  8:30 AM  (Arrive by 8:15 AM)  Return Visit with Patricia Lake CNP  [facility] [place] ([facility] [place] ) [address]  [place] [address]  [phone]
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
## 2023-02-21 NOTE — TELEPHONE ENCOUNTER
Patient presented to the ED from [facility] after a fall; patient hit her head, has a laceration to right scalp and an abrasion on her right elbow; admitted for hyperammonemia. Ana Goodson at [facility] and she reports the patient is a bed hold, is able to return whenever she is ready and will not need a covid test. Met with patient at bedside for transition of care planning. Patient reports she wants to return the facility and requested her son, Marzena Chatman (known as Ryan Dalton) be called. Call placed to Ryan Dalton to confirm discharge plan but no answer; left a message to return the call. Ambulance form completed and envelope placed on the soft chart.     FRANCIE Coronel, LSW [phone] Sleep Study and Urology referral pending.  Metformin 500mg BID pending.  Alana Griffith, ERLINDAN
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
## 2023-02-21 NOTE — PATIENT INSTRUCTIONS
Lung Cancer Screening   Frequently Asked Questions  If you are at high-risk for lung cancer, getting screened with low-dose computed tomography (LDCT) every year can help save your life. This handout offers answers to some of the most common questions about lung cancer screening. If you have other questions, please call [phone] ([phone]).     What is it?  Lung cancer screening uses special X-ray technology to create an image of your lung tissue. The exam is quick and easy and takes less than 10 seconds. We don t give you any medicine or use any needles. You can eat before and after the exam. You don t need to change your clothes as long as the clothing on your chest doesn t contain metal. But, you do need to be able to hold your breath for at least 6 seconds during the exam.    What is the goal of lung cancer screening?  The goal of lung cancer screening is to save lives. Many times, lung cancer is not found until a person starts having physical symptoms. Lung cancer screening can help detect lung cancer in the earliest stages when it may be easier to treat.    Who should be screened for lung cancer?  We suggest lung cancer screening for anyone who is at high-risk for lung cancer. You are in the high-risk group if you:      are between the ages of 55 and 79, and    have smoked at least 1 pack of cigarettes a day for 20 or more years, and    still smoke or have quit within the past 15 years.    However, if you have a new cough or shortness of breath, you should talk to your doctor before being screened.    Why does it matter if I have symptoms?  Certain symptoms can be a sign that you have a condition in your lungs that should be checked and treated by your doctor. These symptoms include fever, chest pain, a new or changing cough, shortness of breath that you have never felt before, coughing up blood or unexplained weight loss. Having any of these symptoms can greatly affect the results of lung  cancer screening.       Should all smokers get an LDCT lung cancer screening exam?  It depends. Lung cancer screening is for a very specific group of men and women who have a history of heavy smoking over a long period of time (see  Who should be screened for lung cancer  above).  I am in the high-risk group, but have been diagnosed with cancer in the past. Is LDCT lung cancer screening right for me?  In some cases, you should not have LDCT lung screening, such as when your doctor is already following your cancer with CT scan studies. Your doctor will help you decide if LDCT lung screening is right for you.  Do I need to have a screening exam every year?  Yes. If you are in the high-risk group described earlier, you should get an LDCT lung cancer screening exam every year until you are 79, or are no longer willing or able to undergo screening and possible procedures to diagnose and treat lung cancer.  How effective is LDCT at preventing death from lung cancer?  Studies have shown that LDCT lung cancer screening can lower the risk of death from lung cancer by 20 percent in people who are at high-risk.  What are the risks?  There are some risks and limitations of LDCT lung cancer screening. We want to make sure you understand the risks and benefits, so please let us know if you have any questions. Your doctor may want to talk with you more about these risks.    Radiation exposure: As with any exam that uses radiation, there is a very small increased risk of cancer. The amount of radiation in LDCT is small--about the same amount a person would get from a mammogram. Your doctor orders the exam when he or she feels the potential benefits outweigh the risks.    False negatives: No test is perfect, including LDCT. It is possible that you may have a medical condition, including lung cancer, that is not found during your exam. This is called a false negative result.    False positives and more testing: LDCT very often finds  something in the lung that could be cancer, but in fact is not. This is called a false positive result. False positive tests often cause anxiety. To make sure these findings are not cancer, you may need to have more tests. These tests will be done only if you give us permission. Sometimes patients need a treatment that can have side effects, such as a biopsy. For more information on false positives, see  What can I expect from the results?     Findings not related to lung cancer: Your LDCT exam also takes pictures of areas of your body next to your lungs. In a very small number of cases, the CT scan will show an abnormal finding in one of these areas, such as your kidneys, adrenal glands, liver or thyroid. This finding may not be serious, but you may need more tests. Your doctor can help you decide what other tests you may need, if any.  What can I expect from the results?  About 1 out of 4 LDCT exams will find something that may need more tests. Most of the time, these findings are lung nodules. Lung nodules are very small collections of tissue in the lung. These nodules are very common, and the vast majority--more than 97 percent--are not cancer (benign). Most are normal lymph nodes or small areas of scarring from past infections.  But, if a small lung nodule is found to be cancer, the cancer can be cured more than 90 percent of the time. To know if the nodule is cancer, we may need to get more images before your next yearly screening exam. If the nodule has suspicious features (for example, it is large, has an odd shape or grows over time), we will refer you to a specialist for further testing.  Will my doctor also get the results?  Yes. Your doctor will get a copy of your results.  Is it okay to keep smoking now that there s a cancer screening exam?  No. Tobacco is one of the strongest cancer-causing agents. It causes not only lung cancer, but other cancers and cardiovascular (heart) diseases as well. The damage  caused by smoking builds over time. This means that the longer you smoke, the higher your risk of disease. While it is never too late to quit, the sooner you quit, the better.  Where can I find help to quit smoking?  The best way to prevent lung cancer is to stop smoking. If you have already quit smoking, congratulations and keep it up! For help on quitting smoking, please call [place] at 5-581-QUITNOW (1-134.782.3694) or the [employer] at 1-275.237.1710 to find local resources near you.  One-on-one health coaching:  If you d prefer to work individually with a health care provider on tobacco cessation, we offer:      Medication Therapy Management:  Our specially trained pharmacists work closely with you and your doctor to help you quit smoking.  Call [phone] or [phone] (toll free).

## 2023-02-21 NOTE — PATIENT INSTRUCTIONS
Thank you for allowing Patricia Lake CNP and our  team to participate in your care. Please call our office at [phone] with scheduling questions or if you need to cancel or change your appointment. With any other questions or concerns you may call cardiology nurse at [phone] or [phone].       If you experience chest pain, chest pressure, chest tightness, shortness of breath, fainting, lightheadedness, nausea, vomiting, or other concerning symptoms, please report to the Emergency Department or call 911. These symptoms may be emergent, and best treated in the Emergency Department.

## 2023-02-22 PROBLEM — J84.10 INTERSTITIAL PULMONARY FIBROSIS (H): Status: ACTIVE | Noted: 2023-01-01

## 2023-02-22 NOTE — TELEPHONE ENCOUNTER
CONSULT PATIENT  PAIN INJECTION POST CALL    Procedure: Epidural TL L3-4  Radiologist(s): Dr. Darron Mckeon  Date of Procedure: 2/9/23    I responded to the patient's questions/concerns.    Relief of pain from this injection    A = 90%  A- = 85%  B = 80%  B- =75%  C = 70%  C- = 65%  D = 60%  D- = 50%  F = less than 50%       Would you say this injection has been beneficial? Yes, 80% of relief.  If yes, for how long? Patient is currently feeling relief.    Was there one injection that worked better than the other? The injection in [place] at [place] was the best, this one.    Where is the pain? His first pain that he came in with, the consult I did one him is doing great. He is now feeling new pain.  Can you describe the pain? N/A  Does the pain radiate anywhere? N/A  If yes, where does it radiate and where does the pain stop? N/A    Is this new pain? Yes: Patient states that a couple days ago on his upper back, by the left armpit area he started to feel shooting pain. It goes all the way down his back on the left side only to the tailbone, then it goes on the inside of his left leg stopping at the knee.    I instructed the patient that his consult is only good for his lower back. He just needs to get into touch with Amparo Alvares for another consult on the T-Spine.    Flor Guillaume

## 2023-03-06 NOTE — TELEPHONE ENCOUNTER
Called patient to verify home medications. He denies using Lidoderm patches, instructed to remove patch if he does need it the night before his injection. ASA is no hold. Patient denies questions.

## 2023-03-07 NOTE — PROGRESS NOTES
Chart review prior to sleep testing.    Patient Summary:  67 year old yo male who is referred for presumed sleep-disordered breathing as part of management of IPF, PAH.    Patient Active Problem List    Diagnosis Date Noted     Interstitial pulmonary fibrosis (H) 02/22/2023     Priority: Medium     Encounter for monitoring digoxin therapy 08/03/2022     Priority: Medium     Prediabetes 08/03/2022     Priority: Medium     Severe chronic obstructive pulmonary disease (H) 08/03/2022     Priority: Medium     Right heart failure, NYHA class 3 (H) 08/03/2022     Priority: Medium     Encounter for smoking cessation counseling 04/19/2021     Priority: Medium     Diastolic dysfunction 04/19/2021     Priority: Medium     Status post coronary angiogram 12/11/2020     Priority: Medium     Pulmonary arterial hypertension (H) 11/20/2020     Priority: Medium     Right ventricular dilation 10/29/2020     Priority: Medium     Added automatically from request for surgery [id number]       Bee sting allergy 08/07/2020     Priority: Medium     Hyperglycemia 08/07/2020     Priority: Medium     Elevated prostate specific antigen (PSA) 08/07/2020     Priority: Medium     History of fusion of cervical spine 08/06/2020     Priority: Medium     Generalized anxiety disorder 08/06/2020     Priority: Medium     Tobacco use disorder 08/06/2020     Priority: Medium     Pulmonary emphysema, unspecified emphysema type (H) 08/05/2020     Priority: Medium     Hyperlipidemia, unspecified hyperlipidemia type 08/05/2020     Priority: Medium     Cardiomegaly 08/05/2020     Priority: Medium     Enlarged prostate 08/05/2020     Priority: Medium     Diverticulosis 03/25/2020     Priority: Medium     Chronic bronchitis with emphysema (H) 02/03/2020     Priority: Medium     Class 1 obesity with body mass index (BMI) of 33.0 to 33.9 in adult 02/03/2020     Priority: Medium     Smoking greater than 40 pack years 02/03/2020     Priority: Medium     Pain due to total  left knee replacement, sequela 01/27/2020     Priority: Medium     Cervical stenosis of spinal canal 01/13/2020     Priority: Medium     Cholesteatoma of left ear 03/04/2019     Priority: Medium     Nasal septal deviation 12/18/2017     Priority: Medium     Primary osteoarthritis of right knee 02/25/2017     Priority: Medium     Status post total left knee replacement 02/25/2017     Priority: Medium     Recurrent major depressive disorder, in partial remission (H) 11/21/2015     Priority: Medium     PTSD (post-traumatic stress disorder) 08/19/2015     Priority: Medium     Seasonal allergies 08/19/2015     Priority: Medium     Anxiety state 07/07/2011     Priority: Medium     Back pain, chronic 07/07/2011     Priority: Medium     Hearing loss 07/07/2011     Priority: Medium     Insomnia, unspecified type 07/07/2011     Priority: Medium     Sleep apnea 07/07/2011     Priority: Medium       Current Outpatient Medications   Medication     acetaminophen (TYLENOL) 500 MG tablet     albuterol (PROAIR HFA/PROVENTIL HFA/VENTOLIN HFA) 108 (90 Base) MCG/ACT inhaler     ARIPiprazole (ABILIFY) 10 MG tablet     ASPIRIN LOW DOSE 81 MG EC tablet     atorvastatin (LIPITOR) 20 MG tablet     benztropine (COGENTIN) 0.5 MG tablet     cetirizine (ZYRTEC) 10 MG tablet     Cholecalciferol (D 1000) 25 MCG (1000 UT) CAPS     digoxin (LANOXIN) 125 MCG tablet     DULoxetine (CYMBALTA) 30 MG capsule     DULoxetine (CYMBALTA) 60 MG capsule     EPINEPHrine (ANY BX GENERIC EQUIV) 0.3 MG/0.3ML injection 2-pack     Fluticasone-Umeclidin-Vilant (TRELEGY ELLIPTA) 100-62.5-25 MCG/ACT oral inhaler     gabapentin (NEURONTIN) 100 MG capsule     HYDROcodone-acetaminophen (NORCO) 5-325 MG tablet     hydrOXYzine (VISTARIL) 25 MG capsule     lidocaine (LIDODERM) 5 % patch     metFORMIN (GLUCOPHAGE) 500 MG tablet     methocarbamol (ROBAXIN) 500 MG tablet     metoprolol succinate ER (TOPROL-XL) 25 MG 24 hr tablet     montelukast (SINGULAIR) 10 MG tablet      "multivitamin w/minerals (THERA-VIT-M) tablet     potassium chloride ER (KLOR-CON M) 20 MEQ CR tablet     prazosin (MINIPRESS) 1 MG capsule     torsemide (DEMADEX) 20 MG tablet     vitamin C (ASCORBIC ACID) 500 MG tablet     zolpidem (AMBIEN) 10 MG tablet     No current facility-administered medications for this visit.     Facility-Administered Medications Ordered in Other Visits   Medication     phenylephrine-ketorolac (OMIDRIA) 1-0.3 % 4 mL in  mL irrigation       Pertinent PMHx of idiopathic pulmonary fibrosis, pulmonary arterial HTN, pulmonary emphysema, obesity, prediabetes, HFpEF, JEFF, PTSD.    Most recent echo:  9/29/2022 - LVEF 60-65%, elevated RVSP, severe RV dilation.  Unable to assess PaSP.    Most recent PFT's:  3/25/2021 - Normal spirometry, normal lung volumes.  Severe DLCOcor impairment.    STOP-BANG score of 3, with unknown neck circumference.  Simms score of ?.  BMI of Estimated body mass index is 31.07 kg/m  as calculated from the following:    Height as of 2/21/23: 1.803 m (5' 11\").    Weight as of 2/21/23: 101.1 kg (222 lb 12.8 oz).     Per questionnaire: \"?\"    Caffeine use:  ? for 3+ per day.  ? for within 6 hours of bed.    Tobacco use: Yes    Sleep pattern:  10pm - 9am, total sleep time 16 hours.  Time to fall asleep: ~10 minutes.  Awakenings: 0 times per night, - minutes to return to sleep, awake for total of 0 hours per night.  Napping. 5  days per week, 1.5 hours per nap.    No for RLS screen.  No for sleep walking.  No for dream enactment behavior.  Yes for bruxism.    Yes for morning headaches.  No for snoring.  No for observed apnea.  Yes for FHx of EMILEE.    SHx:  , lives alone.  Not working.    A/P:    1.)  Borderline increased likelihood of EMILEE with STOP-BANG score of 3.  2.)  Increased concern for hypoventilation / sustained hypoxemia with severe diffusion impairment on PFT's, co-morbid IPF / PAH.  - Recommend in-lab PSG with end-tidal capnography with pre-study " VBG.    ---  This note was written with the assistance of the Dragon voice-dictation technology software. The final document, although reviewed, may contain errors. For corrections, please contact the office.    Ko Sosa MD    Sleep Medicine    [place], [place]  o Main Office: [phone]    [facility] [address] [place] [address], [id number]  o Schedule visits: [phone]  o Main Office: [phone]  o Fax: [phone]

## 2023-03-07 NOTE — PROGRESS NOTES
LUIS ARMANDO PEARCE       Name: Joey Irene MRN# [id number]   Age: 67 year old YOB: 1955     Stop Bang questionnaire completed with a score of >3 to allow for HST     Have you been told you snore loudly (louder than talking or loud enough to be heard through doors)? NO    Do you often feel tired, fatigued, or sleepy during the daytime? YES    Has anyone observed you stop breathing during your sleep? NO    Do you have or are you being treated for high blood pressure? NO    Is your BMI greater than 35? NO    Is your neck size circumference 16 inches or greater? NO    Are you over 50 years old? YES    Stop Bang Score (# of yes): 3

## 2023-03-07 NOTE — PROGRESS NOTES
SLEEP HISTORY QUESTIONNAIRE    Please describe the main reason for your sleep appointment? ?    How long has this been a problem? ?    Have you been diagnosed with a sleep problem in the past? NO    If so, what? n/a    What treatment was recommended? n/a    Have you had a sleep study in the past? NO    If yes, where and when? n/a    Sleep Habits:   Do you read in bed? No  Do you eat in bed? No  Do you watch TV in bed? No  Do you work in bed? No  Do you use a phone or computer in bed? No    Is you sleep disturbed by:   Bed partner: No  Children: No  Noise: No   Pets: No  Other: thoughts      On two or more nights per week, do you drink alcohol to help you fall asleep?NO    On two or more nights per week, do you take melatonin to help you fall asleep? NO    On two or more nights per week, do you take over the counter medicine to fall asleep?  NO    Do you take drinks with caffeine (coffee, tea, soda, energy drinks)? YES    Do you have 3 or more caffeine drinks in a day? ?    Do you have caffeine drinks within 6 hours of bedtime? ?    Do you smoke or use tobacco? YES    Do you exercise? NO    Sleep Routine:   Using a 24 Hour Clock    What time do you usually get into bed on workdays? 10pm    Weekend/non work days? ?    What time do you get out of bed on workdays? 9am      Weekend/non work days??    Do you work the evening or night shift or do your shifts rotate? NO    How long does it usually take to fall to sleep? 10 nmin    How many times do you wake during the night? 0    How much time do you feel that you are awake during the entire night? Some hours    How long does it take for you to fall back to sleep after you wake up? 15-20 min    Why do you think you wake up? n/a    What do you do when you wake up? n/a    How much sleep do you think you get on work nights? 16    How much sleep do you think you get on weekends/non work days? 0    How much sleep do you think you need to feel your best? 16    How many days  during a week do you take a nap on average? 5    What is the average length of your naps? 1.5 hours    Do you feel better after taking a nap? YES    If you could chose the best sleep schedule for you, what time would you go to bed? 10-11  What time would you get up? ?    Do you read in bed? NO    Do you eat in bed? NO    Do you watch TV in bed? NO    Do you do work in bed? NO    Do you use a computer or phone in bed? NO    Sleep Disruptions?   Leg movements:  Do you ever have restless, crawling, aching or other unusual feelings in your legs? NO    Do you ever wake yourself by kicking your legs during the night? NO    Are the sheets and blankets messed up or tossed about when you get up? NO    Night-time behaviors:   Do you have nightmares or night terrors? NO   How often? n/a    Have you had times when you were sleep walking? NO    Have you been seen doing anything unusual while you sleep at nights? NO  What? n/a  How often? n/a    Have you ever hurt yourself or someone else while you were sleeping? NO  Please describe: n/a    Do you clench or grind your teeth during the night? yes    Sleep Apnea (pauses in breathing during sleep):  Do you wake with a headache in the morning? YES  How often? ?    Does your bed partner, family or friends ever say that you snore? NO  How many nights per week do you snore? n/a  Can snoring be heard outside the bedroom? n/a    Do you ever wake yourself up from snoring, gasping or choking? NO    Have you ever been told that you stop breathing or have pauses in your breathing? NO    Do you wake in the morning with a dry throat or mouth? YES    Do you have trouble breathing through your nose? NO    Do you have problems with heartburn, reflux or a hiatal hernia? YES    Which positions do you usually sleep in? (stomach, back, sides, all) sides    Do you use oxygen or any other medical equipment when you sleep? YES    Do members of your family (related by blood) snore? NO    Have any members  of your family been diagnosed with with sleep apnea? NO    Do other members of your family have restless leg? NO    Do other members of your family have sleep walking? NO    Have you ever had an accident, or near accident due to sleepiness while driving? NO    Does your sleepiness affect your work on the job or at school? NO    Do you ever fall asleep by accident while doing a task? NO    Have you had sudden muscle weakness when laughing, angry or surprised? NO    Have you ever been unable to move your body when falling asleep or waking up? NO    Do you ever have trouble  your dreams from real life events? NO  Please describe: n/a    Physical Health: (including illness and injury): During the past 30 days, on how many days was your physical health not good?  days     Mental Health: (including stress, depression, and problems with emotions): During the last 30 days, how may days was your mental health not good? 10/30 days.     During the past 30 days, on how many days did poor physical or mental health keep you from doing your usual activities? This might be self-care, work, or play? 10/30 days.     Social History:   Marital status:     Who lives in your home with you? ?    Mother (alive or dead)? dead If has , from what? ?  Father (alive or dead)? dead If has , from what? ?    Siblings: YES  Have any ? YES  If so, from what? sister    Currently working? NO  If yes, work: n/a  Former jobs: construction      Sleepiness Scale:   Sitting and reading ?   Watching TV ?   Sitting in a public place ?   Riding in a car ?   Lying down to rest in the afternoon ?   Sitting and talking to someone ?   Sitting quietly after a lunch without alcohol ?   In a car, stopping for a few minutes in traffic ?       Surgical History:   Past Surgical History:   Procedure Laterality Date     CV CORONARY ANGIOGRAM N/A 2020    Procedure: Coronary Angiogram;  Surgeon: Aman Delgado MD;  [facility]:   [facility]     CV RIGHT HEART CATH MEASUREMENTS RECORDED N/A 12/11/2020    Procedure: CV RIGHT HEART CATH;  Surgeon: Aman Delgado MD;  Location:  [facility]     ENT SURGERY      patient has had three left ear surgeries, unsure what they did     FUSION CERVICAL POSTERIOR THREE+ LEVELS       HERNIA REPAIR, INGUINAL RT/LT Right     done times 3     IR CONSULTATION FOR IR EXAM  1/19/2023     JOINT REPLACEMENT Left      PHACOEMULSIFICATION WITH STANDARD INTRAOCULAR LENS IMPLANT Right 1/25/2022    Procedure: COMPLEX PHACOEMULSIFICATION CATARACT EXTRACTION POSTERIOR CHAMBER LENS RIGHT EYE: PUPIL STRETCHING RIGHT RIGHT;  Surgeon: Efra Gibson MD;  Location: [facility]     PHACOEMULSIFICATION WITH STANDARD INTRAOCULAR LENS IMPLANT Left 2/8/2022    Procedure: ANTERIOR VITRECTOMY AND POSTERIOR CHAMBER LENS IMPLANT;  Surgeon: Efra Gibson MD;  Location: [facility]     REPAIR HAMMER TOE Right        Medical Conditions:   Past Medical History:   Diagnosis Date     COPD (chronic obstructive pulmonary disease) (H)      Hypertension      Uncomplicated asthma        Medications:   Current Outpatient Medications   Medication Sig     acetaminophen (TYLENOL) 500 MG tablet Take 1 tablet by mouth     albuterol (PROAIR HFA/PROVENTIL HFA/VENTOLIN HFA) 108 (90 Base) MCG/ACT inhaler INHALE 1-2 PUFFS INTO THE LUNGS EVERY 4 HOURS AS NEEDED FOR SHORTNESS OF BREATH/DYSPNEA/WHEEZING     ARIPiprazole (ABILIFY) 10 MG tablet      ASPIRIN LOW DOSE 81 MG EC tablet TAKE 1 TABLET BY MOUTH DAILY     atorvastatin (LIPITOR) 20 MG tablet TAKE 1 TABLET BY MOUTH DAILY     benztropine (COGENTIN) 0.5 MG tablet      cetirizine (ZYRTEC) 10 MG tablet TAKE 1 TABLET BY MOUTH DAILY     Cholecalciferol (D 1000) 25 MCG (1000 UT) CAPS      digoxin (LANOXIN) 125 MCG tablet TAKE 1 TABLET BY MOUTH DAILY     DULoxetine (CYMBALTA) 30 MG capsule      DULoxetine (CYMBALTA) 60 MG capsule Take 1 capsule (60 mg) by mouth 2 times daily      EPINEPHrine (ANY BX GENERIC EQUIV) 0.3 MG/0.3ML injection 2-pack INJECT 0.3MLS INTO THE MUSCLE AS NEEDED FOR ANAPHYLAXIS     Fluticasone-Umeclidin-Vilant (TRELEGY ELLIPTA) 100-62.5-25 MCG/ACT oral inhaler Inhale 1 puff into the lungs daily     gabapentin (NEURONTIN) 100 MG capsule Take 1 capsule (100 mg) by mouth 2 times daily     HYDROcodone-acetaminophen (NORCO) 5-325 MG tablet TAKE 1 TABLET BY MOUTH EVERY 12 HOURS AS NEEDED FOR SEVERE PAIN     hydrOXYzine (VISTARIL) 25 MG capsule Take 25 mg by mouth 2 times daily     lidocaine (LIDODERM) 5 % patch Place 1 patch onto the skin every 24 hours To prevent lidocaine toxicity, patient should be patch free for 12 hrs daily.     metFORMIN (GLUCOPHAGE) 500 MG tablet Take 1 tablet (500 mg) by mouth 2 times daily (with meals)     methocarbamol (ROBAXIN) 500 MG tablet Take 1 tablet (500 mg) by mouth 4 times daily as needed for muscle spasms     metoprolol succinate ER (TOPROL-XL) 25 MG 24 hr tablet TAKE 1 TABLET BY MOUTH DAILY     montelukast (SINGULAIR) 10 MG tablet TAKE ONE TABLET BY MOUTH AT BEDTIME     multivitamin w/minerals (THERA-VIT-M) tablet Take 1 tablet by mouth daily     potassium chloride ER (KLOR-CON M) 20 MEQ CR tablet TAKE 2 TABLETS (40MEQ) IN THE MORNING     prazosin (MINIPRESS) 1 MG capsule Take 1 mg by mouth At Bedtime     torsemide (DEMADEX) 20 MG tablet Take 1 tablet (20 mg) by mouth daily     vitamin C (ASCORBIC ACID) 500 MG tablet      zolpidem (AMBIEN) 10 MG tablet      No current facility-administered medications for this visit.     Facility-Administered Medications Ordered in Other Visits   Medication     phenylephrine-ketorolac (OMIDRIA) 1-0.3 % 4 mL in  mL irrigation       Are you currently having any of the following symptoms?   General:   Obvious weight gain or loss NO  Fever, chills or sweats NO  Drug allergies: no    Eyes:   Changes in vision NO  Blind spots NO  Double vision NO  Other no    Ear, Nose and Throat:   Ear pain NO  Sore  throat NO  Sinus pain NO  Post-nasal drip YES  Runny nose NO  Bloody nose NO    Heart:   Rapid or irregular heart beat NO  Chest pain or pressure NO  Out of breath when lying down NO  Swelling in feet or legs NO  High blood pressure NO  Heart disease NO    Nervous system   Headaches YES  Weakness in arms or legs NO  Numbness in arms of legs NO  Other: no    Skin  Rashes NO  New moles or skin changes NO  Other no    Lungs  Shortness of breath at rest YES  Shortness of breath with activity YES  Dry cough YES  Coughing up mucous or phlegm YES  Coughing up blood NO  Wheezing when breathing YES    Lymph System  Swollen lymph nodes NO  New lumps or bumps NO  Changes in breasts or discharge NO    Digestive System   Nausea or vomiting NO  Loose or watery stools NO  Hard, dry stools (constipation) NO  Fat or grease in stools NO  Blood in stools NO  Stools are black or bloody NO  Abdominal (belly) pain NO    Urinary Tract   Pain when you urinate (pee) NO  Blood in your urine NO  Urinate (pee) more than normal NO  Irregular periods NO    Muscles and bones   Muscle pain NO  Joint or bone pain YES  Swollen joints NO  Other no    Glands  Increased thirst or urination NO  Diabetes NO  Morning glucose: no  Afternoon glucose: no    Mental Health  Depression YES  Anxiety YES  Other mental health issues: yes

## 2023-03-10 NOTE — TELEPHONE ENCOUNTER
torsemide  Last Written Prescription Date:  9/14/2022  Last Fill Quantity: 270,   # refills:   Last Office Visit: 2/21/23  Future Office visit:       Routing refill request to provider for review/approval because:  Medication is reported/historical    digoxin      Last Written Prescription Date:  12/8/22  Last Fill Quantity: 90,   # refills: 0  Last Office Visit: 2/21/23  Future Office visit:       Routing refill request to provider for review/approval because:  Medication is reported/historical

## 2023-03-27 NOTE — TELEPHONE ENCOUNTER
atorvastatin      Last Written Prescription Date:  12/14/22  Last Fill Quantity: 90,   # refills: 0  Last Office Visit: 2/21/23  Future Office visit:       Routing refill request to provider for review/approval because:  Medication is reported/historical

## 2023-07-20 NOTE — TELEPHONE ENCOUNTER
Zyrtec      Last Written Prescription Date:  6.17.23  Last Fill Quantity: #30,   # refills: 0  Last Office Visit: 2.21.23  Future Office visit:    Next 5 appointments (look out 90 days)    Aug 07, 2023  3:30 PM  (Arrive by 3:15 PM)  SHORT with Amparo Alvares NP  [facility] ([facility] ) [address]  [place] [address]  [phone]   Aug 21, 2023  9:00 AM  (Arrive by 8:45 AM)  SHORT with Amparo Alvares NP  [facility] ([facility] ) [address]  [place] [address]  [phone]           Routing refill request to provider for review/approval because:
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
## 2023-08-07 PROBLEM — E11.9 DIABETES MELLITUS, TYPE 2 (H): Status: ACTIVE | Noted: 2023-01-01

## 2023-08-07 NOTE — PATIENT INSTRUCTIONS
Do not take the metolazone     Take torsemide - 60 mg until seeing Marnie    Take the prednisone and doxycycline for COPD exacerbation.

## 2023-08-08 NOTE — TELEPHONE ENCOUNTER
Patient called lab results given.  He has an appointment tomorrow morning with Pritesh.  MAGI Alvares NP  8/7/2023  4:51 PM CDT       Notify pt. A1c was 6.5. this is in the diabetic range. Currently taking Metformin 500 mg twice daily. Please have him increase this to 1000 mg twice daily and see me back 3 months. Please pend new script once talking to him.

## 2023-08-08 NOTE — PROGRESS NOTES
Assessment & Plan     Pneumonia of both lower lobes due to infectious organism  Patient feeling better. Denies fevers. Shortness of breath has resolved. Has completed the doxycycline without side effects. Will continue the Omnicef until his prescription is gone. Will return with new or worsening symptoms.     Acute on chronic diastolic congestive heart failure (H)  Symptoms improving. BNP trending down. Feels much better. Will continue the torsemide and reassess in 4 weeks. Sooner with new or worsening symptoms.     Pulmonary emphysema, unspecified emphysema type (H)  Breathing has improved. Will continue current inhalers and place referral to pulmonary as as has interstitial lung disease. Smoking cessation encouraged.     Tobacco Abuse  Smoking cessation encouraged.     PAH (pulmonary arterial hypertension) with portal hypertension (H)  Stable. Continue follow-up with Dr. Suarez. Also due for cardiology follow-up. Encouraged to scheduled.     Continues to smoke. Therefore not candidate for lung transplant.     Acute on chronic respiratory failure with hypoxia (H)  Improved. Oxygen sats 91 percent today without oxygen. This is around his baseline. Continue current medications.     Rib pain on left side  XR with old healing fracture. Most likely due to scar tissue. Will continue to monitor.     ILD (interstitial lung disease) (H)  Patient with interstitial fibrosis. On chronic oxygen. He has never seen pulmonary. A referral was placed in the past, but he never made an appointment. Will again place a referral.     Seasonal allergic rhinitis, unspecified trigger  Allergies worsening. Will restart his Singulair and reassess in 4 weeks.         [id number]}   MED REC REQUIRED  Post Medication Reconciliation Status:  Discharge medications reconciled, continue medications without change      Amparo Alvares NP  [facility] - BERNIE Cook is a 67 year old, presenting for the following health  "issues:  Heart Failure    HPI     Patient was seen on 8/7/23 with a cough and shortness of breath. BNP was elevated, but he had not been taking his torsemide as prescribed.     A CXR was also done and showed a bilateral pna. Also has COPD. Treated with doxycycline and steroid burst.     Was then seen for a follow-up on 8/9. BNP was up slightly, but he was feeling better. Oxygen sats had improved. Encouraged to take his torsemide.     He then presented to the ER on 8/14/23 with worsening shortness of breath. Note was reviewed. Patient was diuresed and given Rocephin with good results. Doxycyline was also continued.     Today he notes that he feels \"100 percent better.\" Shortness of breath has improved. No fevers. Still has a cough, but it has improved. No nausea or vomiting. No chest pain, but he continues to have pain over left lower rib.     He completed the full course of doxycycline. Also on Omnicef. No side effects.     Taking 40 mg of torsemide daily. Does not like taking the 60 mg that is prescribed.     Using his oxygen at home as needed. Back to his baseline. Using 2 liters during the day and as needed.     Seasonal allergies slightly worse. Would like to restart Singulair.       Diastolic Heart Failure Follow-up  Are you experiencing any shortness of breath? No  Are you experiencing any swelling in your legs or feet?  No  Are you using more pillows than usual? No  Do you cough at night?  Yes  Do you check your weight daily?  Yes  Have you had a weight change recently?  Weight decrease  Are you having any of the following side effects from your medications? (Select all that apply)  Fatigue  Since your last visit, how many times have you gone to the cardiologist, urgent care, emergency room, or hospital because of your heart failure?   1 time    How many servings of fruits and vegetables do you eat daily?  2-3  On average, how many sweetened beverages do you drink each day (Examples: soda, juice, sweet tea, " "etc.  Do NOT count diet or artificially sweetened beverages)?   2  How many days per week do you exercise enough to make your heart beat faster? 3 or less  How many minutes a day do you exercise enough to make your heart beat faster? 10 - 19  How many days per week do you miss taking your medication? 0        Review of Systems   Constitutional, HEENT, cardiovascular, pulmonary, gi and gu systems are negative, except as otherwise noted.      Objective    /72 (BP Location: Right arm, Patient Position: Sitting, Cuff Size: Adult Regular)   Pulse 98   Temp 98.7  F (37.1  C) (Tympanic)   Resp 18   Ht 1.803 m (5' 11\")   Wt 98.4 kg (217 lb)   SpO2 91%   BMI 30.27 kg/m    Body mass index is 30.27 kg/m .  Physical Exam   GENERAL: healthy, alert and no distress, obese  EYES: Eyes grossly normal to inspection, PERRL and conjunctivae and sclerae normal  HENT: ear canals and TM's normal, nose and mouth without ulcers or lesions  NECK: no adenopathy, no asymmetry, masses, or scars and thyroid normal to palpation  RESP: lungs clear to auscultation - no rales, rhonchi or wheezes, diminished in the bases  CV: regular rate and rhythm, no murmur, click or rub, no peripheral edema     He does have pain with palpation over lower left ribs.   ABDOMEN: soft, nontender, obese, no masses and bowel sounds normal  MS: no gross musculoskeletal defects noted, no edema  NEURO: Normal strength and tone, mentation intact and speech normal  PSYCH: mentation appears normal, affect normal/bright  Diabetic foot exam: normal DP and PT pulses, no trophic changes or ulcerative lesions, and normal sensory exam    Results for orders placed or performed in visit on 08/21/23   XR Ribs Left 2 Views     Status: None    Narrative    PROCEDURE: XR RIBS LEFT 2 VIEWS 8/21/2023 9:49 AM    HISTORY: left lower rib pain; Rib pain on left side    COMPARISONS: 8/14/2013.    TECHNIQUE: PA view of the chest and AP and oblique views of left ribs.    FINDINGS: " Heart is stable in size. There is linear scar or atelectasis  at the left lung base. There is some mild interstitial prominence.  There is prominence of the pulmonary arteries.    There are postoperative changes in the lower cervical/upper thoracic  region. Neurostimulator wires are seen.    There is a left lateral rib fracture which is probably subacute given  some apparent callus formation. There is no pneumothorax.         Impression    IMPRESSION: Healing fracture of the left lateral eighth rib.    GURINDER SANFORD MD         SYSTEM ID:  [id number]   Basic metabolic panel     Status: Abnormal   Result Value Ref Range    Sodium 139 136 - 145 mmol/L    Potassium 4.0 3.4 - 5.3 mmol/L    Chloride 99 98 - 107 mmol/L    Carbon Dioxide (CO2) 28 22 - 29 mmol/L    Anion Gap 12 7 - 15 mmol/L    Urea Nitrogen 19.8 8.0 - 23.0 mg/dL    Creatinine 1.19 (H) 0.67 - 1.17 mg/dL    Calcium 9.9 8.8 - 10.2 mg/dL    Glucose 96 70 - 99 mg/dL    GFR Estimate 67 >60 mL/min/1.73m2   N terminal pro BNP outpatient     Status: Abnormal   Result Value Ref Range    N Terminal Pro BNP Outpatient 4,099 (H) 0 - 900 pg/mL                     Answers submitted by the patient for this visit:  Patient Health Questionnaire (Submitted on 8/21/2023)  If you checked off any problems, how difficult have these problems made it for you to do your work, take care of things at home, or get along with other people?: Not difficult at all  PHQ9 TOTAL SCORE: 0  JEFF-7 (Submitted on 8/21/2023)  JEFF 7 TOTAL SCORE: 0  Answers submitted by the patient for this visit:  Patient Health Questionnaire (Submitted on 8/21/2023)  If you checked off any problems, how difficult have these problems made it for you to do your work, take care of things at home, or get along with other people?: Not difficult at all  PHQ9 TOTAL SCORE: 0  JEFF-7 (Submitted on 8/21/2023)  JEFF 7 TOTAL SCORE: 0

## 2023-08-09 NOTE — PROGRESS NOTES
Assessment & Plan     Pneumonia of both lower lobes due to infectious organism  Joey states breathing has improved.  O2 91% today without his oxygen on.  Complete antibiotic as prescribed  Use inhalers as prescribed     Acute on chronic diastolic congestive heart failure (H)  BNP has increased again.  He has increased his torsemide to 2 tablets per day.  No increased work of breathing, no distress and oxygen level has improved  Keep follow up appointment with PCP in 2 weeks as scheduled     Bee sting allergy  refilled  - EPINEPHrine (ANY BX GENERIC EQUIV) 0.3 MG/0.3ML injection 2-pack; INJECT 0.3MLS INTO THE MUSCLE AS NEEDED FOR ANAPHYLAXIS    Symptoms improving with current treatment.  Continue as discussed.  To the ER if increased SOB, difficulty breathing or any concerns.    Reviewed labs ordered by PCP to be completed for appointment today   I spent a total of 22 minutes on the day of the visit.   Time spent by me doing chart review, history and exam, documentation and further activities per the note       See Patient Instructions    No follow-ups on file.    BERNARDINO Tolliver [facility]    Subjective   Joey is a 67 year old, presenting for the following health issues:  RECHECK      HPI     Joey was seen by his provider on 8/7/23.  Noted to have an elevated BNP, but was not taking torsemide as prescribed recently due to traveling.  He did restart his torsemide as prescribed. He is currently taking 2 torsemide per day.  he was also noted to have bilateral pneumonia.  He presents today for close follow up and to recheck some labs.  Current symptoms   States he is feeling a little tired. States his cough is getting better, but still there. Coughing up white phlegm.          Review of Systems   CONSTITUTIONAL:sweating at night occasionally   ENT/MOUTH: left ear discomfort   RESP:chronic o2 use and Hx COPD  CV: NEGATIVE for chest pain, palpitations or peripheral edema  GI:  "NEGATIVE for nausea, abdominal pain, heartburn, or change in bowel habits  : dribble urine, concerned for prostate cancer   NEURO: fatigued       Objective    /70 (BP Location: Left arm, Patient Position: Sitting, Cuff Size: Adult Regular)   Pulse 89   Temp 97.4  F (36.3  C) (Tympanic)   Ht 1.803 m (5' 11\")   Wt 101.1 kg (222 lb 12.8 oz)   SpO2 91%   BMI 31.07 kg/m    Body mass index is 31.07 kg/m .  Physical Exam   GENERAL: alert and no distress  RESP: coarse bilateral lower lobes   CV: regular rate and rhythm, normal S1 S2, no S3 or S4, no murmur, click or rub, no peripheral edema and peripheral pulses strong  MS: no gross musculoskeletal defects noted, no edema  PSYCH: mentation appears normal, affect normal/bright    Results for orders placed or performed in visit on 08/09/23   BNP-N terminal pro     Status: Abnormal   Result Value Ref Range    N Terminal Pro BNP Outpatient 5,041 (H) 0 - 900 pg/mL   Basic metabolic panel     Status: Abnormal   Result Value Ref Range    Sodium 140 136 - 145 mmol/L    Potassium 4.2 3.4 - 5.3 mmol/L    Chloride 100 98 - 107 mmol/L    Carbon Dioxide (CO2) 26 22 - 29 mmol/L    Anion Gap 14 7 - 15 mmol/L    Urea Nitrogen 23.3 (H) 8.0 - 23.0 mg/dL    Creatinine 1.27 (H) 0.67 - 1.17 mg/dL    Calcium 10.0 8.8 - 10.2 mg/dL    Glucose 97 70 - 99 mg/dL    GFR Estimate 62 >60 mL/min/1.73m2                     "

## 2023-08-14 PROBLEM — K76.6 PAH (PULMONARY ARTERIAL HYPERTENSION) WITH PORTAL HYPERTENSION (H): Status: ACTIVE | Noted: 2020-11-20

## 2023-08-14 PROBLEM — J96.21 ACUTE AND CHRONIC RESPIRATORY FAILURE WITH HYPOXIA (H): Status: ACTIVE | Noted: 2023-01-01

## 2023-08-14 PROBLEM — J44.9 COPD (CHRONIC OBSTRUCTIVE PULMONARY DISEASE) (H): Status: ACTIVE | Noted: 2023-01-01

## 2023-08-14 PROBLEM — J96.21 ACUTE ON CHRONIC RESPIRATORY FAILURE WITH HYPOXIA (H): Status: ACTIVE | Noted: 2023-01-01

## 2023-08-14 PROBLEM — J96.10 CHRONIC RESPIRATORY FAILURE (H): Status: ACTIVE | Noted: 2023-01-01

## 2023-08-14 PROBLEM — J40 BRONCHITIS: Status: ACTIVE | Noted: 2023-01-01

## 2023-08-14 PROBLEM — I50.9 CONGESTIVE HEART FAILURE, UNSPECIFIED HF CHRONICITY, UNSPECIFIED HEART FAILURE TYPE (H): Status: ACTIVE | Noted: 2023-01-01

## 2023-08-14 PROBLEM — R42 DIZZINESS: Status: ACTIVE | Noted: 2023-01-01

## 2023-08-14 NOTE — ED TRIAGE NOTES
Patient arrived by private auto with family having c/o right and left sided chest pain, Dizziness, Cough, SOB, Hypoxia, 88% RA..  Patient stated that his CP has been ongoing x 4 days but the periodic SOB is chronic with his COPD but much worse today.  Patient is having difficulty speaking with his SOB and stated that he does have O2 that he uses at his home.  Patient stated that his PCP attempted to have him admitted last week but he refused stating he was afraid.  Patient has had 3 torsemide daily x 3 days self prescribed to help with his fluid overload.

## 2023-08-15 NOTE — MEDICATION SCRIBE - ADMISSION MEDICATION HISTORY
"Medication Scribe Admission Medication History    Admission medication history is complete. The information provided in this note is only as accurate as the sources available at the time of the update.    Medication reconciliation/reorder completed by provider prior to medication history? Yes    Information Source(s): Patient, Patient's pharmacy, and CareEverywhere/SureScripts via in-person    Pertinent Information:   Patient manages his medications and reports his fiance assists him at home. Seems to be a reliable historian on his own.   Confirms behavioral health medications managed by Betzy Garcia at [facility]- unable to get through to cross reference with nursing staff.   Doxycyline- pt reports that he has 4 doses remaining at home.   Cogentin- prescribed 3-4 times daily- only takes twice daily   Hydroxyzine- prescribed PRN, takes scheduled  Lidocaine- stopped using approx 3 weeks ago- \"going cold turkey\"  Metoprolol- pt has not taken in a long time, thought it was a behavioral health med. He is not sure why he is not taking it and did not know what it was prescribed for. Has not been filled in over 1 year. No documentation that it was supposed to be discontinued at any point.   Montelukast- stopped taking due to negative side effects. Reports makes him drowsy.   Torsemide- pt reports he was taking 40 mg for approx 3 months up until 2-3 days ago when he increased his dose to try to help him urinate.     Changes made to PTA medication list:  Added: None  Deleted: None  Changed: input missing instructions    Medication Affordability:  Not including over the counter (OTC) medications, was there a time in the past 3 months when you did not take your medications as prescribed because of cost?: No    Allergies reviewed with patient and updates made in EHR: yes    Medication History Completed By: Ember Johns 8/15/2023 9:20 AM    Prior to Admission medications    Medication Sig Last Dose Taking? Auth Provider Long Term " End Date   acetaminophen (TYLENOL) 500 MG tablet Take 1,000 mg by mouth daily as needed (headaches) 8/14/2023 Yes Reported, Patient     albuterol (PROAIR HFA/PROVENTIL HFA/VENTOLIN HFA) 108 (90 Base) MCG/ACT inhaler INHALE 1-2 PUFFS INTO THE LUNGS EVERY 4 HOURS AS NEEDED FOR SHORTNESS OF BREATH/DYSPNEA/WHEEZING 8/14/2023 Yes Amparo Alvares, NP Yes    ARIPiprazole (ABILIFY) 5 MG tablet Take 5 mg by mouth daily 8/14/2023 at AM Yes Reported, Patient No    ASPIRIN LOW DOSE 81 MG EC tablet TAKE 1 TABLET BY MOUTH DAILY 8/14/2023 at AM Yes Amparo Alvares NP     atorvastatin (LIPITOR) 20 MG tablet TAKE 1 TABLET BY MOUTH DAILY 8/14/2023 at AM Yes Amparo Alvares, NP Yes    benztropine (COGENTIN) 0.5 MG tablet Take 0.5 mg by mouth three to four times daily 8/14/2023 at AM Yes Reported, Patient Yes    cetirizine (ZYRTEC) 10 MG tablet TAKE 1 TABLET BY MOUTH DAILY 8/14/2023 at AM Yes Amparo Alvares, NP     Cholecalciferol (D 1000) 25 MCG (1000 UT) CAPS Take 1 capsule by mouth daily 8/14/2023 at AM Yes Reported, Patient     digoxin (LANOXIN) 125 MCG tablet TAKE 1 TABLET BY MOUTH DAILY 8/14/2023 at AM Yes Patricia Lake, FARIDEH Yes    doxycycline hyclate (VIBRAMYCIN) 100 MG capsule Take 1 capsule (100 mg) by mouth 2 times daily for 10 days 8/14/2023 at AM Yes Amparo Alvares NP  8/17/23   DULoxetine (CYMBALTA) 60 MG capsule Take 1 capsule (60 mg) by mouth 2 times daily 8/14/2023 at AM Yes Amparo Alvares NP Yes    EPINEPHrine (ANY BX GENERIC EQUIV) 0.3 MG/0.3ML injection 2-pack INJECT 0.3MLS INTO THE MUSCLE AS NEEDED FOR ANAPHYLAXIS Unknown Yes Marnie Jonas, APRN CNP     Fluticasone-Umeclidin-Vilant (TRELEGY ELLIPTA) 100-62.5-25 MCG/ACT oral inhaler Inhale 1 puff into the lungs daily 8/14/2023 at AM Yes Amparo Alvares, NP     hydrOXYzine (VISTARIL) 25 MG capsule Take 25 mg by mouth 2 times daily as needed for anxiety 8/14/2023 at AM Yes Reported, Patient No    metFORMIN (GLUCOPHAGE) 1000 MG tablet Take 1 tablet  (1,000 mg) by mouth 2 times daily (with meals) 8/14/2023 at AM Yes Amparo Alvares NP Yes    multivitamin w/minerals (THERA-VIT-M) tablet Take 1 tablet by mouth daily 8/14/2023 at AM Yes Reported, Patient     potassium chloride ER (KLOR-CON M) 20 MEQ CR tablet TAKE 2 TABLETS (40MEQ) IN THE MORNING 8/14/2023 at AM Yes Amparo Alvares NP     prazosin (MINIPRESS) 1 MG capsule Take 1 mg by mouth At Bedtime 8/13/2023 at HS Yes Reported, Patient Yes    tamsulosin (FLOMAX) 0.4 MG capsule Take 1 capsule (0.4 mg) by mouth daily 8/14/2023 at AM Yes Amparo Alvares NP     torsemide (DEMADEX) 20 MG tablet TAKE 3 TABLETS (60MG) BY MOUTH IN THE MORNING 8/14/2023 at AM Yes Patricia Lake, CNP Yes    vitamin C (ASCORBIC ACID) 500 MG tablet Take 500 mg by mouth daily 8/14/2023 at AM Yes Reported, Patient     zolpidem (AMBIEN) 10 MG tablet Take 10 mg by mouth At Bedtime 8/13/2023 at HS Yes Reported, Patient No    lidocaine (LIDODERM) 5 % patch Place 1 patch onto the skin every 24 hours To prevent lidocaine toxicity, patient should be patch free for 12 hrs daily.  Patient not taking: Reported on 8/15/2023 Not Taking  Ely Montez PA-C     metoprolol succinate ER (TOPROL-XL) 25 MG 24 hr tablet TAKE 1 TABLET BY MOUTH DAILY  Patient not taking: Reported on 8/15/2023 Not Taking  Amparo Alvares NP Yes    montelukast (SINGULAIR) 10 MG tablet TAKE ONE TABLET BY MOUTH AT BEDTIME  Patient not taking: Reported on 8/15/2023 Not Taking  Amparo Alvares NP Yes

## 2023-08-15 NOTE — PLAN OF CARE
[facility] Inpatient Admission Note:    Patient admitted to 3210/3210-1 at approximately 2233 via cart accompanied by ER staff from emergency room . Report received from CASSIE Trent in SBAR format at 2203 via telephone. Patient transferred to bed via self.. Patient is alert and oriented X 3, reports pain; rates at 3 on 0-10 scale.  Patient oriented to room, unit, hourly rounding, and plan of care. Explained admission packet and patient handbook with patient bill of rights brochure. Will continue to monitor and document as needed.     Inpatient Nursing criteria listed below was met:    Health care directives status obtained and documented: Yes     If initial lactic acid greater than 2.0, repeat lactic acid drawn within one hour of arrival to unit: No. If no, state reason: lactic 1.7    Clergy visit ordered if patient requests: N/A    Skin issues/needs documented: Yes    Isolation Patient: yes Education given, correct sign in place and documentation row added to PCS:  Yes    Fall Prevention Yes: Care plan updated, education given and documented, sticker and magnet in place: Yes    Care Plan initiated: Yes    Education Documented (including assessment): Yes    Patient has discharge needs :  not at this time

## 2023-08-15 NOTE — PLAN OF CARE
Free from falls/injuries this shift. OBS to INPT this shift.  Assess as charted.  Up in chair for dinner. Appetite good. Telemetry intact. O2 at 8L HFNC. Wife here visiting this shift.       Face to face report given with opportunity to observe patient.    Report given Luiza PELAEZ.     Aisha Murphy RN   8/15/2023  7:03 PM

## 2023-08-15 NOTE — UTILIZATION REVIEW
Admission Status; Secondary Review Determination         Under the authority of the Utilization Management Committee, the utilization review process indicated a secondary review on the above patient.  The review outcome is based on review of the medical records, discussions with staff, and applying clinical experience noted on the date of the review.        (x)      Inpatient Status Appropriate - This patient's medical care is consistent with medical management for inpatient care and reasonable inpatient medical practice.       RATIONALE FOR DETERMINATION   The patient is a 67-year-old male admitted on 8/14/2023.  He came to the emergency room with dizziness, cough, shortness of breath and hypoxia.  He does have chronic pulmonary hypertension and is normally on 2 L at home.  Upon coming to the ED he required nebulizers and increased oxygen and was started on doxycycline and Rocephin.  Chest x-ray done on admission shows interstitial and retrocardiac opacities which could represent pulmonary vascular congestion or infection.  Patient was given IV Lasix and he has a history of diastolic heart failure.  An echocardiogram shows no worsening of ejection fraction.  Patient also has diabetes mellitus type 2.  Based on acute on chronic respiratory failure with concern for possible pneumonia, advised that the patient be changed from observation status to inpatient status.  The patient continues to require 8 L/min to maintain oxygen saturations of 92.  Dr. Virk will be notified via his cell phone of this recommendation.      The severity of illness, intensity of service provided, expected LOS and risk for adverse outcome make the care complex, high risk and appropriate for hospital admission.        The information on this document is developed by the utilization review team in order for the business office to ensure compliance.  This only denotes the appropriateness of proper admission status and does not reflect the  quality of care rendered.         The definitions of Inpatient Status and Observation Status used in making the determination above are those provided in the CMS Coverage Manual, Chapter 1 and Chapter 6, section 70.4.      Sincerely,     Víctor Davidson MD  Physician Advisor  Utilization Review/ Case Management  [facility].

## 2023-08-15 NOTE — ED PROVIDER NOTES
History     Chief Complaint   Patient presents with    Chest Pain    Shortness of Breath     HPI  Joey Irene is a 67 year old male with a past medical history of pulmonary emphysema, obesity, anxiety, hypertension, pulmonary hypertension, CHF, diabetes mellitus, presenting for chest pain or shortness of breath for 4 days.  He saw his primary care provider 4 days ago who recommended being hospitalized but he refused at the time.  He comes back with worsening shortness of breath.  He has been trying his inhalers without relief.  He has had increased cough and chills at home.  He has some nausea and decreased appetite.  He has significant weight loss over the past several months.  He is unaware if he has had a fever at home. No hx of PE. Not on blood thinners. No recent travel. Has tried torsemide as well for CHF.    Allergies:  Allergies   Allergen Reactions    Bees Anaphylaxis    Seasonal Allergies Difficulty breathing and Cough    Bupropion Other (See Comments)     tremor    Tramadol Nausea and Swelling    [place] Nite Time Dizziness and Nausea and Vomiting    Nyquil Cold &  [Dm-Doxylamine-Acetaminophen] Dizziness and Nausea and Vomiting    Trichophyton Other (See Comments)       Problem List:    Patient Active Problem List    Diagnosis Date Noted    Bronchitis 08/14/2023     Priority: Medium    Acute and chronic respiratory failure with hypoxia (H) 08/14/2023     Priority: Medium    Congestive heart failure, unspecified HF chronicity, unspecified heart failure type (H) 08/14/2023     Priority: Medium    Diabetes mellitus, type 2 (H) 08/07/2023     Priority: Medium    Interstitial pulmonary fibrosis (H) 02/22/2023     Priority: Medium    Encounter for monitoring digoxin therapy 08/03/2022     Priority: Medium    Prediabetes 08/03/2022     Priority: Medium    Severe chronic obstructive pulmonary disease (H) 08/03/2022     Priority: Medium    Right heart failure, NYHA class 3 (H) 08/03/2022     Priority: Medium     Encounter for smoking cessation counseling 04/19/2021     Priority: Medium    Diastolic dysfunction 04/19/2021     Priority: Medium    Status post coronary angiogram 12/11/2020     Priority: Medium    Pulmonary arterial hypertension (H) 11/20/2020     Priority: Medium    Right ventricular dilation 10/29/2020     Priority: Medium     Added automatically from request for surgery [id number]      Bee sting allergy 08/07/2020     Priority: Medium    Hyperglycemia 08/07/2020     Priority: Medium    Elevated prostate specific antigen (PSA) 08/07/2020     Priority: Medium    History of fusion of cervical spine 08/06/2020     Priority: Medium    Generalized anxiety disorder 08/06/2020     Priority: Medium    Tobacco use disorder 08/06/2020     Priority: Medium    Pulmonary emphysema, unspecified emphysema type (H) 08/05/2020     Priority: Medium    Hyperlipidemia, unspecified hyperlipidemia type 08/05/2020     Priority: Medium    Cardiomegaly 08/05/2020     Priority: Medium    Enlarged prostate 08/05/2020     Priority: Medium    Diverticulosis 03/25/2020     Priority: Medium    Chronic bronchitis with emphysema (H) 02/03/2020     Priority: Medium    Class 1 obesity with body mass index (BMI) of 33.0 to 33.9 in adult 02/03/2020     Priority: Medium    Smoking greater than 40 pack years 02/03/2020     Priority: Medium    Pain due to total left knee replacement, sequela 01/27/2020     Priority: Medium    Cervical stenosis of spinal canal 01/13/2020     Priority: Medium    Cholesteatoma of left ear 03/04/2019     Priority: Medium    Nasal septal deviation 12/18/2017     Priority: Medium    Primary osteoarthritis of right knee 02/25/2017     Priority: Medium    Status post total left knee replacement 02/25/2017     Priority: Medium    Recurrent major depressive disorder, in partial remission (H) 11/21/2015     Priority: Medium    PTSD (post-traumatic stress disorder) 08/19/2015     Priority: Medium    Seasonal allergies  08/19/2015     Priority: Medium    Anxiety state 07/07/2011     Priority: Medium    Back pain, chronic 07/07/2011     Priority: Medium    Hearing loss 07/07/2011     Priority: Medium    Insomnia, unspecified type 07/07/2011     Priority: Medium    Sleep apnea 07/07/2011     Priority: Medium        Past Medical History:    Past Medical History:   Diagnosis Date    COPD (chronic obstructive pulmonary disease) (H)     Hypertension     Uncomplicated asthma        Past Surgical History:    Past Surgical History:   Procedure Laterality Date    CV CORONARY ANGIOGRAM N/A 12/11/2020    Procedure: Coronary Angiogram;  Surgeon: Aman Delgado MD;  [facility]: [facility]    CV RIGHT HEART CATH MEASUREMENTS RECORDED N/A 12/11/2020    Procedure: CV RIGHT HEART CATH;  Surgeon: Aman Delgado MD;  Location: [facility]    ENT SURGERY      patient has had three left ear surgeries, unsure what they did    FUSION CERVICAL POSTERIOR THREE+ LEVELS      HERNIA REPAIR, INGUINAL RT/LT Right     done times 3    IR CONSULTATION FOR IR EXAM  1/19/2023    JOINT REPLACEMENT Left     PHACOEMULSIFICATION WITH STANDARD INTRAOCULAR LENS IMPLANT Right 1/25/2022    Procedure: COMPLEX PHACOEMULSIFICATION CATARACT EXTRACTION POSTERIOR CHAMBER LENS RIGHT EYE: PUPIL STRETCHING RIGHT RIGHT;  Surgeon: Efra Gibson MD;  Location: [facility]    PHACOEMULSIFICATION WITH STANDARD INTRAOCULAR LENS IMPLANT Left 2/8/2022    Procedure: ANTERIOR VITRECTOMY AND POSTERIOR CHAMBER LENS IMPLANT;  Surgeon: Efra Gibson MD;  Location: [facility]    REPAIR HAMMER TOE Right        Family History:    Family History   Problem Relation Age of Onset    Lung Cancer Mother     Ovarian Cancer Sister        Social History:  Marital Status:   [4]  Social History     Tobacco Use    Smoking status: Every Day     Types: Cigars     Start date: 10/1/2021    Smokeless tobacco: Never   Vaping Use    Vaping Use: Never used   Substance Use  "Topics    Alcohol use: Never    Drug use: Never        Medications:    acetaminophen (TYLENOL) 500 MG tablet  albuterol (PROAIR HFA/PROVENTIL HFA/VENTOLIN HFA) 108 (90 Base) MCG/ACT inhaler  ARIPiprazole (ABILIFY) 5 MG tablet  ASPIRIN LOW DOSE 81 MG EC tablet  atorvastatin (LIPITOR) 20 MG tablet  benztropine (COGENTIN) 0.5 MG tablet  cetirizine (ZYRTEC) 10 MG tablet  Cholecalciferol (D 1000) 25 MCG (1000 UT) CAPS  digoxin (LANOXIN) 125 MCG tablet  doxycycline hyclate (VIBRAMYCIN) 100 MG capsule  DULoxetine (CYMBALTA) 60 MG capsule  EPINEPHrine (ANY BX GENERIC EQUIV) 0.3 MG/0.3ML injection 2-pack  Fluticasone-Umeclidin-Vilant (TRELEGY ELLIPTA) 100-62.5-25 MCG/ACT oral inhaler  hydrOXYzine (VISTARIL) 25 MG capsule  lidocaine (LIDODERM) 5 % patch  metFORMIN (GLUCOPHAGE) 1000 MG tablet  metoprolol succinate ER (TOPROL-XL) 25 MG 24 hr tablet  montelukast (SINGULAIR) 10 MG tablet  multivitamin w/minerals (THERA-VIT-M) tablet  potassium chloride ER (KLOR-CON M) 20 MEQ CR tablet  prazosin (MINIPRESS) 1 MG capsule  tamsulosin (FLOMAX) 0.4 MG capsule  torsemide (DEMADEX) 20 MG tablet  vitamin C (ASCORBIC ACID) 500 MG tablet  zolpidem (AMBIEN) 10 MG tablet          Review of Systems   All other systems reviewed and are negative.      Physical Exam   BP: 127/86  Pulse: 102  Temp: 97.7  F (36.5  C)  Resp: (!) 28  Height: 180.3 cm (5' 11\")  Weight: 97.1 kg (214 lb)  SpO2: (!) 89 %      Physical Exam  Vitals and nursing note reviewed.   Constitutional:       General: He is not in acute distress.     Appearance: He is well-developed. He is not ill-appearing, toxic-appearing or diaphoretic.   HENT:      Head: Normocephalic and atraumatic.   Eyes:      Extraocular Movements: Extraocular movements intact.      Pupils: Pupils are equal, round, and reactive to light.   Cardiovascular:      Rate and Rhythm: Normal rate and regular rhythm.      Heart sounds: Normal heart sounds.   Pulmonary:      Effort: Tachypnea present. No accessory " muscle usage or respiratory distress.      Breath sounds: No stridor. Examination of the right-lower field reveals rales. Examination of the left-lower field reveals rales. Decreased breath sounds and rales present. No wheezing.   Abdominal:      General: Bowel sounds are normal.      Palpations: Abdomen is soft.      Tenderness: There is no abdominal tenderness. There is no guarding or rebound.   Musculoskeletal:         General: Normal range of motion.      Cervical back: Normal range of motion and neck supple.      Right lower leg: No edema.      Left lower leg: No edema.   Skin:     General: Skin is warm and dry.      Capillary Refill: Capillary refill takes less than 2 seconds.   Neurological:      General: No focal deficit present.      Mental Status: He is alert.         ED Course        ED Course as of 08/14/23 2149   Mon Aug 14, 2023   2149 Discussed with hospitalist who agreed with plan for further observation given increase in O2 needs from 2LPM to 4 LPM and worsening symptoms. Will plan on diuresis and treatment of bronchitis.     Procedures       EKG without STEMI There are anteroseptal and lateral depressions but on review these are consistent with previous EKGs. No acute STEMI but definitely concerning for possible subendocardial ischemia or demand ischemia.       Results for orders placed or performed during the hospital encounter of 08/14/23 (from the past 24 hour(s))   CBC with platelets   Result Value Ref Range    WBC Count 11.5 (H) 4.0 - 11.0 10e3/uL    RBC Count 6.03 (H) 4.40 - 5.90 10e6/uL    Hemoglobin 18.7 (H) 13.3 - 17.7 g/dL    Hematocrit 54.8 (H) 40.0 - 53.0 %    MCV 91 78 - 100 fL    MCH 31.0 26.5 - 33.0 pg    MCHC 34.1 31.5 - 36.5 g/dL    RDW 12.7 10.0 - 15.0 %    Platelet Count 266 150 - 450 10e3/uL   Basic metabolic panel   Result Value Ref Range    Sodium 139 136 - 145 mmol/L    Potassium 4.1 3.4 - 5.3 mmol/L    Chloride 98 98 - 107 mmol/L    Carbon Dioxide (CO2) 27 22 - 29 mmol/L     Anion Gap 14 7 - 15 mmol/L    Urea Nitrogen 22.7 8.0 - 23.0 mg/dL    Creatinine 1.14 0.67 - 1.17 mg/dL    Calcium 9.3 8.8 - 10.2 mg/dL    Glucose 99 70 - 99 mg/dL    GFR Estimate 70 >60 mL/min/1.73m2   NT pro BNP   Result Value Ref Range    N terminal Pro BNP Inpatient 4,284 (H) 0 - 900 pg/mL   Procalcitonin   Result Value Ref Range    Procalcitonin 0.05 (H) <0.05 ng/mL   Troponin T, High Sensitivity   Result Value Ref Range    Troponin T, High Sensitivity 63 (H) <=22 ng/L   D dimer quantitative   Result Value Ref Range    D-Dimer Quantitative 0.44 0.00 - 0.50 ug/mL FEU    Narrative    This D-dimer assay is intended for use in conjunction with a clinical pretest probability assessment model to exclude pulmonary embolism (PE) and deep venous thrombosis (DVT) in outpatients suspected of PE or DVT. The cut-off value is 0.50 ug/mL FEU.   Lactic acid whole blood   Result Value Ref Range    Lactic Acid 1.7 0.7 - 2.0 mmol/L   Chest XR,  PA & LAT    Narrative    PROCEDURE:  XR CHEST 2 VIEWS    HISTORY: CP, SOB, COPD vs CHF vs Pneumonia    COMPARISON:  Radiographs 8/7/2023; CT chest 2/20/2023    FINDINGS: PA and lateral chest radiographs  Findings of COPD, including elevated lung volumes, biapical  predominant emphysematous changes, flattening of the diaphragms.  Cardiomediastinal silhouette is within normal limits. Enlarged  pulmonary arteries, similar to comparison, consistent with history of  chronic pulmonary hypertension.  Bilateral interstitial opacities. Retrocardiac opacities. No effusion  or pneumothorax.    No suspicious osseous lesion or subdiaphragmatic free air.  Chronic rib fractures. Postsurgical changes of the cervical spine.  Thoracic spinal stimulator leads.      Impression    IMPRESSION:    Interstitial and retrocardiac opacities, could represent pulmonary  vascular congestion or infection.    VIVI ERWIN MD         SYSTEM ID:  [id number]       Medications   doxycycline (VIBRAMYCIN) 100 mg vial to attach to   mL bag (has no administration in time range)   furosemide (LASIX) injection 40 mg (40 mg Intravenous $Given 8/14/23 2039)       Assessments & Plan (with Medical Decision Making)     I have reviewed the nursing notes.    Chest pain and shortness of breath most concerning for possible relation to pulmonary hypertension and CHF but also including a possibility of COPD or infection.  Plan on broad work-up, treatment, hospitalization.  He is currently on 3 L of oxygen while in the emergency room and remains borderline hypoxic in the high 80s.  Not wear oxygen at home.  He has tried an inhaler and diuretic at home without relief of symptoms.  He also has concerning findings of weight loss and lack of appetite for the past several months and suspect he will need a further work-up for this although it may be related to his pulmonary hypertension/CHF versus possible other etiology that will need further work-up.    See ED Course.    I have reviewed the findings, diagnosis, plan and need for follow up with the patient.  New Prescriptions    No medications on file       Final diagnoses:   Congestive heart failure, unspecified HF chronicity, unspecified heart failure type (H)   Acute and chronic respiratory failure with hypoxia (H)   Bronchitis       8/14/2023   [facility]       Joey Reyes MD  08/14/23 3067

## 2023-08-15 NOTE — PLAN OF CARE
"Reason for hospital stay:  Acute on chronic respiratory failure with hypoxia     Most recent vitals: BP (!) 84/56 (BP Location: Right arm, Patient Position: Left side, Cuff Size: Adult Regular)   Pulse 75   Temp 97.8  F (36.6  C) (Tympanic)   Resp 20   Ht 1.803 m (5' 11\")   Wt 98.1 kg (216 lb 4.3 oz)   SpO2 93%   BMI 30.16 kg/m        Pain Management: upon admission pt reports 3/10 chest pain, states it feels like \"heart burn\" or from \"anxiety.\"   LOC:  A&O x4, anxious. Requesting something for anxiety. PRN Xanax given.  Cardiac: apical pulse regular, on tele  Respiratory: lung sounds clear and equal bilaterally. Upon admission to the unit pt on 4 LPM, increased to 6 LPM due to SpO2 87-89%. Continued to have low SpO2 levels, switched to HFNC at 8 LPM maintaining 88-94%.   GI: audible, normoactive x4  : voids spontaneously without difficulty     IVF:  SL  ABX: Rocephin, Doxycycline     Nutrition: good appetite; CC diet  ADL's:  independent  Ambulation: SBA   Safety: call light within reach, room near nurses station, bed in lowest position, wheels locked, remained free from falls this shift.    Comments: see MAR for medications given throughout the night. BP soft this AM, provider notified, bolus ordered and given.       Face to face report given with opportunity to observe patient.    Report given to CASSIE Gomez.     Fabienne Rojo RN   8/15/2023  7:12 AM                        "

## 2023-08-15 NOTE — PROGRESS NOTES
[facility]    Hospitalist Progress Note    Date of Service (when I saw the patient): 08/15/2023    Assessment & Plan   Joey Irene is a 67 year old male who was admitted on 8/14/2023.     Acute on chronic hypoxic respiratory failure: multifactorial with CAP, interstitial pulmonary fibrosis, COPD, and diastolic heart failure.  Volume status seems euvolemic at this point, borderline hypovolemic after diuresis.  Chest x-ray with interstitial and retrocardiac opacities, likely CAP.  -Gentle IV boluses for relative hypotension  -Continue oxygen supplementation.  He is on 2 L at home, currently on 8L HFNC.  Wean as able  -Nebulizers  -Continue with Doxy and Rocephin empirically  -prn bronchodilators  -continue home inhalers     Pulmonary artery hypertension  -Follows with Dr. Manning pulmonary hypertension specialist at  who recommended conservative management.  Continue home medications and oxygen  -Have to hold diuresis for relative hypotension     Secondary polycythemia  -Chronic hypoxia and pulmonary hypertension  Hgb 18.7 On asa. JAK2 with reflex to MPL CALR MPN FOC NGS and Erythropoietin normal. Most likely due to severe COPD and undiagnosed sleep apnea. Declined to do a sleep study.  Primary is planning to make hematology referral.     Diastolic heart failure: Echo obtained September 2022 shows EF 60 to 65%, paradoxical septal motion consistent with right ventricular pressure overload, severe right ventricular dilatation.  Patient is followed at pulmonary artery hypertension clinic.  -Continue beta-blockers  -Bedside echo revealed preserved ejection fraction with grade 1 diastolic dysfunction     Dizziness  -Associated with coughing spells most likely due to decreased preload due to pulmonary hypertension  -Seems to have resolved now with IVFs.  Patient does seem euvolemic currently     DM-2  - diabetic diet  - continue metformin    FEN: Oral diet as tolerated.    Clinically Significant Risk  "Factors Present on Admission                # Drug Induced Platelet Defect: home medication list includes an antiplatelet medication   # Hypertension: Home medication list includes antihypertensive(s)  # Acute heart failure with preserved ejection fraction: heart failure noted on problem list, last echo with EF >50%, and receiving IV diuretics  # Non-Invasive mechanical ventilation: current O2 Device: High Flow Nasal Cannula (HFNC)  # Acute hypoxic respiratory failure: continue supplemental O2 as needed    # DMII: A1C = 6.5 % (Ref range: <5.7 %) within past 6 months    # Obesity: Estimated body mass index is 30.16 kg/m  as calculated from the following:    Height as of this encounter: 1.803 m (5' 11\").    Weight as of this encounter: 98.1 kg (216 lb 4.3 oz).              DVT Prophylaxis: Enoxaparin (Lovenox) SQ    Code Status: Full Code    Disposition: Expected discharge in 1-2 days once clinically improved.    Prashanth Virk MD, MD        Interval History   Patient seen in room.  Patient sitting up, feeding self without difficulty.  Patient denies acute issues, denies chest pain, shortness of breath.  No acute events overnight, no new symptoms.    -Data reviewed today: I reviewed all new labs and imaging results over the last 24 hours. I personally reviewed imaging reports.    Physical Exam   Temp: 97.8  F (36.6  C) Temp src: Tympanic BP: 98/70 Pulse: 72   Resp: 20 SpO2: 95 % O2 Device: High Flow Nasal Cannula (HFNC) Oxygen Delivery: 8 LPM  Vitals:    08/14/23 1849 08/14/23 2239   Weight: 97.1 kg (214 lb) 98.1 kg (216 lb 4.3 oz)     Vital Signs with Ranges  Temp:  [97.1  F (36.2  C)-97.8  F (36.6  C)] 97.8  F (36.6  C)  Pulse:  [] 72  Resp:  [20-28] 20  BP: ()/(48-89) 98/70  SpO2:  [86 %-95 %] 95 %    Intake/Output Summary (Last 24 hours) at 8/15/2023 0912  Last data filed at 8/15/2023 0846  Gross per 24 hour   Intake 1035 ml   Output 1025 ml   Net 10 ml       Peripheral IV 08/14/23 Left Antecubital " fossa (Active)   Site Assessment WDL 08/15/23 0600   Line Status Infusing 08/15/23 0600   Dressing Transparent 08/15/23 0600   Dressing Status dry;clean;intact 08/15/23 0600   Phlebitis Scale 0-->no symptoms 08/15/23 0600   Infiltration? no 08/15/23 0600   Number of days: 1       Incision/Surgical Site 02/08/22 Left Eye (Active)   Number of days: 553     No line/device    Constitutional - AA, NAD, obese  HEENT - atraumatic, normocephalic  Neck - supple, no masses, no JVD  CVS -distant heart sounds, S1 S2 RRR, no murmurs, rubs, gallops  Respiratory -reduced breath sounds bilaterally, no wheezes, no rhonchi  GI - soft, NT, ND, + bowel sounds, no organomegaly  Musculoskeletal - trace LE edema, no lesions  Neuro - oriented x 3, no gross focal deficits      Medications      ARIPiprazole  5 mg Oral Daily    aspirin  81 mg Oral Daily    atorvastatin  20 mg Oral Daily    benztropine  0.5 mg Oral BID    cefTRIAXone  2 g Intravenous Q24H    digoxin  125 mcg Oral Daily    doxycycline hyclate  100 mg Oral BID    DULoxetine  60 mg Oral BID    enoxaparin ANTICOAGULANT  40 mg Subcutaneous Q24H MOISES    fluticasone-vilanterol  1 puff Inhalation Daily    And    umeclidinium  1 puff Inhalation Daily    metFORMIN  1,000 mg Oral BID w/meals    metoprolol succinate ER  25 mg Oral Daily    montelukast  10 mg Oral At Bedtime    nicotine  1 patch Transdermal Daily    nicotine   Transdermal Q8H    prazosin  1 mg Oral At Bedtime    sodium chloride (PF)  3 mL Intracatheter Q8H    tamsulosin  0.4 mg Oral Daily    [Held by provider] torsemide  60 mg Oral Daily       Data   Recent Labs   Lab 08/14/23  2310 08/14/23  1949 08/09/23  0918   WBC  --  11.5*  --    HGB  --  18.7*  --    MCV  --  91  --    PLT  --  266  --    NA  --  139 140   POTASSIUM  --  4.1 4.2   CHLORIDE  --  98 100   CO2  --  27 26   BUN  --  22.7 23.3*   CR  --  1.14 1.27*   ANIONGAP  --  14 14   MARC  --  9.3 10.0   * 99 97     Lactic Acid   Date Value Ref Range Status    08/14/2023 1.7 0.7 - 2.0 mmol/L Final   11/11/2022 2.0 0.7 - 2.0 mmol/L Final   06/19/2021 2.8 (H) 0.7 - 2.0 mmol/L Final     Comment:     Significant value called to and read back by  URVASHI PATEL AT 1757 06.19.21 AJ     05/05/2021 2.6 (H) 0.7 - 2.0 mmol/L Final     Comment:     Significant value called to and read back by  AGGIE MORFIN AT 0945 05.05.2021 TO [facility]         Recent Results (from the past 24 hour(s))   Chest XR,  PA & LAT    Narrative    PROCEDURE:  XR CHEST 2 VIEWS    HISTORY: CP, SOB, COPD vs CHF vs Pneumonia    COMPARISON:  Radiographs 8/7/2023; CT chest 2/20/2023    FINDINGS: PA and lateral chest radiographs  Findings of COPD, including elevated lung volumes, biapical  predominant emphysematous changes, flattening of the diaphragms.  Cardiomediastinal silhouette is within normal limits. Enlarged  pulmonary arteries, similar to comparison, consistent with history of  chronic pulmonary hypertension.  Bilateral interstitial opacities. Retrocardiac opacities. No effusion  or pneumothorax.    No suspicious osseous lesion or subdiaphragmatic free air.  Chronic rib fractures. Postsurgical changes of the cervical spine.  Thoracic spinal stimulator leads.      Impression    IMPRESSION:    Interstitial and retrocardiac opacities, could represent pulmonary  vascular congestion or infection.    VIVI ERWIN MD         SYSTEM ID:  [id number]       Prashanth Virk MD

## 2023-08-15 NOTE — PLAN OF CARE
"Goal Outcome Evaluation:       Reason for hospital stay:  acute on chronic respiratory failure   Living situation PTA: home   Most recent vitals: BP 96/65   Pulse 91   Temp (!) 96.6  F (35.9  C) (Tympanic)   Resp 18   Ht 1.803 m (5' 11\")   Wt 98.1 kg (216 lb 4.3 oz)   SpO2 95%   BMI 30.16 kg/m      Pain Management:  denied pain   LOC:  A& O  Cardiac:  apical regular   Respiratory:  8 LPM high flow nasal cannula   GI:  WNL  :  WNL  Skin Issues:  please see charting     IVF:  none  ABX:  doxycyline     Nutrition: 60cc, heart healthy   ADL's:  independent   Ambulation:stand by   Safety:  call light in place, bed in low position with wheels locked, room near nurses station with door open       Face to face report given with opportunity to observe patient.    Report given to Aisha Arizmendi RN   8/15/2023  3:10 PM        8/15/2023  3:26 PM  Jason Arizmendi RN                   "

## 2023-08-15 NOTE — PROGRESS NOTES
Medical record and Jaret Score reviewed. Participated in interdisciplinary rounds.  No apparent needs noted at this time. Care Transitions will remain available if needs arise.

## 2023-08-15 NOTE — SIGNIFICANT EVENT
Significant Event Note    Time of event: 5:11 AM August 15, 2023    Description of event:  Low BP    Plan:  Hold lasix (given 40 mg in ED).   250 ml bolus NS      Discussed with: bedside nurse    Belkis Quezada MD

## 2023-08-15 NOTE — H&P
[facility]    History and Physical - Hospitalist Service       Date of Admission:  8/14/2023    Assessment & Plan      Joey Irene is a 67 year old male admitted on 8/14/2023. He presents to the emergency room with dizziness, cough, shortness of breath and hypoxia.  Symptoms have been worsening for a 4 days, but became severe on the day of admission.    Hospital problems  Acute on chronic hypoxic respiratory failure  -Combined due to interstitial pulmonary fibrosis, COPD, and diastolic heart failure  -Oxygen supplementation.  He is on 2 L at home most likely will need more -will assess prior to discharge  -Nebulizers  -Treat possible infection with Doxy and Rocephin    Pulmonary artery hypertension  -Follows with Dr. Manning pulmonary hypertension specialist at  who recommended conservative management.  Continue home medications and oxygen  -We will diurese    Secondary polycythemia  -Chronic hypoxia and pulmonary hypertension  Hgb 18.7 On asa. JAK2 with reflex to MPL CALR MPN FOC NGS and Erythropoietin normal. Most likely due to severe COPD and undiagnosed sleep apnea. Declined to do a sleep study.  Primary is planning to make hematology referral    Diastolic heart failure  -We will diurese continue beta-blockers  -Today's bedside echo revealed preserved ejection fraction with grade 1 diastolic dysfunction    Dizziness  -Associated with coughing spells most likely due to decreased preload due to pulmonary hypertension  -We need to be careful with diuresis    DM-2  - diabetic diet  - continue metformin    Chest pain  - non-cardiac per my assessment.      Diet:  combination with low Na, counted carbs  DVT Prophylaxis: Enoxaparin (Lovenox) SQ  Tsai Catheter: Not present  Lines: None     Cardiac Monitoring: telemetry  Code Status:  full code    Clinically Significant Risk Factors Present on Admission                # Drug Induced Platelet Defect: home medication list includes an antiplatelet  "medication   # Hypertension: Home medication list includes antihypertensive(s)  # Acute heart failure with preserved ejection fraction: heart failure noted on problem list, last echo with EF >50%, and receiving IV diuretics    # DMII: A1C = 6.5 % (Ref range: <5.7 %) within past 6 months    # Overweight: Estimated body mass index is 29.85 kg/m  as calculated from the following:    Height as of this encounter: 1.803 m (5' 11\").    Weight as of this encounter: 97.1 kg (214 lb).              Disposition Plan home when stable     Expected Discharge Date: 08/15/2023         or may require longer stay         Belkis Quezada MD  Hospitalist Service  [facility]  Securely message with [facility] (more info)  Text page via [facility] Paging/Directory     ______________________________________________________________________    Chief Complaint   Cough, dyspnea, dizziness, chest pain.     History is obtained from the patient, electronic health record, and emergency department physician    History of Present Illness   Joey Irene is a 67 year old male who has history of interstitial pulmonary fibrosis, pulmonary hypertension, COPD, diastolic heart failure, BPH, DM-2, secondary polycythemia presents to emergency room with bilateral chest pains and shortness of breath.  Symptoms worsening for 4 days.  He saw his primary care provider 4 days ago on follow-up (noticed with multifocal pneumonia 8/7/2023 (cough and hypoxia) but refused to go to emergency room to be hospitalized. He was seen in the office 8.9.2023 and reported that he is feeling better.   He comes back today with worsening shortness of breath.  He has been trying his inhalers without relief.  He has had increased cough and chills at home but no documented fever.  Some nausea and anorexia.  He admits that he gets dizzy with coughing spells.  He coughs white-colored phlegm.  His chest pain is sharp and associated with cough.  No hemoptysis.  He is on home oxygen " 2 L but he required 4 L in ED (more on the floor).  She was seen by his primary care physician diagnosed with pneumonia August 7, 2023.  X-ray showed hyperinflation of both lungs with septal patchy multifocal pneumonia and atelectasis of the lung bases.  Today's x-ray shows interstitial and retrocardiac opacities.  They could represent pulmonary vascular congestion or infection.  By reviewing his medical records I found that this gentleman follows with pulmonary hypertension specialist with recommendations for conservative management.  He had decreased his torsemide to 20 mg daily to improve his urinary incontinence symptoms he was experiencing.  He denies lower extremity edema or weight gain. His most recent echo revealed moderate right ventricular dilatation with moderately reduced right ventricle systolic function.  Left ventricular ejection fraction was 55 to 60%.  Grade 1 diastolic dysfunction.  2020 he had myocardial perfusion imaging which showed no inducible or reversible ischemia.  His most recent coronary angiogram showed very minimal coronary artery disease.  CT February 22, 2023 showed interstitial thickening seen in both lungs which is stable as compared to September 22, 2022.  Severe emphysema but no bronchial wall thickening but some bronchiectasis.  ED course: Unremarkable except basic BNP 4284.  Procalcitonin 0.05.  Blood cell count is 11.5, hemoglobin 18.7, platelets 266.  Today's x-ray showed retrocardiac this could be due to atelectasis or infection.  Patient was seen in the emergency room.  Bedside echo revealed preserved EF of the left ventricle and pulmonary hypertension with right ventricular dilatation.  Admitted for diuresis and antibiotic treatment.I think he deserves a little longer antibiotic therapy.  But I also think that most of his symptoms come from severe pulmonary hypertension and interstitial pulmonary fibrosis and with not much to offer to this gentleman.  Status was discussed  and he wants everything to be done until his last breath.  Past Medical History    Past Medical History:   Diagnosis Date    COPD (chronic obstructive pulmonary disease) (H)     Hypertension     Uncomplicated asthma        Past Surgical History   Past Surgical History:   Procedure Laterality Date    CV CORONARY ANGIOGRAM N/A 12/11/2020    Procedure: Coronary Angiogram;  Surgeon: Aman Delgado MD;  [facility]    CV RIGHT HEART CATH MEASUREMENTS RECORDED N/A 12/11/2020    Procedure: CV RIGHT HEART CATH;  Surgeon: Aman Delgado MD;  Location:  [facility]    ENT SURGERY      patient has had three left ear surgeries, unsure what they did    FUSION CERVICAL POSTERIOR THREE+ LEVELS      HERNIA REPAIR, INGUINAL RT/LT Right     done times 3    IR CONSULTATION FOR IR EXAM  1/19/2023    JOINT REPLACEMENT Left     PHACOEMULSIFICATION WITH STANDARD INTRAOCULAR LENS IMPLANT Right 1/25/2022    Procedure: COMPLEX PHACOEMULSIFICATION CATARACT EXTRACTION POSTERIOR CHAMBER LENS RIGHT EYE: PUPIL STRETCHING RIGHT RIGHT;  Surgeon: Efra Gibson MD;  Location: [facility]    PHACOEMULSIFICATION WITH STANDARD INTRAOCULAR LENS IMPLANT Left 2/8/2022    Procedure: ANTERIOR VITRECTOMY AND POSTERIOR CHAMBER LENS IMPLANT;  Surgeon: Efra Gibson MD;  Location: [facility] OR    REPAIR HAMMER TOE Right        Prior to Admission Medications   Prior to Admission Medications   Prescriptions Last Dose Informant Patient Reported? Taking?   ARIPiprazole (ABILIFY) 5 MG tablet   Yes No   ASPIRIN LOW DOSE 81 MG EC tablet   No No   Sig: TAKE 1 TABLET BY MOUTH DAILY   Cholecalciferol (D 1000) 25 MCG (1000 UT) CAPS  Self Yes No   DULoxetine (CYMBALTA) 60 MG capsule   Yes No   Sig: Take 1 capsule (60 mg) by mouth 2 times daily   EPINEPHrine (ANY BX GENERIC EQUIV) 0.3 MG/0.3ML injection 2-pack   No No   Sig: INJECT 0.3MLS INTO THE MUSCLE AS NEEDED FOR ANAPHYLAXIS   Fluticasone-Umeclidin-Vilant (TRELEGY ELLIPTA)  100-62.5-25 MCG/ACT oral inhaler   No No   Sig: Inhale 1 puff into the lungs daily   acetaminophen (TYLENOL) 500 MG tablet  Self Yes No   Sig: Take 1 tablet by mouth   albuterol (PROAIR HFA/PROVENTIL HFA/VENTOLIN HFA) 108 (90 Base) MCG/ACT inhaler   No No   Sig: INHALE 1-2 PUFFS INTO THE LUNGS EVERY 4 HOURS AS NEEDED FOR SHORTNESS OF BREATH/DYSPNEA/WHEEZING   atorvastatin (LIPITOR) 20 MG tablet   No No   Sig: TAKE 1 TABLET BY MOUTH DAILY   benztropine (COGENTIN) 0.5 MG tablet   Yes No   cetirizine (ZYRTEC) 10 MG tablet   No No   Sig: TAKE 1 TABLET BY MOUTH DAILY   digoxin (LANOXIN) 125 MCG tablet   No No   Sig: TAKE 1 TABLET BY MOUTH DAILY   doxycycline hyclate (VIBRAMYCIN) 100 MG capsule   No No   Sig: Take 1 capsule (100 mg) by mouth 2 times daily for 10 days   hydrOXYzine (VISTARIL) 25 MG capsule   Yes No   Sig: Take 25 mg by mouth 2 times daily   lidocaine (LIDODERM) 5 % patch   No No   Sig: Place 1 patch onto the skin every 24 hours To prevent lidocaine toxicity, patient should be patch free for 12 hrs daily.   metFORMIN (GLUCOPHAGE) 1000 MG tablet   No No   Sig: Take 1 tablet (1,000 mg) by mouth 2 times daily (with meals)   metoprolol succinate ER (TOPROL-XL) 25 MG 24 hr tablet   No No   Sig: TAKE 1 TABLET BY MOUTH DAILY   montelukast (SINGULAIR) 10 MG tablet   No No   Sig: TAKE ONE TABLET BY MOUTH AT BEDTIME   Patient not taking: Reported on 8/9/2023   multivitamin w/minerals (THERA-VIT-M) tablet   Yes No   Sig: Take 1 tablet by mouth daily   potassium chloride ER (KLOR-CON M) 20 MEQ CR tablet   No No   Sig: TAKE 2 TABLETS (40MEQ) IN THE MORNING   prazosin (MINIPRESS) 1 MG capsule   Yes No   Sig: Take 1 mg by mouth At Bedtime   tamsulosin (FLOMAX) 0.4 MG capsule   No No   Sig: Take 1 capsule (0.4 mg) by mouth daily   torsemide (DEMADEX) 20 MG tablet   No No   Sig: TAKE 3 TABLETS (60MG) BY MOUTH IN THE MORNING   vitamin C (ASCORBIC ACID) 500 MG tablet  Self Yes No   zolpidem (AMBIEN) 10 MG tablet  Self Yes No       Facility-Administered Medications: None        Review of Systems    The 10 point Review of Systems is negative other than noted in the HPI.    Social History   I have reviewed this patient's social history and updated it with pertinent information if needed.  Social History     Tobacco Use    Smoking status: Every Day     Types: Cigars     Start date: 10/1/2021    Smokeless tobacco: Never   Vaping Use    Vaping Use: Never used   Substance Use Topics    Alcohol use: Never    Drug use: Never         Family History   I have reviewed this patient's family history and updated it with pertinent information if needed.  Family History   Problem Relation Age of Onset    Lung Cancer Mother     Ovarian Cancer Sister          Allergies   Allergies   Allergen Reactions    Bees Anaphylaxis    Seasonal Allergies Difficulty breathing and Cough    Bupropion Other (See Comments)     tremor    Tramadol Nausea and Swelling    Pulteney Nite Time Dizziness and Nausea and Vomiting    Nyquil Cold &  [Dm-Doxylamine-Acetaminophen] Dizziness and Nausea and Vomiting    Trichophyton Other (See Comments)        Physical Exam   Vital Signs: Temp: 97.7  F (36.5  C) Temp src: Tympanic BP: 129/82 Pulse: 96   Resp: (!) 28 SpO2: (!) 91 % O2 Device: Nasal cannula Oxygen Delivery: 3 LPM  Weight: 214 lbs 0 oz    General Appearance: Anxious but not in distress  Eyes: No icterus  HEENT: Oropharynx clear  Respiratory: Coarse breath sounds diffusely  Cardiovascular: Regular, increased S2 pulmonary auscultation area  GI: Soft nontender nondistended  Lymph/Hematologic: No new adenopathy no ecchymoses  Genitourinary: Bladder not palpable  Skin: No edema no rash  Musculoskeletal: No sarcopenia, no joint effusion or deformity.  There is no focal muscle tenderness on palpation of his chest.  Neurologic: Nonfocal  Psychiatric: Anxious, awake alert oriented x3    Medical Decision Making       70 MINUTES SPENT BY ME on the date of service doing chart review,  history, exam, documentation & further activities per the note.      Data     I have personally reviewed the following data over the past 24 hrs:    11.5 (H)  \   18.7 (H)   / 266     139 98 22.7 /  107 (H)   4.1 27 1.14 \     Trop: 63 (H) BNP: 4,284 (H)     Procal: 0.05 (H) CRP: N/A Lactic Acid: 1.7       INR:  N/A PTT:  N/A   D-dimer:  0.44 Fibrinogen:  N/A       Imaging results reviewed over the past 24 hrs:   Recent Results (from the past 24 hour(s))   Chest XR,  PA & LAT    Narrative    PROCEDURE:  XR CHEST 2 VIEWS    HISTORY: CP, SOB, COPD vs CHF vs Pneumonia    COMPARISON:  Radiographs 8/7/2023; CT chest 2/20/2023    FINDINGS: PA and lateral chest radiographs  Findings of COPD, including elevated lung volumes, biapical  predominant emphysematous changes, flattening of the diaphragms.  Cardiomediastinal silhouette is within normal limits. Enlarged  pulmonary arteries, similar to comparison, consistent with history of  chronic pulmonary hypertension.  Bilateral interstitial opacities. Retrocardiac opacities. No effusion  or pneumothorax.    No suspicious osseous lesion or subdiaphragmatic free air.  Chronic rib fractures. Postsurgical changes of the cervical spine.  Thoracic spinal stimulator leads.      Impression    IMPRESSION:    Interstitial and retrocardiac opacities, could represent pulmonary  vascular congestion or infection.    VIVI ERWIN MD         SYSTEM ID:  [id number]

## 2023-08-15 NOTE — PHARMACY-MEDICATION REGIMEN REVIEW
Pharmacy Antimicrobial Stewardship Assessment     Current Antimicrobial Therapy:  Anti-infectives (From now, onward)      Start     Dose/Rate Route Frequency Ordered Stop    08/15/23 0900  doxycycline hyclate (VIBRAMYCIN) capsule 100 mg        Note to Pharmacy: PTA Sig:Take 1 capsule (100 mg) by mouth 2 times daily for 10 days      100 mg Oral 2 TIMES DAILY 23 2329      08/15/23 0030  cefTRIAXone in d5w (ROCEPHIN) intermittent infusion 1 g         1 g  over 30 Minutes Intravenous EVERY 24 HOURS SCHEDULED 08/15/23 0018              Indication: CAP    Days of Therapy:   Doxycycline: Day 9 (started 23 outpatient)   Ceftriaxone: Day 1     Pertinent Labs:  Recent Labs   Lab Test 23  19423  1543 23  1041   WBC 11.5* 11.8* 10.0     Recent Labs   Lab Test 23  19422  2149 21  1732 20  1746 20  1641   LACT 1.7 2.0 2.8*   < >  --    CRP  --   --  9.8*  --  13.5*   PCAL 0.05* 0.04  --    < >  --     < > = values in this interval not displayed.        Temperature:  Temp (24hrs), Av.6  F (36.4  C), Min:97.1  F (36.2  C), Max:97.8  F (36.6  C)    Culture Results:   8/15: COVID = negative  : Blood = in process     Recommendations/Interventions:  Adjusting Rocephin to 2g daily due to weight > 90 kg per Automatic Medication Dose Adjustment policy.  Consider placing stop date on doxycycline of  after evening dose as this will complete 10 days of therapy.     Joceline Jamison, [facility]  August 15, 2023

## 2023-08-16 NOTE — PLAN OF CARE
"Reason for admission: Acute on chronic respiratory failure with hypoxia   Pt is Aox4.  SBA. Makes needs known, Call light within reach, wheels locked, ID band on. Pain has been managed throughout the shift. IV SL, ABX IV rocephin and oral Doxycycline. Weaned oxygen from 8L HFNC to 3L NC sats 90's. Uses urinal to void.  /66 (BP Location: Left arm, Patient Position: Semi-Hudson's, Cuff Size: Adult Regular)   Pulse 76   Temp (!) 96.7  F (35.9  C) (Tympanic)   Resp 18   Ht 1.803 m (5' 11\")   Wt 98.1 kg (216 lb 4.3 oz)   SpO2 93%   BMI 30.16 kg/m    Face to face report given with opportunity to observe patient.    Report given to Keely Prince RN   8/16/2023  07:22 AM        "

## 2023-08-16 NOTE — DISCHARGE SUMMARY
[facility]    Discharge Summary  Hospitalist    Date of Admission:  8/14/2023  Date of Discharge:  8/16/2023  Discharging Provider: Prashanth Virk MD, MD  Date of Service (when I saw the patient): 08/16/23    Discharge Diagnoses   Principal Problem:    Acute on chronic respiratory failure with hypoxia (H)  Active Problems:    Elevated prostate specific antigen (PSA)    PAH (pulmonary arterial hypertension) with portal hypertension (H)    Bronchitis    Congestive heart failure, unspecified HF chronicity, unspecified heart failure type (H)    COPD (chronic obstructive pulmonary disease) (H)    Dizziness    Acute and chronic respiratory failure with hypoxia (H)      History of Present Illness   Joey Irene is an 67 year old male who presented with sob.  Please see admission H+P for additional details.    Hospital Course   Joey Irene was admitted on 8/14/2023.  67-year-old male with history of interstitial pulmonary fibrosis and resultant diastolic heart failure and pulmonary hypertension on 2 L nasal cannula at home, non-insulin-dependent diabetes mellitus type 2 who presents with shortness of breath.  Chest x-ray showed interstitial and retrocardiac infiltrates consistent with community-acquired pneumonia.  Patient was initially diuresed, thinking perhaps there was a component of volume overload and pulmonary edema.  Patient had resultant hypotension, so fluid was given back and patient treated with empiric Rocephin with good results.  Patient was already on doxycycline as an outpatient, this was continued as well.  Patient did need up to 8 L high flow nasal cannula, but he was able to be weaned down to 2 to 3 L nasal cannula on discharge.  This morning, patient is feeling at his baseline, wishes to go home.  Blood cultures remain no growth to date.  With clinical improvement, patient is stable to be discharged from acute care with 2 to 3 L nasal cannula supplemental oxygen.  He will finish off  his doxycycline at home, and we will discharge him with 5 more days of cefdinir orally.  He has follow-up scheduled already on 8/21/2023 with his primary care physician.  He should be evaluated for resolution of the symptoms at that time.    Prashanth Virk MD      Significant Results and Procedures   See below    Pending Results   These results will be followed up by Amparo Alvares    Unresulted Labs Ordered in the Past 30 Days of this Admission       Date and Time Order Name Status Description    8/14/2023  7:37 PM Blood Culture Peripheral Blood Preliminary             Code Status   Full Code       Primary Care Physician   Amparo Alvares    Physical Exam   Temp: 97.8  F (36.6  C) Temp src: Tympanic BP: 99/70 Pulse: 83   Resp: 20 SpO2: (!) 90 % O2 Device: Nasal cannula Oxygen Delivery: 3 LPM  Vitals:    08/14/23 1849 08/14/23 2239   Weight: 97.1 kg (214 lb) 98.1 kg (216 lb 4.3 oz)     Vital Signs with Ranges  Temp:  [96.6  F (35.9  C)-97.8  F (36.6  C)] 97.8  F (36.6  C)  Pulse:  [63-91] 83  Resp:  [18-20] 20  BP: ()/(61-78) 99/70  SpO2:  [89 %-95 %] 90 %  I/O last 3 completed shifts:  In: 2715 [P.O.:2160; IV Piggyback:555]  Out: 1775 [Urine:1775]    Constitutional - AA, NAD, obese  HEENT - atraumatic, normocephalic  Neck - supple, no masses, no JVD  CVS - distant heart sounds, S1 S2 RRR, no murmurs, rubs, gallops  Respiratory - reduced breath sounds bilaterally, no wheezes, no rhonchi  GI - soft, NT, ND, + bowel sounds, no organomegaly  Musculoskeletal - trace LE edema, no lesions  Neuro - oriented x 3, no gross focal deficits      Discharge Disposition   Discharged to home  Condition at discharge: Stable    Consultations This Hospital Stay   CARE MANAGEMENT / SOCIAL WORK IP CONSULT  RESPIRATORY CARE IP CONSULT    Time Spent on this Encounter   IPrashanth MD, personally saw the patient today and spent greater than 30 minutes discharging this patient.    Discharge Orders      Reason for your hospital  stay    CAP     Follow-up and recommended labs and tests     Follow up with primary care provider, Amparo Alvares, within 7 days for hospital follow- up.  No follow up labs or test are needed.     Activity    Your activity upon discharge: activity as tolerated     Oxygen Adult/Peds    Oxygen Documentation  I certify that this patient, Joey Irene has been under my care (or a nurse practitioner or physican's assistant working with me). This is the face-to-face encounter for oxygen medical necessity.      At the time of this encounter supplemental oxygen is reasonable and necessary and is expected to improve the patient's condition in a home setting.       Patient has continued oxygen desaturation due to Pulmonary Hypertension I27.20.    If portability is ordered, is the patient mobile within the home? yes     Diet    Follow this diet upon discharge: Regular     Discharge Medications   Current Discharge Medication List        START taking these medications    Details   benzonatate (TESSALON) 200 MG capsule Take 1 capsule (200 mg) by mouth 3 times daily as needed for cough  Qty: 30 capsule, Refills: 0    Associated Diagnoses: Acute on chronic respiratory failure with hypoxia (H)      cefdinir (OMNICEF) 300 MG capsule Take 1 capsule (300 mg) by mouth 2 times daily for 5 days  Qty: 10 capsule, Refills: 0    Associated Diagnoses: Acute on chronic respiratory failure with hypoxia (H)           CONTINUE these medications which have NOT CHANGED    Details   acetaminophen (TYLENOL) 500 MG tablet Take 1,000 mg by mouth daily as needed (headaches)      albuterol (PROAIR HFA/PROVENTIL HFA/VENTOLIN HFA) 108 (90 Base) MCG/ACT inhaler INHALE 1-2 PUFFS INTO THE LUNGS EVERY 4 HOURS AS NEEDED FOR SHORTNESS OF BREATH/DYSPNEA/WHEEZING  Qty: 8.5 g, Refills: 0    Associated Diagnoses: Severe pulmonary arterial systolic hypertension (H)      ARIPiprazole (ABILIFY) 5 MG tablet Take 5 mg by mouth daily      ASPIRIN LOW DOSE 81 MG EC  tablet TAKE 1 TABLET BY MOUTH DAILY  Qty: 30 tablet, Refills: 4    Associated Diagnoses: Cardiomegaly      atorvastatin (LIPITOR) 20 MG tablet TAKE 1 TABLET BY MOUTH DAILY  Qty: 90 tablet, Refills: 2    Associated Diagnoses: Hyperlipidemia, unspecified hyperlipidemia type      benztropine (COGENTIN) 0.5 MG tablet Take 0.5 mg by mouth three to four times daily      cetirizine (ZYRTEC) 10 MG tablet TAKE 1 TABLET BY MOUTH DAILY  Qty: 30 tablet, Refills: 4    Associated Diagnoses: Seasonal allergies      Cholecalciferol (D 1000) 25 MCG (1000 UT) CAPS Take 1 capsule by mouth daily      digoxin (LANOXIN) 125 MCG tablet TAKE 1 TABLET BY MOUTH DAILY  Qty: 90 tablet, Refills: 1    Associated Diagnoses: Pulmonary arterial hypertension (H); Right ventricular dilation; Right heart failure, NYHA class 3 (H)      doxycycline hyclate (VIBRAMYCIN) 100 MG capsule Take 1 capsule (100 mg) by mouth 2 times daily for 10 days  Qty: 20 capsule, Refills: 0    Associated Diagnoses: COPD exacerbation (H)      DULoxetine (CYMBALTA) 60 MG capsule Take 1 capsule (60 mg) by mouth 2 times daily  Qty: 60 capsule, Refills: 1    Associated Diagnoses: Generalized anxiety disorder      EPINEPHrine (ANY BX GENERIC EQUIV) 0.3 MG/0.3ML injection 2-pack INJECT 0.3MLS INTO THE MUSCLE AS NEEDED FOR ANAPHYLAXIS  Qty: 2 each, Refills: 1    Associated Diagnoses: Bee sting allergy      Fluticasone-Umeclidin-Vilant (TRELEGY ELLIPTA) 100-62.5-25 MCG/ACT oral inhaler Inhale 1 puff into the lungs daily  Qty: 60 each, Refills: 1    Associated Diagnoses: Severe pulmonary arterial systolic hypertension (H)      hydrOXYzine (VISTARIL) 25 MG capsule Take 25 mg by mouth 2 times daily as needed for anxiety      metFORMIN (GLUCOPHAGE) 1000 MG tablet Take 1 tablet (1,000 mg) by mouth 2 times daily (with meals)  Qty: 60 tablet, Refills: 1    Associated Diagnoses: IFG (impaired fasting glucose)      multivitamin w/minerals (THERA-VIT-M) tablet Take 1 tablet by mouth daily       potassium chloride ER (KLOR-CON M) 20 MEQ CR tablet TAKE 2 TABLETS (40MEQ) IN THE MORNING  Qty: 180 tablet, Refills: 2    Associated Diagnoses: Drug-induced hypokalemia      prazosin (MINIPRESS) 1 MG capsule Take 1 mg by mouth At Bedtime      tamsulosin (FLOMAX) 0.4 MG capsule Take 1 capsule (0.4 mg) by mouth daily  Qty: 30 capsule, Refills: 1    Associated Diagnoses: Urinary hesitancy      torsemide (DEMADEX) 20 MG tablet TAKE 3 TABLETS (60MG) BY MOUTH IN THE MORNING  Qty: 270 tablet, Refills: 3    Associated Diagnoses: Pulmonary arterial hypertension (H); MURRAY (dyspnea on exertion)      vitamin C (ASCORBIC ACID) 500 MG tablet Take 500 mg by mouth daily      zolpidem (AMBIEN) 10 MG tablet Take 10 mg by mouth At Bedtime      lidocaine (LIDODERM) 5 % patch Place 1 patch onto the skin every 24 hours To prevent lidocaine toxicity, patient should be patch free for 12 hrs daily.  Qty: 1 patch, Refills: 0      metoprolol succinate ER (TOPROL-XL) 25 MG 24 hr tablet TAKE 1 TABLET BY MOUTH DAILY  Qty: 90 tablet, Refills: 3    Associated Diagnoses: Right heart failure, NYHA class 3 (H); Sinus tachycardia      montelukast (SINGULAIR) 10 MG tablet TAKE ONE TABLET BY MOUTH AT BEDTIME  Qty: 90 tablet, Refills: 3    Associated Diagnoses: Seasonal allergies           Allergies   Allergies   Allergen Reactions    Bees Anaphylaxis    Seasonal Allergies Difficulty breathing and Cough    Tramadol Nausea and Swelling    Wellbutrin [Bupropion] Other (See Comments)     tremor    Boomer Nite Time Dizziness and Nausea and Vomiting    Nyquil Cold &  [Dm-Doxylamine-Acetaminophen] Dizziness and Nausea and Vomiting    Trichophyton Other (See Comments)     Data   Most Recent 3 CBC's:  Recent Labs   Lab Test 08/16/23  0549 08/15/23  1327 08/14/23  1949   WBC 8.4 8.1 11.5*   HGB 17.0 17.1 18.7*   MCV 92 92 91    228 266      Most Recent 3 BMP's:  Recent Labs   Lab Test 08/16/23  0549 08/15/23  2057 08/15/23  1707 08/15/23  1327  08/14/23  2310 08/14/23  1949     --   --  135*  --  139   POTASSIUM 4.2  --   --  3.8  --  4.1   CHLORIDE 99  --   --  95*  --  98   CO2 30*  --   --  30*  --  27   BUN 19.1  --   --  21.0  --  22.7   CR 0.93  --   --  0.96  --  1.14   ANIONGAP 7  --   --  10  --  14   MARC 8.7*  --   --  9.0  --  9.3   GLC 89 95 88 91   < > 99    < > = values in this interval not displayed.     Most Recent 2 LFT's:  Recent Labs   Lab Test 02/21/23  1255 11/11/22  2149   AST 17 20   ALT 15 13   ALKPHOS 92 90   BILITOTAL 0.5 0.3     Most Recent INR's and Anticoagulation Dosing History:  Anticoagulation Dose History          Latest Ref Rng & Units 2/23/2021 5/5/2021 6/19/2021 11/11/2022   Recent Dosing and Labs   INR 0.85 - 1.15 0.94  1.01  1.05  1.05      Most Recent 3 Troponin's:  Recent Labs   Lab Test 07/01/21  1316 06/15/21  1213 06/15/21  0823   TROPI <0.015 <0.015 <0.015     Most Recent Cholesterol Panel:  Recent Labs   Lab Test 02/21/23  1041   CHOL 147   LDL 64   HDL 28*   TRIG 275*     Most Recent 6 Bacteria Isolates From Any Culture (See EPIC Reports for Culture Details):  Recent Labs   Lab Test 11/20/20  1745   CULT No growth after 6 days     Most Recent TSH, T4 and A1c Labs:  Recent Labs   Lab Test 08/07/23  1543 12/08/20  0825 08/07/20  0927   TSH 3.25   < > 5.92*   T4  --   --  0.97   A1C 6.5*   < > 6.1*    < > = values in this interval not displayed.     Results for orders placed or performed during the hospital encounter of 08/14/23   Chest XR,  PA & LAT    Narrative    PROCEDURE:  XR CHEST 2 VIEWS    HISTORY: CP, SOB, COPD vs CHF vs Pneumonia    COMPARISON:  Radiographs 8/7/2023; CT chest 2/20/2023    FINDINGS: PA and lateral chest radiographs  Findings of COPD, including elevated lung volumes, biapical  predominant emphysematous changes, flattening of the diaphragms.  Cardiomediastinal silhouette is within normal limits. Enlarged  pulmonary arteries, similar to comparison, consistent with history of  chronic  pulmonary hypertension.  Bilateral interstitial opacities. Retrocardiac opacities. No effusion  or pneumothorax.    No suspicious osseous lesion or subdiaphragmatic free air.  Chronic rib fractures. Postsurgical changes of the cervical spine.  Thoracic spinal stimulator leads.      Impression    IMPRESSION:    Interstitial and retrocardiac opacities, could represent pulmonary  vascular congestion or infection.    VIVI ERWIN MD         SYSTEM ID:  [id number]

## 2023-08-16 NOTE — PLAN OF CARE
Pt remains on 3L NC. Pt amb this AM with no increase SOB. Pt states feels ready to go home.    Patient discharged at 1118 PM via wheel chair accompanied by significant other and staff. Prescriptions sent to patients preferred pharmacy. All belongings sent with patient.     Discharge instructions reviewed with pt. Listed belongings gathered and returned to patient. All personal    Patient discharged to home.   Report called to na    Surgical Patient   Surgical Procedures during stay: none  Did patient receive discharge instruction on wound care and recognition of infection symptoms? Yes    MISC  Follow up appointment made:  Yes  Home medications returned to patient: N/A  Patient reports pain was well managed at discharge: Yes. Denies.

## 2023-08-17 NOTE — PROGRESS NOTES
Clinic Care Coordination Contact  Care Team Conversations    RN CC attempted to contact patient to complete TCM. No answer. LVM. RN CC will attempt again on a later date.     Mian PELAEZ RN, Care Coordinator  [facility]

## 2023-08-21 NOTE — PROGRESS NOTES
Clinic Care Coordination Contact  Care Team Conversations    TCM not completed with patient. Patient attended hospital follow up with PCP.   No further outreach by this RN CC.     Mian PELAEZ RN, Care Coordinator  [facility]
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
## 2023-08-30 NOTE — PATIENT INSTRUCTIONS
Thank you for allowing Patricia Lake CNP and our  team to participate in your care. Please call our office at [phone] with scheduling questions or if you need to cancel or change your appointment. With any other questions or concerns you may call cardiology nurse at [phone] or [phone].       If you experience chest pain, chest pressure, chest tightness, shortness of breath, fainting, lightheadedness, nausea, vomiting, or other concerning symptoms, please report to the Emergency Department or call 911. These symptoms may be emergent, and best treated in the Emergency Department.    See how you do with torsemide 20 mg daily instead of 40 mg once daily.   Keep track of daily weights- if there is 3 pounds weight gain overnight or 5 pounds in 1 week. Send a Nitch message and take 2 tablets for a day or two.   Sodium restriction 2,500 mg daily  Fluid restriction 1.5 liters daily    You should receive a call to follow-up with pulmonology    Follow-up with cardiology in 6 months, certainly sooner with acute concerns.      Patricia Lake CNP

## 2023-09-21 NOTE — PROGRESS NOTES
HEMATOLOGY CONSULT NOTE  Sep 21, 2023    Reason for consult: polycythemia.     Referring Provider: Amparo Alvares NP    HISTORY OF PRESENT ILLNESS  Joey Irene is a 67 year old male with PMH as stated below who is seen in the hematology clinic for polycythemia.    His history in short is as follows:    Mr. Irene was diagnosed with pulmonary fibrosis last year.  Since then he is been having progressive dyspnea on exertion.  He is on home oxygen.  He is also a current everyday smoker.    He also has been complaining of worsening fatigue over the last few months.  He states he was found to have an enlarged prostate and was having difficulty urinating but that has improved after he was started on Flomax.  He also has a rash on bilateral groin which causes itching and burning.  He states since he has had issues with his prostate he is also does not have much of an appetite and is losing weight.  He is on Lasix for diastolic heart failure and checks his weight regularly.  He states he has lost 6 pounds in the last week.  He also complains of night sweats which has been ongoing for a few months.  He does have pain in the lower abdomen near his groin.  He denies any pain anywhere else.  He also complains of headache and dizziness.  He denies any pruritus after taking a shower.  Though the area around his groin is pruritic.  He denies any blood in his urine.    Review of labs in epic show elevated hemoglobin at 18.2 with a hematocrit of 52.9 dating back to 10/13/2020.  He also has intermittent elevated hemoglobin dating back to 6/13/2018 with hematocrit of 50.5    REVIEW OF SYSTEMS  A 12-point ROS negative except as in HPI      Current Outpatient Medications   Medication Sig Dispense Refill    acetaminophen (TYLENOL) 500 MG tablet Take 1,000 mg by mouth daily as needed (headaches)      albuterol (PROAIR HFA/PROVENTIL HFA/VENTOLIN HFA) 108 (90 Base) MCG/ACT inhaler INHALE 1-2 PUFFS INTO THE LUNGS EVERY 4 HOURS AS  NEEDED FOR SHORTNESS OF BREATH/DYSPNEA/WHEEZING 8.5 g 0    ARIPiprazole (ABILIFY) 5 MG tablet Take 5 mg by mouth daily      ASPIRIN LOW DOSE 81 MG EC tablet TAKE 1 TABLET BY MOUTH DAILY 30 tablet 4    atorvastatin (LIPITOR) 20 MG tablet TAKE 1 TABLET BY MOUTH DAILY 90 tablet 2    benzonatate (TESSALON) 200 MG capsule Take 1 capsule (200 mg) by mouth 3 times daily as needed for cough 30 capsule 0    benztropine (COGENTIN) 0.5 MG tablet Take 0.5 mg by mouth three to four times daily      cetirizine (ZYRTEC) 10 MG tablet TAKE 1 TABLET BY MOUTH DAILY 30 tablet 4    Cholecalciferol (D 1000) 25 MCG (1000 UT) CAPS Take 1 capsule by mouth daily      clotrimazole (LOTRIMIN) 1 % external cream Apply topically 2 times daily 60 g 0    digoxin (LANOXIN) 125 MCG tablet TAKE 1 TABLET BY MOUTH DAILY 90 tablet 1    DULoxetine (CYMBALTA) 60 MG capsule Take 1 capsule (60 mg) by mouth 2 times daily 60 capsule 1    EPINEPHrine (ANY BX GENERIC EQUIV) 0.3 MG/0.3ML injection 2-pack INJECT 0.3MLS INTO THE MUSCLE AS NEEDED FOR ANAPHYLAXIS 2 each 1    fish oil-omega-3 fatty acids 1000 MG capsule Take 2 g by mouth daily      Fluticasone-Umeclidin-Vilant (TRELEGY ELLIPTA) 100-62.5-25 MCG/ACT oral inhaler Inhale 1 puff into the lungs daily 60 each 1    hydrOXYzine (VISTARIL) 25 MG capsule Take 25 mg by mouth 2 times daily as needed for anxiety      lidocaine (LIDODERM) 5 % patch Place 1 patch onto the skin every 24 hours To prevent lidocaine toxicity, patient should be patch free for 12 hrs daily. (Patient taking differently: Place onto the skin every 24 hours To prevent lidocaine toxicity, patient should be patch free for 12 hrs daily.) 1 patch 0    metFORMIN (GLUCOPHAGE) 1000 MG tablet Take 1 tablet (1,000 mg) by mouth 2 times daily (with meals) 60 tablet 1    metoprolol succinate ER (TOPROL-XL) 25 MG 24 hr tablet TAKE 1 TABLET BY MOUTH DAILY 90 tablet 3    montelukast (SINGULAIR) 10 MG tablet Take 1 tablet (10 mg) by mouth At Bedtime 90  tablet 1    montelukast (SINGULAIR) 10 MG tablet TAKE ONE TABLET BY MOUTH AT BEDTIME 90 tablet 3    multivitamin w/minerals (THERA-VIT-M) tablet Take 1 tablet by mouth daily      nicotine (NICODERM CQ) 14 MG/24HR 24 hr patch Place 1 patch onto the skin every 24 hours 30 patch 3    potassium chloride ER (KLOR-CON M) 20 MEQ CR tablet TAKE 2 TABLETS (40MEQ) IN THE MORNING 180 tablet 2    prazosin (MINIPRESS) 1 MG capsule Take 1 mg by mouth At Bedtime      tamsulosin (FLOMAX) 0.4 MG capsule Take 1 capsule (0.4 mg) by mouth daily 30 capsule 1    torsemide (DEMADEX) 20 MG tablet Take 1 tablet (20 mg) by mouth daily 270 tablet 3    vitamin C (ASCORBIC ACID) 500 MG tablet Take 500 mg by mouth daily      zolpidem (AMBIEN) 10 MG tablet Take 10 mg by mouth At Bedtime         Allergies   Allergen Reactions    Bees Anaphylaxis    Seasonal Allergies Difficulty breathing and Cough    Tramadol Nausea and Swelling    Wellbutrin [Bupropion] Other (See Comments)     tremor    [place] Nite Time Dizziness and Nausea and Vomiting    Nyquil Cold &  [Dm-Doxylamine-Acetaminophen] Dizziness and Nausea and Vomiting    Trichophyton Other (See Comments)     Immunization History   Administered Date(s) Administered    COVID-19 Bivalent 12+ (Pfizer) 01/18/2023    COVID-19 MONOVALENT 12+ (Pfizer) 05/22/2021, 06/12/2021, 01/06/2022    FLU 6-35 months 10/10/2011    Flu, Unspecified 12/20/2017    Influenza (H1N1) 01/01/2010    Influenza (IIV3) PF 10/10/2011, 12/08/2012, 09/12/2013, 11/03/2014    Influenza Vaccine 65+ (Fluzone HD) 11/30/2021, 01/18/2023    Influenza Vaccine >6 months (Alfuria,Fluzone) 11/21/2015, 11/14/2016, 11/05/2018, 10/07/2019    MMR 04/19/1993    Pneumo Conj 13-V (2010&after) 11/04/2014    Pneumococcal 23 valent 11/03/2014, 08/07/2020    TDAP (Adacel,Boostrix) 02/23/2021    TDAP Vaccine (Adacel) 05/12/2010, 08/03/2020    TDAP Vaccine (Boostrix) 05/12/2010    Td (Adult), Adsorbed 09/25/2009       Past Medical History:   Diagnosis Date     COPD (chronic obstructive pulmonary disease) (H)     Hypertension     Uncomplicated asthma        Past Surgical History:   Procedure Laterality Date    CV CORONARY ANGIOGRAM N/A 12/11/2020    Procedure: Coronary Angiogram;  Surgeon: Aman Delgado MD;  [facility]    CV RIGHT HEART CATH MEASUREMENTS RECORDED N/A 12/11/2020    Procedure: CV RIGHT HEART CATH;  Surgeon: Aman Delgado MD;  [facility]    ENT SURGERY      patient has had three left ear surgeries, unsure what they did    FUSION CERVICAL POSTERIOR THREE+ LEVELS      HERNIA REPAIR, INGUINAL RT/LT Right     done times 3    IR CONSULTATION FOR IR EXAM  1/19/2023    JOINT REPLACEMENT Left     PHACOEMULSIFICATION WITH STANDARD INTRAOCULAR LENS IMPLANT Right 1/25/2022    Procedure: COMPLEX PHACOEMULSIFICATION CATARACT EXTRACTION POSTERIOR CHAMBER LENS RIGHT EYE: PUPIL STRETCHING RIGHT RIGHT;  Surgeon: Efra Gibson MD;  [facility]: [facility]    PHACOEMULSIFICATION WITH STANDARD INTRAOCULAR LENS IMPLANT Left 2/8/2022    Procedure: ANTERIOR VITRECTOMY AND POSTERIOR CHAMBER LENS IMPLANT;  Surgeon: Efra Gibson MD;  Location: [facility]    REPAIR HAMMER TOE Right        SOCIAL HISTORY  History   Smoking Status    Every Day    Types: Cigars   Smokeless Tobacco    Never    Social History    Substance and Sexual Activity      Alcohol use: Never     History   Drug Use Unknown       FAMILY HISTORY  Family History   Problem Relation Age of Onset    Lung Cancer Mother     Ovarian Cancer Sister     Diabetes No family hx of     Coronary Artery Disease No family hx of     Hypertension No family hx of     Hyperlipidemia No family hx of     Cerebrovascular Disease No family hx of     Colon Cancer No family hx of     Prostate Cancer No family hx of     Breast Cancer No family hx of     Depression No family hx of        PHYSICAL EXAMINATION  /62   Pulse 105   Temp 98.2  F (36.8  C) (Tympanic)   Ht 1.803 m  "(5' 11\")   Wt 97.5 kg (215 lb)   SpO2 90%   BMI 29.99 kg/m    Wt Readings from Last 2 Encounters:   09/21/23 97.5 kg (215 lb)   08/30/23 99.2 kg (218 lb 9.6 oz)     Physical Exam  Constitutional:       Appearance: Normal appearance.   Pulmonary:      Effort: Pulmonary effort is normal.   Skin:     Comments: Erythema seen on bilateral inguinal region.   Neurological:      General: No focal deficit present.      Mental Status: He is alert and oriented to person, place, and time.   Psychiatric:         Mood and Affect: Mood normal.         Behavior: Behavior normal.     Laboratory and imaging:     Latest Reference Range & Units 08/16/23 05:49   WBC 4.0 - 11.0 10e3/uL 8.4   Hemoglobin 13.3 - 17.7 g/dL 17.0   Hematocrit 40.0 - 53.0 % 49.8   Platelet Count 150 - 450 10e3/uL 224       ASSESSMENT AND PLAN    Polycythemia:    Review of labs show elevated elevated hemoglobin dating back to 2018.  His hemoglobin on 6/16/2023 was 17 with a hematocrit of 49.8.    He has a history of pulmonary fibrosis and is on home oxygen.  He is also a current everyday smoker with a long history of smoking.  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  There might be a component of relative polycythemia today as he is on torsemide    At this point I would recommend checking a JAK2 mutation and erythropoietin level to rule out a primary process.  If the JAK2 mutation is negative it is then confirmed to be primary polycythemia.    He does complain of weight loss and appetite loss with night sweats.  Therefore will get a CT abdomen and pelvis to evaluate for splenomegaly or any other masses.    2.  Elevated PSA    Most recent PSA on 8/7/2023 is 9.86.  He also has symptoms of enlarged prostate.  He is seeing urology in December 2023.  May benefit from prior appointment.    He is also complaining of a rash and itching on bilateral groin.  This appeared to have some redness in that area.  Very likely this is a " fungal infection.  Clotrimazole cream prescribed.  Recommend he keep that area dry and clean.    Total time spent on the patient on day of encounter was 60 minutes doing chart review, review of test results, interpretation of results, patient visit and documentation.       Venus Pace MD

## 2023-09-21 NOTE — NURSING NOTE
"Oncology Rooming Note    September 21, 2023 1:03 PM   Joey Irene is a 67 year old male who presents for:    Chief Complaint   Patient presents with    Hematology     New consult. Elevated hemoglobin     Initial Vitals: /62   Pulse 105   Temp 98.2  F (36.8  C) (Tympanic)   Ht 1.803 m (5' 11\")   Wt 97.5 kg (215 lb)   SpO2 90%   BMI 29.99 kg/m   Estimated body mass index is 29.99 kg/m  as calculated from the following:    Height as of this encounter: 1.803 m (5' 11\").    Weight as of this encounter: 97.5 kg (215 lb). Body surface area is 2.21 meters squared.  Moderate Pain (4) Comment: Data Unavailable   No LMP for male patient.  Allergies reviewed: Yes  Medications reviewed: Yes    Medications: Medication refills not needed today.  Pharmacy name entered into EPIC:    [facility] - [place], [place] - [address] [id number] - [place], [place] - [address]  AT [facility] [address]  [facility]    Clinical concerns: none       Asia Molina LPN              "

## 2023-09-21 NOTE — Clinical Note
Hi I saw Mr. Irene  today he may benefit from seeing urology sooner I feel.  I know it has been difficult to get patients into urology but I was wondering if your office can help in any way.

## 2023-09-29 NOTE — TELEPHONE ENCOUNTER
Lanoxin      Last Written Prescription Date:  03/10/23  Last Fill Quantity: 90,   # refills: 1  Last Office Visit: 08/30/23  Future Office visit:    Next 5 appointments (look out 90 days)      Oct 12, 2023  3:00 PM  (Arrive by 2:45 PM)  Return Visit with Venus Pace MD  [facility] ([facility] ) [address] [address]  [phone]     Oct 31, 2023 10:30 AM  (Arrive by 10:15 AM)  SHORT with Amparo Alvares NP  [facility] ([facility] ) [address] [address]  [phone]

## 2023-10-03 NOTE — TELEPHONE ENCOUNTER
Tamsulosin (Flomax) 0.4 MG capsule     Last Written Prescription Date:  08/07/2023  Last Fill Quantity: 30,   # refills: 0  Last Office Visit: 08/21/2023

## 2023-10-12 NOTE — NURSING NOTE
"Oncology Rooming Note    October 12, 2023 2:53 PM   Joey Irene is a 67 year old male who presents for:    Chief Complaint   Patient presents with    Hematology     Follow up. Polycythemia      Initial Vitals: /58 (BP Location: Right arm, Patient Position: Sitting, Cuff Size: Adult Large)   Pulse 54   Temp 97.4  F (36.3  C) (Tympanic)   Ht 1.803 m (5' 11\")   Wt 98.9 kg (218 lb)   SpO2 (!) 89%   BMI 30.40 kg/m   Estimated body mass index is 30.4 kg/m  as calculated from the following:    Height as of this encounter: 1.803 m (5' 11\").    Weight as of this encounter: 98.9 kg (218 lb). Body surface area is 2.23 meters squared.  No Pain (0) Comment: Data Unavailable   No LMP for male patient.  Allergies reviewed: Yes  Medications reviewed: Yes    Medications: Medication refills not needed today.  Pharmacy name entered into EPIC:    [facility] - [place], [place] - [address] [id number] - [place], [place] - [address] [facility] [address]  [facility]    Clinical concerns: none       Asia Molina LPN              "

## 2023-10-12 NOTE — PROGRESS NOTES
HEMATOLOGY FOLLOW-UP NOTE  Oct 12, 2023    Reason for follow-up: polycythemia.     HISTORY OF PRESENT ILLNESS  Joey Irene is a 67 year old male with PMH as stated below who is seen in the hematology clinic for polycythemia.    His history in short is as follows:    Mr. Irene was diagnosed with pulmonary fibrosis last year.  Since then he is been having progressive dyspnea on exertion.  He is on home oxygen.  He is also a current everyday smoker.    He also has been complaining of worsening fatigue over the last few months.  He states he was found to have an enlarged prostate and was having difficulty urinating but that has improved after he was started on Flomax. .  He states since he has had issues with his prostate he is also does not have much of an appetite and is losing weight.  He is on Lasix for diastolic heart failure and checks his weight regularly.  He states he has lost 6 pounds in the last week.  He also complains of night sweats which has been ongoing for a few months.  He does have pain in the lower abdomen near his groin.  He denies any pain anywhere else.  He also complains of headache and dizziness.  He denies any pruritus after taking a shower.  Though the area around his groin is pruritic.  He denies any blood in his urine.    Review of labs in epic show elevated hemoglobin at 18.2 with a hematocrit of 52.9 dating back to 10/13/2020.  He also has intermittent elevated hemoglobin dating back to 6/13/2018 with hematocrit of 50.5.    Mr. Bowser is seen in clinic today for follow-up.  He is doing well.  No new complaints.  His night sweats have improved.  He does notice some increased shortness of breath over the last few days.  He attributes this to his allergies.  Denies any fever or chills.  Denies any worsening cough.  He is trying to cut down on his smoking and is currently smoking 1 cigar a day.    9/28/2023: CT abdomen and pelvis with contrast:IMPRESSION: No acute intra-abdominal  findings. No evidence of mass or lymphadenopathy. No significant interval change from prior study.       REVIEW OF SYSTEMS  A 12-point ROS negative except as in HPI      Current Outpatient Medications   Medication Sig Dispense Refill    acetaminophen (TYLENOL) 500 MG tablet Take 1,000 mg by mouth daily as needed (headaches)      albuterol (PROAIR HFA/PROVENTIL HFA/VENTOLIN HFA) 108 (90 Base) MCG/ACT inhaler INHALE 1-2 PUFFS INTO THE LUNGS EVERY 4 HOURS AS NEEDED FOR SHORTNESS OF BREATH/DYSPNEA/WHEEZING 8.5 g 0    ARIPiprazole (ABILIFY) 5 MG tablet Take 5 mg by mouth daily      ASPIRIN LOW DOSE 81 MG EC tablet TAKE 1 TABLET BY MOUTH DAILY 30 tablet 4    atorvastatin (LIPITOR) 20 MG tablet TAKE 1 TABLET BY MOUTH DAILY 90 tablet 2    benzonatate (TESSALON) 200 MG capsule Take 1 capsule (200 mg) by mouth 3 times daily as needed for cough 30 capsule 0    benztropine (COGENTIN) 0.5 MG tablet Take 0.5 mg by mouth three to four times daily      cetirizine (ZYRTEC) 10 MG tablet TAKE 1 TABLET BY MOUTH DAILY 30 tablet 4    Cholecalciferol (D 1000) 25 MCG (1000 UT) CAPS Take 1 capsule by mouth daily      clotrimazole (LOTRIMIN) 1 % external cream Apply topically 2 times daily 60 g 0    digoxin (LANOXIN) 125 MCG tablet Take 1 tablet (125 mcg) by mouth daily 90 tablet 0    DULoxetine (CYMBALTA) 60 MG capsule Take 1 capsule (60 mg) by mouth 2 times daily 60 capsule 1    EPINEPHrine (ANY BX GENERIC EQUIV) 0.3 MG/0.3ML injection 2-pack INJECT 0.3MLS INTO THE MUSCLE AS NEEDED FOR ANAPHYLAXIS 2 each 1    fish oil-omega-3 fatty acids 1000 MG capsule Take 2 g by mouth daily      Fluticasone-Umeclidin-Vilant (TRELEGY ELLIPTA) 100-62.5-25 MCG/ACT oral inhaler Inhale 1 puff into the lungs daily 60 each 1    hydrOXYzine (VISTARIL) 25 MG capsule Take 25 mg by mouth 2 times daily as needed for anxiety      lidocaine (LIDODERM) 5 % patch Place 1 patch onto the skin every 24 hours To prevent lidocaine toxicity, patient should be patch free for  12 hrs daily. (Patient taking differently: Place onto the skin every 24 hours To prevent lidocaine toxicity, patient should be patch free for 12 hrs daily.) 1 patch 0    metFORMIN (GLUCOPHAGE) 1000 MG tablet Take 1 tablet (1,000 mg) by mouth 2 times daily (with meals) 60 tablet 1    metoprolol succinate ER (TOPROL-XL) 25 MG 24 hr tablet TAKE 1 TABLET BY MOUTH DAILY 90 tablet 3    montelukast (SINGULAIR) 10 MG tablet Take 1 tablet (10 mg) by mouth At Bedtime 90 tablet 1    montelukast (SINGULAIR) 10 MG tablet TAKE ONE TABLET BY MOUTH AT BEDTIME 90 tablet 3    multivitamin w/minerals (THERA-VIT-M) tablet Take 1 tablet by mouth daily      nicotine (NICODERM CQ) 14 MG/24HR 24 hr patch Place 1 patch onto the skin every 24 hours 30 patch 3    potassium chloride ER (KLOR-CON M) 20 MEQ CR tablet TAKE 2 TABLETS (40MEQ) IN THE MORNING 180 tablet 2    prazosin (MINIPRESS) 1 MG capsule Take 1 mg by mouth At Bedtime      tamsulosin (FLOMAX) 0.4 MG capsule TAKE 1 CAPSULE BY MOUTH DAILY 30 capsule 10    torsemide (DEMADEX) 20 MG tablet Take 1 tablet (20 mg) by mouth daily 270 tablet 3    vitamin C (ASCORBIC ACID) 500 MG tablet Take 500 mg by mouth daily      zolpidem (AMBIEN) 10 MG tablet Take 10 mg by mouth At Bedtime         Allergies   Allergen Reactions    Bees Anaphylaxis    Seasonal Allergies Difficulty breathing and Cough    Tramadol Nausea and Swelling    Wellbutrin [Bupropion] Other (See Comments)     tremor    [place] Nite Time Dizziness and Nausea and Vomiting    Nyquil Cold &  [Dm-Doxylamine-Acetaminophen] Dizziness and Nausea and Vomiting    Trichophyton Other (See Comments)     Immunization History   Administered Date(s) Administered    COVID-19 Bivalent 12+ (Pfizer) 01/18/2023    COVID-19 MONOVALENT 12+ (Pfizer) 05/22/2021, 06/12/2021, 01/06/2022    FLU 6-35 months 10/10/2011    Flu, Unspecified 12/20/2017    Influenza (H1N1) 01/01/2010    Influenza (IIV3) PF 10/10/2011, 12/08/2012, 09/12/2013, 11/03/2014    Influenza  Vaccine 65+ (Fluzone HD) 11/30/2021, 01/18/2023    Influenza Vaccine >6 months (Alfuria,Fluzone) 11/21/2015, 11/14/2016, 11/05/2018, 10/07/2019    MMR 04/19/1993    Pneumo Conj 13-V (2010&after) 11/04/2014    Pneumococcal 23 valent 11/03/2014, 08/07/2020    TDAP (Adacel,Boostrix) 02/23/2021    TDAP Vaccine (Adacel) 05/12/2010, 08/03/2020    TDAP Vaccine (Boostrix) 05/12/2010    Td (Adult), Adsorbed 09/25/2009       Past Medical History:   Diagnosis Date    COPD (chronic obstructive pulmonary disease) (H)     Hypertension     Uncomplicated asthma        Past Surgical History:   Procedure Laterality Date    CV CORONARY ANGIOGRAM N/A 12/11/2020    Procedure: Coronary Angiogram;  Surgeon: Aman Delgado MD;  [facility]    CV RIGHT HEART CATH MEASUREMENTS RECORDED N/A 12/11/2020    Procedure: CV RIGHT HEART CATH;  Surgeon: Aman Delgado MD;  [facility]    ENT SURGERY      patient has had three left ear surgeries, unsure what they did    FUSION CERVICAL POSTERIOR THREE+ LEVELS      HERNIA REPAIR, INGUINAL RT/LT Right     done times 3    IR CONSULTATION FOR IR EXAM  1/19/2023    JOINT REPLACEMENT Left     PHACOEMULSIFICATION WITH STANDARD INTRAOCULAR LENS IMPLANT Right 1/25/2022    Procedure: COMPLEX PHACOEMULSIFICATION CATARACT EXTRACTION POSTERIOR CHAMBER LENS RIGHT EYE: PUPIL STRETCHING RIGHT RIGHT;  Surgeon: Efra Gibson MD;  Location: [facility]    PHACOEMULSIFICATION WITH STANDARD INTRAOCULAR LENS IMPLANT Left 2/8/2022    Procedure: ANTERIOR VITRECTOMY AND POSTERIOR CHAMBER LENS IMPLANT;  Surgeon: Efra Gibson MD;  Location: [facility]    REPAIR HAMMER TOE Right        SOCIAL HISTORY  History   Smoking Status    Every Day    Types: Cigars   Smokeless Tobacco    Never    Social History    Substance and Sexual Activity      Alcohol use: Never     History   Drug Use Unknown       FAMILY HISTORY  Family History   Problem Relation Age of Onset    Lung Cancer  Mother     Ovarian Cancer Sister     Diabetes No family hx of     Coronary Artery Disease No family hx of     Hypertension No family hx of     Hyperlipidemia No family hx of     Cerebrovascular Disease No family hx of     Colon Cancer No family hx of     Prostate Cancer No family hx of     Breast Cancer No family hx of     Depression No family hx of        PHYSICAL EXAMINATION  There were no vitals taken for this visit.  Wt Readings from Last 2 Encounters:   09/21/23 97.5 kg (215 lb)   08/30/23 99.2 kg (218 lb 9.6 oz)     Physical Exam  Constitutional:       Appearance: Normal appearance.   Pulmonary:      Effort: Pulmonary effort is normal.   Neurological:      General: No focal deficit present.      Mental Status: He is alert and oriented to person, place, and time.   Psychiatric:         Mood and Affect: Mood normal.         Behavior: Behavior normal.     Laboratory and imaging:     Latest Reference Range & Units 08/16/23 05:49   WBC 4.0 - 11.0 10e3/uL 8.4   Hemoglobin 13.3 - 17.7 g/dL 17.0   Hematocrit 40.0 - 53.0 % 49.8   Platelet Count 150 - 450 10e3/uL 224       ASSESSMENT AND PLAN    Polycythemia:    Review of labs show elevated elevated hemoglobin dating back to 2018.  His hemoglobin on 6/16/2023 was 17 with a hematocrit of 49.8.    He has a history of pulmonary fibrosis and is on home oxygen.  He is also a current everyday smoker with a long history of smoking.  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  There might be a component of relative polycythemia today as he is on torsemide    His JAK2 mutation was negative therefore this is not primary polycythemia.  As mentioned above this is very likely from his pulmonary fibrosis, chronic hypoxia and smoking.  Therefore I would recommend he stop smoking.    CT scan obtained because he was complaining of night sweats and weight loss was also negative for any masses or lymphadenopathy.  He states his night sweats has  improved.    2.  Elevated PSA    Most recent PSA on 8/7/2023 is 9.86.  He also has symptoms of enlarged prostate.  He is seeing urology in December 2023.      Total time spent on the patient on day of encounter was 60 minutes doing chart review, review of test results, interpretation of results, patient visit and documentation.       Venus Pace MD

## 2023-10-20 NOTE — TELEPHONE ENCOUNTER
Metformin 1000 mg tab      Last Written Prescription Date:  8/8/23  Last Fill Quantity: 60,   # refills: 1  Last Office Visit: 8/21/23  Future Office visit:    Next 5 appointments (look out 90 days)      Oct 31, 2023 10:30 AM  (Arrive by 10:15 AM)  SHORT with Amparo Alvares NP  [facility] ([facility] [place] ) [address]  [place] [address]  [phone]

## 2023-10-30 PROBLEM — J96.21 ACUTE ON CHRONIC RESPIRATORY FAILURE WITH HYPOXIA (H): Status: RESOLVED | Noted: 2023-01-01 | Resolved: 2023-01-01

## 2023-10-30 PROBLEM — J40 BRONCHITIS: Status: RESOLVED | Noted: 2023-01-01 | Resolved: 2023-01-01

## 2023-10-30 PROBLEM — J43.9 PULMONARY EMPHYSEMA, UNSPECIFIED EMPHYSEMA TYPE (H): Status: RESOLVED | Noted: 2020-08-05 | Resolved: 2023-01-01

## 2023-10-30 PROBLEM — R73.9 HYPERGLYCEMIA: Status: RESOLVED | Noted: 2020-08-07 | Resolved: 2023-01-01

## 2023-10-30 PROBLEM — I50.32 CHRONIC DIASTOLIC HEART FAILURE (H): Status: ACTIVE | Noted: 2020-11-20

## 2023-10-30 PROBLEM — I50.32 CHRONIC DIASTOLIC HEART FAILURE (H): Status: RESOLVED | Noted: 2020-11-20 | Resolved: 2023-01-01

## 2023-10-30 PROBLEM — J96.21 ACUTE AND CHRONIC RESPIRATORY FAILURE WITH HYPOXIA (H): Status: RESOLVED | Noted: 2023-01-01 | Resolved: 2023-01-01

## 2023-10-30 PROBLEM — I51.7 RIGHT VENTRICULAR DILATION: Status: RESOLVED | Noted: 2020-10-29 | Resolved: 2023-01-01

## 2023-10-30 PROBLEM — R73.03 PREDIABETES: Status: RESOLVED | Noted: 2022-08-03 | Resolved: 2023-01-01
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
## 2023-10-31 PROBLEM — J44.9 COPD (CHRONIC OBSTRUCTIVE PULMONARY DISEASE) (H): Status: RESOLVED | Noted: 2023-01-01 | Resolved: 2023-01-01

## 2023-11-15 NOTE — ED NOTES
Witnessed respiratory arrest at 1158    1207 - arrived via [facility], CPR in progress via Josias torres in place  1210 - ETT in place via Dr. Chaves  1211 - IO in place and IVF started   1213 - 18g IV  1215 - no pulse, PEA, 1 epi  1218 - bicarb, calcium chloride  1219 - no pulse, PEA   1220 - epi  1223 -  calcium chloride  1224 - epi  1225 - no pulse, asystole  1227 - no pulse, asystole  1228 - epi  1232 - epi  1233 - calcium chloride  1234 - no pulse, asystole   1234 - time of death called via Dr. Chaves

## 2023-11-15 NOTE — ED NOTES
Family left promptly after time of death. Family spoke to Yakelin RN house supervisor regarding disposition and  home arrangements.

## 2023-11-15 NOTE — PROGRESS NOTES
Responded to ED for [facility]. CPR was in progress & pt was intubated with a I-gel in the field. Pt was re-intubated by provider and we continued PPV with 100% 02. TOD was called by provider and PPV discontinued.

## 2023-11-15 NOTE — ED NOTES
Off unit to hunter with house supervisor CASSIE Hamlin    Silver colored ring is in place on left 4th digit and bottom denture in plastic bag with patient

## 2023-11-15 NOTE — ED NOTES
Patient short of breath, tripoding when EMS arrived on scene. SpO2 70%. Duoneb given, 0.25mg of terbutaline, 10mg decadron, albuterol neb. Patient got to EMS rig, attempted BiPAP without success. Patient color purple/grey on first assessment of EMS.     200 Ketamine, 100 eric given to intubate. iGel placed. CPR started 1158. 3 doses epi give through IV placed in field. Witnessed respiratory arrest    Telemetry Afib prior to arrest. PEA/asystole on monitor after arrest    Recent pneumonia with hospitalization.     Hx COPD

## 2023-11-15 NOTE — PROGRESS NOTES
This writer spoke with Tia, patient's emergency contact, she was aware Joey brought into hospital, asked her to come in as soon as she can. She said she will be right here.

## 2023-11-15 NOTE — ED PROVIDER NOTES
[facility]  ED Provider Note    Chief Complaint   Patient presents with    Cardiac Arrest     History:  Joey Irene is a 67 year old male with pulmonary hypertension, COPD, and heart failure presenting for concerns regarding cardiac arrest.  The patient called 911 earlier today due to concerns regarding respiratory distress and shortness of breath.  The patient reported to medics that he felt like it was similar to COPD exacerbation.    Medics noted that the patient had facial cyanosis, and appeared ill.  They tried BiPAP, and noted that he was initially breathing 40 times a minute.  His respiratory rate dropped, and because of this, they subsequently abandoned BiPAP.  Magnesium, terbutaline, and albuterol neb were given.    Shortly after the patient had a relatively dyspnea, EMS attempted intubation.  They tried ketamine in route, and were unsuccessful.  An i-gel was placed, the patient lost pulses, CPR was started, and the patient was transported.  3 of epi were given in route.  No calcium or bicarb.  Initial rhythm was PEA.    Review of Systems   Performed; see HPI for pertinent positives and negatives.     Medical history, surgical history, and social history was reviewed.  Delores respiratory distress and shortness of breathsing documentation, triage note, and vitals were reviewed.    Vitals:       Physical Exam:  General: Ongoing CPR  Head: There is facial cyanosis.  There is no evidence of trauma.  Eyes: There is anisocoria noted, left pupil is approximately 5 mm, right pupil is approximately 2 mm.  ENT: There is a i-gel in place.  Neck: No trauma.  Palpable cart cricothyroid membrane.  Cardiovascular: Wide-complex bradycardic PEA on the monitor with ongoing CPR.  2+ femoral pulse with ongoing CPR.  Asystolic when removal of the CPR device.  Pulmonary: Coarse, but with auscultated breath sounds bilaterally with i-gel in place.  Abdomen: Nondistended.  Extremities: No long bone deformities.   There is 2+ pitting edema noted in the bilateral lower extremities.  Skin: There is mottling, pallor, and facial cyanosis noted.  Delayed cap refill.  Extremities are cool to the touch.  Neuro: GCS 3 T.    MDM:  In summary, this is a 67-year-old male who is presenting for cardiac arrest.  He was initially in bradycardic PEA rhythm, but quickly transitioned to a systolic rhythm during the initial pulse check.  EMS was concerned that the patient will require surgical airway upon arrival.    The i-gel was quickly pulled, CPR was continued, and the patient was successfully intubated with a 7 5 tube.  It was 23 at the teeth.  Breath sounds were heard bilaterally.  I/O and IV access was obtained via nursing.    Epi was given, and ACLS measures were continued.  The patient had a wide-complex tachycardic PEA, and the endotracheal tube was hooked up to end-tidal.  Initial end-tidal's were low.  On numerous pulse checks, the patient had agonal cardiac activity with PEA, but quickly transition to asystole.    After approximately 25 minutes of CPR, family arrived at the bedside, and showed interest in being with the patient.  Family was allowed to come back, and resuscitative measures were continued.  The second to last pulse check demonstrated that the patient had developed a PEA rhythm, with heart rates in the 60s.  It still appeared Wide-complex.    Calcium and an additional dose of epinephrine, 7 total, was going given.  The patient then transition to a bradycardic PEA, with agonal cardiac activity.  He had a total of approximately 35 minutes of CPR.  I do suspect that he had a poor outcome given his numerous cardiac comorbidities, and the amount of downtime.      Additionally, the unequal pupils demonstrated that he may be suffering some degree of hypoxia, or cerebral edema that may be a neurological insult.  Because of this, resuscitative measures were ceased.  Time of death was declared at 1236.     Family was at the  bedside and updated when time of death was called.  All questions and concerns were otherwise addressed and answered.    Impression:  Final diagnoses:   Cardiac arrest (H)   Acute on chronic respiratory failure with hypoxia and hypercapnia (H)   Pulmonary hypertension (H)   Acute on chronic congestive heart failure, unspecified heart failure type (H)     Procedures:  -Intubation    Date/Time: 11/15/2023 1:04 PM    Performed by: Guilherme Chaves MD  Authorized by: Guilherme Chaves MD    Risks, benefits and alternatives discussed.    Consent:     Risks, benefits, and alternatives were discussed: yes    Pre-procedure details:     Indications: cardio/pulmonary arrest      Patient status:  Unresponsive    Obstruction: none      Pharmacologic strategy: none      Induction agents:  None (Medications given prior to arrival)    Paralytics:  None  Procedure details:     Preoxygenation:  Supraglottic device    CPR in progress: yes      Number of attempts:  1  Successful intubation attempt details:     Intubation method:  Oral    Laryngoscope blade:  Mac 4    Bougie used: yes      Grade view: I      Tube size (mm):  7.5    Tube type:  Cuffed    Tube visualized through cords: yes    Placement assessment:     ETT at teeth/gumline (cm):  23    Tube secured with:  ETT villarreal    Breath sounds:  Equal    Placement verification: chest rise, colorimetric ETCO2, direct visualization, numeric ETCO2 and waveform ETCO2      PROCEDURE  Describe Procedure: Cardiac arrest with ongoing CPR.  2 was never confirmed via chest x-ray, but was confirmed with numeric end-tidal    Critical Care time was 30 minutes for this patient excluding procedures.     Guilherme Chaves MD  11/15/23 2185

## 2024-11-05 ASSESSMENT — PATIENT HEALTH QUESTIONNAIRE - PHQ9: SUM OF ALL RESPONSES TO PHQ QUESTIONS 1-9: 2

## 2024-11-26 ASSESSMENT — PATIENT HEALTH QUESTIONNAIRE - PHQ9: SUM OF ALL RESPONSES TO PHQ QUESTIONS 1-9: 1

## (undated) DEVICE — Device

## (undated) DEVICE — PACK-PHACO STELLARIS

## (undated) DEVICE — BIN-LENS IMPLANT CART

## (undated) DEVICE — KNIFE-KERATOME SLIT 2.8MM

## (undated) DEVICE — HANDPIECE-CAPSULEGUARD I/A STELLARIS

## (undated) DEVICE — BIN-TECNIS DCB00 LENSES

## (undated) DEVICE — CANNULA-VISCOFLOW 25G 9MM 45 DEGREE ANGLE

## (undated) DEVICE — BETADINE 5% STERILE OPHTHALMIC SOLUTION 1 OZ.

## (undated) DEVICE — KNIFE-MICRO UNITOME 5.0MM

## (undated) DEVICE — TUBING PRESSURE 30"

## (undated) DEVICE — INSTRUMENT WIPE-VISIWIPE

## (undated) DEVICE — GLV-7.5 PROTEXIS PI CLASSIC LF/PF

## (undated) DEVICE — PACK-EYE-CUSTOM

## (undated) DEVICE — GLV-7.0 PROTEXIS PI CLASSIC LF/PF

## (undated) DEVICE — MALYUGIN 2.0 RING 6.25MM

## (undated) DEVICE — CANNULA-VISCOFLOW 30G 9MM BEND

## (undated) DEVICE — INTRO SHEATH 7FRX10CM PINNACLE RSS702

## (undated) DEVICE — CANNULA-NUCLEUS HYDRODISSECTOR

## (undated) DEVICE — PACK HEART LEFT CUSTOM

## (undated) DEVICE — CYSTOTOME-IRRIGATING  25G

## (undated) DEVICE — BIN-CATARACT BIN

## (undated) DEVICE — LENS DELIVERY SYSTEM-SOFPORT LI61AO (EZ-28)

## (undated) DEVICE — KIT HAND CONTROL ACIST 014644 AR-P54

## (undated) DEVICE — CATH ANGIO SUPERTORQUE PLUS JL4 6FRX100CM 533620

## (undated) DEVICE — GLIDEWIRE TERUMO .035X180CM 1.5,, J-TIP GR3525

## (undated) DEVICE — FASTENER CATH BALLOON CLAMPX2 STATLOCK 0684-00-493

## (undated) DEVICE — SHTH INTRO 0.021IN ID 6FR DIA

## (undated) DEVICE — SLEEVE TR BAND RADIAL COMPRESSION DEVICE 24CM TRB24-REG

## (undated) RX ORDER — FENTANYL CITRATE 50 UG/ML
INJECTION, SOLUTION INTRAMUSCULAR; INTRAVENOUS
Status: DISPENSED
Start: 2022-02-08

## (undated) RX ORDER — NITROGLYCERIN 5 MG/ML
VIAL (ML) INTRAVENOUS
Status: DISPENSED
Start: 2020-12-11

## (undated) RX ORDER — SODIUM CHLORIDE 9 MG/ML
INJECTION, SOLUTION INTRAVENOUS
Status: DISPENSED
Start: 2020-12-11

## (undated) RX ORDER — LIDOCAINE 40 MG/G
CREAM TOPICAL
Status: DISPENSED
Start: 2020-12-11

## (undated) RX ORDER — HEPARIN SODIUM 1000 [USP'U]/ML
INJECTION, SOLUTION INTRAVENOUS; SUBCUTANEOUS
Status: DISPENSED
Start: 2020-12-11

## (undated) RX ORDER — FENTANYL CITRATE 50 UG/ML
INJECTION, SOLUTION INTRAMUSCULAR; INTRAVENOUS
Status: DISPENSED
Start: 2020-12-11